# Patient Record
Sex: FEMALE | Race: BLACK OR AFRICAN AMERICAN | NOT HISPANIC OR LATINO | Employment: FULL TIME | URBAN - METROPOLITAN AREA
[De-identification: names, ages, dates, MRNs, and addresses within clinical notes are randomized per-mention and may not be internally consistent; named-entity substitution may affect disease eponyms.]

---

## 2017-01-03 DIAGNOSIS — M17.0 OSTEOARTHRITIS OF BOTH KNEES, UNSPECIFIED OSTEOARTHRITIS TYPE: ICD-10-CM

## 2017-01-03 RX ORDER — MELOXICAM 15 MG/1
TABLET ORAL
Qty: 30 TABLET | Refills: 5 | Status: SHIPPED | OUTPATIENT
Start: 2017-01-03 | End: 2019-05-21

## 2017-01-05 ENCOUNTER — PROCEDURE VISIT (OUTPATIENT)
Dept: OPHTHALMOLOGY | Facility: CLINIC | Age: 52
End: 2017-01-05
Payer: COMMERCIAL

## 2017-01-05 DIAGNOSIS — H34.8120 CENTRAL RETINAL VEIN OCCLUSION WITH MACULAR EDEMA OF LEFT EYE: Primary | ICD-10-CM

## 2017-01-05 PROCEDURE — 67028 INJECTION EYE DRUG: CPT | Mod: LT,S$GLB,, | Performed by: OPHTHALMOLOGY

## 2017-01-05 PROCEDURE — 92134 CPTRZ OPH DX IMG PST SGM RTA: CPT | Mod: S$GLB,,, | Performed by: OPHTHALMOLOGY

## 2017-01-05 PROCEDURE — 99499 UNLISTED E&M SERVICE: CPT | Mod: S$GLB,,, | Performed by: OPHTHALMOLOGY

## 2017-01-05 NOTE — PROGRESS NOTES
===============================  Chastity Hung,   51 y.o. female   Last visit [facility]: :12/5/2016   Last visit eye dept. 12/5/2016  [facility]:  Corrected distance visual acuity was 20/25 in the right eye and 20/40 in the left eye.   Not recorded         Not recorded         Not recorded        Chief Complaint   Patient presents with    CRVO     today eylea vs focal os, pt states no change in vision since last visit     Ophthalmic Medications     Ophthalmic - Anti-inflammatory, Glucocorticoids Start End    prednisoLONE acetate (PRED FORTE) 1 % DrpS 7/6/2016     Sig: Place 1 drop into both eyes 4 (four) times daily.    Route: Both Eyes         HPI     CRVO    Additional comments: today eylea vs focal os, pt states no change in   vision since last visit           Comments   --DM since 1999  NO H/O BDR  --CRVO OS with associated ME diagnosed 2/25/14  Previous Avastin, now Eylea  LAST Eylea 12/5/16  --JEANE JACOBS (dx by Dr. JEANE Schneider)  C:D 0.6/0.55  /519  RNFL OD nl OS low IT 2/14/14 with Dr. Schneider  Tmax 20/18   Last VF 2/25/14       Last edited by KRYSTA Muhammad on 1/5/2017  8:10 AM. (History)      Read Studies: y  Vitalsy    ________________  1/5/2017  1. Central retinal vein occlusion with macular edema of left eye      .  crvo  Now treated to minmixed   rec focal  1/5/2017  Diagnosis :  Os  focal}   Plan : rtc 1 mo - eyela  Or obsevre     OS} today  Next visit : rtc 1 mo          Laser Procedure Note    Laser: Focal OS  ARGON  Power: 330  Duration: 30  Interval: 450  Number: 38  Lens centralis  Complications: None  Procedure ordered: y  Consent: y  Opnote: y  Charge capture:y  Sided procedure note: y             ===========================

## 2017-01-05 NOTE — MR AVS SNAPSHOT
"    [facility]  [address] [place] [address]  Phone: [phone]  Fax: [phone]                  Chastity Hung   2017 8:00 AM   Procedure visit    Description:  Female : 1965   Provider:  SHIMA Lopez MD   Department:  [facility]           Reason for Visit     CRVO           Diagnoses this Visit        Comments    Central retinal vein occlusion with macular edema of left eye    -  Primary            To Do List           Goals (5 Years of Data)     None      Follow-Up and Disposition     Return in about 1 month (around 2017).      [facility]     [facility] On Call Nurse Care Line -  Assistance  Registered nurses in the [facility] On Call Center provide clinical advisement, health education, appointment booking, and other advisory services.  Call for this free service at [phone].             Medications           Message regarding Medications     Verify the changes and/or additions to your medication regime listed below are the same as discussed with your clinician today.  If any of these changes or additions are incorrect, please notify your healthcare provider.             Verify that the below list of medications is an accurate representation of the medications you are currently taking.  If none reported, the list may be blank. If incorrect, please contact your healthcare provider. Carry this list with you in case of emergency.           Current Medications     ACCU-CHEK FREDI PLUS TEST STRP Strp TEST BLOOD SUGAR THREE TIMES DAILY    diphenhydramine-acetaminophen (TYLENOL PM)  mg Tab Take 1 tablet by mouth nightly as needed.    fluticasone (FLONASE) 50 mcg/actuation nasal spray 2 sprays by Each Nare route once daily.    glimepiride (AMARYL) 1 MG tablet TAKE 1 TABLET BY MOUTH EVERY MORNING    insulin needles, disposable, (BD INSULIN PEN NEEDLE UF MINI) 31 x 3/16 " Ndle USE AS DIRECTED WITH VICTOZA    losartan-hydrochlorothiazide " 50-12.5 mg (HYZAAR) 50-12.5 mg per tablet Take 1 tablet by mouth every morning.    meloxicam (MOBIC) 15 MG tablet TAKE 1 TABLET BY MOUTH EVERY DAY    metformin (GLUCOPHAGE) 1000 MG tablet TAKE 1 TABLET BY MOUTH TWICE DAILY WITH FOOD    multivitamin with minerals tablet Take 1 tablet by mouth once daily.    prednisoLONE acetate (PRED FORTE) 1 % DrpS Place 1 drop into both eyes 4 (four) times daily.    senna-docusate 8.6-50 mg (PERICOLACE) 8.6-50 mg per tablet Take 1 tablet by mouth once daily.    sertraline (ZOLOFT) 50 MG tablet TAKE 1 TABLET BY MOUTH EVERY DAY    tolterodine (DETROL) 2 MG Tab TAKE 1 TABLET BY MOUTH TWICE DAILY           Clinical Reference Information           Allergies as of 1/5/2017     No Known Allergies      Immunizations Administered on Date of Encounter - 1/5/2017     None      Orders Placed During Today's Visit      Normal Orders This Visit    Posterior Segment OCT Retina-Both eyes

## 2017-01-06 DIAGNOSIS — E11.9 TYPE 2 DIABETES MELLITUS WITHOUT COMPLICATION: ICD-10-CM

## 2017-02-10 ENCOUNTER — NURSE TRIAGE (OUTPATIENT)
Dept: ADMINISTRATIVE | Facility: CLINIC | Age: 52
End: 2017-02-10

## 2017-02-10 NOTE — TELEPHONE ENCOUNTER
Reason for Disposition   [1] Probable influenza (fever) with no complications AND [2] NOT HIGH RISK (all triage questions negative)   Influenza prevention, questions about    Protocols used: ST INFLUENZA - SEASONAL-A-, ST INFLUENZA EXPOSURE-A-    Patient has flu like symptoms and is wondering how to care for it. She states she started having symptoms today including body aches, chills, stuffy nose, and sore throat. She isn't sure if she was exposed to anyone with the flu that she is aware of. She is not sure if she has fever. She denies shortness of breath. She states she is normally fairly healthy. Advised on home care including drinking plenty of fluids, ibuprofen for body aches/fever, coricidin a and steam showers for nasal congestion. Advised if any worsening symptoms to call back. Please contact caller with any further care advice.

## 2017-03-01 ENCOUNTER — PROCEDURE VISIT (OUTPATIENT)
Dept: OPHTHALMOLOGY | Facility: CLINIC | Age: 52
End: 2017-03-01
Payer: COMMERCIAL

## 2017-03-01 DIAGNOSIS — H34.8120 CENTRAL RETINAL VEIN OCCLUSION WITH MACULAR EDEMA OF LEFT EYE: Primary | ICD-10-CM

## 2017-03-01 DIAGNOSIS — E11.9 TYPE 2 DIABETES MELLITUS WITHOUT COMPLICATION, WITH LONG-TERM CURRENT USE OF INSULIN: ICD-10-CM

## 2017-03-01 DIAGNOSIS — Z79.4 TYPE 2 DIABETES MELLITUS WITHOUT COMPLICATION, WITH LONG-TERM CURRENT USE OF INSULIN: ICD-10-CM

## 2017-03-01 PROCEDURE — 92014 COMPRE OPH EXAM EST PT 1/>: CPT | Mod: 25,S$GLB,, | Performed by: OPHTHALMOLOGY

## 2017-03-01 PROCEDURE — 92134 CPTRZ OPH DX IMG PST SGM RTA: CPT | Mod: S$GLB,,, | Performed by: OPHTHALMOLOGY

## 2017-03-01 PROCEDURE — 67028 INJECTION EYE DRUG: CPT | Mod: LT,S$GLB,, | Performed by: OPHTHALMOLOGY

## 2017-03-01 NOTE — PROGRESS NOTES
===============================  Chastity Hung,   51 y.o. female   Last visit [facility]: :1/5/2017   Last visit eye dept. 1/5/2017  [facility]:  Corrected distance visual acuity was 20/25 in the right eye and 20/80 -2 in the left eye.  Tonometry     Tonometry (Applanation, 9:31 AM)      Right Left   Pressure 12 12                 Wearing Rx     Wearing Rx      Sphere Cylinder Axis Add   Right -1.25 +0.50 130 +2.25   Left -1.00 +0.50 035 +2.25       Type:  PAL              Manifest Refraction     Manifest Refraction      Sphere Cylinder Axis Dist Add   Right -1.25 +0.50 130  +2.25   Left -1.50 +0.50 035 20/60 +2.25                 Chief Complaint   Patient presents with    CRVO     EYLEA VRS OBSERVE     Ophthalmic Medications     Ophthalmic - Anti-inflammatory, Glucocorticoids Start End    prednisoLONE acetate (PRED FORTE) 1 % DrpS 7/6/2016     Sig: Place 1 drop into both eyes 4 (four) times daily.    Route: Both Eyes         HPI     CRVO    Additional comments: EYLEA VRS OBSERVE           Comments   --DM since 1999  NO H/O BDR  --CRVO OS with associated ME diagnosed 2/25/14  Previous Avastin, now Eylea  LAST Eylea 12/5/16  FOCAL OS 1/5/17  --JEANE JACOBS (dx by Dr. JEANE Schneider)  C:D 0.6/0.55  /519  RNFL OD nl OS low IT 2/14/14 with Dr. Schneider  Tmax 20/18   Last VF 2/25/14       Last edited by Nela Martinez on 3/1/2017  7:54 AM. (History)      Read Studies: y  Vitalsy    ________________  3/1/2017  1. Central retinal vein occlusion with macular edema of left eye    2. Type 2 diabetes mellitus without complication, with long-term current use of insulin      DM, no BDR  CRVO diagnosed 2/2014  Last eylea 12/5/17.  Os focal last visit - worse!, but 3 months since last Eylea  20/40> 20 100  MR today 20/60+1    eyela    3/1/2017  Diagnosis :  Os crvo  Today:   Eylea (afibercept) 2 mg/0.05 ml Intravitreal Injection , OS   Follow up: rtc 1 mo eyela #2        Call 24/7 for any worsening of vision. Check  OU QD. Gave my home  phone number.      Procedure  Note:   OS}  Eylea (afibercept) 2 mg/0.05 ml Intravitreal Injection    I have explained the Risks, Benefits and Alternatives of the procedure in detail.  The patient voices understanding and all questions have been answered.  The patient agrees to proceed as discussed.  LIDOCAINE 2%  subconj bleb  was used for anesthesia.  Topical betadine was used for antisepsis.  0.05 cc was  injected 3.7 mm from corneal limbus in the inferotemporal quadrant.  Following injection the IOP was less than thirty (<30) by tonopen.  The eye was then thoroughly irrigated with BSS.  Patient tolerated procedure well.  No complications were observed.  The Patient was educated that mild irritation tonight was normal secondary to topical antispsis use.  Pt was advised to call at any time day or night for pain, redness, or any decline in vision. I gave the patient my home number as well as the clinic on call number. Daily visual checks and Amsler grid testing were reviewed.  ciloxan Antibiotic Drops to be used 4 times daily for 4 days  SHIMA Lopez MD  Procedure ordered: y  Consent: y  Pre auth: y  MAR:y  Opnote: y  Charge capture:y  Sided procedure note: y       ===========================

## 2017-03-01 NOTE — MR AVS SNAPSHOT
[facility]  [address] [place] [address]  Phone: [phone]  Fax: [phone]                  Chastity Hung   3/1/2017 8:45 AM   Procedure visit    Description:  Female : 1965   Provider:  SHIMA Lopez MD   Department:  [facility]           Reason for Visit     CRVO           Diagnoses this Visit        Comments    Central retinal vein occlusion with macular edema of left eye    -  Primary            To Do List           Future Appointments        Provider Department Dept Phone    3/1/2017 8:45 AM SHIMA Lopez MD [facility] [phone]      Goals (5 Years of Data)     None      Follow-Up and Disposition     Return in about 1 month (around 2017).      [facility]     [facility] On Call Nurse Care Line -  Assistance  Registered nurses in the [facility] On Call Center provide clinical advisement, health education, appointment booking, and other advisory services.  Call for this free service at [phone].             Medications           Message regarding Medications     Verify the changes and/or additions to your medication regime listed below are the same as discussed with your clinician today.  If any of these changes or additions are incorrect, please notify your healthcare provider.        These medications were administered today        Dose Freq    aflibercept Soln 2 mg 2 mg Clinic/HOD 1 time    Si.05 mLs (2 mg total) by Intravitreal route one time.    Class: Normal    Route: Intravitreal           Verify that the below list of medications is an accurate representation of the medications you are currently taking.  If none reported, the list may be blank. If incorrect, please contact your healthcare provider. Carry this list with you in case of emergency.           Current Medications     ACCU-CHEK FREDI PLUS TEST STRP Strp TEST BLOOD SUGAR THREE TIMES DAILY    diphenhydramine-acetaminophen (TYLENOL PM)  mg Tab Take  "1 tablet by mouth nightly as needed.    fluticasone (FLONASE) 50 mcg/actuation nasal spray 2 sprays by Each Nare route once daily.    glimepiride (AMARYL) 1 MG tablet TAKE 1 TABLET BY MOUTH EVERY MORNING    insulin needles, disposable, (BD INSULIN PEN NEEDLE UF MINI) 31 x 3/16 " Ndle USE AS DIRECTED WITH VICTOZA    losartan-hydrochlorothiazide 50-12.5 mg (HYZAAR) 50-12.5 mg per tablet Take 1 tablet by mouth every morning.    meloxicam (MOBIC) 15 MG tablet TAKE 1 TABLET BY MOUTH EVERY DAY    metformin (GLUCOPHAGE) 1000 MG tablet TAKE 1 TABLET BY MOUTH TWICE DAILY WITH FOOD    multivitamin with minerals tablet Take 1 tablet by mouth once daily.    prednisoLONE acetate (PRED FORTE) 1 % DrpS Place 1 drop into both eyes 4 (four) times daily.    senna-docusate 8.6-50 mg (PERICOLACE) 8.6-50 mg per tablet Take 1 tablet by mouth once daily.    sertraline (ZOLOFT) 50 MG tablet TAKE 1 TABLET BY MOUTH EVERY DAY    tolterodine (DETROL) 2 MG Tab TAKE 1 TABLET BY MOUTH TWICE DAILY           Clinical Reference Information           Allergies as of 3/1/2017     No Known Allergies      Immunizations Administered on Date of Encounter - 3/1/2017     None      Orders Placed During Today's Visit      Normal Orders This Visit    Posterior Segment OCT Retina-Both eyes     Prior Authorization Order       Language Assistance Services     ATTENTION: Language assistance services are available, free of charge. Please call [phone].      ATENCIÓN: Si habla kailee, tiene a zayas disposición servicios gratuitos de asistencia lingüística. Llkrishna al [phone].     [facility] Ý: N?u b?n nói Ti?ng Vi?t, có các d?ch v? h? tr? ngôn ng? mi?n phí dành cho b?n. G?i s? [phone].         [facility] complies with applicable Federal civil rights laws and does not discriminate on the basis of race, color, national origin, age, disability, or sex.        "

## 2017-03-10 ENCOUNTER — PATIENT MESSAGE (OUTPATIENT)
Dept: FAMILY MEDICINE | Facility: CLINIC | Age: 52
End: 2017-03-10

## 2017-03-29 ENCOUNTER — PROCEDURE VISIT (OUTPATIENT)
Dept: OPHTHALMOLOGY | Facility: CLINIC | Age: 52
End: 2017-03-29
Payer: COMMERCIAL

## 2017-03-29 DIAGNOSIS — H34.8120 CENTRAL RETINAL VEIN OCCLUSION WITH MACULAR EDEMA OF LEFT EYE: Primary | ICD-10-CM

## 2017-03-29 PROCEDURE — 92134 CPTRZ OPH DX IMG PST SGM RTA: CPT | Mod: S$GLB,,, | Performed by: OPHTHALMOLOGY

## 2017-03-29 PROCEDURE — 99499 UNLISTED E&M SERVICE: CPT | Mod: S$GLB,,, | Performed by: OPHTHALMOLOGY

## 2017-03-29 PROCEDURE — 67028 INJECTION EYE DRUG: CPT | Mod: LT,S$GLB,, | Performed by: OPHTHALMOLOGY

## 2017-03-29 NOTE — PROGRESS NOTES
"    ===============================  Chastity Hung,   51 y.o. female   Last visit [facility]: :3/1/2017   Last visit eye dept. 3/1/2017  [facility]:  Corrected distance visual acuity was 20/25 in the right eye and 20/50 in the left eye.   Not recorded         Not recorded         Not recorded        Chief Complaint   Patient presents with    CRVO     eylea os, pt states vision is "no better, no worse"     Ophthalmic Medications     Ophthalmic - Anti-inflammatory, Glucocorticoids Start End    prednisoLONE acetate (PRED FORTE) 1 % DrpS 7/6/2016     Sig: Place 1 drop into both eyes 4 (four) times daily.    Route: Both Eyes         HPI     CRVO    Additional comments: eylea os, pt states vision is "no better, no worse"           Comments   Last Diabetic Eye Exam 3/1/17  Last S. COAG check 2/2014    --DM since 1999  NO H/O BDR  --CRVO OS with associated ME diagnosed 2/25/14  LAST Eylea 3/1/17  FOCAL OS 1/5/17  --S. COAG (dx by Dr. JEANE Schneider)  C:D 0.6/0.55  /519  RNFL OD nl OS low IT 2/14/14 with Dr. Schneider  Tmax 20/18   Last VF 2/25/14       Last edited by KRYSTA Muhammad on 3/29/2017  8:09 AM. (History)      Read Studies: y  Vitalsy    ________________  3/29/2017  Problem List Items Addressed This Visit        Eye/Vision problems    Central retinal vein occlusion with macular edema of left eye - Primary    Overview     Diagnosed 2/25/14  Treating with Eylea  History of Focal OS         Relevant Medications    aflibercept Soln 2 mg (Completed)    Other Relevant Orders    Posterior Segment OCT Retina-Both eyes (Completed)    Prior Authorization Order        .  Much better by oct - no me   va 20/80 tp 20 50    3/29/2017  Diagnosis :  Os crvo  Today:   Eylea (afibercept) 2 mg/0.05 ml Intravitreal Injection , OS   Follow up: rtc 1 mo  avgf then focal            Call 24/7 for any worsening of vision. Check  OU QD. Gave my home phone number.      Procedure  Note:   OS}  Eylea (afibercept) 2 mg/0.05 ml Intravitreal " Injection    I have explained the Risks, Benefits and Alternatives of the procedure in detail.  The patient voices understanding and all questions have been answered.  The patient agrees to proceed as discussed.  LIDOCAINE 2%  subconj bleb  was used for anesthesia.  Topical betadine was used for antisepsis.  0.05 cc was  injected 3.7 mm from corneal limbus in the inferotemporal quadrant.  Following injection the IOP was less than thirty (<30) by tonopen.  The eye was then thoroughly irrigated with BSS.  Patient tolerated procedure well.  No complications were observed.  The Patient was educated that mild irritation tonight was normal secondary to topical antispsis use.  Pt was advised to call at any time day or night for pain, redness, or any decline in vision. I gave the patient my home number as well as the clinic on call number. Daily visual checks and Amsler grid testing were reviewed.  ciloxan Antibiotic Drops to be used 4 times daily for 4 days  SHIMA Lopez MD  Procedure ordered: y  Consent: y  Pre auth: y  MAR:y  Opnote: y  Charge capture:y  Sided procedure note: y       ===========================

## 2017-03-29 NOTE — MR AVS SNAPSHOT
[facility]  [address] [place] [address]  Phone: [phone]  Fax: [phone]                  Chastity Hung   3/29/2017 8:00 AM   Procedure visit    Description:  Female : 1965   Provider:  SHIMA Lopez MD   Department:  [facility]           Reason for Visit     CRVO           Diagnoses this Visit        Comments    Central retinal vein occlusion with macular edema of left eye    -  Primary            To Do List           Future Appointments        Provider Department Dept Phone    3/30/2017 8:00 AM Tiarra Stuart RD, CDE [facility] [phone]      Goals (5 Years of Data)     None      Follow-Up and Disposition     Return in about 1 month (around 2017).      [facility]     [facility] Nurse Care [facility] - 24/7 Assistance  Registered nurses in the [facility] Center provide clinical advisement, health education, appointment booking, and other advisory services.  Call for this free service at [phone].             Medications           Message regarding Medications     Verify the changes and/or additions to your medication regime listed below are the same as discussed with your clinician today.  If any of these changes or additions are incorrect, please notify your healthcare provider.        These medications were administered today        Dose Freq    aflibercept Soln 2 mg 2 mg Clinic/HOD 1 time    Si.05 mLs (2 mg total) by Intravitreal route one time.    Class: Normal    Route: Intravitreal           Verify that the below list of medications is an accurate representation of the medications you are currently taking.  If none reported, the list may be blank. If incorrect, please contact your healthcare provider. Carry this list with you in case of emergency.           Current Medications     blood sugar diagnostic (ACCU-CHEK FREDI PLUS TEST STRP) Strp TEST BLOOD SUGAR THREE TIMES DAILY     "diphenhydramine-acetaminophen (TYLENOL PM)  mg Tab Take 1 tablet by mouth nightly as needed.    fluticasone (FLONASE) 50 mcg/actuation nasal spray 2 sprays by Each Nare route once daily.    glimepiride (AMARYL) 1 MG tablet TAKE 1 TABLET BY MOUTH EVERY MORNING    insulin needles, disposable, (BD INSULIN PEN NEEDLE UF MINI) 31 x 3/16 " Ndle USE AS DIRECTED WITH VICTOZA    losartan-hydrochlorothiazide 50-12.5 mg (HYZAAR) 50-12.5 mg per tablet Take 1 tablet by mouth every morning.    meloxicam (MOBIC) 15 MG tablet TAKE 1 TABLET BY MOUTH EVERY DAY    metformin (GLUCOPHAGE) 1000 MG tablet TAKE 1 TABLET BY MOUTH TWICE DAILY WITH FOOD    multivitamin with minerals tablet Take 1 tablet by mouth once daily.    prednisoLONE acetate (PRED FORTE) 1 % DrpS Place 1 drop into both eyes 4 (four) times daily.    senna-docusate 8.6-50 mg (PERICOLACE) 8.6-50 mg per tablet Take 1 tablet by mouth once daily.    sertraline (ZOLOFT) 50 MG tablet TAKE 1 TABLET BY MOUTH EVERY DAY    tolterodine (DETROL) 2 MG Tab TAKE 1 TABLET BY MOUTH TWICE DAILY           Clinical Reference Information           Allergies as of 3/29/2017     No Known Allergies      Immunizations Administered on Date of Encounter - 3/29/2017     None      Orders Placed During Today's Visit      Normal Orders This Visit    Posterior Segment OCT Retina-Both eyes     Prior Authorization Order       Administrations This Visit     aflibercept Soln 2 mg     Admin Date Action Dose Route Administered By             03/29/2017 Given 2 mg Intravitreal SHIMA Lopez MD                      Language Assistance Services     ATTENTION: Language assistance services are available, free of charge. Please call [phone].      ATENCIÓN: Si habla español, tiene a zayas disposición servicios gratuitos de asistencia lingüística. Llame al [phone].     CHÚ Ý: N?u b?n nói Ti?ng Vi?t, có các d?ch v? h? tr? ngôn ng? mi?n phí dành cho b?n. G?i s? [phone].         [facility] complies with applicable Federal civil rights laws and does not discriminate on the basis of race, color, national origin, age, disability, or sex.

## 2017-04-26 ENCOUNTER — PROCEDURE VISIT (OUTPATIENT)
Dept: OPHTHALMOLOGY | Facility: CLINIC | Age: 52
End: 2017-04-26
Payer: COMMERCIAL

## 2017-04-26 DIAGNOSIS — H34.8120 CENTRAL RETINAL VEIN OCCLUSION WITH MACULAR EDEMA OF LEFT EYE: Primary | ICD-10-CM

## 2017-04-26 PROCEDURE — 67028 INJECTION EYE DRUG: CPT | Mod: LT,S$GLB,, | Performed by: OPHTHALMOLOGY

## 2017-04-26 PROCEDURE — 92134 CPTRZ OPH DX IMG PST SGM RTA: CPT | Mod: S$GLB,,, | Performed by: OPHTHALMOLOGY

## 2017-04-26 PROCEDURE — 99499 UNLISTED E&M SERVICE: CPT | Mod: S$GLB,,, | Performed by: OPHTHALMOLOGY

## 2017-04-26 NOTE — MR AVS SNAPSHOT
[facility]  [address] [facility] [place] [address]  Phone: [phone]  Fax: [phone]                  Chastity Hung   2017 7:45 AM   Procedure visit    Description:  Female : 1965   Provider:  SHIMA Lopez MD   Department:  [facility]           Reason for Visit     CRVO           Diagnoses this Visit        Comments    Central retinal vein occlusion with macular edema of left eye    -  Primary            To Do List           Goals (5 Years of Data)     None      Follow-Up and Disposition     Return in about 1 month (around 2017).      [facility] Call     [facility] On Call Nurse Care Line -  Assistance  Unless otherwise directed by your provider, please contact [facility], our nurse care line that is available for  assistance.     Registered nurses in the [facility] Center provide: appointment scheduling, clinical advisement, health education, and other advisory services.  Call: [phone] (toll free)               Medications           Message regarding Medications     Verify the changes and/or additions to your medication regime listed below are the same as discussed with your clinician today.  If any of these changes or additions are incorrect, please notify your healthcare provider.        These medications were administered today        Dose Freq    aflibercept Soln 2 mg 2 mg Clinic/HOD 1 time    Si.05 mLs (2 mg total) by Intravitreal route one time.    Class: Normal    Route: Intravitreal           Verify that the below list of medications is an accurate representation of the medications you are currently taking.  If none reported, the list may be blank. If incorrect, please contact your healthcare provider. Carry this list with you in case of emergency.           Current Medications     blood sugar diagnostic (ACCU-CHEK FREDI PLUS TEST STRP) Strp TEST BLOOD SUGAR THREE TIMES DAILY    diphenhydramine-acetaminophen (TYLENOL  "PM)  mg Tab Take 1 tablet by mouth nightly as needed.    fluticasone (FLONASE) 50 mcg/actuation nasal spray 2 sprays by Each Nare route once daily.    glimepiride (AMARYL) 1 MG tablet TAKE 1 TABLET BY MOUTH EVERY MORNING    insulin needles, disposable, (BD INSULIN PEN NEEDLE UF MINI) 31 x 3/16 " Ndle USE AS DIRECTED WITH VICTOZA    losartan-hydrochlorothiazide 50-12.5 mg (HYZAAR) 50-12.5 mg per tablet Take 1 tablet by mouth every morning.    meloxicam (MOBIC) 15 MG tablet TAKE 1 TABLET BY MOUTH EVERY DAY    metformin (GLUCOPHAGE) 1000 MG tablet TAKE 1 TABLET BY MOUTH TWICE DAILY WITH FOOD    multivitamin with minerals tablet Take 1 tablet by mouth once daily.    prednisoLONE acetate (PRED FORTE) 1 % DrpS Place 1 drop into both eyes 4 (four) times daily.    senna-docusate 8.6-50 mg (PERICOLACE) 8.6-50 mg per tablet Take 1 tablet by mouth once daily.    sertraline (ZOLOFT) 50 MG tablet TAKE 1 TABLET BY MOUTH EVERY DAY    tolterodine (DETROL) 2 MG Tab TAKE 1 TABLET BY MOUTH TWICE DAILY           Clinical Reference Information           Allergies as of 4/26/2017     No Known Allergies      Immunizations Administered on Date of Encounter - 4/26/2017     None      Orders Placed During Today's Visit      Normal Orders This Visit    Posterior Segment OCT Retina-Both eyes     Prior Authorization Order       Administrations This Visit     aflibercept Soln 2 mg     Admin Date Action Dose Route Administered By             04/26/2017 Given 2 mg Intravitreal SHIMA Lopez MD                      Language Assistance Services     ATTENTION: Language assistance services are available, free of charge. Please call [phone].      ATENCIÓN: Si charlotte dugan, tiene a zayas disposición servicios gratuitos de asistencia lingüística. Llame al [phone].     CHÚ Ý: N?u b?n nói Ti?ng Vi?t, có các d?ch v? h? tr? ngôn ng? mi?n phí dành cho b?n. G?i s? [phone].         [facility] complies with applicable " Federal civil rights laws and does not discriminate on the basis of race, color, national origin, age, disability, or sex.

## 2017-04-26 NOTE — PROGRESS NOTES
===============================  04/26/2017   Chastity Hung,   51 y.o. female   Last visit [facility]: :3/29/2017   [facility]  [facility]  Cor[place] visual acuity was 20/20 in the right eye and 20/60 in the left eye.   Not recorded         Not recorded         Not recorded        Chief Complaint   Patient presents with    CRVO     EYLEA OS     Ophthalmic Medications     Ophthalmic - Anti-inflammatory, Glucocorticoids Start End    prednisoLONE acetate (PRED FORTE) 1 % DrpS 7/6/2016     Sig: Place 1 drop into both eyes 4 (four) times daily.    Route: Both Eyes         HPI     CRVO    Additional comments: EYLEA OS           Comments   Last Diabetic Eye Exam 3/1/17  Last S. COAG check 2/2014    --DM since 1999  NO H/O BDR  --CRVO OS with associated ME diagnosed 2/25/14  Previous Avastin, now Eylea  LAST Eylea 3/29/17  FOCAL OS 1/5/17  --S. COAG (dx by Dr. JEANE Schneider)  C:D 0.6/0.55  /519  RNFL OD nl OS low IT 2/14/14 with Dr. Schneider  Tmax 20/18   Last VF 2/25/14       Last edited by Nela Martinez on 4/26/2017  7:39 AM. (History)      Read Studies: y  Vitalsy  ________________  4/26/2017  Problem List Items Addressed This Visit        Eye/Vision problems    Central retinal vein occlusion with macular edema of left eye - Primary    Overview     Diagnosed 2/25/14  Treating with Eylea  History of Focal OS         Relevant Medications    aflibercept Soln 2 mg (Completed)    Other Relevant Orders    Prior Authorization Order    Posterior Segment OCT Retina-Both eyes        .  .  Looks great on oct   Focal laseer in janujary - 2 moths therafter worse worse   plan  eyle today repeat foal 2 weeks - eyela at week 4  To contiune then back off yela   20/60 NO ME - wou;ld be best expectation     4/26/2017  Diagnosis :  Os rvo  Today:   Eylea (afibercept) 2 mg/0.05 ml Intravitreal Injection , OS   Follow up: rtc 2 weeks focal - 4 weeks eyela         Call 24/7 for any worsening of vision. Check  OU  QD. Gave my home phone number.      Procedure  Note:   OS}  Eylea (afibercept) 2 mg/0.05 ml Intravitreal Injection    I have explained the Risks, Benefits and Alternatives of the procedure in detail.  The patient voices understanding and all questions have been answered.  The patient agrees to proceed as discussed.  LIDOCAINE 2% subconj bleb    was used for anesthesia.  Topical betadine was used for antisepsis.  0.05 cc was  injected 3.7 mm from corneal limbus in the inferotemporal quadrant.  Following injection the IOP was less than thirty (<30) by tonopen.  The eye was then thoroughly irrigated with BSS.  Patient tolerated procedure well.  No complications were observed.  The Patient was educated that mild irritation tonight was normal secondary to topical antispsis use.  Pt was advised to call at any time day or night for pain, redness, or any decline in vision. I gave the patient my home number as well as the clinic on call number. Daily visual checks and Amsler grid testing were reviewed.  ciloxan Antibiotic Drops to be used 4 times daily for 4 days  SHIMA Lopez MD  Procedure ordered: y  Consent: y  Pre auth: y  MAR:y  Opnote: y  Charge capture:y  Sided procedure note: y       ===========================

## 2017-05-13 RX ORDER — METFORMIN HYDROCHLORIDE 1000 MG/1
TABLET ORAL
Qty: 60 TABLET | Refills: 0 | Status: SHIPPED | OUTPATIENT
Start: 2017-05-13 | End: 2017-06-09 | Stop reason: SDUPTHER

## 2017-05-19 ENCOUNTER — OFFICE VISIT (OUTPATIENT)
Dept: FAMILY MEDICINE | Facility: CLINIC | Age: 52
End: 2017-05-19
Payer: COMMERCIAL

## 2017-05-19 VITALS
RESPIRATION RATE: 18 BRPM | DIASTOLIC BLOOD PRESSURE: 80 MMHG | HEIGHT: 64 IN | OXYGEN SATURATION: 98 % | TEMPERATURE: 98 F | HEART RATE: 104 BPM | WEIGHT: 293 LBS | BODY MASS INDEX: 50.02 KG/M2 | SYSTOLIC BLOOD PRESSURE: 124 MMHG

## 2017-05-19 DIAGNOSIS — S81.832A: Primary | ICD-10-CM

## 2017-05-19 DIAGNOSIS — Z23 NEED FOR TETANUS BOOSTER: ICD-10-CM

## 2017-05-19 PROCEDURE — 3074F SYST BP LT 130 MM HG: CPT | Mod: S$GLB,,, | Performed by: FAMILY MEDICINE

## 2017-05-19 PROCEDURE — 3079F DIAST BP 80-89 MM HG: CPT | Mod: S$GLB,,, | Performed by: FAMILY MEDICINE

## 2017-05-19 PROCEDURE — 99999 PR PBB SHADOW E&M-EST. PATIENT-LVL III: CPT | Mod: PBBFAC,,, | Performed by: FAMILY MEDICINE

## 2017-05-19 PROCEDURE — 99213 OFFICE O/P EST LOW 20 MIN: CPT | Mod: 25,S$GLB,, | Performed by: FAMILY MEDICINE

## 2017-05-19 PROCEDURE — 1160F RVW MEDS BY RX/DR IN RCRD: CPT | Mod: S$GLB,,, | Performed by: FAMILY MEDICINE

## 2017-05-19 PROCEDURE — 90714 TD VACC NO PRESV 7 YRS+ IM: CPT | Mod: S$GLB,,, | Performed by: FAMILY MEDICINE

## 2017-05-19 PROCEDURE — 90471 IMMUNIZATION ADMIN: CPT | Mod: S$GLB,,, | Performed by: FAMILY MEDICINE

## 2017-05-19 NOTE — MR AVS SNAPSHOT
[facility]  [address] [facility] [address]  Phone: [phone]                  Chastity Hung   2017 11:00 AM   Office Visit    Description:  Female : 1965   Provider:  Alexandra Dawkins MD   Department:  [facility]           Reason for Visit     Leg Injury           Diagnoses this Visit        Comments    Puncture wound of leg excluding thigh, left, initial encounter    -  Primary     Need for tetanus booster                To Do List           Future Appointments        Provider Department Dept Phone    2017 7:45 AM SHIMA Lopez MD [facility] [phone]    2017 1:30 PM Alexandra Dawkins MD [facility] [phone]      Goals (5 Years of Data)     None      [facility]     [facility] On [facility] Nurse Care Line -  Assistance  Unless otherwise directed by your provider, please contact [facility], our nurse care line that is available for  assistance.     Registered nurses in the [facility] Center provide: appointment scheduling, clinical advisement, health education, and other advisory services.  Call: [phone] (toll free)               Medications           Message regarding Medications     Verify the changes and/or additions to your medication regime listed below are the same as discussed with your clinician today.  If any of these changes or additions are incorrect, please notify your healthcare provider.             Verify that the below list of medications is an accurate representation of the medications you are currently taking.  If none reported, the list may be blank. If incorrect, please contact your healthcare provider. Carry this list with you in case of emergency.           Current Medications     diphenhydramine-acetaminophen (TYLENOL PM)  mg Tab Take 1 tablet by mouth nightly as needed.    fluticasone (FLONASE) 50 mcg/actuation nasal spray 2  "sprays by Each Nare route once daily.    glimepiride (AMARYL) 1 MG tablet TAKE 1 TABLET BY MOUTH EVERY MORNING    insulin needles, disposable, (BD INSULIN PEN NEEDLE UF MINI) 31 x 3/16 " Ndle USE AS DIRECTED WITH VICTOZA    losartan-hydrochlorothiazide 50-12.5 mg (HYZAAR) 50-12.5 mg per tablet Take 1 tablet by mouth every morning.    meloxicam (MOBIC) 15 MG tablet TAKE 1 TABLET BY MOUTH EVERY DAY    metformin (GLUCOPHAGE) 1000 MG tablet TAKE 1 TABLET BY MOUTH TWICE DAILY WITH FOOD    multivitamin with minerals tablet Take 1 tablet by mouth once daily.    prednisoLONE acetate (PRED FORTE) 1 % DrpS Place 1 drop into both eyes 4 (four) times daily.    senna-docusate 8.6-50 mg (PERICOLACE) 8.6-50 mg per tablet Take 1 tablet by mouth once daily.    sertraline (ZOLOFT) 50 MG tablet TAKE 1 TABLET BY MOUTH EVERY DAY    tolterodine (DETROL) 2 MG Tab TAKE 1 TABLET BY MOUTH TWICE DAILY    blood sugar diagnostic (ACCU-CHEK FREDI PLUS TEST STRP) Strp TEST BLOOD SUGAR THREE TIMES DAILY           Clinical Reference Information           Your Vitals Were     BP Pulse Temp Resp Height Weight    124/80 104 98.1 °F (36.7 °C) (Tympanic) 18 5' 3.5" (1.613 m) 225.5 kg (497 lb 2.2 oz)    SpO2 BMI             98% 86.68 kg/m2         Blood Pressure          Most Recent Value    BP  124/80      Allergies as of 5/19/2017     No Known Allergies      Immunizations Administered on Date of Encounter - 5/19/2017     Name Date Dose VIS Date Route    TD  Incomplete 0.5 mL 2/24/2015 Intramuscular      Orders Placed During Today's Visit      Normal Orders This Visit    Td Vaccine (Adult) - Preservative Free       Language Assistance Services     ATTENTION: Language assistance services are available, free of charge. Please call [phone].      ATENCIÓN: Si charlotte kailee, tiene a zayas disposición servicios gratuitos de asistencia lingüística. Llame al [phone].     CHÚ Ý: N?u b?n nói Ti?ng Vi?t, có các d?ch v? h? tr? ngôn ng? mi?n phí dành cho " b?n. G?i s? [phone].         [facility] complies with applicable Federal civil rights laws and does not discriminate on the basis of race, color, national origin, age, disability, or sex.

## 2017-05-19 NOTE — PROGRESS NOTES
CHIEF COMPLAINT: This 51-year-old female complaining of puncture wound to left lower extremity.    SUBJECTIVE: Patient reports that this morning, a spring in her mattress punctured her left lower leg.  Her son had to remove the spring from her leg by pulling it out from inside the mattress.  The patient's last tetanus shot was in 2009.  She denies swelling, fever or chills.  Patient reports that blood sugars have been 113-123.  She denies polyuria, polydipsia, polyphagia.  Last A1c was 5.6%, 12 months ago.    ROS:  GENERAL: Patient denies fever, chills, night sweats. Patient denies weight loss. Patient denies anorexia, fatigue, weakness or swollen glands.  SKIN: Patient denies rash or hair loss.  LUNGS: Patient denies cough, wheeze or hemoptysis.  CARDIOVASCULAR: Patient denies chest pain, shortness of breath, palpitations, syncope or lower extremity edema.  MUSCULOSKELETAL: Patient denies joint pain, swelling, redness or warmth.  NEUROLOGIC: Patient denies headache, vertigo,, numbness, tingling, weakness of limb or abnormality of gait.     OBJECTIVE:   GENERAL: Well-developed well-nourished, morbidly obese, black female alert and oriented x3, in no acute distress. Memory, judgment and cognition without deficit.   SKIN: Puncture wound, left anterior lower leg just below knee.  Minimal ecchymosis surrounding wound.  No swelling, redness or warmth.  HEENT: Eyes: Clear conjunctivae. No scleral icterus.   NECK: Supple, normal range of motion.   LUNGS: Normal respiratory effort.  EXTREMITIES: Without cyanosis, clubbing. Difficult to assess edema due to morbid obesity. Distal pulses 2+ and equal. Normal range of motion in left hip, knee, ankle and foot except as limited by obesity. No joint effusion, erythema or warmth.  NEUROLOGIC: Motor strength equal bilaterally. Sensation normal to touch.Gait waddling. No tremor.     ASSESSMENT:  1. Puncture wound of leg excluding thigh, left, initial encounter    2. Need for tetanus  booster      PLAN:   1.  Local care instructions.  2.  TD booster.  3.  Return to clinic for preventive health exam and fasting lab.

## 2017-05-24 ENCOUNTER — PROCEDURE VISIT (OUTPATIENT)
Dept: OPHTHALMOLOGY | Facility: CLINIC | Age: 52
End: 2017-05-24
Payer: COMMERCIAL

## 2017-05-24 DIAGNOSIS — H34.8120 CENTRAL RETINAL VEIN OCCLUSION WITH MACULAR EDEMA OF LEFT EYE: Primary | ICD-10-CM

## 2017-05-24 PROCEDURE — 92134 CPTRZ OPH DX IMG PST SGM RTA: CPT | Mod: S$GLB,,, | Performed by: OPHTHALMOLOGY

## 2017-05-24 PROCEDURE — 99499 UNLISTED E&M SERVICE: CPT | Mod: S$GLB,,, | Performed by: OPHTHALMOLOGY

## 2017-05-24 PROCEDURE — 67028 INJECTION EYE DRUG: CPT | Mod: LT,S$GLB,, | Performed by: OPHTHALMOLOGY

## 2017-05-24 NOTE — PROGRESS NOTES
"    ===============================  05/24/2017   Chastity Hung,   51 y.o. female   Last visit [facility]: :4/26/2017   [facility] [place] [facility]  [facility]  Cor[place] visual acuity was 20/20 in the right eye and 20/30 in the left eye.   Not recorded         Not recorded         Not recorded        Chief Complaint   Patient presents with    Eye Problem     crvo with me os, here for focal vs eylea     Ophthalmic Medications     Ophthalmic - Anti-inflammatory, Glucocorticoids Start End    prednisoLONE acetate (PRED FORTE) 1 % DrpS 7/6/2016     Sig: Place 1 drop into both eyes 4 (four) times daily.    Route: Both Eyes         HPI     Eye Problem    Additional comments: crvo with me os, here for focal vs eylea           Comments   Last Diabetic Eye Exam 3/1/17  Last S. COAG check 2/2014    --DM since 1999  NO H/O BDR  --CRVO OS with associated ME diagnosed 2/25/14  Previous Avastin, now Eylea  LAST Eylea 4/26/17  FOCAL OS 1/5/17  --S. COAG (dx by Dr. JEANE Schneider)  C:D 0.6/0.55  /519  RNFL OD nl OS low IT 2/14/14 with Dr. Schneider  Tmax 20/18   Last VF 2/25/14       Last edited by KRYSTA Muhammad on 5/24/2017  7:45 AM. (History)      Read Studies: y  Vitalsy  ________________  5/24/2017  Problem List        Eye/Vision problems    Central retinal vein occlusion with macular edema of left eye - Primary    Overview     Diagnosed 2/25/14  Treating with Eylea  History of Focal OS             .  .  Old inferioir "full hrvo"   presnetd 2/14  today    3/17 worse worse after focal      Prev foacal 1/17   oncging avgf   stable today    Today rec eylea os  Then focal 2 weesk then eyla agon bc wosre after laser       5/24/2017  Diagnosis :  Os crvo   Today:   Eylea (afibercept) 2 mg/0.05 ml Intravitreal Injection , OS   Follow up: rtc 2 weeks focal        Call 24/7 for any worsening of vision. Check  OU QD. Gave my home phone number.      Procedure  Note:   OS}  Eylea (afibercept) 2 mg/0.05 ml Intravitreal " Injection    I have explained the Risks, Benefits and Alternatives of the procedure in detail.  The patient voices understanding and all questions have been answered.  The patient agrees to proceed as discussed.  LIDOCAINE 2%  subconj bleb  was used for anesthesia.  Topical betadine was used for antisepsis.  0.05 cc was  injected 3.7 mm from corneal limbus in the inferotemporal quadrant.  Following injection the IOP was less than thirty (<30) by tonopen.  The eye was then thoroughly irrigated with BSS.  Patient tolerated procedure well.  No complications were observed.  The Patient was educated that mild irritation tonight was normal secondary to topical antispsis use.  Pt was advised to call at any time day or night for pain, redness, or any decline in vision. I gave the patient my home number as well as the clinic on call number. Daily visual checks and Amsler grid testing were reviewed.  ciloxan Antibiotic Drops to be used 4 times daily for 4 days  SHIMA Lopez MD  Procedure ordered: y  Consent: y  Pre auth: y  MAR:y  Opnote: y  Charge capture:y  Sided procedure note: y         ===========================

## 2017-05-26 ENCOUNTER — PATIENT OUTREACH (OUTPATIENT)
Dept: ADMINISTRATIVE | Facility: HOSPITAL | Age: 52
End: 2017-05-26

## 2017-06-07 ENCOUNTER — PROCEDURE VISIT (OUTPATIENT)
Dept: OPHTHALMOLOGY | Facility: CLINIC | Age: 52
End: 2017-06-07
Payer: COMMERCIAL

## 2017-06-07 DIAGNOSIS — H34.8120 CENTRAL RETINAL VEIN OCCLUSION WITH MACULAR EDEMA OF LEFT EYE: Primary | ICD-10-CM

## 2017-06-07 PROCEDURE — 67210 TREATMENT OF RETINAL LESION: CPT | Mod: LT,S$GLB,, | Performed by: OPHTHALMOLOGY

## 2017-06-07 PROCEDURE — 99499 UNLISTED E&M SERVICE: CPT | Mod: S$GLB,,, | Performed by: OPHTHALMOLOGY

## 2017-06-07 NOTE — PROGRESS NOTES
===============================  06/07/2017   Chastity Hung,   51 y.o. female   Last visit [facility]: :5/24/2017   Last [facility] dept[facility]24/2017  [facility]  Corrected distance visual acuity was 20/20 in the right eye and 20/30 in the left eye.   Not recorded         Not recorded         Not recorded        Chief Complaint   Patient presents with    crvo     focal os     Ophthalmic Medications     Ophthalmic - Anti-inflammatory, Glucocorticoids Start End    prednisoLONE acetate (PRED FORTE) 1 % DrpS 7/6/2016     Sig: Place 1 drop into both eyes 4 (four) times daily.    Route: Both Eyes         HPI     crvo    Additional comments: focal os           Comments   --DM since 1999  NO H/O BDR  --CRVO OS with associated ME diagnosed 2/25/14  Previous Avastin, now Eylea  LAST Eylea 5/24/17  FOCAL OS 1/5/17  --JEANE JACOBS (dx by Dr. JEANE Schneider)  C:D 0.6/0.55  /519  RNFL OD nl OS low IT 2/14/14 with Dr. Schneider  Tmax 20/18   Last VF 2/25/14       Last edited by KRYSTA Muhammad on 6/7/2017  1:13 PM. (History)      Read Studies:   Vitals  ________________  6/7/2017  Problem List Items Addressed This Visit        Eye/Vision problems    Central retinal vein occlusion with macular edema of left eye - Primary    Overview     Diagnosed 2/25/14  Treating with Eylea  History of Focal OS         Relevant Orders    Focal Coagulation - OS - Left Eye      Other Visit Diagnoses    None.       6/7/2017          Laser Procedure Note    Laser: Focal OS  ARGON  Power: 270  Duration: 30  Interval: 250  Number: [id number]  Lens centralis  Complications: None  Procedure ordered: y  Consent: y  Opnote: y  Charge capture:y  Sided procedure note: y      .  .       ===========================

## 2017-06-09 ENCOUNTER — OFFICE VISIT (OUTPATIENT)
Dept: FAMILY MEDICINE | Facility: CLINIC | Age: 52
End: 2017-06-09
Payer: COMMERCIAL

## 2017-06-09 VITALS
RESPIRATION RATE: 20 BRPM | SYSTOLIC BLOOD PRESSURE: 176 MMHG | WEIGHT: 293 LBS | HEIGHT: 64 IN | DIASTOLIC BLOOD PRESSURE: 100 MMHG | TEMPERATURE: 98 F | BODY MASS INDEX: 50.02 KG/M2 | HEART RATE: 110 BPM

## 2017-06-09 DIAGNOSIS — E66.01 MORBID OBESITY WITH BMI OF 70 AND OVER, ADULT: ICD-10-CM

## 2017-06-09 DIAGNOSIS — I10 ESSENTIAL HYPERTENSION: ICD-10-CM

## 2017-06-09 DIAGNOSIS — E78.5 HYPERLIPIDEMIA, UNSPECIFIED HYPERLIPIDEMIA TYPE: ICD-10-CM

## 2017-06-09 DIAGNOSIS — Z12.31 ENCOUNTER FOR SCREENING MAMMOGRAM FOR MALIGNANT NEOPLASM OF BREAST: ICD-10-CM

## 2017-06-09 DIAGNOSIS — E11.9 CONTROLLED TYPE 2 DIABETES MELLITUS WITHOUT COMPLICATION, WITHOUT LONG-TERM CURRENT USE OF INSULIN: ICD-10-CM

## 2017-06-09 DIAGNOSIS — Z00.00 PREVENTATIVE HEALTH CARE: Primary | ICD-10-CM

## 2017-06-09 PROCEDURE — 99999 PR PBB SHADOW E&M-EST. PATIENT-LVL III: CPT | Mod: PBBFAC,,, | Performed by: FAMILY MEDICINE

## 2017-06-09 PROCEDURE — 99396 PREV VISIT EST AGE 40-64: CPT | Mod: S$GLB,,, | Performed by: FAMILY MEDICINE

## 2017-06-09 RX ORDER — METFORMIN HYDROCHLORIDE 1000 MG/1
TABLET ORAL
Qty: 60 TABLET | Refills: 11 | Status: SHIPPED | OUTPATIENT
Start: 2017-06-09 | End: 2018-07-08 | Stop reason: SDUPTHER

## 2017-06-09 NOTE — PROGRESS NOTES
CHIEF COMPLAINT: This is a 51-year-old female here for preventive health exam.    SUBJECTIVE:  Patient is doing well without complaints.  She is morbidly obese with a BMI of 89.03.  She's gained 38 pounds in the last year.  She reports that her blood sugars are controlled with readings from 100-122..  Last A1c was 5.6% one year ago.  She denies polyuria, polydipsia, polyphagia.  She takes metformin 1000 mg twice daily and glimepiride 1 mg daily.  Blood pressure is not controlled.  Blood pressure reading today is 176/100.  She had procedure on her left eye this week.  She has central retinal vein occlusion with macular edema which has been attributed to hypertension.  Patient takes sertraline for depression.  Patient does not exercise.     Eye exam June 2017.  Mammogram October 2015.  Colonoscopy December 2013, due again December 2023.  Prevnar October 2014.  Tdap May 2009.     ROS:  GENERAL: Patient denies fever, chills, night sweats.  Patient denies weight loss.  Patient denies anorexia, fatigue, weakness or swollen glands.  SKIN: Patient denies rash or hair loss.  HEENT: Patient denies sore throat, ear pain, hearing loss, nasal congestion, or runny nose. Patient denies visual disturbance, eye irritation or discharge.  LUNGS: Patient denies cough, wheeze or hemoptysis.  CARDIOVASCULAR: Patient denies chest pain, shortness of breath, palpitations, syncope or lower extremity edema.  GI: Patient denies abdominal pain, nausea, vomiting, diarrhea, constipation, blood in stool or melena.  GENITOURINARY: Patient denies pelvic pain, vaginal discharge, itch or odor. Patient denies irregular vaginal bleeding.  Patient denies dysuria, frequency, hematuria, nocturia, urgency or incontinence.  BREASTS: Patient denies breast pain, mass or nipple discharge.  MUSCULOSKELETAL: Patient denies joint pain, swelling, redness or warmth.  NEUROLOGIC: Patient denies headache, vertigo, paresthesias, weakness in limb, dysarthria, dysphagia  or abnormality of gait.  PSYCHIATRIC: Patient denies anxiety, depression, or memory loss.     OBJECTIVE:   GENERAL: Well-developed well-nourished, morbidly obese, black female alert and oriented x3, in no acute distress. Memory, judgment and cognition without deficit.   SKIN: Clear without rash. Normal color and tone.  HEENT: Eyes: Clear conjunctivae. No scleral icterus. Pupils equal reactive to light and accommodation. Ears: Clear TMs. Clear canals. Nose: Without congestion. Pharynx: Without injection or exudates.  NECK: Supple, normal range of motion. No masses, lymphadenopathy or enlarged thyroid. No JVD. Carotids 2+ and equal. No bruits.  LUNGS: Clear to auscultation. Normal respiratory effort.  CARDIOVASCULAR: Regular rhythm, normal S1, S2, with grade 2/6 systolic murmur heard best at the right upper sternal border. No gallops or rubs.   BACK: No CVA or spinal tenderness.  BREASTS: No masses, tenderness or nipple discharge.  ABDOMEN: Obese. Active bowel sounds. Soft, nontender . Difficult exam, but no palpable mass or organomegaly. No rebound or guarding.  EXTREMITIES: Without cyanosis, clubbing. Difficult to assess edema due to morbid obesity. Distal pulses 2+ and equal. Normal range of motion in all extremities except as limited by obesity. No joint effusion, erythema or warmth.  NEUROLOGIC: Cranial nerves II through XII without deficit. Motor strength equal bilaterally. Sensation normal to touch. Deep tendon reflexes 2+ and equal. Gait waddling. No tremor. Negative cerebellar signs.  Negative microfilament.  PELVIC: Deferred due to difficulty getting patient on exam table and in position.     ASSESSMENT:  1. Preventative health care    2. Controlled type 2 diabetes mellitus without complication, without long-term current use of insulin    3. Essential hypertension    4. Hyperlipidemia, unspecified hyperlipidemia type    5. Morbid obesity with BMI of 70 and over, adult    6. Encounter for screening mammogram  for malignant neoplasm of breast      PLAN:   1.  Weight reduction.  Exercise regularly.  2.  Age-appropriate counseling.  3.  Discussed bariatric surgery with health insurance.  4.  Fasting lab.  5.  Mammogram.  6.  Increase losartan -25 mg daily.  Consider adding second antihypertensive.  7.  Monitor blood pressure.  Report readings in 2-3 weeks.

## 2017-06-12 ENCOUNTER — PATIENT MESSAGE (OUTPATIENT)
Dept: FAMILY MEDICINE | Facility: CLINIC | Age: 52
End: 2017-06-12

## 2017-06-14 RX ORDER — LANCETS
1 EACH MISCELLANEOUS 3 TIMES DAILY
Qty: 100 EACH | Refills: 11 | Status: SHIPPED | OUTPATIENT
Start: 2017-06-14 | End: 2017-07-28 | Stop reason: SDUPTHER

## 2017-06-21 ENCOUNTER — HOSPITAL ENCOUNTER (OUTPATIENT)
Dept: RADIOLOGY | Facility: HOSPITAL | Age: 52
Discharge: HOME | End: 2017-06-21
Attending: FAMILY MEDICINE
Payer: COMMERCIAL

## 2017-06-21 ENCOUNTER — PROCEDURE VISIT (OUTPATIENT)
Dept: OPHTHALMOLOGY | Facility: CLINIC | Age: 52
End: 2017-06-21
Payer: COMMERCIAL

## 2017-06-21 VITALS — WEIGHT: 293 LBS | BODY MASS INDEX: 50.02 KG/M2 | HEIGHT: 64 IN

## 2017-06-21 DIAGNOSIS — Z12.31 ENCOUNTER FOR SCREENING MAMMOGRAM FOR MALIGNANT NEOPLASM OF BREAST: ICD-10-CM

## 2017-06-21 DIAGNOSIS — H34.8120 CENTRAL RETINAL VEIN OCCLUSION WITH MACULAR EDEMA OF LEFT EYE: Primary | ICD-10-CM

## 2017-06-21 PROCEDURE — 77067 SCR MAMMO BI INCL CAD: CPT | Mod: TC

## 2017-06-21 PROCEDURE — 77067 SCR MAMMO BI INCL CAD: CPT | Mod: 26,,, | Performed by: RADIOLOGY

## 2017-06-21 PROCEDURE — 92134 CPTRZ OPH DX IMG PST SGM RTA: CPT | Mod: S$GLB,,, | Performed by: OPHTHALMOLOGY

## 2017-06-21 PROCEDURE — 99499 UNLISTED E&M SERVICE: CPT | Mod: S$GLB,,, | Performed by: OPHTHALMOLOGY

## 2017-06-21 PROCEDURE — 67028 INJECTION EYE DRUG: CPT | Mod: 58,LT,S$GLB, | Performed by: OPHTHALMOLOGY

## 2017-06-21 NOTE — PROGRESS NOTES
===============================  06/21/2017   Chastity Hung,   51 y.o. female   Last visit [facility]: :6/7/2017   Last visit eye dept. 6/7/2017  [facility]:  Corrected distance visual acuity was 20/20 in the right eye and 20/20 in the left eye.   Not recorded         Not recorded         Not recorded        Chief Complaint   Patient presents with    CRVO     EYLEA OS     Ophthalmic Medications     Ophthalmic - Anti-inflammatory, Glucocorticoids Start End    prednisoLONE acetate (PRED FORTE) 1 % DrpS 7/6/2016     Sig: Place 1 drop into both eyes 4 (four) times daily.    Route: Both Eyes         HPI     CRVO    Additional comments: EYLEA OS           Comments   Last Diabetic Eye Exam 3/1/17  Last S. COAG check 2/2014    --DM since 1999  NO H/O BDR  --CRVO OS with associated ME diagnosed 2/25/14  Previous Avastin, now Eylea  LAST Eylea 5/24/17  FOCAL OS 1/5/17 6/7/17  --S. COAG (dx by Dr. JEANE Schneider)  C:D 0.6/0.55  /519  RNFL OD nl OS low IT 2/14/14 with Dr. Schneider  Tmax 20/18   Last VF 2/25/14       Last edited by Nela Martinez on 6/21/2017  7:49 AM. (History)      Read Studies: y  Vitalsy  ________________  6/21/2017  Problem List Items Addressed This Visit        Eye/Vision problems    Central retinal vein occlusion with macular edema of left eye - Primary    Overview     Diagnosed 2/25/14  Treating with Eylea  History of Focal OS         Relevant Medications    aflibercept Soln 2 mg (Completed)    Other Relevant Orders    Prior Authorization Order    Posterior Segment OCT Retina-Both eyes      Other Visit Diagnoses    None.       .  .tretaing os recurrent crvo   Laser last visit 3 weeks ago   To contuiune AVGF    And eventual stop to eval  va better   Oct better      6/21/2017  Diagnosis :  Os crvo  Today:   Eylea (afibercept) 2 mg/0.05 ml Intravitreal Injection , OS   Follow up: rtc 1 mo eya;e again or folow?        Call 24/7 for any worsening of vision. Check  OU QD. Gave my home phone  number.      Procedure  Note:   OS}  Eylea (afibercept) 2 mg/0.05 ml Intravitreal Injection    I have explained the Risks, Benefits and Alternatives of the procedure in detail.  The patient voices understanding and all questions have been answered.  The patient agrees to proceed as discussed.  LIDOCAINE 2%  subconj bleb was used for anesthesia.  Topical betadine was used for antisepsis.  0.05 cc was  injected 3.7 mm from corneal limbus in the inferotemporal quadrant.  Following injection the IOP was less than thirty (<30) by tonopen.  The eye was then thoroughly irrigated with BSS.  Patient tolerated procedure well.  No complications were observed.  The Patient was educated that mild irritation tonight was normal secondary to topical antispsis use.  Pt was advised to call at any time day or night for pain, redness, or any decline in vision. I gave the patient my home number as well as the clinic on call number. Daily visual checks and Amsler grid testing were reviewed.  ciloxan Antibiotic Drops to be used 4 times daily for 4 days  SHIMA Lopez MD  Procedure ordered: y  Consent: y  Pre auth: y  MAR:y  Opnote: y  Charge capture:y  Sided procedure note: y       ===========================

## 2017-06-22 NOTE — TELEPHONE ENCOUNTER
Notified pt rx was at the pharmacy and not covered under her insurance  Pt will contact her insurance company to see what meter was filled

## 2017-06-22 NOTE — TELEPHONE ENCOUNTER
----- Message from Umu Escobar sent at 6/22/2017  3:23 PM CDT -----  Contact: self [phone]  1. What is the name of the medication you are requesting? Diabetic supplies (strips and lancets)  2. What is the dose? na  3. How do you take the medication? Orally, topically, etc? na  4. How often do you take this medication? 2x daily  5. Do you need a 30 day or 90 day supply? 90 day  6. How many refills are you requesting? 4  7. What is your preferred pharmacy and location of the pharmacy?     [facility] [address] [place] [address] [address] [facility] AT SEC of [address] [facility]  [address] [facility] [address]  Phone: [phone] Fax: [phone]    8. Who can we contact with further questions? Pt [phone]

## 2017-07-14 DIAGNOSIS — E11.9 TYPE 2 DIABETES MELLITUS WITHOUT COMPLICATION: ICD-10-CM

## 2017-07-22 RX ORDER — SERTRALINE HYDROCHLORIDE 50 MG/1
TABLET, FILM COATED ORAL
Qty: 90 TABLET | Refills: 11 | Status: SHIPPED | OUTPATIENT
Start: 2017-07-22 | End: 2019-05-21 | Stop reason: SDUPTHER

## 2017-07-28 RX ORDER — LANCETS
1 EACH MISCELLANEOUS 3 TIMES DAILY
Qty: 100 EACH | Refills: 11 | Status: SHIPPED | OUTPATIENT
Start: 2017-07-28 | End: 2017-10-04 | Stop reason: SDUPTHER

## 2017-08-02 ENCOUNTER — LAB VISIT (OUTPATIENT)
Dept: LAB | Facility: HOSPITAL | Age: 52
End: 2017-08-02
Attending: REGISTERED NURSE
Payer: COMMERCIAL

## 2017-08-02 ENCOUNTER — OFFICE VISIT (OUTPATIENT)
Dept: FAMILY MEDICINE | Facility: CLINIC | Age: 52
End: 2017-08-02
Payer: COMMERCIAL

## 2017-08-02 VITALS
DIASTOLIC BLOOD PRESSURE: 73 MMHG | WEIGHT: 293 LBS | OXYGEN SATURATION: 96 % | RESPIRATION RATE: 20 BRPM | HEIGHT: 63 IN | BODY MASS INDEX: 51.91 KG/M2 | SYSTOLIC BLOOD PRESSURE: 132 MMHG | HEART RATE: 99 BPM | TEMPERATURE: 95 F

## 2017-08-02 DIAGNOSIS — M79.671 BILATERAL FOOT PAIN: Primary | ICD-10-CM

## 2017-08-02 DIAGNOSIS — E11.9 CONTROLLED TYPE 2 DIABETES MELLITUS WITHOUT COMPLICATION, WITHOUT LONG-TERM CURRENT USE OF INSULIN: ICD-10-CM

## 2017-08-02 DIAGNOSIS — M79.672 BILATERAL FOOT PAIN: Primary | ICD-10-CM

## 2017-08-02 DIAGNOSIS — M79.671 BILATERAL FOOT PAIN: ICD-10-CM

## 2017-08-02 DIAGNOSIS — M79.672 BILATERAL FOOT PAIN: ICD-10-CM

## 2017-08-02 LAB — URATE SERPL-MCNC: 9.6 MG/DL

## 2017-08-02 PROCEDURE — 84550 ASSAY OF BLOOD/URIC ACID: CPT

## 2017-08-02 PROCEDURE — 3044F HG A1C LEVEL LT 7.0%: CPT | Mod: S$GLB,,, | Performed by: REGISTERED NURSE

## 2017-08-02 PROCEDURE — 99999 PR PBB SHADOW E&M-EST. PATIENT-LVL V: CPT | Mod: PBBFAC,,, | Performed by: REGISTERED NURSE

## 2017-08-02 PROCEDURE — 99214 OFFICE O/P EST MOD 30 MIN: CPT | Mod: S$GLB,,, | Performed by: REGISTERED NURSE

## 2017-08-02 PROCEDURE — 36415 COLL VENOUS BLD VENIPUNCTURE: CPT | Mod: PO

## 2017-08-02 RX ORDER — LANCETS 28 GAUGE
EACH MISCELLANEOUS
Refills: 11 | COMMUNITY
Start: 2017-07-30 | End: 2020-05-15

## 2017-08-02 RX ORDER — INDOMETHACIN 50 MG/1
50 CAPSULE ORAL
Qty: 90 CAPSULE | Refills: 0 | Status: SHIPPED | OUTPATIENT
Start: 2017-08-02 | End: 2017-08-29 | Stop reason: SDUPTHER

## 2017-08-02 NOTE — LETTER
August 2, 2017      [facility]  [address] [address]  Phone: [phone]       Patient: Chastity Hung   YOB: 1965  Date of Visit: 08/02/2017    To Whom It May Concern:    Chastity was at [facility] on 08/02/2017. She may return to work on 08/07/2017 with no restrictions. If you have any questions or concerns, or if I can be of further assistance, please do not hesitate to contact me.    Sincerely,    Minor Montesinos NP

## 2017-08-02 NOTE — PROGRESS NOTES
"Subjective:       Patient ID: Chastity Hung is a 51 y.o. female.    Chief Complaint: Foot Pain      HPI    Mrs. Hung is here today with c/o chronic foot pain, bilateral.  Denies injury or trauma.  Pain located to top, ball and bottom of feet.  Describes her pain as constant, sharp stabbing at times like "stiletto heels into my feet".  Denies swelling or changes to color of skin.  Denies h/o gout.  She has tried OTC pain meds, Icy Hot, ice and heat.  Does have OA to knees.      On DM meds as ordered, home FBS low 100s.    Lab Results   Component Value Date    HGBA1C 6.2 (H) 06/21/2017         Review of Systems   Constitutional: Positive for activity change (with pain).   Respiratory: Negative.    Cardiovascular: Negative.    Musculoskeletal: Positive for arthralgias and gait problem. Negative for joint swelling.         Patient Active Problem List   Diagnosis    Hyperlipidemia    Depression    Essential hypertension    Diabetes mellitus type 2, controlled    Morbid obesity with BMI of 70 and over, adult    Central retinal vein occlusion with macular edema of left eye         Objective:     Vitals:    08/02/17 0805   BP: 132/73   Pulse: 99   Resp: 20   Temp: (!) 95.2 °F (35.1 °C)   TempSrc: Tympanic   SpO2: 96%   Weight: (!) 224.1 kg (494 lb 0.8 oz)   Height: 5' 3" (1.6 m)   PainSc:   8   PainLoc: Foot       Physical Exam   Constitutional: She is oriented to person, place, and time. She appears well-developed and well-nourished.   Cardiovascular:   Pulses:       Dorsalis pedis pulses are 1+ on the right side, and 1+ on the left side.        Posterior tibial pulses are 1+ on the right side, and 1+ on the left side.   Musculoskeletal: She exhibits no edema, tenderness (no TTP with palpation (pt reports pain to area more with WB and walking)) or deformity (decreased ROM).        Right foot: There is normal range of motion and no deformity.        Left foot: There is normal range of motion and no " "deformity.   Feet:   Right Foot:   Skin Integrity: Negative for ulcer, blister or skin breakdown.   Left Foot:   Skin Integrity: Negative for ulcer, blister or skin breakdown.   Neurological: She is alert and oriented to person, place, and time. She displays no atrophy and no tremor. No sensory deficit. She exhibits normal muscle tone. She displays no seizure activity. Gait (antalgic gait due to foot pain) abnormal. Coordination normal.   Skin: Skin is warm and dry.         Medication List with Changes/Refills   New Medications    INDOMETHACIN (INDOCIN) 50 MG CAPSULE    Take 1 capsule (50 mg total) by mouth 3 (three) times daily with meals.   Current Medications    BLOOD SUGAR DIAGNOSTIC STRP    1 strip by Misc.(Non-Drug; Combo Route) route 3 (three) times daily.    DIPHENHYDRAMINE-ACETAMINOPHEN (TYLENOL PM)  MG TAB    Take 1 tablet by mouth nightly as needed.    FLUTICASONE (FLONASE) 50 MCG/ACTUATION NASAL SPRAY    2 sprays by Each Nare route once daily.    FREESTYLE LANCETS 28 GAUGE LANCETS    USE TID    GLIMEPIRIDE (AMARYL) 1 MG TABLET    TAKE 1 TABLET BY MOUTH EVERY MORNING    INSULIN NEEDLES, DISPOSABLE, (BD INSULIN PEN NEEDLE UF MINI) 31 X 3/16 " NDLE    USE AS DIRECTED WITH VICTOZA    LANCETS (ACCU-CHEK SOFTCLIX LANCETS) MISC    1 Device by Misc.(Non-Drug; Combo Route) route 3 (three) times daily.    LOSARTAN-HYDROCHLOROTHIAZIDE 50-12.5 MG (HYZAAR) 50-12.5 MG PER TABLET    Take 1 tablet by mouth every morning.    MELOXICAM (MOBIC) 15 MG TABLET    TAKE 1 TABLET BY MOUTH EVERY DAY    METFORMIN (GLUCOPHAGE) 1000 MG TABLET    TAKE 1 TABLET BY MOUTH TWICE DAILY WITH FOOD    MULTIVITAMIN WITH MINERALS TABLET    Take 1 tablet by mouth once daily.    PREDNISOLONE ACETATE (PRED FORTE) 1 % DRPS    Place 1 drop into both eyes 4 (four) times daily.    SENNA-DOCUSATE 8.6-50 MG (PERICOLACE) 8.6-50 MG PER TABLET    Take 1 tablet by mouth once daily.    SERTRALINE (ZOLOFT) 50 MG TABLET    TAKE 1 TABLET BY MOUTH EVERY " DAY    TOLTERODINE (DETROL) 2 MG TAB    TAKE 1 TABLET BY MOUTH TWICE DAILY           Diagnosis       1. Bilateral foot pain    2. Controlled type 2 diabetes mellitus without complication, without long-term current use of insulin          Assessment/ Plan     Bilateral foot pain  -     Uric acid; Future; Expected date: 08/02/2017  -     Ambulatory referral to Podiatry  -     indomethacin (INDOCIN) 50 MG capsule; Take 1 capsule (50 mg total) by mouth 3 (three) times daily with meals.  Dispense: 90 capsule; Refill: 0    Controlled type 2 diabetes mellitus without complication, without long-term current use of insulin        -    Stable and controlled, continue medication as ordered.          Lab pending.  Medication discussed, take as directed.  To Podiatry for further evaluation.  Follow-up in clinic as needed.        Future Appointments  Date Time Provider Department Center   8/2/2017 10:00 AM SHEN [facility]   8/3/2017 8:40 AM Delisa Murray DPM [facility] POD [facility]   8/30/2017 8:45 AM SHIMA Lopez MD [facility] OPHTHAL [place]         Andrea Cothern, CFNP [facility]

## 2017-08-03 ENCOUNTER — TELEPHONE (OUTPATIENT)
Dept: FAMILY MEDICINE | Facility: CLINIC | Age: 52
End: 2017-08-03

## 2017-08-03 DIAGNOSIS — M79.671 FOOT PAIN, BILATERAL: ICD-10-CM

## 2017-08-03 DIAGNOSIS — E79.0 HYPERURICEMIA: Primary | ICD-10-CM

## 2017-08-03 DIAGNOSIS — M79.672 FOOT PAIN, BILATERAL: ICD-10-CM

## 2017-08-03 RX ORDER — ALLOPURINOL 100 MG/1
200 TABLET ORAL DAILY
Qty: 60 TABLET | Refills: 6 | Status: SHIPPED | OUTPATIENT
Start: 2017-08-03 | End: 2017-08-29 | Stop reason: SDUPTHER

## 2017-08-03 NOTE — TELEPHONE ENCOUNTER
Patient contacted with the results and recommendations and also stated that she would reschedule the Podiatry appointment.

## 2017-08-03 NOTE — TELEPHONE ENCOUNTER
Uric acid elevated at 9.6 --- anything above 6 or 7 can cause chronic joint pain, especially in the feet.  I am starting her on allopurinol to take daily to see if this may help get her uric acid levels down.  Please send her copy of low-purine diet.  Recheck uric acid level in 3 months.  I noticed she cancelled her Podiatry appt --- does she plan on rescheduling ???

## 2017-08-04 DIAGNOSIS — E11.9 TYPE 2 DIABETES MELLITUS WITHOUT COMPLICATION: ICD-10-CM

## 2017-08-23 ENCOUNTER — PATIENT MESSAGE (OUTPATIENT)
Dept: ADMINISTRATIVE | Facility: OTHER | Age: 52
End: 2017-08-23

## 2017-08-23 ENCOUNTER — TELEPHONE (OUTPATIENT)
Dept: FAMILY MEDICINE | Facility: CLINIC | Age: 52
End: 2017-08-23

## 2017-08-23 NOTE — TELEPHONE ENCOUNTER
----- Message from Saúl Reese sent at 8/23/2017  2:49 PM CDT -----  Contact: pt  She's calling in regards to receipt of a fax on Primary provider form, please advise, [phone] (cell)

## 2017-08-29 DIAGNOSIS — M79.672 BILATERAL FOOT PAIN: ICD-10-CM

## 2017-08-29 DIAGNOSIS — M79.671 FOOT PAIN, BILATERAL: ICD-10-CM

## 2017-08-29 DIAGNOSIS — M79.672 FOOT PAIN, BILATERAL: ICD-10-CM

## 2017-08-29 DIAGNOSIS — E79.0 HYPERURICEMIA: ICD-10-CM

## 2017-08-29 DIAGNOSIS — M79.671 BILATERAL FOOT PAIN: ICD-10-CM

## 2017-08-29 RX ORDER — INDOMETHACIN 50 MG/1
CAPSULE ORAL
Qty: 90 CAPSULE | Refills: 0 | Status: SHIPPED | OUTPATIENT
Start: 2017-08-29 | End: 2019-05-21

## 2017-08-29 RX ORDER — ALLOPURINOL 100 MG/1
200 TABLET ORAL 2 TIMES DAILY
Qty: 120 TABLET | Refills: 6 | Status: SHIPPED | OUTPATIENT
Start: 2017-08-29 | End: 2019-05-22 | Stop reason: SDUPTHER

## 2017-08-29 NOTE — TELEPHONE ENCOUNTER
I rec'd refill request for Indocin today --- I started this med when I saw her for foot pain; however, her uric acid level came back sig elevated indicating hyperuricemia/gout.  I then started her on allopurinol to help with the elevated uric acid levels and hopefully in turn help with joint/foot pain.  How is she doing?  I may need to increase the allopurinol as the dose may need to be increased depending on how she is responding.

## 2017-08-29 NOTE — TELEPHONE ENCOUNTER
Pt notified of medication sent to the pharmacy and changes in medication. Pt voiced understanding.

## 2017-08-29 NOTE — TELEPHONE ENCOUNTER
Pt states that pain hasn't gotten any better and that she thinks you might need to increase the dosage.

## 2017-08-29 NOTE — TELEPHONE ENCOUNTER
Indocin refilled.  Also increased the allopurinol to 200 mg twice daily.  Need to recheck uric acid level in about 4 to 6 weeks.   2017

## 2017-08-30 ENCOUNTER — PROCEDURE VISIT (OUTPATIENT)
Dept: OPHTHALMOLOGY | Facility: CLINIC | Age: 52
End: 2017-08-30
Payer: COMMERCIAL

## 2017-08-30 DIAGNOSIS — H40.003 GLAUCOMA SUSPECT OF BOTH EYES: ICD-10-CM

## 2017-08-30 DIAGNOSIS — I10 ESSENTIAL HYPERTENSION: ICD-10-CM

## 2017-08-30 DIAGNOSIS — E11.9 CONTROLLED TYPE 2 DIABETES MELLITUS WITHOUT COMPLICATION, WITHOUT LONG-TERM CURRENT USE OF INSULIN: Primary | ICD-10-CM

## 2017-08-30 DIAGNOSIS — H34.8120 CENTRAL RETINAL VEIN OCCLUSION WITH MACULAR EDEMA OF LEFT EYE: ICD-10-CM

## 2017-08-30 PROCEDURE — 92133 CPTRZD OPH DX IMG PST SGM ON: CPT | Mod: S$GLB,,, | Performed by: OPHTHALMOLOGY

## 2017-08-30 PROCEDURE — 67028 INJECTION EYE DRUG: CPT | Mod: 78,LT,S$GLB, | Performed by: OPHTHALMOLOGY

## 2017-08-30 PROCEDURE — 92014 COMPRE OPH EXAM EST PT 1/>: CPT | Mod: 24,25,S$GLB, | Performed by: OPHTHALMOLOGY

## 2017-08-30 NOTE — PATIENT INSTRUCTIONS
"POSTERIOR VITREOUS DETACHMENT PRECAUTIONS  The eye is filled with a gel (vitreous) that supports its shape. The retina is the light-sensitive tissue at the back of your eye. It records visual images and sends them to your brain so you can see. In front of the retina, the center of the eye is filled with a jelly-like substance called the vitreous.   With age, the vitreous liquefies and begins to contract,  from the retinal tissue. When the vitreous separates it causes floaters to appear gradually. (Floaters are small dots or strings that seem to be moving across your field of vision). These floaters themselves are typically harmless, however a dilated eye exam is necessary to make sure that the separation did not lead to a retinal tear.  Sometimes, when the vitreous pulls away from the retina it can cause a tear in the retina. If this happens, you will experience a sudden onset of many floaters, which may occur with flashes of light. A retinal tear is painless, but is a serious condition. If not treated, most retinal tears will progress to retinal detachment within days or weeks. This may appear as a "curtain" or "veil" covering part of the field of view, or may just cause blurred vision. Retinal detachment is also painless, but it causes vision loss which is permanent.   Eye surgery is necessary to treat a retinal tear and prevent it from progressing to a retinal detachment. The methods commonly used are laser surgery or freezing. They may be done as outpatient procedures. Healing takes about two weeks. No treatment is necessary for floaters. They typically go away or become less noticeable with time.    SEE YOUR EYE DOCTOR IMMEDIATELY if any of the following signs of a new retinal tear occur:   Any sudden changes in your vision  Light flashes or wavy vision  Sudden blur in your vision  Burst of new floaters appearing in your field of vision    - be on the lookout for signs of retinal tear or " detachment.  These include:  1. Flashes of light  2. New floaters - especially a swarm of new floaters   3. A curtain or veil coming across your vision.  If you see any of these signs, contact your eye doctor immediately.

## 2017-08-30 NOTE — ADDENDUM NOTE
Encounter addended by: SHIMA Lopez MD on: 8/30/2017  2:45 PM<BR>    Actions taken: Sign clinical note

## 2017-08-30 NOTE — PROGRESS NOTES
===============================  08/30/2017   Chastity Hung,   51 y.o. female   Last visit [facility]: :6/21/2017   Last visit eye dept. 6/21/2017  [facility]:  Corrected distance visual acuity was 20/20 in the right eye and 20/70 -2 in the left eye.  Tonometry     Tonometry (Applanation, 7:56 AM)       Right Left    Pressure 12 9              Wearing Rx     Wearing Rx       Sphere Cylinder Axis Add    Right -1.25 +0.50 130 +2.25    Left -1.00 +0.50 035 +2.25    Type:  PAL              Manifest Refraction     Manifest Refraction       Sphere Cylinder Axis Add    Right -1.25 +0.50 130 +2.25    Left -1.00 +0.50 035 +2.25              Chief Complaint   Patient presents with    Diabetic Eye Exam     Patient states no complaints.  BG has been running good.  99 yesterday, usually not high.    Glaucoma     Glaucoma suspect, GOCT review    crvo OS     States she does not feel like swelling is worse.     Ophthalmic Medications     Ophthalmic - Anti-inflammatory, Glucocorticoids Start End    prednisoLONE acetate (PRED FORTE) 1 % DrpS (Discontinued) 7/6/2016 8/30/2017    Sig: Place 1 drop into both eyes 4 (four) times daily.    Route: Both Eyes    Reason for Discontinue: Patient no longer taking         HPI     Diabetic Eye Exam    Additional comments: Patient states no complaints.  BG has been running   good.  99 yesterday, usually not high.           Glaucoma    Additional comments: Glaucoma suspect, GOCT review           crvo OS    Additional comments: States she does not feel like swelling is worse.           Comments   Last Diabetic Eye Exam 3/1/17  Last S. COAG check 2/2014    --DM since 1999  NO H/O BDR  --CRVO OS with associated ME diagnosed 2/25/14  Previous Avastin, now Eylea  LAST Eylea OS 6/21/17  FOCAL OS 1/5/17 6/7/17  --S. COAG (dx by Dr. JEANE Schneider)  C:D 0.6/0.55  /519  RNFL OD nl OS low IT 2/14/14 with Dr. Schneider  Tmax 20/18   Last VF 2/25/14         Last edited by SHIMA Lopez MD on 8/30/2017  7:46  AM. (History)      Read Studies: clif Guajardo  ________________  8/30/2017  Problem List Items Addressed This Visit        Eye/Vision problems    Diabetes mellitus type 2, controlled - Primary  --No BDR    Central retinal vein occlusion with macular edema of left eye  --Worse CME today, 600 micron cme inferior and central  Recommend Eylea again today  Then, once flat, consider focal again      Overview     Diagnosed 2/25/14  Treating with Eylea  History of Focal OS         Relevant Medications    aflibercept Soln 2 mg    Other Relevant Orders    Prior Authorization Order       Other    Essential hypertension  --no active htnr    Glaucoma suspect of both eyes  --IOP good  But increased c:d 0.65/0.8 with notch at 3:00  Worse rnfl od, but wnl - borderline inf.  Needs vf next visit    Relevant Orders    Posterior Segment OCT Optic Nerve- Both eyes (Completed)      Other Visit Diagnoses    None.       .  .  Os worse cme   last focal 10 weeks   rec eyela agin todat   Consider foacl again after flat     8/30/2017  Diagnosis :  brvo os   Today:   Eylea (afibercept) 2 mg/0.05 ml Intravitreal Injection , OS   Follow up: rtc  1mop eyel  and vf        Call 24/7 for any worsening of vision. Check  OU QD. Gave my home phone number.      Procedure  Note:   OS}  Eylea (afibercept) 2 mg/0.05 ml Intravitreal Injection    I have explained the Risks, Benefits and Alternatives of the procedure in detail.  The patient voices understanding and all questions have been answered.  The patient agrees to proceed as discussed.  LIDOCAINE 2%  subconj bleb  was used for anesthesia.  Topical betadine was used for antisepsis.  0.05 cc was  injected 3.7 mm from corneal limbus in the inferotemporal quadrant.  Following injection the IOP was less than thirty (<30) by tonopen.  The eye was then thoroughly irrigated with BSS.  Patient tolerated procedure well.  No complications were observed.  The Patient was educated that mild irritation tonight was  normal secondary to topical antispsis use.  Pt was advised to call at any time day or night for pain, redness, or any decline in vision. I gave the patient my home number as well as the clinic on call number. Daily visual checks and Amsler grid testing were reviewed.  ciloxan Antibiotic Drops to be used 4 times daily for 4 days  SHIMA Lopez MD  Procedure ordered: y  Consent: y  Pre auth: y  MAR:y  Opnote: y  Charge capture:y  Sided procedure note: y         ===========================

## 2017-10-04 ENCOUNTER — PROCEDURE VISIT (OUTPATIENT)
Dept: OPHTHALMOLOGY | Facility: CLINIC | Age: 52
End: 2017-10-04
Payer: COMMERCIAL

## 2017-10-04 ENCOUNTER — HOSPITAL ENCOUNTER (OUTPATIENT)
Dept: RADIOLOGY | Facility: HOSPITAL | Age: 52
Discharge: HOME | End: 2017-10-04
Attending: PODIATRIST
Payer: COMMERCIAL

## 2017-10-04 ENCOUNTER — OFFICE VISIT (OUTPATIENT)
Dept: PODIATRY | Facility: CLINIC | Age: 52
End: 2017-10-04
Payer: COMMERCIAL

## 2017-10-04 VITALS
HEART RATE: 80 BPM | SYSTOLIC BLOOD PRESSURE: 149 MMHG | BODY MASS INDEX: 51.91 KG/M2 | DIASTOLIC BLOOD PRESSURE: 92 MMHG | WEIGHT: 293 LBS | HEIGHT: 63 IN

## 2017-10-04 DIAGNOSIS — H34.8120 CENTRAL RETINAL VEIN OCCLUSION WITH MACULAR EDEMA OF LEFT EYE: Primary | ICD-10-CM

## 2017-10-04 DIAGNOSIS — H40.003 GLAUCOMA SUSPECT OF BOTH EYES: ICD-10-CM

## 2017-10-04 DIAGNOSIS — M79.672 BILATERAL FOOT PAIN: ICD-10-CM

## 2017-10-04 DIAGNOSIS — M79.671 BILATERAL FOOT PAIN: ICD-10-CM

## 2017-10-04 DIAGNOSIS — E66.01 MORBID OBESITY WITH BMI OF 70 AND OVER, ADULT: ICD-10-CM

## 2017-10-04 DIAGNOSIS — M72.2 PLANTAR FASCIITIS, BILATERAL: ICD-10-CM

## 2017-10-04 DIAGNOSIS — M79.671 ARCH PAIN OF RIGHT FOOT: Primary | ICD-10-CM

## 2017-10-04 DIAGNOSIS — E11.9 CONTROLLED TYPE 2 DIABETES MELLITUS WITHOUT COMPLICATION, WITHOUT LONG-TERM CURRENT USE OF INSULIN: ICD-10-CM

## 2017-10-04 DIAGNOSIS — M21.40 ACQUIRED PES PLANOVALGUS, UNSPECIFIED LATERALITY: ICD-10-CM

## 2017-10-04 PROCEDURE — 67028 INJECTION EYE DRUG: CPT | Mod: LT,S$GLB,, | Performed by: OPHTHALMOLOGY

## 2017-10-04 PROCEDURE — 92083 EXTENDED VISUAL FIELD XM: CPT | Mod: S$GLB,,, | Performed by: OPHTHALMOLOGY

## 2017-10-04 PROCEDURE — 73630 X-RAY EXAM OF FOOT: CPT | Mod: 50,TC,PO

## 2017-10-04 PROCEDURE — 99243 OFF/OP CNSLTJ NEW/EST LOW 30: CPT | Mod: S$GLB,,, | Performed by: PODIATRIST

## 2017-10-04 PROCEDURE — 99999 PR PBB SHADOW E&M-EST. PATIENT-LVL III: CPT | Mod: PBBFAC,,, | Performed by: PODIATRIST

## 2017-10-04 PROCEDURE — 99212 OFFICE O/P EST SF 10 MIN: CPT | Mod: 25,S$GLB,, | Performed by: OPHTHALMOLOGY

## 2017-10-04 PROCEDURE — 73630 X-RAY EXAM OF FOOT: CPT | Mod: 26,50,, | Performed by: RADIOLOGY

## 2017-10-04 PROCEDURE — 92134 CPTRZ OPH DX IMG PST SGM RTA: CPT | Mod: S$GLB,,, | Performed by: OPHTHALMOLOGY

## 2017-10-04 NOTE — PROGRESS NOTES
===============================  10/04/2017   Chastity Hung,   51 y.o. female   Last visit [facility]: :8/30/2017   Last visit eye dept. 8/30/2017  [facility]  Corrected distance visual acuity was 20/20 in the right eye and 20/50 in the left eye.  Tonometry     Tonometry (Applanation, 10:00 AM)       Right Left    Pressure 16 18               Not recorded         Not recorded        Chief Complaint   Patient presents with    DME     EYLEA VRS FOCAL OS        HPI     DME    Additional comments: EYLEA VRS FOCAL OS           Comments   Last Diabetic Eye Exam 3/1/17  Last S. COAG check 2/2014    --DM since 1999  NO H/O BDR  --CRVO OS with associated ME diagnosed 2/25/14  Previous Avastin, now Eylea  LAST Eylea OS 8/30/17  FOCAL OS 1/5/17 6/7/17  --S. COAG (dx by Dr. JEANE Schneider)  C:D 0.6/0.55  /519  RNFL OD nl OS low IT 2/14/14 with Dr. Schneider  Tmax 20/18   Last VF 2/25/14       Last edited by Nela Martinez on 10/4/2017  8:41 AM. (History)          ________________  10/4/2017  Problem List Items Addressed This Visit        Eye/Vision problems    Central retinal vein occlusion with macular edema of left eye - Primary    Overview     Diagnosed 2/25/14  Treating with Eylea  History of Focal OS         Relevant Medications    aflibercept Soln 2 mg (Start on 10/4/2017 10:15 AM)    Other Relevant Orders    Prior Authorization Order    Posterior Segment OCT Retina-Both eyes (Completed)       Other    Glaucoma suspect of both eyes    Relevant Orders    [place] Visual Field - OU - Extended - Both Eyes (Completed)      Other Visit Diagnoses    None.       VF today od nl os superior relative  defect  cw brvo  T 16 18    Looks great!  rec focal agin ..I asked that the patient see me for an immediate exam if there is another episode of this problem.  Then avgf x2 therafter     10/4/2017  Diagnosis :  Os crvo  Today:   Eylea (afibercept) 2 mg/0.05 ml Intravitreal Injection , OS   Follow up: about 3 wekks focal repaet - then 2  later eyela x2        Call 24/7 for any worsening of vision. Check  OU QD. Gave my home phone number.      Procedure  Note:   OS}  Eylea (afibercept) 2 mg/0.05 ml Intravitreal Injection    I have explained the Risks, Benefits and Alternatives of the procedure in detail.  The patient voices understanding and all questions have been answered.  The patient agrees to proceed as discussed.  LIDOCAINE 2%  subconj bleb  was used for anesthesia.  Topical betadine was used for antisepsis.  0.05 cc was  injected 3.7 mm from corneal limbus in the inferotemporal quadrant.  Following injection the IOP was less than thirty (<30) by tonopen.  The eye was then thoroughly irrigated with BSS.  Patient tolerated procedure well.  No complications were observed.  The Patient was educated that mild irritation tonight was normal secondary to topical antispsis use.  Pt was advised to call at any time day or night for pain, redness, or any decline in vision. I gave the patient my home number as well as the clinic on call number. Daily visual checks and Amsler grid testing were reviewed.  ciloxan Antibiotic Drops to be used 4 times daily for 4 days  SHIMA Lopez MD  Procedure ordered: y  Consent: y  Pre auth: y  MAR:y  Opnote: y  Charge capture:y  Sided procedure note: y         ===========================

## 2017-10-04 NOTE — PROGRESS NOTES
[facility]  PODIATRIC MEDICINE AND SURGERY  PROGRESS NOTE  10/4/2017    PODIATRY NOTE  PCP: Dr. Alexandra Dawkins MD    CHIEF COMPLAINT   Chief Complaint   Patient presents with    Foot Pain     bilateral ankle heel and arch constant pain worse in arch       HPI  Chastity Hung is a 51 y.o. female who has a past medical history of Arthritis; Central retinal vein occlusion of left eye (02/25/2014); Depression; Diabetes mellitus type 2, controlled; Diabetic retinopathy; Glaucoma (02/2014); Hyperlipidemia; Hypertension; Morbid obesity; and Reactive airway disease.   Chastity presents to clinic today complaining of  Bilateral foot pain. She was referred here for consultation by her PCP for bilateral ankle and foot pain.    Patient describes pain as:   Location: both feet and ankles, arch pain specifically worse  Quality: throbbing,tingling  Severity:7/10  Duration: several months  Modifying Factors (Aggravating): weight bearing, symptoms are worse with ambulation   Modifying Factors (Alleviating): non weight bearing    Patient denies other pedal complaints at this time.    Hemoglobin A1C   Date Value Ref Range Status   06/21/2017 6.2 (H) 4.0 - 5.6 % Final     Comment:     According to ADA guidelines, hemoglobin A1c <7.0% represents  optimal control in non-pregnant diabetic patients. Different  metrics may apply to specific patient populations.   Standards of Medical Care in Diabetes-2016.  For the purpose of screening for the presence of diabetes:  <5.7%     Consistent with the absence of diabetes  5.7-6.4%  Consistent with increasing risk for diabetes   (prediabetes)  >or=6.5%  Consistent with diabetes  Currently, no consensus exists for use of hemoglobin A1c  for diagnosis of diabetes for children.  This Hemoglobin A1c assay has significant interference with fetal   hemoglobin   (HbF). The results are invalid for patients with abnormal amounts of   HbF,   including those with known Hereditary  Persistence   of Fetal Hemoglobin. Heterozygous hemoglobin variants (HbAS, HbAC,   HbAD, HbAE, HbA2) do not significantly interfere with this assay;   however, presence of multiple variants in a sample may impact the %   interference.     05/24/2016 5.6 4.5 - 6.2 % Final   01/05/2016 5.1 4.5 - 6.2 % Final       PMH  Past Medical History:   Diagnosis Date    Arthritis     Central retinal vein occlusion of left eye 02/25/2014    Treated by Dr. Lopez.  Jacquelinelehubert     Depression     Diabetes mellitus type 2, controlled     Diabetic retinopathy     Glaucoma 02/2014    Glaucoma Suspect    Hyperlipidemia     Hypertension     Morbid obesity     Reactive airway disease        PROBLEM LIST  Patient Active Problem List    Diagnosis Date Noted    Glaucoma suspect of both eyes 08/30/2017    Hyperuricemia 08/03/2017    Central retinal vein occlusion with macular edema of left eye 12/05/2016    Morbid obesity with BMI of 70 and over, adult 01/11/2016    Diabetes mellitus type 2, controlled     Essential hypertension     Hyperlipidemia 07/11/2013    Depression 07/11/2013       MEDS  Current Outpatient Prescriptions on File Prior to Visit   Medication Sig Dispense Refill    allopurinol (ZYLOPRIM) 100 MG tablet Take 2 tablets (200 mg total) by mouth 2 (two) times daily. 120 tablet 6    blood sugar diagnostic Strp 1 strip by Misc.(Non-Drug; Combo Route) route 3 (three) times daily. 100 strip 11    diphenhydramine-acetaminophen (TYLENOL PM)  mg Tab Take 1 tablet by mouth nightly as needed.      fluticasone (FLONASE) 50 mcg/actuation nasal spray 2 sprays by Each Nare route once daily. 16 g 3    FREESTYLE LANCETS 28 gauge lancets USE TID  11    glimepiride (AMARYL) 1 MG tablet TAKE 1 TABLET BY MOUTH EVERY MORNING 90 tablet 3    indomethacin (INDOCIN) 50 MG capsule TAKE 1 CAPSULE(50 MG) BY MOUTH THREE TIMES DAILY WITH MEALS 90 capsule 0    losartan-hydrochlorothiazide 50-12.5 mg (HYZAAR) 50-12.5 mg per tablet  Take 1 tablet by mouth every morning. 90 tablet 3    meloxicam (MOBIC) 15 MG tablet TAKE 1 TABLET BY MOUTH EVERY DAY 30 tablet 5    metformin (GLUCOPHAGE) 1000 MG tablet TAKE 1 TABLET BY MOUTH TWICE DAILY WITH FOOD 60 tablet 11    multivitamin with minerals tablet Take 1 tablet by mouth once daily.      senna-docusate 8.6-50 mg (PERICOLACE) 8.6-50 mg per tablet Take 1 tablet by mouth once daily. 30 tablet 0    sertraline (ZOLOFT) 50 MG tablet TAKE 1 TABLET BY MOUTH EVERY DAY 90 tablet 11    tolterodine (DETROL) 2 MG Tab TAKE 1 TABLET BY MOUTH TWICE DAILY 60 tablet 9    [DISCONTINUED] lancets (ACCU-CHEK SOFTCLIX LANCETS) Misc 1 Device by Misc.(Non-Drug; Combo Route) route 3 (three) times daily. 100 each 11     Current Facility-Administered Medications on File Prior to Visit   Medication Dose Route Frequency Provider Last Rate Last Dose    [COMPLETED] aflibercept Soln 2 mg  2 mg Intravitreal 1 time in Clinic/HOD SHIMA Lopez MD   2 mg at 10/04/17 1003       Medication List with Changes/Refills   Current Medications    ALLOPURINOL (ZYLOPRIM) 100 MG TABLET    Take 2 tablets (200 mg total) by mouth 2 (two) times daily.    BLOOD SUGAR DIAGNOSTIC STRP    1 strip by Misc.(Non-Drug; Combo Route) route 3 (three) times daily.    DIPHENHYDRAMINE-ACETAMINOPHEN (TYLENOL PM)  MG TAB    Take 1 tablet by mouth nightly as needed.    FLUTICASONE (FLONASE) 50 MCG/ACTUATION NASAL SPRAY    2 sprays by Each Nare route once daily.    FREESTYLE LANCETS 28 GAUGE LANCETS    USE TID    GLIMEPIRIDE (AMARYL) 1 MG TABLET    TAKE 1 TABLET BY MOUTH EVERY MORNING    INDOMETHACIN (INDOCIN) 50 MG CAPSULE    TAKE 1 CAPSULE(50 MG) BY MOUTH THREE TIMES DAILY WITH MEALS    LOSARTAN-HYDROCHLOROTHIAZIDE 50-12.5 MG (HYZAAR) 50-12.5 MG PER TABLET    Take 1 tablet by mouth every morning.    MELOXICAM (MOBIC) 15 MG TABLET    TAKE 1 TABLET BY MOUTH EVERY DAY    METFORMIN (GLUCOPHAGE) 1000 MG TABLET    TAKE 1 TABLET BY MOUTH TWICE DAILY WITH  FOOD    MULTIVITAMIN WITH MINERALS TABLET    Take 1 tablet by mouth once daily.    SENNA-DOCUSATE 8.6-50 MG (PERICOLACE) 8.6-50 MG PER TABLET    Take 1 tablet by mouth once daily.    SERTRALINE (ZOLOFT) 50 MG TABLET    TAKE 1 TABLET BY MOUTH EVERY DAY    TOLTERODINE (DETROL) 2 MG TAB    TAKE 1 TABLET BY MOUTH TWICE DAILY   Discontinued Medications    LANCETS (ACCU-CHEK SOFTCLIX LANCETS) MISC    1 Device by Misc.(Non-Drug; Combo Route) route 3 (three) times daily.       PSH     Past Surgical History:   Procedure Laterality Date    APPENDECTOMY      BREAST SURGERY Left 2014    CHOLECYSTECTOMY  10/2014    HEMICOLECTOMY Right 10/2014    Including appendix, due to perforated appendix    TOTAL VAGINAL HYSTERECTOMY      Secondary to uterine prolapse        ALL  Review of patient's allergies indicates:  No Known Allergies    SOC     Social History   Substance Use Topics    Smoking status: Never Smoker    Smokeless tobacco: Never Used    Alcohol use No         FAMILY HX    Family History   Problem Relation Age of Onset    Hypertension Father     Cancer Father      Myelodysplasia    Heart disease Father     Schizophrenia Father      Paranoid type    Diabetes Mother     Hypertension Mother     Stroke Mother     Heart disease Mother     Kidney disease Mother     Diabetes Sister     Hypertension Sister     Diabetes Son     Hypertension Son     Stroke Sister     Heart disease Sister     Stroke Sister     Breast cancer Paternal Aunt             REVIEW OF SYSTEMS  Constitutional: Negative for chills and fever.   Respiratory: Negative for shortness of breath.    Cardiovascular: Negative for chest pain, palpitations, orthopnea, claudication and leg swelling.   Gastrointestinal: Negative for diarrhea, nausea and vomiting.   Musculoskeletal: Positive for joint pain   Skin: Negative for rash.   Neurological: Negative for dizziness, tingling, sensory change, focal weakness and weakness.   Psychiatric/Behavioral:  "Negative.    PHYSICAL EXAM  Vitals:    10/04/17 1127   BP: (!) 149/92   Pulse: 80   Weight: (!) 224.1 kg (494 lb 0.8 oz)   Height: 5' 3" (1.6 m)   PainSc:   7   PainLoc: Foot       General: This patient is well-developed, well-nourished and appears stated age, well-oriented to person, place and time, and cooperative and pleasant on today's visit      LOWER EXTREMITY  Vascular exam:   · Dorsalis pedis and posterior tibial pulses palpable 2/4 bilaterally.   · Capillary refill time immediate to the toes.   · Feet are warm to the touch. Skin temperature warm to warm from proximally to distally   · There are no varicosities, telangiectasias noted to bilateral foot and ankle regions.   · There are no ecchymoses noted to bilateral foot and ankle regions.   · There is gross lower extremity edema.    Dermatologic exam:   · Skin moist with healthy texture and turgor.  · There are no open ulcerations, lacerations, or fissures to bilateral foot and ankle regions. There are no signs of infection as there is no erythema, no proximal-extending lymphangiitis, no fluctuance, or crepitus noted on palpation to bilateral foot and ankle regions.   · There is no interdigital maceration.   · There are no hyperkeratotic lesions noted to feet. Nails are well-trimmed.    Neurologic exam:  · Epicritic sensation is intact as the patient is able to sense light touch to bilateral foot and ankle regions.   · Achilles and patellar deep tendon reflexes intact  · Babinski reflex absent    Musculoskeletal/Orthopedic exam:   · There is pain with palpation of medial arch of RIGHT foot  · There is negative pain with palpation of medial tubercle of calcaneus   · Muscle strength AT/EHL/EDL/PT: 5/5; Achilles/Gastroc/Soleus: 5/5; PB/PL: 5/5 Muscle tone is normal.  · Ankle joint ROM  B/L supple DF/PF, non-crepitus  · STJ ROM supple inv/ev, non crepitus   · MTPJ b/l supple DF/PF, non crepitus  · Maximally pronated foot type with decreased medial longitudinal " arch    IMAGING   Reviewed by me and I agree with radiologist findings, 3 views of foot/ankle, reveal:  xrays reviewed no acute fractures or dislocations noted    ASSESSMENT  Encounter Diagnoses   Name Primary?    Arch pain of right foot Yes    Plantar fasciitis, bilateral     Morbid obesity with BMI of 70 and over, adult     Controlled type 2 diabetes mellitus without complication, without long-term current use of insulin     Acquired pes planovalgus, unspecified laterality          PLAN  1. Patient was educated about clinical and imaging findings, and verbalizes understanding of above.     Diagnoses and all orders for this visit:  Arch pain of right foot    Plantar fasciitis, bilateral    Morbid obesity with BMI of 70 and over, adult    Controlled type 2 diabetes mellitus without complication, without long-term current use of insulin    Acquired pes planovalgus, unspecified laterality      2. Treatment plan: Discuss weight loss and contribution to heel pain    Recommend custom orthotics but will start with power step orthoses and motion control tennis shoes     -I Reviewed films with patient. Discussed different treatment options for heel pain.   -I gave written and verbal instructions on heel cord stretching and this was demonstrated for the patient. A theraband was also provided to the patient for stretching exercises. Recommendations were given for plantar fasciitis treatment including icing, stretching, arch supports, avoidance of barefoot walking, appropriate shoe wear, and strict compliance. Discussed importance of patient to perform stretching exercises.   -Prescription was written for OTC arch supports. I Discussed that plantar fasciitis typically resolves on its own in a variable amount of time. If no improvement, will proceed with cortisone injection. I also  discussed possible tx with SLC, PT, Dynasplint and custom orthotics.Surgical intervention is the last resort for plantar fasciitis.   Information was given regarding the patient's condition and prognosis. All the patient's questions were answered.       3. RTC  for follow up/evaluation as scheduled       No future appointments.    Report Electronically Signed By:  Delisa Murray DPM   Podiatric Medicine & Surgery  [facility]  10/4/2017

## 2017-10-04 NOTE — LETTER
October 6, 2017      Alexandra Dawkins MD  [address]  [place] [address]           [facility]  [address] [place] [address]  Phone: [phone]  Fax: [phone]          Patient: Chastity Hung   MR Number: [id number]   YOB: 1965   Date of Visit: 10/4/2017       Dear Dr. Alexandra Dawkins:    Thank you for referring Chastity Hung to me for evaluation. Attached you will find relevant portions of my assessment and plan of care.    If you have questions, please do not hesitate to call me. I look forward to following Chastity Hung along with you.    Sincerely,    Delisa Murray DPM    Enclosure  CC:  No Recipients    If you would like to receive this communication electronically, please contact [email or web address] or [phone] to request more information on [employer] access.    For providers and/or their staff who would like to refer a patient to [facility], please contact us through our one-stop-shop provider referral line, [facility], at [phone].    If you feel you have received this communication in error or would no longer like to receive these types of communications, please e-mail [email or web address]

## 2018-01-05 DIAGNOSIS — E11.9 TYPE 2 DIABETES MELLITUS WITHOUT COMPLICATION: ICD-10-CM

## 2018-03-27 DIAGNOSIS — R32 URINARY INCONTINENCE: ICD-10-CM

## 2018-03-27 RX ORDER — TOLTERODINE TARTRATE 2 MG/1
TABLET, EXTENDED RELEASE ORAL
Qty: 60 TABLET | Refills: 3 | Status: SHIPPED | OUTPATIENT
Start: 2018-03-27 | End: 2019-05-21

## 2018-04-26 ENCOUNTER — PATIENT OUTREACH (OUTPATIENT)
Dept: ADMINISTRATIVE | Facility: HOSPITAL | Age: 53
End: 2018-04-26

## 2018-04-26 NOTE — PROGRESS NOTES
Attempted without success to contact pt. to schedule appointment.  Patient not available, left voicemail.

## 2018-06-05 ENCOUNTER — PATIENT OUTREACH (OUTPATIENT)
Dept: ADMINISTRATIVE | Facility: HOSPITAL | Age: 53
End: 2018-06-05

## 2018-06-05 NOTE — PROGRESS NOTES
I have attempted without success to contact this patient to schedule an appointment.Contact on file is unreachable

## 2018-07-09 RX ORDER — METFORMIN HYDROCHLORIDE 1000 MG/1
TABLET ORAL
Qty: 60 TABLET | Refills: 0 | Status: SHIPPED | OUTPATIENT
Start: 2018-07-09 | End: 2019-05-21 | Stop reason: SDUPTHER

## 2018-09-20 ENCOUNTER — PATIENT OUTREACH (OUTPATIENT)
Dept: ADMINISTRATIVE | Facility: HOSPITAL | Age: 53
End: 2018-09-20

## 2018-12-06 ENCOUNTER — PATIENT OUTREACH (OUTPATIENT)
Dept: ADMINISTRATIVE | Facility: HOSPITAL | Age: 53
End: 2018-12-06

## 2019-02-18 RX ORDER — SERTRALINE HYDROCHLORIDE 50 MG/1
TABLET, FILM COATED ORAL
Qty: 90 TABLET | Refills: 0 | OUTPATIENT
Start: 2019-02-18

## 2019-03-28 DIAGNOSIS — R32 URINARY INCONTINENCE: ICD-10-CM

## 2019-03-28 RX ORDER — TOLTERODINE TARTRATE 2 MG/1
TABLET, EXTENDED RELEASE ORAL
Qty: 60 TABLET | Refills: 0 | OUTPATIENT
Start: 2019-03-28

## 2019-04-05 ENCOUNTER — PATIENT OUTREACH (OUTPATIENT)
Dept: ADMINISTRATIVE | Facility: HOSPITAL | Age: 54
End: 2019-04-05

## 2019-04-05 NOTE — PROGRESS NOTES
Attempted to contact pt to schedule a blood pressure f/u per [facility] report and due for a physical. No answer. Left message for pt to return my call.

## 2019-05-21 ENCOUNTER — OFFICE VISIT (OUTPATIENT)
Dept: FAMILY MEDICINE | Facility: CLINIC | Age: 54
End: 2019-05-21
Payer: COMMERCIAL

## 2019-05-21 ENCOUNTER — LAB VISIT (OUTPATIENT)
Dept: LAB | Facility: HOSPITAL | Age: 54
End: 2019-05-21
Attending: FAMILY MEDICINE
Payer: COMMERCIAL

## 2019-05-21 VITALS
WEIGHT: 293 LBS | HEIGHT: 63 IN | DIASTOLIC BLOOD PRESSURE: 86 MMHG | TEMPERATURE: 98 F | SYSTOLIC BLOOD PRESSURE: 139 MMHG | BODY MASS INDEX: 51.91 KG/M2 | HEART RATE: 99 BPM | RESPIRATION RATE: 18 BRPM | OXYGEN SATURATION: 97 %

## 2019-05-21 DIAGNOSIS — E66.01 MORBID OBESITY WITH BMI OF 70 AND OVER, ADULT: ICD-10-CM

## 2019-05-21 DIAGNOSIS — M17.0 PRIMARY OSTEOARTHRITIS OF BOTH KNEES: ICD-10-CM

## 2019-05-21 DIAGNOSIS — I10 ESSENTIAL HYPERTENSION: ICD-10-CM

## 2019-05-21 DIAGNOSIS — E11.9 CONTROLLED TYPE 2 DIABETES MELLITUS WITHOUT COMPLICATION, WITHOUT LONG-TERM CURRENT USE OF INSULIN: ICD-10-CM

## 2019-05-21 DIAGNOSIS — E79.0 HYPERURICEMIA: ICD-10-CM

## 2019-05-21 DIAGNOSIS — Z00.00 PREVENTATIVE HEALTH CARE: ICD-10-CM

## 2019-05-21 DIAGNOSIS — E78.5 HYPERLIPIDEMIA, UNSPECIFIED HYPERLIPIDEMIA TYPE: ICD-10-CM

## 2019-05-21 DIAGNOSIS — Z12.31 ENCOUNTER FOR SCREENING MAMMOGRAM FOR MALIGNANT NEOPLASM OF BREAST: ICD-10-CM

## 2019-05-21 DIAGNOSIS — F32.0 CURRENT MILD EPISODE OF MAJOR DEPRESSIVE DISORDER WITHOUT PRIOR EPISODE: ICD-10-CM

## 2019-05-21 DIAGNOSIS — Z00.00 PREVENTATIVE HEALTH CARE: Primary | ICD-10-CM

## 2019-05-21 DIAGNOSIS — Z23 NEED FOR PNEUMOCOCCAL VACCINATION: ICD-10-CM

## 2019-05-21 LAB
ALBUMIN SERPL BCP-MCNC: 3.4 G/DL (ref 3.5–5.2)
ALP SERPL-CCNC: 108 U/L (ref 55–135)
ALT SERPL W/O P-5'-P-CCNC: 9 U/L (ref 10–44)
ANION GAP SERPL CALC-SCNC: 16 MMOL/L (ref 8–16)
AST SERPL-CCNC: 13 U/L (ref 10–40)
BASOPHILS # BLD AUTO: 0.03 K/UL (ref 0–0.2)
BASOPHILS NFR BLD: 0.4 % (ref 0–1.9)
BILIRUB SERPL-MCNC: 0.5 MG/DL (ref 0.1–1)
BUN SERPL-MCNC: 10 MG/DL (ref 6–20)
CALCIUM SERPL-MCNC: 10.1 MG/DL (ref 8.7–10.5)
CHLORIDE SERPL-SCNC: 106 MMOL/L (ref 95–110)
CHOLEST SERPL-MCNC: 202 MG/DL (ref 120–199)
CHOLEST/HDLC SERPL: 2.9 {RATIO} (ref 2–5)
CO2 SERPL-SCNC: 21 MMOL/L (ref 23–29)
CREAT SERPL-MCNC: 0.9 MG/DL (ref 0.5–1.4)
DIFFERENTIAL METHOD: ABNORMAL
EOSINOPHIL # BLD AUTO: 0.2 K/UL (ref 0–0.5)
EOSINOPHIL NFR BLD: 2.1 % (ref 0–8)
ERYTHROCYTE [DISTWIDTH] IN BLOOD BY AUTOMATED COUNT: 14 % (ref 11.5–14.5)
EST. GFR  (AFRICAN AMERICAN): >60 ML/MIN/1.73 M^2
EST. GFR  (NON AFRICAN AMERICAN): >60 ML/MIN/1.73 M^2
ESTIMATED AVG GLUCOSE: 134 MG/DL (ref 68–131)
GLUCOSE SERPL-MCNC: 100 MG/DL (ref 70–110)
HBA1C MFR BLD HPLC: 6.3 % (ref 4–5.6)
HCT VFR BLD AUTO: 42.7 % (ref 37–48.5)
HDLC SERPL-MCNC: 70 MG/DL (ref 40–75)
HDLC SERPL: 34.7 % (ref 20–50)
HGB BLD-MCNC: 12.8 G/DL (ref 12–16)
IMM GRANULOCYTES # BLD AUTO: 0.02 K/UL (ref 0–0.04)
IMM GRANULOCYTES NFR BLD AUTO: 0.3 % (ref 0–0.5)
LDLC SERPL CALC-MCNC: 112.6 MG/DL (ref 63–159)
LYMPHOCYTES # BLD AUTO: 2.7 K/UL (ref 1–4.8)
LYMPHOCYTES NFR BLD: 33.9 % (ref 18–48)
MCH RBC QN AUTO: 26.8 PG (ref 27–31)
MCHC RBC AUTO-ENTMCNC: 30 G/DL (ref 32–36)
MCV RBC AUTO: 89 FL (ref 82–98)
MONOCYTES # BLD AUTO: 0.7 K/UL (ref 0.3–1)
MONOCYTES NFR BLD: 8.6 % (ref 4–15)
NEUTROPHILS # BLD AUTO: 4.3 K/UL (ref 1.8–7.7)
NEUTROPHILS NFR BLD: 54.7 % (ref 38–73)
NONHDLC SERPL-MCNC: 132 MG/DL
NRBC BLD-RTO: 0 /100 WBC
PLATELET # BLD AUTO: 270 K/UL (ref 150–350)
PMV BLD AUTO: 12.5 FL (ref 9.2–12.9)
POTASSIUM SERPL-SCNC: 4 MMOL/L (ref 3.5–5.1)
PROT SERPL-MCNC: 8.2 G/DL (ref 6–8.4)
RBC # BLD AUTO: 4.78 M/UL (ref 4–5.4)
SODIUM SERPL-SCNC: 143 MMOL/L (ref 136–145)
TRIGL SERPL-MCNC: 97 MG/DL (ref 30–150)
TSH SERPL DL<=0.005 MIU/L-ACNC: 1.87 UIU/ML (ref 0.4–4)
WBC # BLD AUTO: 7.91 K/UL (ref 3.9–12.7)

## 2019-05-21 PROCEDURE — 84443 ASSAY THYROID STIM HORMONE: CPT

## 2019-05-21 PROCEDURE — 3044F HG A1C LEVEL LT 7.0%: CPT | Mod: CPTII,S$GLB,, | Performed by: FAMILY MEDICINE

## 2019-05-21 PROCEDURE — 3079F PR MOST RECENT DIASTOLIC BLOOD PRESSURE 80-89 MM HG: ICD-10-PCS | Mod: CPTII,S$GLB,, | Performed by: FAMILY MEDICINE

## 2019-05-21 PROCEDURE — 99396 PREV VISIT EST AGE 40-64: CPT | Mod: 25,S$GLB,, | Performed by: FAMILY MEDICINE

## 2019-05-21 PROCEDURE — 99396 PR PREVENTIVE VISIT,EST,40-64: ICD-10-PCS | Mod: 25,S$GLB,, | Performed by: FAMILY MEDICINE

## 2019-05-21 PROCEDURE — 85025 COMPLETE CBC W/AUTO DIFF WBC: CPT

## 2019-05-21 PROCEDURE — 83036 HEMOGLOBIN GLYCOSYLATED A1C: CPT

## 2019-05-21 PROCEDURE — 3075F PR MOST RECENT SYSTOLIC BLOOD PRESS GE 130-139MM HG: ICD-10-PCS | Mod: CPTII,S$GLB,, | Performed by: FAMILY MEDICINE

## 2019-05-21 PROCEDURE — 3044F PR MOST RECENT HEMOGLOBIN A1C LEVEL <7.0%: ICD-10-PCS | Mod: CPTII,S$GLB,, | Performed by: FAMILY MEDICINE

## 2019-05-21 PROCEDURE — 3075F SYST BP GE 130 - 139MM HG: CPT | Mod: CPTII,S$GLB,, | Performed by: FAMILY MEDICINE

## 2019-05-21 PROCEDURE — 90732 PPSV23 VACC 2 YRS+ SUBQ/IM: CPT | Mod: S$GLB,,, | Performed by: FAMILY MEDICINE

## 2019-05-21 PROCEDURE — 99999 PR PBB SHADOW E&M-EST. PATIENT-LVL V: CPT | Mod: PBBFAC,,, | Performed by: FAMILY MEDICINE

## 2019-05-21 PROCEDURE — 90732 PNEUMOCOCCAL POLYSACCHARIDE VACCINE 23-VALENT =>2YO SQ IM: ICD-10-PCS | Mod: S$GLB,,, | Performed by: FAMILY MEDICINE

## 2019-05-21 PROCEDURE — 90471 IMMUNIZATION ADMIN: CPT | Mod: S$GLB,,, | Performed by: FAMILY MEDICINE

## 2019-05-21 PROCEDURE — 80061 LIPID PANEL: CPT

## 2019-05-21 PROCEDURE — 3079F DIAST BP 80-89 MM HG: CPT | Mod: CPTII,S$GLB,, | Performed by: FAMILY MEDICINE

## 2019-05-21 PROCEDURE — 90471 PNEUMOCOCCAL POLYSACCHARIDE VACCINE 23-VALENT =>2YO SQ IM: ICD-10-PCS | Mod: S$GLB,,, | Performed by: FAMILY MEDICINE

## 2019-05-21 PROCEDURE — 80053 COMPREHEN METABOLIC PANEL: CPT

## 2019-05-21 PROCEDURE — 36415 COLL VENOUS BLD VENIPUNCTURE: CPT | Mod: PO

## 2019-05-21 PROCEDURE — 99999 PR PBB SHADOW E&M-EST. PATIENT-LVL V: ICD-10-PCS | Mod: PBBFAC,,, | Performed by: FAMILY MEDICINE

## 2019-05-21 RX ORDER — SERTRALINE HYDROCHLORIDE 50 MG/1
50 TABLET, FILM COATED ORAL DAILY
Qty: 90 TABLET | Refills: 3 | Status: SHIPPED | OUTPATIENT
Start: 2019-05-21 | End: 2020-04-15

## 2019-05-21 RX ORDER — ATORVASTATIN CALCIUM 10 MG/1
10 TABLET, FILM COATED ORAL DAILY
Qty: 90 TABLET | Refills: 3 | Status: SHIPPED | OUTPATIENT
Start: 2019-05-21 | End: 2020-05-13

## 2019-05-21 RX ORDER — GABAPENTIN 300 MG/1
300 CAPSULE ORAL NIGHTLY
Qty: 90 CAPSULE | Refills: 3 | Status: SHIPPED | OUTPATIENT
Start: 2019-05-21 | End: 2020-04-28

## 2019-05-21 RX ORDER — LOSARTAN POTASSIUM AND HYDROCHLOROTHIAZIDE 12.5; 5 MG/1; MG/1
1 TABLET ORAL EVERY MORNING
Qty: 90 TABLET | Refills: 3 | Status: SHIPPED | OUTPATIENT
Start: 2019-05-21 | End: 2020-10-14 | Stop reason: SDUPTHER

## 2019-05-21 RX ORDER — GLIMEPIRIDE 1 MG/1
1 TABLET ORAL EVERY MORNING
Qty: 90 TABLET | Refills: 3 | Status: SHIPPED | OUTPATIENT
Start: 2019-05-21 | End: 2020-05-13

## 2019-05-21 RX ORDER — MELOXICAM 15 MG/1
15 TABLET ORAL DAILY
Qty: 30 TABLET | Refills: 5 | Status: SHIPPED | OUTPATIENT
Start: 2019-05-21 | End: 2019-11-14 | Stop reason: SDUPTHER

## 2019-05-21 RX ORDER — METFORMIN HYDROCHLORIDE 1000 MG/1
1000 TABLET ORAL 2 TIMES DAILY WITH MEALS
Qty: 180 TABLET | Refills: 3 | Status: SHIPPED | OUTPATIENT
Start: 2019-05-21 | End: 2020-05-15

## 2019-05-21 NOTE — PROGRESS NOTES
CHIEF COMPLAINT:  This is a 53-year-old female here for preventive health exam.    SUBJECTIVE:  The patient has not been seen in almost 2 years.  She admits to stopping all her medication months ago.  Her blood pressure is elevated today at 150/86.  She is supposed to be taking losartan HCT 50-12.5 mg daily.  She is a type 2 diabetic but has not been monitoring her blood sugars.  She is supposed to be taking glimepiride 1 mg with breakfast and metformin 1000 mg twice daily.  She denies polyuria, polydipsia or polyphagia.  The patient is morbidly obese with a BMI of 90.95.  Patient has not followed up with ophthalmologist after central retinal vein occlusion with macular edema which was attributed to hypertension.  She has been treated for depression in the past with sertraline and requests refill.    Patient complains of burning pain and tingling in the bottom of her feet which makes it difficult to bear weight on her feet.  She complains of pain in her knees and fingers.  She has a history of osteoarthritis of the knees.    Eye exam June 2017.  Mammogram October 2015.  Colonoscopy December 2013, due again December 2023.  Prevnar October 2014.  Tdap May 2009.     ROS:  GENERAL: Patient denies fever, chills, night sweats.  Patient denies weight loss.  Patient denies anorexia, fatigue, weakness or swollen glands.  SKIN: Patient denies rash or hair loss.  HEENT: Patient denies sore throat, ear pain, hearing loss, nasal congestion, or runny nose. Patient denies visual disturbance, eye irritation or discharge.  LUNGS: Patient denies cough, wheeze or hemoptysis.  CARDIOVASCULAR: Patient denies chest pain, shortness of breath, palpitations, syncope or lower extremity edema.  GI: Patient denies abdominal pain, nausea, vomiting, diarrhea, constipation, blood in stool or melena.  GENITOURINARY: Patient denies pelvic pain, vaginal discharge, itch or odor. Patient denies irregular vaginal bleeding.  Patient denies dysuria,  frequency, hematuria, nocturia, urgency or incontinence.  BREASTS: Patient denies breast pain, mass or nipple discharge.  MUSCULOSKELETAL: Patient denies joint pain, swelling, redness or warmth.  NEUROLOGIC: Patient denies headache, vertigo, paresthesias, weakness in limb, dysarthria, dysphagia or abnormality of gait.  PSYCHIATRIC: Patient denies anxiety, depression, or memory loss.     OBJECTIVE:   GENERAL: Well-developed well-nourished, morbidly obese, black female alert and oriented x3, in no acute distress. Memory, judgment and cognition without deficit.   SKIN: Clear without rash. Normal color and tone.  HEENT: Eyes: Clear conjunctivae. No scleral icterus. Pupils equal reactive to light and accommodation. Ears: Clear TMs. Clear canals. Nose: Without congestion. Pharynx: Without injection or exudates.  NECK: Supple, normal range of motion. No masses, lymphadenopathy or enlarged thyroid. No JVD. Carotids 2+ and equal. No bruits.  LUNGS: Clear to auscultation. Normal respiratory effort.  CARDIOVASCULAR: Regular rhythm, normal S1, S2, with grade 2/6 systolic murmur heard best at the right upper sternal border. No gallops or rubs.   BACK: No CVA or spinal tenderness.  BREASTS: No masses, tenderness or nipple discharge.  ABDOMEN: Obese. Active bowel sounds. Soft, nontender . Difficult exam, but no palpable mass or organomegaly. No rebound or guarding.  EXTREMITIES: Without cyanosis, clubbing. Difficult to assess edema due to morbid obesity. Distal pulses 2+ and equal. Normal range of motion in all extremities except as limited by obesity. No joint effusion, erythema or warmth.  NEUROLOGIC: Cranial nerves II through XII without deficit. Motor strength equal bilaterally. Sensation normal to touch. Deep tendon reflexes 2+ and equal. Gait waddling. No tremor. Negative cerebellar signs.  Negative microfilament.  PELVIC: Deferred due to difficulty getting patient on exam table and in position.     ASSESSMENT:  1.  Preventative health care    2. Controlled type 2 diabetes mellitus without complication, without long-term current use of insulin    3. Essential hypertension    4. Hyperlipidemia, unspecified hyperlipidemia type    5. Hyperuricemia    6. Morbid obesity with BMI of 70 and over, adult    7. Primary osteoarthritis of both knees    8. Current mild episode of major depressive disorder without prior episode    9. Encounter for screening mammogram for malignant neoplasm of breast    10. Need for pneumococcal vaccination      PLAN:   1.  Weight reduction.  Exercise regularly.  2.  Age-appropriate counseling.  3.  Fasting lab.  4.  Mammogram.  5.  Refill medications.  6.  Pneumovax.  7.  Add atorvastatin 10 mg daily.  8.  Eye exam.  9.  Follow-up in 6-12 months.    This note is generated with speech recognition software and is subject to transcription error and sound alike phrases that may be missed by proofreading.

## 2019-05-22 DIAGNOSIS — M79.671 FOOT PAIN, BILATERAL: ICD-10-CM

## 2019-05-22 DIAGNOSIS — M79.672 FOOT PAIN, BILATERAL: ICD-10-CM

## 2019-05-22 DIAGNOSIS — E79.0 HYPERURICEMIA: ICD-10-CM

## 2019-05-22 RX ORDER — ALLOPURINOL 100 MG/1
TABLET ORAL
Qty: 120 TABLET | Refills: 0 | Status: SHIPPED | OUTPATIENT
Start: 2019-05-22 | End: 2020-10-14 | Stop reason: SDUPTHER

## 2019-11-14 DIAGNOSIS — M17.0 PRIMARY OSTEOARTHRITIS OF BOTH KNEES: ICD-10-CM

## 2019-11-14 RX ORDER — MELOXICAM 15 MG/1
TABLET ORAL
Qty: 30 TABLET | Refills: 3 | Status: SHIPPED | OUTPATIENT
Start: 2019-11-14 | End: 2020-03-28

## 2019-12-06 DIAGNOSIS — E11.9 TYPE 2 DIABETES MELLITUS WITHOUT COMPLICATION: ICD-10-CM

## 2020-01-23 NOTE — TELEPHONE ENCOUNTER
Patient last visit 05/21/19  Last annual appt /04/24/15    Called patient and schedule her for  Annual  appt

## 2020-03-28 DIAGNOSIS — M17.0 PRIMARY OSTEOARTHRITIS OF BOTH KNEES: ICD-10-CM

## 2020-03-28 RX ORDER — MELOXICAM 15 MG/1
TABLET ORAL
Qty: 30 TABLET | Refills: 2 | Status: SHIPPED | OUTPATIENT
Start: 2020-03-28 | End: 2020-06-29

## 2020-04-15 RX ORDER — SERTRALINE HYDROCHLORIDE 50 MG/1
TABLET, FILM COATED ORAL
Qty: 90 TABLET | Refills: 0 | Status: SHIPPED | OUTPATIENT
Start: 2020-04-15 | End: 2020-07-20

## 2020-04-28 RX ORDER — GABAPENTIN 300 MG/1
CAPSULE ORAL
Qty: 90 CAPSULE | Refills: 0 | Status: SHIPPED | OUTPATIENT
Start: 2020-04-28 | End: 2020-10-14 | Stop reason: SDUPTHER

## 2020-05-13 RX ORDER — GLIMEPIRIDE 1 MG/1
TABLET ORAL
Qty: 90 TABLET | Refills: 0 | Status: SHIPPED | OUTPATIENT
Start: 2020-05-13 | End: 2020-08-09

## 2020-05-13 RX ORDER — ATORVASTATIN CALCIUM 10 MG/1
TABLET, FILM COATED ORAL
Qty: 90 TABLET | Refills: 0 | Status: SHIPPED | OUTPATIENT
Start: 2020-05-13 | End: 2020-08-09

## 2020-05-15 RX ORDER — LANCETS 28 GAUGE
EACH MISCELLANEOUS
Qty: 100 EACH | Refills: 0 | Status: SHIPPED | OUTPATIENT
Start: 2020-05-15 | End: 2020-06-12

## 2020-05-15 RX ORDER — METFORMIN HYDROCHLORIDE 1000 MG/1
TABLET ORAL
Qty: 180 TABLET | Refills: 0 | Status: SHIPPED | OUTPATIENT
Start: 2020-05-15 | End: 2020-08-14

## 2020-06-12 RX ORDER — LANCETS 28 GAUGE
EACH MISCELLANEOUS
Qty: 100 EACH | Refills: 0 | Status: SHIPPED | OUTPATIENT
Start: 2020-06-12 | End: 2020-12-02 | Stop reason: SDUPTHER

## 2020-07-13 RX ORDER — LANCETS 28 GAUGE
EACH MISCELLANEOUS
OUTPATIENT
Start: 2020-07-13

## 2020-07-30 RX ORDER — GABAPENTIN 300 MG/1
CAPSULE ORAL
Qty: 90 CAPSULE | Refills: 0 | OUTPATIENT
Start: 2020-07-30

## 2020-08-03 DIAGNOSIS — M17.0 PRIMARY OSTEOARTHRITIS OF BOTH KNEES: ICD-10-CM

## 2020-08-03 RX ORDER — MELOXICAM 15 MG/1
TABLET ORAL
Qty: 30 TABLET | Refills: 0 | OUTPATIENT
Start: 2020-08-03

## 2020-08-07 ENCOUNTER — PATIENT OUTREACH (OUTPATIENT)
Dept: ADMINISTRATIVE | Facility: HOSPITAL | Age: 55
End: 2020-08-07

## 2020-08-07 DIAGNOSIS — Z00.00 VISIT FOR ANNUAL HEALTH EXAMINATION: Primary | ICD-10-CM

## 2020-08-07 NOTE — PROGRESS NOTES
Pt PCP visit and labs scheduled, Not Eligible for Dig med, or OPCM , Labs Ordered Pt Refused Mammogram at this time      Autumn HA LPN Care Coordinator  Care Coordination Department  [facility]  [phone]

## 2020-08-09 RX ORDER — ATORVASTATIN CALCIUM 10 MG/1
TABLET, FILM COATED ORAL
Qty: 90 TABLET | Refills: 0 | Status: SHIPPED | OUTPATIENT
Start: 2020-08-09 | End: 2020-10-14 | Stop reason: SDUPTHER

## 2020-08-09 RX ORDER — GLIMEPIRIDE 1 MG/1
TABLET ORAL
Qty: 90 TABLET | Refills: 0 | Status: SHIPPED | OUTPATIENT
Start: 2020-08-09 | End: 2020-10-14 | Stop reason: SDUPTHER

## 2020-08-14 RX ORDER — METFORMIN HYDROCHLORIDE 1000 MG/1
TABLET ORAL
Qty: 180 TABLET | Refills: 0 | Status: SHIPPED | OUTPATIENT
Start: 2020-08-14 | End: 2020-10-14 | Stop reason: SDUPTHER

## 2020-10-01 ENCOUNTER — PATIENT MESSAGE (OUTPATIENT)
Dept: FAMILY MEDICINE | Facility: CLINIC | Age: 55
End: 2020-10-01

## 2020-10-13 ENCOUNTER — OFFICE VISIT (OUTPATIENT)
Dept: FAMILY MEDICINE | Facility: CLINIC | Age: 55
End: 2020-10-13
Payer: COMMERCIAL

## 2020-10-13 ENCOUNTER — LAB VISIT (OUTPATIENT)
Dept: LAB | Facility: HOSPITAL | Age: 55
End: 2020-10-13
Payer: COMMERCIAL

## 2020-10-13 VITALS
OXYGEN SATURATION: 95 % | BODY MASS INDEX: 50.02 KG/M2 | TEMPERATURE: 97 F | SYSTOLIC BLOOD PRESSURE: 181 MMHG | DIASTOLIC BLOOD PRESSURE: 98 MMHG | HEIGHT: 64 IN | WEIGHT: 293 LBS | HEART RATE: 118 BPM

## 2020-10-13 DIAGNOSIS — E11.9 TYPE 2 DIABETES MELLITUS WITHOUT COMPLICATION: ICD-10-CM

## 2020-10-13 DIAGNOSIS — Z00.00 VISIT FOR ANNUAL HEALTH EXAMINATION: ICD-10-CM

## 2020-10-13 DIAGNOSIS — I10 ESSENTIAL HYPERTENSION: ICD-10-CM

## 2020-10-13 DIAGNOSIS — E78.5 HYPERLIPIDEMIA, UNSPECIFIED HYPERLIPIDEMIA TYPE: ICD-10-CM

## 2020-10-13 DIAGNOSIS — Z00.00 PREVENTATIVE HEALTH CARE: Primary | ICD-10-CM

## 2020-10-13 DIAGNOSIS — Z23 NEED FOR VACCINATION: ICD-10-CM

## 2020-10-13 DIAGNOSIS — Z00.00 PREVENTATIVE HEALTH CARE: ICD-10-CM

## 2020-10-13 DIAGNOSIS — E66.01 MORBID OBESITY WITH BMI OF 70 AND OVER, ADULT: ICD-10-CM

## 2020-10-13 DIAGNOSIS — E11.9 CONTROLLED TYPE 2 DIABETES MELLITUS WITHOUT COMPLICATION, WITHOUT LONG-TERM CURRENT USE OF INSULIN: ICD-10-CM

## 2020-10-13 DIAGNOSIS — F32.0 CURRENT MILD EPISODE OF MAJOR DEPRESSIVE DISORDER WITHOUT PRIOR EPISODE: ICD-10-CM

## 2020-10-13 DIAGNOSIS — Z12.31 ENCOUNTER FOR SCREENING MAMMOGRAM FOR MALIGNANT NEOPLASM OF BREAST: ICD-10-CM

## 2020-10-13 LAB
ALBUMIN SERPL BCP-MCNC: 3.4 G/DL (ref 3.5–5.2)
ALP SERPL-CCNC: 91 U/L (ref 55–135)
ALT SERPL W/O P-5'-P-CCNC: 11 U/L (ref 10–44)
ANION GAP SERPL CALC-SCNC: 14 MMOL/L (ref 8–16)
AST SERPL-CCNC: 15 U/L (ref 10–40)
BASOPHILS # BLD AUTO: 0.03 K/UL (ref 0–0.2)
BASOPHILS NFR BLD: 0.3 % (ref 0–1.9)
BILIRUB SERPL-MCNC: 0.5 MG/DL (ref 0.1–1)
BUN SERPL-MCNC: 10 MG/DL (ref 6–20)
CALCIUM SERPL-MCNC: 9.3 MG/DL (ref 8.7–10.5)
CHLORIDE SERPL-SCNC: 106 MMOL/L (ref 95–110)
CHOLEST SERPL-MCNC: 123 MG/DL (ref 120–199)
CHOLEST/HDLC SERPL: 2.4 {RATIO} (ref 2–5)
CO2 SERPL-SCNC: 24 MMOL/L (ref 23–29)
CREAT SERPL-MCNC: 0.8 MG/DL (ref 0.5–1.4)
DIFFERENTIAL METHOD: ABNORMAL
EOSINOPHIL # BLD AUTO: 0.2 K/UL (ref 0–0.5)
EOSINOPHIL NFR BLD: 2.4 % (ref 0–8)
ERYTHROCYTE [DISTWIDTH] IN BLOOD BY AUTOMATED COUNT: 14.3 % (ref 11.5–14.5)
EST. GFR  (AFRICAN AMERICAN): >60 ML/MIN/1.73 M^2
EST. GFR  (NON AFRICAN AMERICAN): >60 ML/MIN/1.73 M^2
GLUCOSE SERPL-MCNC: 96 MG/DL (ref 70–110)
HCT VFR BLD AUTO: 42.3 % (ref 37–48.5)
HDLC SERPL-MCNC: 52 MG/DL (ref 40–75)
HDLC SERPL: 42.3 % (ref 20–50)
HGB BLD-MCNC: 12.2 G/DL (ref 12–16)
IMM GRANULOCYTES # BLD AUTO: 0.04 K/UL (ref 0–0.04)
IMM GRANULOCYTES NFR BLD AUTO: 0.4 % (ref 0–0.5)
LDLC SERPL CALC-MCNC: 53.4 MG/DL (ref 63–159)
LYMPHOCYTES # BLD AUTO: 3.2 K/UL (ref 1–4.8)
LYMPHOCYTES NFR BLD: 35.3 % (ref 18–48)
MCH RBC QN AUTO: 25.8 PG (ref 27–31)
MCHC RBC AUTO-ENTMCNC: 28.8 G/DL (ref 32–36)
MCV RBC AUTO: 89 FL (ref 82–98)
MONOCYTES # BLD AUTO: 0.6 K/UL (ref 0.3–1)
MONOCYTES NFR BLD: 6.6 % (ref 4–15)
NEUTROPHILS # BLD AUTO: 5 K/UL (ref 1.8–7.7)
NEUTROPHILS NFR BLD: 55 % (ref 38–73)
NONHDLC SERPL-MCNC: 71 MG/DL
NRBC BLD-RTO: 0 /100 WBC
PLATELET # BLD AUTO: 263 K/UL (ref 150–350)
PMV BLD AUTO: 13 FL (ref 9.2–12.9)
POTASSIUM SERPL-SCNC: 3.8 MMOL/L (ref 3.5–5.1)
PROT SERPL-MCNC: 8 G/DL (ref 6–8.4)
RBC # BLD AUTO: 4.73 M/UL (ref 4–5.4)
SODIUM SERPL-SCNC: 144 MMOL/L (ref 136–145)
TRIGL SERPL-MCNC: 88 MG/DL (ref 30–150)
TSH SERPL DL<=0.005 MIU/L-ACNC: 1.37 UIU/ML (ref 0.4–4)
WBC # BLD AUTO: 9.06 K/UL (ref 3.9–12.7)

## 2020-10-13 PROCEDURE — 3077F PR MOST RECENT SYSTOLIC BLOOD PRESSURE >= 140 MM HG: ICD-10-PCS | Mod: CPTII,S$GLB,, | Performed by: FAMILY MEDICINE

## 2020-10-13 PROCEDURE — 83036 HEMOGLOBIN GLYCOSYLATED A1C: CPT

## 2020-10-13 PROCEDURE — 90686 IIV4 VACC NO PRSV 0.5 ML IM: CPT | Mod: S$GLB,,, | Performed by: FAMILY MEDICINE

## 2020-10-13 PROCEDURE — 80061 LIPID PANEL: CPT

## 2020-10-13 PROCEDURE — 99396 PR PREVENTIVE VISIT,EST,40-64: ICD-10-PCS | Mod: 25,S$GLB,, | Performed by: FAMILY MEDICINE

## 2020-10-13 PROCEDURE — 3008F PR BODY MASS INDEX (BMI) DOCUMENTED: ICD-10-PCS | Mod: CPTII,S$GLB,, | Performed by: FAMILY MEDICINE

## 2020-10-13 PROCEDURE — 80053 COMPREHEN METABOLIC PANEL: CPT

## 2020-10-13 PROCEDURE — 3008F BODY MASS INDEX DOCD: CPT | Mod: CPTII,S$GLB,, | Performed by: FAMILY MEDICINE

## 2020-10-13 PROCEDURE — 85025 COMPLETE CBC W/AUTO DIFF WBC: CPT

## 2020-10-13 PROCEDURE — 86703 HIV-1/HIV-2 1 RESULT ANTBDY: CPT

## 2020-10-13 PROCEDURE — 99999 PR PBB SHADOW E&M-EST. PATIENT-LVL IV: ICD-10-PCS | Mod: PBBFAC,,, | Performed by: FAMILY MEDICINE

## 2020-10-13 PROCEDURE — 3077F SYST BP >= 140 MM HG: CPT | Mod: CPTII,S$GLB,, | Performed by: FAMILY MEDICINE

## 2020-10-13 PROCEDURE — 3080F PR MOST RECENT DIASTOLIC BLOOD PRESSURE >= 90 MM HG: ICD-10-PCS | Mod: CPTII,S$GLB,, | Performed by: FAMILY MEDICINE

## 2020-10-13 PROCEDURE — 90686 FLU VACCINE (QUAD) GREATER THAN OR EQUAL TO 3YO PRESERVATIVE FREE IM: ICD-10-PCS | Mod: S$GLB,,, | Performed by: FAMILY MEDICINE

## 2020-10-13 PROCEDURE — 36415 COLL VENOUS BLD VENIPUNCTURE: CPT | Mod: PO

## 2020-10-13 PROCEDURE — 99396 PREV VISIT EST AGE 40-64: CPT | Mod: 25,S$GLB,, | Performed by: FAMILY MEDICINE

## 2020-10-13 PROCEDURE — 90471 IMMUNIZATION ADMIN: CPT | Mod: S$GLB,,, | Performed by: FAMILY MEDICINE

## 2020-10-13 PROCEDURE — 3080F DIAST BP >= 90 MM HG: CPT | Mod: CPTII,S$GLB,, | Performed by: FAMILY MEDICINE

## 2020-10-13 PROCEDURE — 84443 ASSAY THYROID STIM HORMONE: CPT

## 2020-10-13 PROCEDURE — 99999 PR PBB SHADOW E&M-EST. PATIENT-LVL IV: CPT | Mod: PBBFAC,,, | Performed by: FAMILY MEDICINE

## 2020-10-13 PROCEDURE — 90471 FLU VACCINE (QUAD) GREATER THAN OR EQUAL TO 3YO PRESERVATIVE FREE IM: ICD-10-PCS | Mod: S$GLB,,, | Performed by: FAMILY MEDICINE

## 2020-10-13 NOTE — PROGRESS NOTES
CHIEF COMPLAINT: This is a 54-year-old female here for preventive health exam.     SUBJECTIVE:  Patient is doing well without complaints except for occasional headaches.  She has essential hypertension and her blood pressure is not controlled.  Blood pressure reading today is 181/98.  She admits to not taking her anti-hypertensive medication, losartan HCT 50-12.5 mg daily.  She is morbidly obese with a BMI of 87.87. She reports that her blood sugars are controlled with readings from 100-125.  Last A1c was 6.3% over one year ago.  She denies polyuria, polydipsia, polyphagia.  She takes metformin 1000 mg twice daily and glimepiride 1 mg daily.  She has a history of central retinal vein occlusion with macular edema which has been attributed to hypertension.  Patient admits to decreased vision.  She has been noncompliant with follow-up with retinal specialist.  Patient takes sertraline for depression.  Patient does not exercise.     Eye exam June 2017.  Mammogram June 2017  Colonoscopy December 2013, due again December 2023.  Prevnar October 2014.  Td booster May 2017. Pneumovax May 2019.     ROS:  GENERAL: Patient denies fever, chills, night sweats.  Patient denies weight loss.  Patient denies anorexia, fatigue, weakness or swollen glands.  SKIN: Patient denies rash or hair loss.  HEENT: Patient denies sore throat, ear pain, hearing loss, nasal congestion, or runny nose. Patient denies visual disturbance, eye irritation or discharge.  LUNGS: Patient denies cough, wheeze or hemoptysis.  CARDIOVASCULAR: Patient denies chest pain, shortness of breath, palpitations, syncope or lower extremity edema.  GI: Patient denies abdominal pain, nausea, vomiting, diarrhea, constipation, blood in stool or melena.  GENITOURINARY: Patient denies pelvic pain, vaginal discharge, itch or odor. Patient denies irregular vaginal bleeding.  Patient denies dysuria, frequency, hematuria, nocturia, urgency or incontinence.  BREASTS: Patient  denies breast pain, mass or nipple discharge.  MUSCULOSKELETAL: Patient denies joint pain, swelling, redness or warmth.  NEUROLOGIC: Patient denies vertigo, paresthesias, weakness in limb, dysarthria, dysphagia or abnormality of gait.  Positive for occasional headaches.  PSYCHIATRIC: Patient denies anxiety, depression, or memory loss.  Positive for dysphoric mood.     OBJECTIVE:   GENERAL: Well-developed well-nourished, morbidly obese, black female alert and oriented x3, in no acute distress. Memory, judgment and cognition without deficit.   SKIN: Clear without rash. Normal color and tone.  HEENT: Eyes: Clear conjunctivae. No scleral icterus. Pupils equal reactive to light and accommodation. Ears: Clear TMs. Clear canals. Nose: Without congestion. Pharynx: Without injection or exudates.  NECK: Supple, normal range of motion. No masses, lymphadenopathy or enlarged thyroid. No JVD. Carotids 2+ and equal. No bruits.  LUNGS: Clear to auscultation. Normal respiratory effort.  CARDIOVASCULAR: Regular rhythm, normal S1, S2, with grade 2/6 systolic murmur heard best at the right upper sternal border. No gallops or rubs.   BACK: No CVA or spinal tenderness.  BREASTS:  Deferred per patient request.  ABDOMEN: Obese. Active bowel sounds. Soft, nontender . Difficult exam, but no palpable mass or organomegaly. No rebound or guarding.  EXTREMITIES: Without cyanosis, clubbing. Difficult to assess edema due to morbid obesity. Distal pulses 2+ and equal. Normal range of motion in all extremities except as limited by obesity. No joint effusion, erythema or warmth.  NEUROLOGIC: Cranial nerves II through XII without deficit. Motor strength equal bilaterally. Sensation normal to touch. Deep tendon reflexes 2+ and equal. Gait waddling. No tremor. Negative cerebellar signs.    FOOT EVALUATION: 10 gram monofilament exam with protective sensation intact bilaterally. Nails appropriately trimmed. No ulcers. Distal pulses palpable.  PELVIC:  Deferred due to difficulty getting patient on exam table and in position.      ASSESSMENT:  1. Preventative health care    2. Controlled type 2 diabetes mellitus without complication, without long-term current use of insulin    3. Hyperlipidemia, unspecified hyperlipidemia type    4. Morbid obesity with BMI of 70 and over, adult    5. Essential hypertension    6. Current mild episode of major depressive disorder without prior episode    7. Need for vaccination    8. Encounter for screening mammogram for malignant neoplasm of breast        PLAN:  1.  Weight reduction. Exercise regularly.  2.  Age-appropriate counseling.  3.  Fasting lab.  4.  Mammogram.  5.  Influenza vaccine.  6.  Shingrix vaccine.  7.  Monitor blood pressure.  Report readings in 2 weeks.  8.  Eye exam.  9.  Follow-up in 6 months.    This note is generated with speech recognition software and is subject to transcription error and sound alike phrases that may be missed by proofreading.

## 2020-10-14 ENCOUNTER — PATIENT MESSAGE (OUTPATIENT)
Dept: FAMILY MEDICINE | Facility: CLINIC | Age: 55
End: 2020-10-14

## 2020-10-14 DIAGNOSIS — M17.0 PRIMARY OSTEOARTHRITIS OF BOTH KNEES: ICD-10-CM

## 2020-10-14 DIAGNOSIS — I10 ESSENTIAL HYPERTENSION: ICD-10-CM

## 2020-10-14 LAB
ESTIMATED AVG GLUCOSE: 114 MG/DL (ref 68–131)
HBA1C MFR BLD HPLC: 5.6 % (ref 4–5.6)
HIV 1+2 AB+HIV1 P24 AG SERPL QL IA: NEGATIVE

## 2020-10-14 RX ORDER — METFORMIN HYDROCHLORIDE 1000 MG/1
1000 TABLET ORAL 2 TIMES DAILY WITH MEALS
Qty: 180 TABLET | Refills: 3 | Status: SHIPPED | OUTPATIENT
Start: 2020-10-14 | End: 2020-11-23 | Stop reason: SDUPTHER

## 2020-10-14 RX ORDER — MELOXICAM 15 MG/1
15 TABLET ORAL DAILY
Qty: 30 TABLET | Refills: 3 | Status: SHIPPED | OUTPATIENT
Start: 2020-10-14 | End: 2021-02-11 | Stop reason: SDUPTHER

## 2020-10-14 RX ORDER — SERTRALINE HYDROCHLORIDE 50 MG/1
50 TABLET, FILM COATED ORAL DAILY
Qty: 90 TABLET | Refills: 3 | Status: SHIPPED | OUTPATIENT
Start: 2020-10-14 | End: 2021-04-01 | Stop reason: SDUPTHER

## 2020-10-14 RX ORDER — GLIMEPIRIDE 1 MG/1
1 TABLET ORAL
Qty: 90 TABLET | Refills: 3 | Status: SHIPPED | OUTPATIENT
Start: 2020-10-14 | End: 2021-10-06

## 2020-10-14 RX ORDER — GABAPENTIN 300 MG/1
300 CAPSULE ORAL NIGHTLY
Qty: 90 CAPSULE | Refills: 3 | Status: SHIPPED | OUTPATIENT
Start: 2020-10-14 | End: 2021-04-01 | Stop reason: SDUPTHER

## 2020-10-14 RX ORDER — ATORVASTATIN CALCIUM 10 MG/1
10 TABLET, FILM COATED ORAL DAILY
Qty: 90 TABLET | Refills: 3 | Status: SHIPPED | OUTPATIENT
Start: 2020-10-14 | End: 2021-10-06

## 2020-10-14 RX ORDER — LOSARTAN POTASSIUM AND HYDROCHLOROTHIAZIDE 12.5; 5 MG/1; MG/1
1 TABLET ORAL EVERY MORNING
Qty: 90 TABLET | Refills: 3 | Status: SHIPPED | OUTPATIENT
Start: 2020-10-14 | End: 2021-10-03

## 2020-11-16 ENCOUNTER — PATIENT OUTREACH (OUTPATIENT)
Dept: ADMINISTRATIVE | Facility: HOSPITAL | Age: 55
End: 2020-11-16

## 2020-11-16 ENCOUNTER — TELEPHONE (OUTPATIENT)
Dept: FAMILY MEDICINE | Facility: CLINIC | Age: 55
End: 2020-11-16

## 2020-11-16 NOTE — PROGRESS NOTES
Working HTN report.  Attempted to call pt to obtain remote bp reading or schedule follow up in clinic.  Last time in clinic pts bp was uncontrolled, no f/u scheduled.  Talked to pt on phone, will call back with bp reading. Also entered bp order.

## 2020-11-16 NOTE — Clinical Note
Please see my note for more details.  Talked to pt on phone-bp 244/139.  Pt does not want to come in for visit or go to the ER at this time.                Leon HA LPN  Care Coordination Department  [place] [facility]  [phone]

## 2020-11-16 NOTE — PROGRESS NOTES
"Pt returned phone call but I was on the other line with another pt. Called pt back who stated she already checked bp two times and it was elevated. Has wrist monitor only. Checked again with me on the phone--244/139. Sent chart to pcp. Pt denied any symptoms stated "I feel fine." pt declined setting up a follow up appt. Stated she does not follow a cardiologist. Pt declined going to the ER. Stated she would check her bp again in about an hr and if it went any higher she would make arrangements to go to the ER. Pt stated she does have someone that can take her to the hospital. Did tell pt even though she feels fine she may have something going on she does not know about, that bp is stroke numbers. Pt verbalized understanding. Pt stated she will not do any strenuous activity in the meantime. Pt also stated Dr. Dawkins knows she is stubborn.    "

## 2020-12-02 ENCOUNTER — PATIENT MESSAGE (OUTPATIENT)
Dept: FAMILY MEDICINE | Facility: CLINIC | Age: 55
End: 2020-12-02

## 2020-12-02 RX ORDER — LANCETS 28 GAUGE
EACH MISCELLANEOUS
Qty: 100 EACH | Refills: 11 | Status: SHIPPED | OUTPATIENT
Start: 2020-12-02

## 2021-02-09 ENCOUNTER — PATIENT OUTREACH (OUTPATIENT)
Dept: ADMINISTRATIVE | Facility: HOSPITAL | Age: 56
End: 2021-02-09

## 2021-02-11 DIAGNOSIS — M17.0 PRIMARY OSTEOARTHRITIS OF BOTH KNEES: ICD-10-CM

## 2021-02-11 RX ORDER — MELOXICAM 15 MG/1
15 TABLET ORAL DAILY
Qty: 30 TABLET | Refills: 5 | Status: SHIPPED | OUTPATIENT
Start: 2021-02-11 | End: 2021-08-16

## 2021-03-19 ENCOUNTER — PATIENT OUTREACH (OUTPATIENT)
Dept: ADMINISTRATIVE | Facility: HOSPITAL | Age: 56
End: 2021-03-19

## 2021-04-01 ENCOUNTER — PATIENT MESSAGE (OUTPATIENT)
Dept: FAMILY MEDICINE | Facility: CLINIC | Age: 56
End: 2021-04-01

## 2021-04-01 RX ORDER — GABAPENTIN 300 MG/1
300 CAPSULE ORAL NIGHTLY
Qty: 90 CAPSULE | Refills: 1 | Status: SHIPPED | OUTPATIENT
Start: 2021-04-01 | End: 2021-09-19

## 2021-04-01 RX ORDER — SERTRALINE HYDROCHLORIDE 50 MG/1
50 TABLET, FILM COATED ORAL DAILY
Qty: 90 TABLET | Refills: 1 | Status: SHIPPED | OUTPATIENT
Start: 2021-04-01 | End: 2021-09-19

## 2021-04-21 DIAGNOSIS — E11.9 TYPE 2 DIABETES MELLITUS WITHOUT COMPLICATION: ICD-10-CM

## 2021-04-28 ENCOUNTER — PATIENT MESSAGE (OUTPATIENT)
Dept: RESEARCH | Facility: HOSPITAL | Age: 56
End: 2021-04-28

## 2021-05-18 ENCOUNTER — TELEPHONE (OUTPATIENT)
Dept: OPHTHALMOLOGY | Facility: CLINIC | Age: 56
End: 2021-05-18

## 2021-05-25 ENCOUNTER — LAB VISIT (OUTPATIENT)
Dept: LAB | Facility: HOSPITAL | Age: 56
End: 2021-05-25
Attending: FAMILY MEDICINE
Payer: COMMERCIAL

## 2021-05-25 ENCOUNTER — OFFICE VISIT (OUTPATIENT)
Dept: OPHTHALMOLOGY | Facility: CLINIC | Age: 56
End: 2021-05-25
Payer: COMMERCIAL

## 2021-05-25 DIAGNOSIS — E11.9 TYPE 2 DIABETES MELLITUS WITHOUT COMPLICATION: ICD-10-CM

## 2021-05-25 DIAGNOSIS — H34.8120 CENTRAL RETINAL VEIN OCCLUSION WITH MACULAR EDEMA OF LEFT EYE: Primary | ICD-10-CM

## 2021-05-25 DIAGNOSIS — E11.36 DIABETIC CATARACT: ICD-10-CM

## 2021-05-25 DIAGNOSIS — E11.9 CONTROLLED TYPE 2 DIABETES MELLITUS WITHOUT COMPLICATION, WITHOUT LONG-TERM CURRENT USE OF INSULIN: ICD-10-CM

## 2021-05-25 DIAGNOSIS — H40.013 OPEN ANGLE WITH BORDERLINE FINDINGS AND LOW GLAUCOMA RISK IN BOTH EYES: ICD-10-CM

## 2021-05-25 LAB
ESTIMATED AVG GLUCOSE: 100 MG/DL (ref 68–131)
HBA1C MFR BLD: 5.1 % (ref 4–5.6)
LEFT EYE DM RETINOPATHY: NEGATIVE
RIGHT EYE DM RETINOPATHY: NEGATIVE

## 2021-05-25 PROCEDURE — 1126F PR PAIN SEVERITY QUANTIFIED, NO PAIN PRESENT: ICD-10-PCS | Mod: S$GLB,,, | Performed by: OPHTHALMOLOGY

## 2021-05-25 PROCEDURE — 83036 HEMOGLOBIN GLYCOSYLATED A1C: CPT | Performed by: FAMILY MEDICINE

## 2021-05-25 PROCEDURE — 99999 PR PBB SHADOW E&M-EST. PATIENT-LVL III: ICD-10-PCS | Mod: PBBFAC,,, | Performed by: OPHTHALMOLOGY

## 2021-05-25 PROCEDURE — 92134 POSTERIOR SEGMENT OCT RETINA (OCULAR COHERENCE TOMOGRAPHY)-BOTH EYES: ICD-10-PCS | Mod: S$GLB,,, | Performed by: OPHTHALMOLOGY

## 2021-05-25 PROCEDURE — 92134 CPTRZ OPH DX IMG PST SGM RTA: CPT | Mod: S$GLB,,, | Performed by: OPHTHALMOLOGY

## 2021-05-25 PROCEDURE — 99203 PR OFFICE/OUTPT VISIT, NEW, LEVL III, 30-44 MIN: ICD-10-PCS | Mod: S$GLB,,, | Performed by: OPHTHALMOLOGY

## 2021-05-25 PROCEDURE — 1126F AMNT PAIN NOTED NONE PRSNT: CPT | Mod: S$GLB,,, | Performed by: OPHTHALMOLOGY

## 2021-05-25 PROCEDURE — 99203 OFFICE O/P NEW LOW 30 MIN: CPT | Mod: S$GLB,,, | Performed by: OPHTHALMOLOGY

## 2021-05-25 PROCEDURE — 99999 PR PBB SHADOW E&M-EST. PATIENT-LVL III: CPT | Mod: PBBFAC,,, | Performed by: OPHTHALMOLOGY

## 2021-05-25 PROCEDURE — 36415 COLL VENOUS BLD VENIPUNCTURE: CPT | Performed by: FAMILY MEDICINE

## 2021-06-03 ENCOUNTER — PROCEDURE VISIT (OUTPATIENT)
Dept: OPHTHALMOLOGY | Facility: CLINIC | Age: 56
End: 2021-06-03
Payer: COMMERCIAL

## 2021-06-03 ENCOUNTER — PATIENT MESSAGE (OUTPATIENT)
Dept: OPHTHALMOLOGY | Facility: CLINIC | Age: 56
End: 2021-06-03

## 2021-06-03 DIAGNOSIS — H34.8120 CENTRAL RETINAL VEIN OCCLUSION WITH MACULAR EDEMA OF LEFT EYE: Primary | ICD-10-CM

## 2021-06-03 PROCEDURE — 92134 POSTERIOR SEGMENT OCT RETINA (OCULAR COHERENCE TOMOGRAPHY)-BOTH EYES: ICD-10-PCS | Mod: S$GLB,,, | Performed by: OPHTHALMOLOGY

## 2021-06-03 PROCEDURE — 67028 INJECTION EYE DRUG: CPT | Mod: S$GLB,,, | Performed by: OPHTHALMOLOGY

## 2021-06-03 PROCEDURE — 99499 NO LOS: ICD-10-PCS | Mod: S$GLB,,, | Performed by: OPHTHALMOLOGY

## 2021-06-03 PROCEDURE — 92134 CPTRZ OPH DX IMG PST SGM RTA: CPT | Mod: S$GLB,,, | Performed by: OPHTHALMOLOGY

## 2021-06-03 PROCEDURE — 99499 UNLISTED E&M SERVICE: CPT | Mod: S$GLB,,, | Performed by: OPHTHALMOLOGY

## 2021-06-03 PROCEDURE — 67028 PR INJECT INTRAVITREAL PHARMCOLOGIC: ICD-10-PCS | Mod: S$GLB,,, | Performed by: OPHTHALMOLOGY

## 2021-06-03 RX ADMIN — Medication 1.25 MG: at 08:06

## 2021-08-23 DIAGNOSIS — M17.0 PRIMARY OSTEOARTHRITIS OF BOTH KNEES: ICD-10-CM

## 2021-08-23 RX ORDER — MELOXICAM 15 MG/1
TABLET ORAL
Qty: 30 TABLET | Refills: 1 | Status: SHIPPED | OUTPATIENT
Start: 2021-08-23 | End: 2022-03-07 | Stop reason: SDUPTHER

## 2021-09-11 DIAGNOSIS — E79.0 HYPERURICEMIA: ICD-10-CM

## 2021-09-11 DIAGNOSIS — M79.671 FOOT PAIN, BILATERAL: ICD-10-CM

## 2021-09-11 DIAGNOSIS — M79.672 FOOT PAIN, BILATERAL: ICD-10-CM

## 2021-09-12 RX ORDER — ALLOPURINOL 100 MG/1
TABLET ORAL
Qty: 120 TABLET | Refills: 0 | Status: SHIPPED | OUTPATIENT
Start: 2021-09-12 | End: 2022-03-07 | Stop reason: SDUPTHER

## 2021-09-19 RX ORDER — GABAPENTIN 300 MG/1
CAPSULE ORAL
Qty: 90 CAPSULE | Refills: 0 | Status: SHIPPED | OUTPATIENT
Start: 2021-09-19 | End: 2022-03-07 | Stop reason: SDUPTHER

## 2021-09-19 RX ORDER — SERTRALINE HYDROCHLORIDE 50 MG/1
TABLET, FILM COATED ORAL
Qty: 90 TABLET | Refills: 0 | Status: SHIPPED | OUTPATIENT
Start: 2021-09-19 | End: 2022-03-07

## 2021-10-03 DIAGNOSIS — I10 ESSENTIAL HYPERTENSION: ICD-10-CM

## 2021-10-03 RX ORDER — LOSARTAN POTASSIUM AND HYDROCHLOROTHIAZIDE 12.5; 5 MG/1; MG/1
1 TABLET ORAL EVERY MORNING
Qty: 90 TABLET | Refills: 0 | Status: SHIPPED | OUTPATIENT
Start: 2021-10-03 | End: 2022-01-01

## 2021-10-06 RX ORDER — ATORVASTATIN CALCIUM 10 MG/1
TABLET, FILM COATED ORAL
Qty: 90 TABLET | Refills: 0 | Status: SHIPPED | OUTPATIENT
Start: 2021-10-06 | End: 2022-03-07 | Stop reason: SDUPTHER

## 2021-10-06 RX ORDER — GLIMEPIRIDE 1 MG/1
TABLET ORAL
Qty: 90 TABLET | Refills: 0 | Status: SHIPPED | OUTPATIENT
Start: 2021-10-06 | End: 2022-03-07 | Stop reason: SDUPTHER

## 2021-12-13 ENCOUNTER — PATIENT OUTREACH (OUTPATIENT)
Dept: ADMINISTRATIVE | Facility: HOSPITAL | Age: 56
End: 2021-12-13
Payer: COMMERCIAL

## 2021-12-31 DIAGNOSIS — I10 ESSENTIAL HYPERTENSION: ICD-10-CM

## 2021-12-31 NOTE — TELEPHONE ENCOUNTER
Care Due:                  Date            Visit Type   Department     Provider  --------------------------------------------------------------------------------                                MYCHART                              ANNUAL                              CHECKUP/PHY  [facility]  Last Visit: 10-      [facility]       Alexandra Woodardfransisco  Next Visit: None Scheduled  None         None Found                                                            Last  Test          Frequency    Reason                     Performed    Due Date  --------------------------------------------------------------------------------    Office Visit  12 months..  allopurinoL,               10-   10-                             atorvastatin,                             glimepiride,                             losartan-hydrochlorothiaz                             ronald, metFORMIN,                             sertraline...............    CBC.........  12 months..  allopurinoL..............  10-   10-    CMP.........  12 months..  allopurinoL,               Not Found    Overdue                             atorvastatin,                             glimepiride,                             losartan-hydrochlorothiaz                             ronald, metFORMIN...........    HBA1C.......  6 months...  glimepiride, metFORMIN...  05- 11-    Lipid Panel.  12 months..  atorvastatin.............  Not Found    Overdue    Uric Acid...  12 months..  allopurinoL..............  Not Found    Overdue    Powered by [employer]. Reference number: [id number].   12/31/2021 4:14:42 AM CST

## 2022-01-01 RX ORDER — LOSARTAN POTASSIUM AND HYDROCHLOROTHIAZIDE 12.5; 5 MG/1; MG/1
1 TABLET ORAL EVERY MORNING
Qty: 90 TABLET | Refills: 0 | Status: SHIPPED | OUTPATIENT
Start: 2022-01-01 | End: 2022-03-07 | Stop reason: SDUPTHER

## 2022-01-19 DIAGNOSIS — M79.672 FOOT PAIN, BILATERAL: ICD-10-CM

## 2022-01-19 DIAGNOSIS — M79.671 FOOT PAIN, BILATERAL: ICD-10-CM

## 2022-01-19 DIAGNOSIS — E79.0 HYPERURICEMIA: ICD-10-CM

## 2022-01-19 RX ORDER — ALLOPURINOL 100 MG/1
TABLET ORAL
Qty: 120 TABLET | Refills: 0 | OUTPATIENT
Start: 2022-01-19

## 2022-01-19 NOTE — TELEPHONE ENCOUNTER
No new care gaps identified.  Powered by [employer]. Reference number: [id number].   1/19/2022 4:20:13 AM CST

## 2022-01-19 NOTE — TELEPHONE ENCOUNTER
This Rx Request does not qualify for assessment with the OR.    Please review protocol details and the Care Due Message for extra clinical information    Reasons Rx Request may be deferred:  1. Labs/Vitals overdue  2. Labs/Vitals abnormal  3. Patient has been seen in the ED/Hospital since the last PCP visit  4. Medication is non-delegated  5. Provider is a non-participating provider    Yes

## 2022-02-02 DIAGNOSIS — E11.9 DIABETES MELLITUS TYPE 2, CONTROLLED: ICD-10-CM

## 2022-02-16 RX ORDER — SERTRALINE HYDROCHLORIDE 50 MG/1
TABLET, FILM COATED ORAL
Qty: 90 TABLET | Refills: 0 | OUTPATIENT
Start: 2022-02-16

## 2022-02-16 NOTE — TELEPHONE ENCOUNTER
Care Due:                  Date            Visit Type   Department     Provider  --------------------------------------------------------------------------------                                MYCHART                              ANNUAL                              CHECKUP/PHY  [facility]  Last Visit: 10-      [facility]       Alexandra Dawkins  Next Visit: None Scheduled  None         None Found                                                            Last  Test          Frequency    Reason                     Performed    Due Date  --------------------------------------------------------------------------------    CMP.........  12 months..  allopurinoL,               10-   10-                             atorvastatin,                             glimepiride,                             losartan-hydrochlorothiaz                             ronald, metFORMIN...........    Lipid Panel.  12 months..  atorvastatin.............  10-   10-    Powered by [email or web address]. Reference number: [id number].   2/16/2022 4:26:57 AM CST

## 2022-02-23 PROBLEM — F32.0 CURRENT MILD EPISODE OF MAJOR DEPRESSIVE DISORDER WITHOUT PRIOR EPISODE: Status: RESOLVED | Noted: 2019-05-21 | Resolved: 2022-02-23

## 2022-02-27 ENCOUNTER — PATIENT MESSAGE (OUTPATIENT)
Dept: FAMILY MEDICINE | Facility: CLINIC | Age: 57
End: 2022-02-27
Payer: COMMERCIAL

## 2022-03-01 NOTE — PROGRESS NOTES
Subjective:       Patient ID: Chastity Hung is a 56 y.o. female.    Chief Complaint   Patient presents with    Annual Exam       HPI    Chastity Hung is here today for an annual wellness exam.  This patient is accompanied in the office by her son.  I have reviewed the patient's medical history in detail and updated the computerized patient record.      Health Maintenance Due   Topic Date Due    Shingles Vaccine (1 of 2) Never done --- refuses    Mammogram  06/21/2018 --- ordered    Eye Exam  10/04/2018 --- referral done, overdue for exam    Diabetes Urine Screening  05/21/2020 --- ordered    COVID-19 Vaccine (2 - Booster for Luis Carlos series) 06/02/2021 -- discussed    Influenza Vaccine (1) 09/01/2021 -- defer    Foot Exam  10/13/2021 --- referred to pod    Lipid Panel  10/13/2021 --- ordered    Hemoglobin A1c  11/25/2021 --- ordered         Review of Systems   Constitutional: Positive for activity change and fatigue. Negative for appetite change, chills, diaphoresis, fever and unexpected weight change.   HENT: Negative for nasal congestion, mouth sores and tinnitus.    Eyes: Negative for discharge and visual disturbance.   Respiratory: Negative for cough, shortness of breath and wheezing.    Cardiovascular: Negative for chest pain, palpitations and leg swelling.   Gastrointestinal: Negative for abdominal pain, bloating, blood in stool, constipation, diarrhea, nausea, vomiting and reflux.   Endocrine: Positive for diabetes (no home checks).   Genitourinary: Positive for frequency.   Musculoskeletal: Positive for arthralgias, back pain, joint swelling, leg pain and myalgias.        Neck pain   Integumentary:  Negative for rash. Negative.   Neurological: Negative for vertigo, seizures, syncope, numbness and headaches.        Neuropathy issues, takes te HS only   Hematological: Negative.    Psychiatric/Behavioral: Negative for depression and sleep disturbance. The patient is nervous/anxious.          Dysphoric mood, extreme stress.  Attributes to living arrangements (lives w/ her sister).  Sister has medical issues.   Breast: negative.          Review of patient's allergies indicates:  No Known Allergies      Patient Active Problem List   Diagnosis    Hyperlipidemia    Depression    Essential hypertension    Diabetes mellitus type 2, controlled    Morbid obesity with BMI of 70 and over, adult    Central retinal vein occlusion with macular edema of left eye    Hyperuricemia    Glaucoma suspect of both eyes           Current Outpatient Medications:     allopurinoL (ZYLOPRIM) 100 MG tablet, TAKE 2 TABLETS(200 MG) BY MOUTH TWICE DAILY, Disp: 120 tablet, Rfl: 0    atorvastatin (LIPITOR) 10 MG tablet, TAKE 1 TABLET(10 MG) BY MOUTH EVERY DAY, Disp: 90 tablet, Rfl: 0    blood sugar diagnostic Strp, 1 strip by Misc.(Non-Drug; Combo Route) route 3 (three) times daily., Disp: 100 strip, Rfl: 11    gabapentin (NEURONTIN) 300 MG capsule, TAKE 1 CAPSULE(300 MG) BY MOUTH EVERY EVENING, Disp: 90 capsule, Rfl: 0    glimepiride (AMARYL) 1 MG tablet, TAKE 1 TABLET(1 MG) BY MOUTH DAILY WITH BREAKFAST, Disp: 90 tablet, Rfl: 0    lancets (FREESTYLE LANCETS) 28 gauge Misc, USE THREE TIMES DAILY, Disp: 100 each, Rfl: 11    losartan-hydrochlorothiazide 50-12.5 mg (HYZAAR) 50-12.5 mg per tablet, TAKE 1 TABLET BY MOUTH EVERY MORNING, Disp: 90 tablet, Rfl: 0    meloxicam (MOBIC) 15 MG tablet, TAKE 1 TABLET(15 MG) BY MOUTH EVERY DAY, Disp: 30 tablet, Rfl: 1    metFORMIN (GLUCOPHAGE) 1000 MG tablet, Take 1 tablet (1,000 mg total) by mouth 2 (two) times daily with meals., Disp: 180 tablet, Rfl: 3    multivitamin with minerals tablet, Take 1 tablet by mouth once daily., Disp: , Rfl:     sertraline (ZOLOFT) 50 MG tablet, TAKE 1 TABLET(50 MG) BY MOUTH EVERY DAY. NEED TO SCHEDULE ANNUAL PHYSICAL EXAM WITH DOCTOR NO MORE REILLFS TIL THEN, Disp: 90 tablet, Rfl: 0        Past Medical History:   Diagnosis Date    Central retinal  "vein occlusion of left eye 02/25/2014    Treated by Dr. Lopez.  Adele     Depression     Diabetes mellitus type 2, controlled     Glaucoma 02/2014    Glaucoma Suspect    Hyperlipidemia     Hypertension     Hyperuricemia 8/3/2017    Morbid obesity     Reactive airway disease     Urolithiasis          Past Surgical History:   Procedure Laterality Date    APPENDECTOMY      BREAST SURGERY Left 2014    CHOLECYSTECTOMY  10/2014    HEMICOLECTOMY Right 10/2014    Including appendix, due to perforated appendix    KIDNEY STONE SURGERY  2018    TOTAL VAGINAL HYSTERECTOMY      Secondary to uterine prolapse         Family History   Problem Relation Age of Onset    Hypertension Father     Cancer Father         Myelodysplasia    Heart disease Father     Schizophrenia Father         Paranoid type    Diabetes Mother     Hypertension Mother     Stroke Mother     Heart disease Mother     Kidney disease Mother     Diabetes Sister     Hypertension Sister     Stroke Sister     Kidney failure Sister     Diabetes Son     Hypertension Son     Peripheral vascular disease Sister         S/P AKA, BKA    Hypertension Sister     Heart disease Sister     Breast cancer Paternal Aunt          Social History     Tobacco Use    Smoking status: Never Smoker    Smokeless tobacco: Never Used   Substance Use Topics    Alcohol use: No    Drug use: No           Objective:     Vitals:    03/07/22 1056   BP: (!) 151/72   BP Location: Right arm   Patient Position: Sitting   BP Method: Large (Manual)   Pulse: 105   Temp: 96.9 °F (36.1 °C)   SpO2: 99%   Weight: (!) 218.8 kg (482 lb 4.1 oz)   Height: 5' 4" (1.626 m)       Body mass index is 82.78 kg/m².      Physical Exam  Constitutional:       General: She is not in acute distress.     Appearance: She is well-developed. She is not diaphoretic.   HENT:      Head: Normocephalic and atraumatic.      Right Ear: Tympanic membrane, ear canal and external ear normal. There is no " impacted cerumen.      Left Ear: Tympanic membrane, ear canal and external ear normal. There is no impacted cerumen.      Nose: Nose normal. No nasal deformity, mucosal edema, congestion, rhinorrhea or epistaxis.      Mouth/Throat:      Mouth: Mucous membranes are moist. Mucous membranes are not dry and not cyanotic. No oral lesions.      Dentition: Normal dentition.      Pharynx: Oropharynx is clear. Uvula midline. No oropharyngeal exudate, posterior oropharyngeal erythema or uvula swelling.      Tonsils: No tonsillar abscesses.   Eyes:      General: Lids are normal. Lids are everted, no foreign bodies appreciated. No scleral icterus.        Right eye: No discharge.         Left eye: No discharge.      Conjunctiva/sclera: Conjunctivae normal.      Right eye: Right conjunctiva is not injected. No exudate.     Left eye: Left conjunctiva is not injected. No exudate.     Pupils: Pupils are equal, round, and reactive to light.   Neck:      Thyroid: No thyroid mass or thyromegaly.      Vascular: No carotid bruit or JVD.      Trachea: No tracheal deviation.   Cardiovascular:      Rate and Rhythm: Normal rate and regular rhythm.      Pulses: Normal pulses.      Heart sounds: Normal heart sounds. No murmur heard.    No friction rub. No gallop.   Pulmonary:      Effort: Pulmonary effort is normal. No respiratory distress.      Breath sounds: Normal breath sounds. No stridor. No wheezing.   Chest:      Chest wall: No tenderness.   Abdominal:      General: Bowel sounds are normal. There is no distension.      Palpations: Abdomen is soft. There is no mass.      Tenderness: There is no abdominal tenderness. There is no guarding or rebound.   Musculoskeletal:         General: No swelling, tenderness or deformity. Normal range of motion.      Cervical back: Normal range of motion and neck supple. No edema or erythema. Normal range of motion.   Lymphadenopathy:      Cervical: No cervical adenopathy.   Skin:     General: Skin is  warm and dry.      Capillary Refill: Capillary refill takes less than 2 seconds.      Coloration: Skin is not pale.      Findings: No bruising, erythema or rash.   Neurological:      Mental Status: She is alert and oriented to person, place, and time.      Sensory: No sensory deficit.      Motor: No weakness, tremor, atrophy or abnormal muscle tone.      Coordination: Coordination normal.      Gait: Gait normal.      Deep Tendon Reflexes: Reflexes are normal and symmetric.   Psychiatric:         Mood and Affect: Mood normal.         Speech: Speech normal.         Behavior: Behavior normal.         Thought Content: Thought content normal.         Cognition and Memory: Memory is not impaired.         Judgment: Judgment normal.         LABS REVIEWED:    Lab Results   Component Value Date    WBC 9.06 10/13/2020    HGB 12.2 10/13/2020    HCT 42.3 10/13/2020    MCV 89 10/13/2020     10/13/2020       Sodium   Date Value Ref Range Status   10/13/2020 144 136 - 145 mmol/L Final     Potassium   Date Value Ref Range Status   10/13/2020 3.8 3.5 - 5.1 mmol/L Final     Chloride   Date Value Ref Range Status   10/13/2020 106 95 - 110 mmol/L Final     CO2   Date Value Ref Range Status   10/13/2020 24 23 - 29 mmol/L Final     Glucose   Date Value Ref Range Status   10/13/2020 96 70 - 110 mg/dL Final     BUN   Date Value Ref Range Status   10/13/2020 10 6 - 20 mg/dL Final     Creatinine   Date Value Ref Range Status   10/13/2020 0.8 0.5 - 1.4 mg/dL Final     Calcium   Date Value Ref Range Status   10/13/2020 9.3 8.7 - 10.5 mg/dL Final     Total Protein   Date Value Ref Range Status   10/13/2020 8.0 6.0 - 8.4 g/dL Final     Albumin   Date Value Ref Range Status   10/13/2020 3.4 (L) 3.5 - 5.2 g/dL Final     Total Bilirubin   Date Value Ref Range Status   10/13/2020 0.5 0.1 - 1.0 mg/dL Final     Comment:     For infants and newborns, interpretation of results should be based  on gestational age, weight and in agreement with  clinical  observations.  Premature Infant recommended reference ranges:  Up to 24 hours.............<8.0 mg/dL  Up to 48 hours............<12.0 mg/dL  3-5 days..................<15.0 mg/dL  6-29 days.................<15.0 mg/dL       Alkaline Phosphatase   Date Value Ref Range Status   10/13/2020 91 55 - 135 U/L Final     AST   Date Value Ref Range Status   10/13/2020 15 10 - 40 U/L Final     ALT   Date Value Ref Range Status   10/13/2020 11 10 - 44 U/L Final     Anion Gap   Date Value Ref Range Status   10/13/2020 14 8 - 16 mmol/L Final     eGFR if    Date Value Ref Range Status   10/13/2020 >60.0 >60 mL/min/1.73 m^2 Final     eGFR if non    Date Value Ref Range Status   10/13/2020 >60.0 >60 mL/min/1.73 m^2 Final     Comment:     Calculation used to obtain the estimated glomerular filtration  rate (eGFR) is the CKD-EPI equation.           Lab Results   Component Value Date    TSH 1.370 10/13/2020        Lab Results   Component Value Date    CHOL 123 10/13/2020     Lab Results   Component Value Date    HDL 52 10/13/2020     Lab Results   Component Value Date    LDLCALC 53.4 (L) 10/13/2020     Lab Results   Component Value Date    TRIG 88 10/13/2020     Lab Results   Component Value Date    CHOLHDL 42.3 10/13/2020        Lab Results   Component Value Date    HGBA1C 5.1 05/25/2021           Diagnosis     1. Preventative health care    2. Essential hypertension    3. Hyperlipidemia, unspecified hyperlipidemia type    4. Controlled type 2 diabetes mellitus with complication, without long-term current use of insulin    5. Neuropathy    6. Hyperuricemia    7. Arthritis    8. Mixed anxiety and depressive disorder    9. Morbid obesity with BMI of 70 and over, adult    10. Encounter for screening for malignant neoplasm of breast, unspecified screening modality        Assessment/ Plan     1. Preventative health care  - Uric Acid; Future  - CBC Auto Differential; Future  - Comprehensive  Metabolic Panel; Future  - TSH; Future  - Lipid Panel; Future  - Hemoglobin A1C; Future  - Microalbumin/Creatinine Ratio, Urine  - Ambulatory referral/consult to Ophthalmology; Future  - Ambulatory referral/consult to Podiatry; Future  - Mammo Digital Screening Bilat w/ Nikhil; Future    2. Essential hypertension  - CBC Auto Differential; Future  - Comprehensive Metabolic Panel; Future  - TSH; Future  - Lipid Panel; Future  - Hemoglobin A1C; Future  - Microalbumin/Creatinine Ratio, Urine  - Ambulatory referral/consult to Ophthalmology; Future  - losartan-hydrochlorothiazide 50-12.5 mg (HYZAAR) 50-12.5 mg per tablet; Take 1 tablet by mouth every morning.  Dispense: 90 tablet; Refill: 1    3. Hyperlipidemia, unspecified hyperlipidemia type  - CBC Auto Differential; Future  - Comprehensive Metabolic Panel; Future  - TSH; Future  - Lipid Panel; Future  - Hemoglobin A1C; Future  - Microalbumin/Creatinine Ratio, Urine  - atorvastatin (LIPITOR) 10 MG tablet; Take 1 tablet (10 mg total) by mouth once daily.  Dispense: 90 tablet; Refill: 1    4. Controlled type 2 diabetes mellitus with complication, without long-term current use of insulin  - CBC Auto Differential; Future  - Comprehensive Metabolic Panel; Future  - TSH; Future  - Lipid Panel; Future  - Hemoglobin A1C; Future  - Microalbumin/Creatinine Ratio, Urine  - Ambulatory referral/consult to Ophthalmology; Future  - Ambulatory referral/consult to Podiatry; Future  - glimepiride (AMARYL) 1 MG tablet; TAKE 1 TABLET(1 MG) BY MOUTH DAILY WITH BREAKFAST  Dispense: 90 tablet; Refill: 1  - metFORMIN (GLUCOPHAGE) 1000 MG tablet; Take 1 tablet (1,000 mg total) by mouth 2 (two) times daily with meals.  Dispense: 180 tablet; Refill: 1    5. Neuropathy  - gabapentin (NEURONTIN) 300 MG capsule; Take 1 capsule (300 mg total) by mouth 3 (three) times daily.  Dispense: 270 capsule; Refill: 1  Increased to TID dosing to see if will be more effective, watch for sedating effects.    6.  Hyperuricemia  - Uric Acid; Future    7. Arthritis  - meloxicam (MOBIC) 15 MG tablet; Take 1 tablet (15 mg total) by mouth once daily.  Dispense: 90 tablet; Refill: 0    8. Mixed anxiety and depressive disorder  - sertraline (ZOLOFT) 100 MG tablet; Take 1 tablet (100 mg total) by mouth once daily.  Dispense: 90 tablet; Refill: 1  Uncontrolled, increased Zoloft from 50 to 100 mg.  CBT if needed.    9. Morbid obesity with BMI of 70 and over, adult  - CBC Auto Differential; Future  - Comprehensive Metabolic Panel; Future  - TSH; Future  - Lipid Panel; Future  - Hemoglobin A1C; Future    10. Encounter for screening for malignant neoplasm of breast, unspecified screening modality  - Mammo Digital Screening Bilat w/ Nikhil; Future       FOLLOW-UP:    Patient Care Team:  Alexandra Dawkins MD as PCP - General (Family Medicine)  Yesi Barboza LPN as Care Coordinator (Internal Medicine)      JOY Cr  [facility]

## 2022-03-07 ENCOUNTER — TELEPHONE (OUTPATIENT)
Dept: FAMILY MEDICINE | Facility: CLINIC | Age: 57
End: 2022-03-07

## 2022-03-07 ENCOUNTER — OFFICE VISIT (OUTPATIENT)
Dept: FAMILY MEDICINE | Facility: CLINIC | Age: 57
End: 2022-03-07
Payer: COMMERCIAL

## 2022-03-07 ENCOUNTER — LAB VISIT (OUTPATIENT)
Dept: LAB | Facility: HOSPITAL | Age: 57
End: 2022-03-07
Attending: REGISTERED NURSE
Payer: COMMERCIAL

## 2022-03-07 VITALS
OXYGEN SATURATION: 99 % | TEMPERATURE: 97 F | SYSTOLIC BLOOD PRESSURE: 151 MMHG | HEIGHT: 64 IN | DIASTOLIC BLOOD PRESSURE: 72 MMHG | BODY MASS INDEX: 50.02 KG/M2 | WEIGHT: 293 LBS | HEART RATE: 105 BPM

## 2022-03-07 DIAGNOSIS — E79.0 HYPERURICEMIA: ICD-10-CM

## 2022-03-07 DIAGNOSIS — E78.5 HYPERLIPIDEMIA, UNSPECIFIED HYPERLIPIDEMIA TYPE: ICD-10-CM

## 2022-03-07 DIAGNOSIS — Z00.00 PREVENTATIVE HEALTH CARE: Primary | ICD-10-CM

## 2022-03-07 DIAGNOSIS — E66.01 MORBID OBESITY WITH BMI OF 70 AND OVER, ADULT: ICD-10-CM

## 2022-03-07 DIAGNOSIS — G62.9 NEUROPATHY: ICD-10-CM

## 2022-03-07 DIAGNOSIS — E11.9 CONTROLLED TYPE 2 DIABETES MELLITUS WITHOUT COMPLICATION, WITHOUT LONG-TERM CURRENT USE OF INSULIN: ICD-10-CM

## 2022-03-07 DIAGNOSIS — I10 ESSENTIAL HYPERTENSION: ICD-10-CM

## 2022-03-07 DIAGNOSIS — M19.90 ARTHRITIS: ICD-10-CM

## 2022-03-07 DIAGNOSIS — F41.8 MIXED ANXIETY AND DEPRESSIVE DISORDER: ICD-10-CM

## 2022-03-07 DIAGNOSIS — Z12.39 ENCOUNTER FOR SCREENING FOR MALIGNANT NEOPLASM OF BREAST, UNSPECIFIED SCREENING MODALITY: ICD-10-CM

## 2022-03-07 DIAGNOSIS — E11.8 CONTROLLED TYPE 2 DIABETES MELLITUS WITH COMPLICATION, WITHOUT LONG-TERM CURRENT USE OF INSULIN: ICD-10-CM

## 2022-03-07 DIAGNOSIS — Z00.00 PREVENTATIVE HEALTH CARE: ICD-10-CM

## 2022-03-07 LAB
ALBUMIN SERPL BCP-MCNC: 3.1 G/DL (ref 3.5–5.2)
ALP SERPL-CCNC: 102 U/L (ref 55–135)
ALT SERPL W/O P-5'-P-CCNC: 16 U/L (ref 10–44)
ANION GAP SERPL CALC-SCNC: 14 MMOL/L (ref 8–16)
ANISOCYTOSIS BLD QL SMEAR: SLIGHT
AST SERPL-CCNC: 18 U/L (ref 10–40)
BASOPHILS # BLD AUTO: 0.04 K/UL (ref 0–0.2)
BASOPHILS NFR BLD: 0.6 % (ref 0–1.9)
BILIRUB SERPL-MCNC: 0.4 MG/DL (ref 0.1–1)
BUN SERPL-MCNC: 19 MG/DL (ref 6–20)
CALCIUM SERPL-MCNC: 9.6 MG/DL (ref 8.7–10.5)
CHLORIDE SERPL-SCNC: 105 MMOL/L (ref 95–110)
CHOLEST SERPL-MCNC: 145 MG/DL (ref 120–199)
CHOLEST/HDLC SERPL: 2.1 {RATIO} (ref 2–5)
CO2 SERPL-SCNC: 21 MMOL/L (ref 23–29)
CREAT SERPL-MCNC: 1.1 MG/DL (ref 0.5–1.4)
DIFFERENTIAL METHOD: ABNORMAL
EOSINOPHIL # BLD AUTO: 0.2 K/UL (ref 0–0.5)
EOSINOPHIL NFR BLD: 2.6 % (ref 0–8)
ERYTHROCYTE [DISTWIDTH] IN BLOOD BY AUTOMATED COUNT: 13.6 % (ref 11.5–14.5)
EST. GFR  (AFRICAN AMERICAN): >60 ML/MIN/1.73 M^2
EST. GFR  (NON AFRICAN AMERICAN): 55.5 ML/MIN/1.73 M^2
ESTIMATED AVG GLUCOSE: 114 MG/DL (ref 68–131)
GLUCOSE SERPL-MCNC: 112 MG/DL (ref 70–110)
HBA1C MFR BLD: 5.6 % (ref 4–5.6)
HCT VFR BLD AUTO: 38 % (ref 37–48.5)
HDLC SERPL-MCNC: 69 MG/DL (ref 40–75)
HDLC SERPL: 47.6 % (ref 20–50)
HGB BLD-MCNC: 11.3 G/DL (ref 12–16)
IMM GRANULOCYTES # BLD AUTO: 0.02 K/UL (ref 0–0.04)
IMM GRANULOCYTES NFR BLD AUTO: 0.3 % (ref 0–0.5)
LDLC SERPL CALC-MCNC: 54.4 MG/DL (ref 63–159)
LYMPHOCYTES # BLD AUTO: 2.3 K/UL (ref 1–4.8)
LYMPHOCYTES NFR BLD: 37.4 % (ref 18–48)
MCH RBC QN AUTO: 28.2 PG (ref 27–31)
MCHC RBC AUTO-ENTMCNC: 29.7 G/DL (ref 32–36)
MCV RBC AUTO: 95 FL (ref 82–98)
MONOCYTES # BLD AUTO: 0.4 K/UL (ref 0.3–1)
MONOCYTES NFR BLD: 7 % (ref 4–15)
NEUTROPHILS # BLD AUTO: 3.2 K/UL (ref 1.8–7.7)
NEUTROPHILS NFR BLD: 52.1 % (ref 38–73)
NONHDLC SERPL-MCNC: 76 MG/DL
NRBC BLD-RTO: 0 /100 WBC
OVALOCYTES BLD QL SMEAR: ABNORMAL
PLATELET # BLD AUTO: 307 K/UL (ref 150–450)
PMV BLD AUTO: 12.7 FL (ref 9.2–12.9)
POIKILOCYTOSIS BLD QL SMEAR: SLIGHT
POTASSIUM SERPL-SCNC: 4.5 MMOL/L (ref 3.5–5.1)
PROT SERPL-MCNC: 7.9 G/DL (ref 6–8.4)
RBC # BLD AUTO: 4.01 M/UL (ref 4–5.4)
SODIUM SERPL-SCNC: 140 MMOL/L (ref 136–145)
TRIGL SERPL-MCNC: 108 MG/DL (ref 30–150)
TSH SERPL DL<=0.005 MIU/L-ACNC: 2.32 UIU/ML (ref 0.4–4)
URATE SERPL-MCNC: 9.7 MG/DL (ref 2.4–5.7)
WBC # BLD AUTO: 6.18 K/UL (ref 3.9–12.7)

## 2022-03-07 PROCEDURE — 3077F PR MOST RECENT SYSTOLIC BLOOD PRESSURE >= 140 MM HG: ICD-10-PCS | Mod: CPTII,S$GLB,, | Performed by: REGISTERED NURSE

## 2022-03-07 PROCEDURE — 80053 COMPREHEN METABOLIC PANEL: CPT | Performed by: REGISTERED NURSE

## 2022-03-07 PROCEDURE — 3078F PR MOST RECENT DIASTOLIC BLOOD PRESSURE < 80 MM HG: ICD-10-PCS | Mod: CPTII,S$GLB,, | Performed by: REGISTERED NURSE

## 2022-03-07 PROCEDURE — 1159F MED LIST DOCD IN RCRD: CPT | Mod: CPTII,S$GLB,, | Performed by: REGISTERED NURSE

## 2022-03-07 PROCEDURE — 90750 ZOSTER RECOMBINANT VACCINE: ICD-10-PCS | Mod: S$GLB,,, | Performed by: REGISTERED NURSE

## 2022-03-07 PROCEDURE — 99999 PR PBB SHADOW E&M-EST. PATIENT-LVL IV: ICD-10-PCS | Mod: PBBFAC,,, | Performed by: REGISTERED NURSE

## 2022-03-07 PROCEDURE — 83036 HEMOGLOBIN GLYCOSYLATED A1C: CPT | Performed by: REGISTERED NURSE

## 2022-03-07 PROCEDURE — 99999 PR PBB SHADOW E&M-EST. PATIENT-LVL IV: CPT | Mod: PBBFAC,,, | Performed by: REGISTERED NURSE

## 2022-03-07 PROCEDURE — 84443 ASSAY THYROID STIM HORMONE: CPT | Performed by: REGISTERED NURSE

## 2022-03-07 PROCEDURE — 3008F BODY MASS INDEX DOCD: CPT | Mod: CPTII,S$GLB,, | Performed by: REGISTERED NURSE

## 2022-03-07 PROCEDURE — 3072F PR LOW RISK FOR RETINOPATHY: ICD-10-PCS | Mod: CPTII,S$GLB,, | Performed by: REGISTERED NURSE

## 2022-03-07 PROCEDURE — 84550 ASSAY OF BLOOD/URIC ACID: CPT | Performed by: REGISTERED NURSE

## 2022-03-07 PROCEDURE — 1159F PR MEDICATION LIST DOCUMENTED IN MEDICAL RECORD: ICD-10-PCS | Mod: CPTII,S$GLB,, | Performed by: REGISTERED NURSE

## 2022-03-07 PROCEDURE — 99396 PR PREVENTIVE VISIT,EST,40-64: ICD-10-PCS | Mod: 25,S$GLB,, | Performed by: REGISTERED NURSE

## 2022-03-07 PROCEDURE — 3008F PR BODY MASS INDEX (BMI) DOCUMENTED: ICD-10-PCS | Mod: CPTII,S$GLB,, | Performed by: REGISTERED NURSE

## 2022-03-07 PROCEDURE — 90750 HZV VACC RECOMBINANT IM: CPT | Mod: S$GLB,,, | Performed by: REGISTERED NURSE

## 2022-03-07 PROCEDURE — 90471 IMMUNIZATION ADMIN: CPT | Mod: S$GLB,,, | Performed by: REGISTERED NURSE

## 2022-03-07 PROCEDURE — 3044F PR MOST RECENT HEMOGLOBIN A1C LEVEL <7.0%: ICD-10-PCS | Mod: CPTII,S$GLB,, | Performed by: REGISTERED NURSE

## 2022-03-07 PROCEDURE — 3044F HG A1C LEVEL LT 7.0%: CPT | Mod: CPTII,S$GLB,, | Performed by: REGISTERED NURSE

## 2022-03-07 PROCEDURE — 36415 COLL VENOUS BLD VENIPUNCTURE: CPT | Mod: PO | Performed by: REGISTERED NURSE

## 2022-03-07 PROCEDURE — 85025 COMPLETE CBC W/AUTO DIFF WBC: CPT | Performed by: REGISTERED NURSE

## 2022-03-07 PROCEDURE — 3077F SYST BP >= 140 MM HG: CPT | Mod: CPTII,S$GLB,, | Performed by: REGISTERED NURSE

## 2022-03-07 PROCEDURE — 3072F LOW RISK FOR RETINOPATHY: CPT | Mod: CPTII,S$GLB,, | Performed by: REGISTERED NURSE

## 2022-03-07 PROCEDURE — 80061 LIPID PANEL: CPT | Performed by: REGISTERED NURSE

## 2022-03-07 PROCEDURE — 3078F DIAST BP <80 MM HG: CPT | Mod: CPTII,S$GLB,, | Performed by: REGISTERED NURSE

## 2022-03-07 PROCEDURE — 99396 PREV VISIT EST AGE 40-64: CPT | Mod: 25,S$GLB,, | Performed by: REGISTERED NURSE

## 2022-03-07 PROCEDURE — 90471 ZOSTER RECOMBINANT VACCINE: ICD-10-PCS | Mod: S$GLB,,, | Performed by: REGISTERED NURSE

## 2022-03-07 RX ORDER — METFORMIN HYDROCHLORIDE 1000 MG/1
1000 TABLET ORAL 2 TIMES DAILY WITH MEALS
Qty: 180 TABLET | Refills: 1 | Status: SHIPPED | OUTPATIENT
Start: 2022-03-07 | End: 2022-08-31

## 2022-03-07 RX ORDER — ATORVASTATIN CALCIUM 10 MG/1
10 TABLET, FILM COATED ORAL DAILY
Qty: 90 TABLET | Refills: 1 | Status: SHIPPED | OUTPATIENT
Start: 2022-03-07 | End: 2022-10-02

## 2022-03-07 RX ORDER — GABAPENTIN 300 MG/1
300 CAPSULE ORAL 3 TIMES DAILY
Qty: 270 CAPSULE | Refills: 1 | Status: SHIPPED | OUTPATIENT
Start: 2022-03-07 | End: 2022-06-06

## 2022-03-07 RX ORDER — SERTRALINE HYDROCHLORIDE 100 MG/1
100 TABLET, FILM COATED ORAL DAILY
Qty: 90 TABLET | Refills: 1 | Status: SHIPPED | OUTPATIENT
Start: 2022-03-07 | End: 2022-06-06

## 2022-03-07 RX ORDER — ALLOPURINOL 100 MG/1
TABLET ORAL
Qty: 360 TABLET | Refills: 1 | Status: SHIPPED | OUTPATIENT
Start: 2022-03-07 | End: 2022-03-11

## 2022-03-07 RX ORDER — GLIMEPIRIDE 1 MG/1
TABLET ORAL
Qty: 90 TABLET | Refills: 1 | Status: ON HOLD | OUTPATIENT
Start: 2022-03-07 | End: 2023-01-31 | Stop reason: HOSPADM

## 2022-03-07 RX ORDER — MELOXICAM 15 MG/1
15 TABLET ORAL DAILY
Qty: 90 TABLET | Refills: 0 | Status: ON HOLD | OUTPATIENT
Start: 2022-03-07 | End: 2023-01-31 | Stop reason: HOSPADM

## 2022-03-07 RX ORDER — LOSARTAN POTASSIUM AND HYDROCHLOROTHIAZIDE 12.5; 5 MG/1; MG/1
1 TABLET ORAL EVERY MORNING
Qty: 90 TABLET | Refills: 1 | Status: SHIPPED | OUTPATIENT
Start: 2022-03-07 | End: 2022-04-07

## 2022-03-07 NOTE — TELEPHONE ENCOUNTER
Pt states she does not want to come in for b/p check   Will do nurse visit with podiatry appt on the 29th

## 2022-03-07 NOTE — TELEPHONE ENCOUNTER
----- Message from Diony Frances sent at 3/7/2022  1:32 PM CST -----  Contact: self  Pt would like to consult with nurse regarding upcoming nurse visit.  Please contact Chastity Hung @ [phone].  Thanks/As

## 2022-03-11 ENCOUNTER — PATIENT MESSAGE (OUTPATIENT)
Dept: FAMILY MEDICINE | Facility: CLINIC | Age: 57
End: 2022-03-11
Payer: COMMERCIAL

## 2022-03-11 DIAGNOSIS — E79.0 HYPERURICEMIA: Primary | ICD-10-CM

## 2022-03-11 RX ORDER — FEBUXOSTAT 40 MG/1
40 TABLET, FILM COATED ORAL DAILY
Qty: 90 TABLET | Refills: 1 | Status: SHIPPED | OUTPATIENT
Start: 2022-03-11 | End: 2022-09-08

## 2022-03-29 ENCOUNTER — PATIENT MESSAGE (OUTPATIENT)
Dept: FAMILY MEDICINE | Facility: CLINIC | Age: 57
End: 2022-03-29
Payer: COMMERCIAL

## 2022-04-18 RX ORDER — CALCIUM CITRATE/VITAMIN D3 200MG-6.25
TABLET ORAL
Qty: 300 STRIP | Refills: 3 | OUTPATIENT
Start: 2022-04-18

## 2022-04-19 NOTE — TELEPHONE ENCOUNTER
No new care gaps identified.  Powered by [employer]. Reference number: [id number].   4/17/2022 4:32:58 AM CDT  
Provider Staff:     Action required for this patient.    Please note Refusal of medication.            Requested Prescriptions     Refused Prescriptions Disp Refills    TRUE METRIX GLUCOSE TEST STRIP Strp [Pharmacy Med Name: TRUE METRIX BLOOD GLUCOSE TEST STRP] 300 strip 3     Sig: USE THREE TIMES DAILY     Refused By: OCTAVIO SOLORIO     Reason for Refusal: Patient needs an appointment      Thanks!  [facility]   Note composed: 04/19/2022 12:36 PM         
Refill Routing Note   Medication(s) are not appropriate for processing by [facility] for the following reason(s):      - Patient has not been seen in over 15 months by PCP    [facility] action(s):  Defer          Medication reconciliation completed: No     Appointments  past 12m or future 3m with PCP    Date Provider   Last Visit   10/13/2020 Alexandra Dawkins MD   Next Visit   Visit date not found Alexandra Dawkins MD   ED visits in past 90 days: 0        Note composed:3:38 PM 04/18/2022          
yes

## 2022-04-26 ENCOUNTER — PATIENT MESSAGE (OUTPATIENT)
Dept: ADMINISTRATIVE | Facility: HOSPITAL | Age: 57
End: 2022-04-26
Payer: COMMERCIAL

## 2022-05-03 ENCOUNTER — PATIENT MESSAGE (OUTPATIENT)
Dept: FAMILY MEDICINE | Facility: CLINIC | Age: 57
End: 2022-05-03
Payer: COMMERCIAL

## 2022-05-06 NOTE — PROGRESS NOTES
Subjective:       Patient ID: Chastity Hung is a 56 y.o. female.      Chief Complaint   Patient presents with    Leg Pain       HPI    Leg Pain   There was no injury mechanism. The pain is present in the left leg and right leg (chronic issue, grad worse over time). The pain is at a severity of 9/10. The pain has been constant since onset. Associated symptoms include a loss of sensation (numbness), muscle weakness, numbness (fingers, feet, toes) and tingling (fingers, feet, toes). Pertinent negatives include no inability to bear weight or loss of motion. Nothing aggravates the symptoms. She has tried nothing for the symptoms.            Review of Systems   Constitutional: Positive for activity change and fatigue. Negative for chills, diaphoresis and unexpected weight change.   HENT: Negative.  Negative for hearing loss, rhinorrhea and trouble swallowing.    Eyes: Negative for photophobia, pain, discharge and visual disturbance.   Respiratory: Positive for apnea (possible due to snoring, gasping for air, weight issues). Negative for cough, choking, chest tightness, shortness of breath and wheezing.    Cardiovascular: Negative for chest pain and palpitations.   Gastrointestinal: Negative for blood in stool, constipation, diarrhea and vomiting.   Endocrine: Negative for polydipsia and polyuria.   Genitourinary: Negative for difficulty urinating, dysuria, hematuria and menstrual problem.   Musculoskeletal: Positive for arthralgias, gait problem and joint swelling. Negative for neck pain.   Skin: Positive for color change (lower legs).   Neurological: Positive for tingling (fingers, feet, toes), speech difficulty, numbness (fingers, feet, toes) and headaches. Negative for dizziness, tremors, seizures, syncope, facial asymmetry, weakness and light-headedness.   Psychiatric/Behavioral: Positive for sleep disturbance. Negative for confusion and dysphoric mood.         Review of patient's allergies indicates:  No Known  "Allergies      Patient Active Problem List   Diagnosis    Hyperlipidemia    Depression    Essential hypertension    Diabetes mellitus type 2, controlled    Morbid obesity with BMI of 70 and over, adult    Central retinal vein occlusion with macular edema of left eye    Hyperuricemia    Glaucoma suspect of both eyes           Current Outpatient Medications:     atorvastatin (LIPITOR) 10 MG tablet, Take 1 tablet (10 mg total) by mouth once daily., Disp: 90 tablet, Rfl: 1    blood sugar diagnostic Strp, 1 strip by Misc.(Non-Drug; Combo Route) route 3 (three) times daily., Disp: 100 strip, Rfl: 11    febuxostat (ULORIC) 40 mg Tab, Take 1 tablet (40 mg total) by mouth once daily., Disp: 90 tablet, Rfl: 1    gabapentin (NEURONTIN) 300 MG capsule, Take 1 capsule (300 mg total) by mouth 3 (three) times daily., Disp: 270 capsule, Rfl: 1    glimepiride (AMARYL) 1 MG tablet, TAKE 1 TABLET(1 MG) BY MOUTH DAILY WITH BREAKFAST, Disp: 90 tablet, Rfl: 1    lancets (FREESTYLE LANCETS) 28 gauge Misc, USE THREE TIMES DAILY, Disp: 100 each, Rfl: 11    losartan-hydrochlorothiazide 50-12.5 mg (HYZAAR) 50-12.5 mg per tablet, TAKE 1 TABLET BY MOUTH EVERY MORNING, Disp: 90 tablet, Rfl: 3    meloxicam (MOBIC) 15 MG tablet, Take 1 tablet (15 mg total) by mouth once daily., Disp: 90 tablet, Rfl: 0    metFORMIN (GLUCOPHAGE) 1000 MG tablet, Take 1 tablet (1,000 mg total) by mouth 2 (two) times daily with meals., Disp: 180 tablet, Rfl: 1    multivitamin with minerals tablet, Take 1 tablet by mouth once daily., Disp: , Rfl:     sertraline (ZOLOFT) 100 MG tablet, Take 1 tablet (100 mg total) by mouth once daily., Disp: 90 tablet, Rfl: 1      Past medical, surgical, family and social histories have been reviewed today.      Objective:     Vitals:    05/10/22 1350   BP: 130/72   Pulse: 105   Temp: 97.1 °F (36.2 °C)   SpO2: 95%   Weight: (!) 223.9 kg (493 lb 9.8 oz)   Height: 5' 4" (1.626 m)   PainSc:   9   PainLoc: Leg "         Physical Exam  Vitals reviewed.   Constitutional:       General: She is not in acute distress.     Appearance: She is obese. She is not ill-appearing or diaphoretic.   HENT:      Head: Normocephalic and atraumatic.   Eyes:      Pupils: Pupils are equal, round, and reactive to light.   Neck:      Vascular: No carotid bruit.   Cardiovascular:      Rate and Rhythm: Regular rhythm. Tachycardia present.      Pulses: Normal pulses.      Heart sounds: Normal heart sounds.      Comments: Mild tachy at 105  Pulmonary:      Effort: Pulmonary effort is normal.      Breath sounds: Normal breath sounds.   Musculoskeletal:      Cervical back: Normal range of motion and neck supple. No rigidity.      Right lower leg: Edema present.      Left lower leg: Edema present.   Skin:     Capillary Refill: Capillary refill takes less than 2 seconds.      Findings: No erythema or rash.      Comments: Skin to both lower legs smooth, shiny and hyperpigmented w/ no hair growth.   Neurological:      Mental Status: She is alert and oriented to person, place, and time.      Sensory: No sensory deficit.      Motor: No weakness.      Coordination: Coordination normal.      Gait: Gait normal.   Psychiatric:         Mood and Affect: Mood normal.         Behavior: Behavior normal.         Thought Content: Thought content normal.         Judgment: Judgment normal.           Assessment     1. Essential hypertension --- stop the HCTZ to see if this is causing or adding to the leg issue as she does have a h/o hyperuricemia.  Trial of spironolactone with ARB by itself.  DASH diet, monitor.  - spironolactone (ALDACTONE) 25 MG tablet; Take 1 tablet (25 mg total) by mouth once daily.  Dispense: 90 tablet; Refill: 1  - losartan (COZAAR) 25 MG tablet; Take 1 tablet (25 mg total) by mouth once daily.  Dispense: 90 tablet; Refill: 3    2. Edema, unspecified type --- see # 1.  Exact cause unclear at this time ---- weight, salt, lack of water (only drinks ~  32 oz daily), or other ???  - spironolactone (ALDACTONE) 25 MG tablet; Take 1 tablet (25 mg total) by mouth once daily.  Dispense: 90 tablet; Refill: 1    3. Pain and swelling of lower leg, unspecified laterality -- pending.  - US Lower Extremity Veins Bilateral; Future  - US Lower Extremity Arteries Bilateral; Future    4. Snoring  - Polysomnogram (CPAP will be added if patient meets diagnostic criteria.); Future    5. Gasping for breath  - Polysomnogram (CPAP will be added if patient meets diagnostic criteria.); Future    6. Morbid obesity with BMI of 70 and over, adult  - Polysomnogram (CPAP will be added if patient meets diagnostic criteria.); Future    7. Need for vaccination --- Shingrix # 2 today to complete the series  - (In Office Administered) Zoster Recombinant Vaccine       Follow-up     US pending.  Recheck 2 weeks or sooner if needed.  RTC prn.        JOY Cr  [facility]       40 minutes of total time spent on the encounter, which includes face to face time and non-face to face time preparing to see the patient (eg, review of tests), obtaining and/or reviewing separately obtained history, and documenting clinical information in the electronic or other health record.  Also includes independent interpretation of results (not separately reported) and communicating results to the patient/family/caregiver, with care coordination (not separately reported).

## 2022-05-10 ENCOUNTER — OFFICE VISIT (OUTPATIENT)
Dept: FAMILY MEDICINE | Facility: CLINIC | Age: 57
End: 2022-05-10
Payer: COMMERCIAL

## 2022-05-10 VITALS
SYSTOLIC BLOOD PRESSURE: 130 MMHG | HEIGHT: 64 IN | BODY MASS INDEX: 50.02 KG/M2 | DIASTOLIC BLOOD PRESSURE: 72 MMHG | TEMPERATURE: 97 F | OXYGEN SATURATION: 95 % | WEIGHT: 293 LBS | HEART RATE: 105 BPM

## 2022-05-10 DIAGNOSIS — R60.9 EDEMA, UNSPECIFIED TYPE: ICD-10-CM

## 2022-05-10 DIAGNOSIS — R06.89 GASPING FOR BREATH: ICD-10-CM

## 2022-05-10 DIAGNOSIS — M79.669 PAIN AND SWELLING OF LOWER LEG, UNSPECIFIED LATERALITY: ICD-10-CM

## 2022-05-10 DIAGNOSIS — M79.89 PAIN AND SWELLING OF LOWER LEG, UNSPECIFIED LATERALITY: ICD-10-CM

## 2022-05-10 DIAGNOSIS — R06.83 SNORING: ICD-10-CM

## 2022-05-10 DIAGNOSIS — Z23 NEED FOR VACCINATION: ICD-10-CM

## 2022-05-10 DIAGNOSIS — I10 ESSENTIAL HYPERTENSION: Primary | ICD-10-CM

## 2022-05-10 DIAGNOSIS — E66.01 MORBID OBESITY WITH BMI OF 70 AND OVER, ADULT: ICD-10-CM

## 2022-05-10 PROCEDURE — 90471 ZOSTER RECOMBINANT VACCINE: ICD-10-PCS | Mod: S$GLB,,, | Performed by: REGISTERED NURSE

## 2022-05-10 PROCEDURE — 99999 PR PBB SHADOW E&M-EST. PATIENT-LVL IV: CPT | Mod: PBBFAC,,, | Performed by: REGISTERED NURSE

## 2022-05-10 PROCEDURE — 4010F ACE/ARB THERAPY RXD/TAKEN: CPT | Mod: CPTII,S$GLB,, | Performed by: REGISTERED NURSE

## 2022-05-10 PROCEDURE — 3044F PR MOST RECENT HEMOGLOBIN A1C LEVEL <7.0%: ICD-10-PCS | Mod: CPTII,S$GLB,, | Performed by: REGISTERED NURSE

## 2022-05-10 PROCEDURE — 3078F DIAST BP <80 MM HG: CPT | Mod: CPTII,S$GLB,, | Performed by: REGISTERED NURSE

## 2022-05-10 PROCEDURE — 1159F PR MEDICATION LIST DOCUMENTED IN MEDICAL RECORD: ICD-10-PCS | Mod: CPTII,S$GLB,, | Performed by: REGISTERED NURSE

## 2022-05-10 PROCEDURE — 4010F PR ACE/ARB THEARPY RXD/TAKEN: ICD-10-PCS | Mod: CPTII,S$GLB,, | Performed by: REGISTERED NURSE

## 2022-05-10 PROCEDURE — 3075F SYST BP GE 130 - 139MM HG: CPT | Mod: CPTII,S$GLB,, | Performed by: REGISTERED NURSE

## 2022-05-10 PROCEDURE — 1159F MED LIST DOCD IN RCRD: CPT | Mod: CPTII,S$GLB,, | Performed by: REGISTERED NURSE

## 2022-05-10 PROCEDURE — 3075F PR MOST RECENT SYSTOLIC BLOOD PRESS GE 130-139MM HG: ICD-10-PCS | Mod: CPTII,S$GLB,, | Performed by: REGISTERED NURSE

## 2022-05-10 PROCEDURE — 90750 ZOSTER RECOMBINANT VACCINE: ICD-10-PCS | Mod: S$GLB,,, | Performed by: REGISTERED NURSE

## 2022-05-10 PROCEDURE — 99215 OFFICE O/P EST HI 40 MIN: CPT | Mod: 25,S$GLB,, | Performed by: REGISTERED NURSE

## 2022-05-10 PROCEDURE — 3072F LOW RISK FOR RETINOPATHY: CPT | Mod: CPTII,S$GLB,, | Performed by: REGISTERED NURSE

## 2022-05-10 PROCEDURE — 99999 PR PBB SHADOW E&M-EST. PATIENT-LVL IV: ICD-10-PCS | Mod: PBBFAC,,, | Performed by: REGISTERED NURSE

## 2022-05-10 PROCEDURE — 3008F PR BODY MASS INDEX (BMI) DOCUMENTED: ICD-10-PCS | Mod: CPTII,S$GLB,, | Performed by: REGISTERED NURSE

## 2022-05-10 PROCEDURE — 3078F PR MOST RECENT DIASTOLIC BLOOD PRESSURE < 80 MM HG: ICD-10-PCS | Mod: CPTII,S$GLB,, | Performed by: REGISTERED NURSE

## 2022-05-10 PROCEDURE — 3072F PR LOW RISK FOR RETINOPATHY: ICD-10-PCS | Mod: CPTII,S$GLB,, | Performed by: REGISTERED NURSE

## 2022-05-10 PROCEDURE — 90471 IMMUNIZATION ADMIN: CPT | Mod: S$GLB,,, | Performed by: REGISTERED NURSE

## 2022-05-10 PROCEDURE — 3008F BODY MASS INDEX DOCD: CPT | Mod: CPTII,S$GLB,, | Performed by: REGISTERED NURSE

## 2022-05-10 PROCEDURE — 3044F HG A1C LEVEL LT 7.0%: CPT | Mod: CPTII,S$GLB,, | Performed by: REGISTERED NURSE

## 2022-05-10 PROCEDURE — 99215 PR OFFICE/OUTPT VISIT, EST, LEVL V, 40-54 MIN: ICD-10-PCS | Mod: 25,S$GLB,, | Performed by: REGISTERED NURSE

## 2022-05-10 PROCEDURE — 90750 HZV VACC RECOMBINANT IM: CPT | Mod: S$GLB,,, | Performed by: REGISTERED NURSE

## 2022-05-10 RX ORDER — SPIRONOLACTONE 25 MG/1
25 TABLET ORAL DAILY
Qty: 90 TABLET | Refills: 1 | Status: SHIPPED | OUTPATIENT
Start: 2022-05-10 | End: 2022-11-04

## 2022-05-10 RX ORDER — LOSARTAN POTASSIUM 25 MG/1
25 TABLET ORAL DAILY
Qty: 90 TABLET | Refills: 3 | Status: ON HOLD | OUTPATIENT
Start: 2022-05-10 | End: 2023-03-24 | Stop reason: HOSPADM

## 2022-07-20 ENCOUNTER — PATIENT MESSAGE (OUTPATIENT)
Dept: FAMILY MEDICINE | Facility: CLINIC | Age: 57
End: 2022-07-20
Payer: COMMERCIAL

## 2022-08-09 ENCOUNTER — PATIENT MESSAGE (OUTPATIENT)
Dept: ADMINISTRATIVE | Facility: HOSPITAL | Age: 57
End: 2022-08-09
Payer: COMMERCIAL

## 2022-09-01 ENCOUNTER — PATIENT OUTREACH (OUTPATIENT)
Dept: ADMINISTRATIVE | Facility: HOSPITAL | Age: 57
End: 2022-09-01
Payer: COMMERCIAL

## 2022-09-01 ENCOUNTER — PATIENT MESSAGE (OUTPATIENT)
Dept: ADMINISTRATIVE | Facility: HOSPITAL | Age: 57
End: 2022-09-01
Payer: COMMERCIAL

## 2022-09-01 NOTE — PROGRESS NOTES
Working eye exam report; Chart reviewed for dm eye exam - Manually hyper-linked DM EYE EXAM 5/25/2021  Called and spoke patient to schedule appointment, portal message sent to patient for scheduling at a later date. She states that her sister just passed away and she will need to call back once the services are complete.

## 2022-09-15 DIAGNOSIS — E11.9 TYPE 2 DIABETES MELLITUS WITHOUT COMPLICATION: ICD-10-CM

## 2022-10-04 ENCOUNTER — PATIENT MESSAGE (OUTPATIENT)
Dept: ADMINISTRATIVE | Facility: HOSPITAL | Age: 57
End: 2022-10-04
Payer: COMMERCIAL

## 2022-10-18 ENCOUNTER — PATIENT MESSAGE (OUTPATIENT)
Dept: ADMINISTRATIVE | Facility: HOSPITAL | Age: 57
End: 2022-10-18
Payer: COMMERCIAL

## 2022-12-05 ENCOUNTER — PATIENT MESSAGE (OUTPATIENT)
Dept: FAMILY MEDICINE | Facility: CLINIC | Age: 57
End: 2022-12-05
Payer: COMMERCIAL

## 2022-12-05 NOTE — TELEPHONE ENCOUNTER
How long ?   - for the last two days constantly  Pt tried insta -flex as well as other joint creams pt states please advise     SANDRA Kovacs

## 2022-12-06 DIAGNOSIS — M25.561 PAIN IN BOTH KNEES, UNSPECIFIED CHRONICITY: Primary | ICD-10-CM

## 2022-12-06 DIAGNOSIS — M25.562 PAIN IN BOTH KNEES, UNSPECIFIED CHRONICITY: Primary | ICD-10-CM

## 2022-12-06 NOTE — TELEPHONE ENCOUNTER
I have not seen the patient in over 2 years.  Minor saw her recently.  And apparently she is dealing with the issue.  Why are you sending it to me?

## 2023-01-25 ENCOUNTER — ANESTHESIA EVENT (OUTPATIENT)
Dept: SURGERY | Facility: HOSPITAL | Age: 58
DRG: 326 | End: 2023-01-25
Payer: COMMERCIAL

## 2023-01-25 ENCOUNTER — ANESTHESIA (OUTPATIENT)
Dept: SURGERY | Facility: HOSPITAL | Age: 58
DRG: 326 | End: 2023-01-25
Payer: COMMERCIAL

## 2023-01-25 ENCOUNTER — HOSPITAL ENCOUNTER (INPATIENT)
Facility: HOSPITAL | Age: 58
LOS: 7 days | Discharge: NURSING, REHAB OR LONG-TERM CARE | DRG: 326 | End: 2023-02-01
Attending: EMERGENCY MEDICINE | Admitting: INTERNAL MEDICINE
Payer: COMMERCIAL

## 2023-01-25 DIAGNOSIS — K46.0 INCARCERATED HERNIA: ICD-10-CM

## 2023-01-25 DIAGNOSIS — B37.2 CUTANEOUS CANDIDIASIS: ICD-10-CM

## 2023-01-25 DIAGNOSIS — K43.6 INCARCERATED VENTRAL HERNIA: Primary | ICD-10-CM

## 2023-01-25 DIAGNOSIS — R07.9 CHEST PAIN: ICD-10-CM

## 2023-01-25 DIAGNOSIS — E66.01 MORBID OBESITY: ICD-10-CM

## 2023-01-25 DIAGNOSIS — K25.5 GASTRIC ULCER WITH PERFORATION, UNSPECIFIED CHRONICITY: ICD-10-CM

## 2023-01-25 DIAGNOSIS — K25.1 ACUTE GASTRIC ULCER WITH PERFORATION: ICD-10-CM

## 2023-01-25 DIAGNOSIS — K42.0 INCARCERATED UMBILICAL HERNIA: ICD-10-CM

## 2023-01-25 DIAGNOSIS — R23.8 SKIN BREAKDOWN: ICD-10-CM

## 2023-01-25 DIAGNOSIS — Z01.810 PREOP CARDIOVASCULAR EXAM: ICD-10-CM

## 2023-01-25 DIAGNOSIS — E11.8 CONTROLLED TYPE 2 DIABETES MELLITUS WITH COMPLICATION, WITHOUT LONG-TERM CURRENT USE OF INSULIN: ICD-10-CM

## 2023-01-25 DIAGNOSIS — R60.9 EDEMA: ICD-10-CM

## 2023-01-25 DIAGNOSIS — I10 ESSENTIAL HYPERTENSION: ICD-10-CM

## 2023-01-25 DIAGNOSIS — R11.10 VOMITING: ICD-10-CM

## 2023-01-25 PROBLEM — I87.2 VENOUS STASIS DERMATITIS OF LEFT LOWER EXTREMITY: Status: ACTIVE | Noted: 2023-01-25

## 2023-01-25 PROBLEM — K43.0 INCARCERATED INCISIONAL HERNIA: Status: ACTIVE | Noted: 2023-01-25

## 2023-01-25 PROBLEM — E11.9 TYPE 2 DIABETES MELLITUS, WITHOUT LONG-TERM CURRENT USE OF INSULIN: Status: ACTIVE | Noted: 2023-01-25

## 2023-01-25 PROBLEM — N17.9 AKI (ACUTE KIDNEY INJURY): Status: ACTIVE | Noted: 2023-01-25

## 2023-01-25 PROBLEM — K43.0 INCARCERATED INCISIONAL HERNIA: Status: RESOLVED | Noted: 2023-01-25 | Resolved: 2023-01-25

## 2023-01-25 LAB
ACANTHOCYTES BLD QL SMEAR: PRESENT
ALBUMIN SERPL BCP-MCNC: 2 G/DL (ref 3.5–5.2)
ALP SERPL-CCNC: 91 U/L (ref 55–135)
ALT SERPL W/O P-5'-P-CCNC: 50 U/L (ref 10–44)
ANION GAP SERPL CALC-SCNC: 15 MMOL/L (ref 8–16)
ANISOCYTOSIS BLD QL SMEAR: SLIGHT
AORTIC ROOT ANNULUS: 2.78 CM
ASCENDING AORTA: 3.07 CM
AST SERPL-CCNC: 67 U/L (ref 10–40)
AV INDEX (PROSTH): 0.47
AV MEAN GRADIENT: 23 MMHG
AV PEAK GRADIENT: 27 MMHG
AV VALVE AREA: 1.39 CM2
AV VELOCITY RATIO: 0.44
BACTERIA #/AREA URNS HPF: ABNORMAL /HPF
BASOPHILS # BLD AUTO: 0.09 K/UL (ref 0–0.2)
BASOPHILS NFR BLD: 0.7 % (ref 0–1.9)
BILIRUB SERPL-MCNC: 1.4 MG/DL (ref 0.1–1)
BILIRUB UR QL STRIP: ABNORMAL
BNP SERPL-MCNC: 123 PG/ML (ref 0–99)
BSA FOR ECHO PROCEDURE: 3.26 M2
BUN SERPL-MCNC: 51 MG/DL (ref 6–20)
CALCIUM SERPL-MCNC: 9.1 MG/DL (ref 8.7–10.5)
CHLORIDE SERPL-SCNC: 105 MMOL/L (ref 95–110)
CLARITY UR: ABNORMAL
CO2 SERPL-SCNC: 17 MMOL/L (ref 23–29)
COLOR UR: YELLOW
CREAT SERPL-MCNC: 1.6 MG/DL (ref 0.5–1.4)
CV ECHO LV RWT: 0.93 CM
DACRYOCYTES BLD QL SMEAR: ABNORMAL
DIFFERENTIAL METHOD: ABNORMAL
DOP CALC AO PEAK VEL: 2.59 M/S
DOP CALC AO VTI: 57.75 CM
DOP CALC LVOT AREA: 3 CM2
DOP CALC LVOT DIAMETER: 1.95 CM
DOP CALC LVOT PEAK VEL: 1.15 M/S
DOP CALC LVOT STROKE VOLUME: 80.53 CM3
DOP CALC RVOT PEAK VEL: 1.04 M/S
DOP CALC RVOT VTI: 16.52 CM
DOP CALCLVOT PEAK VEL VTI: 26.98 CM
E WAVE DECELERATION TIME: 196.75 MSEC
E/A RATIO: 0.9
E/E' RATIO: 13.07 M/S
ECHO LV POSTERIOR WALL: 1.38 CM (ref 0.6–1.1)
EJECTION FRACTION: 65 %
EOSINOPHIL # BLD AUTO: 0 K/UL (ref 0–0.5)
EOSINOPHIL NFR BLD: 0.3 % (ref 0–8)
ERYTHROCYTE [DISTWIDTH] IN BLOOD BY AUTOMATED COUNT: 13.9 % (ref 11.5–14.5)
EST. GFR  (NO RACE VARIABLE): 37 ML/MIN/1.73 M^2
FRACTIONAL SHORTENING: 31 % (ref 28–44)
GLUCOSE SERPL-MCNC: 137 MG/DL (ref 70–110)
GLUCOSE UR QL STRIP: NEGATIVE
HCT VFR BLD AUTO: 39.3 % (ref 37–48.5)
HGB BLD-MCNC: 12.2 G/DL (ref 12–16)
HGB UR QL STRIP: ABNORMAL
HYALINE CASTS #/AREA URNS LPF: 23 /LPF
IMM GRANULOCYTES # BLD AUTO: 0.27 K/UL (ref 0–0.04)
IMM GRANULOCYTES NFR BLD AUTO: 2 % (ref 0–0.5)
INTERVENTRICULAR SEPTUM: 1.43 CM (ref 0.6–1.1)
IVRT: 79.92 MSEC
KETONES UR QL STRIP: ABNORMAL
LA MAJOR: 3.1 CM
LA MINOR: 2.7 CM
LA WIDTH: 3.42 CM
LACTATE SERPL-SCNC: 1.8 MMOL/L (ref 0.5–2.2)
LEFT ATRIUM SIZE: 2.67 CM
LEFT ATRIUM VOLUME INDEX: 7.6 ML/M2
LEFT ATRIUM VOLUME: 22.4 CM3
LEFT INTERNAL DIMENSION IN SYSTOLE: 2.05 CM (ref 2.1–4)
LEFT VENTRICLE DIASTOLIC VOLUME INDEX: 11.66 ML/M2
LEFT VENTRICLE DIASTOLIC VOLUME: 34.27 ML
LEFT VENTRICLE MASS INDEX: 47 G/M2
LEFT VENTRICLE SYSTOLIC VOLUME INDEX: 4.6 ML/M2
LEFT VENTRICLE SYSTOLIC VOLUME: 13.59 ML
LEFT VENTRICULAR INTERNAL DIMENSION IN DIASTOLE: 2.97 CM (ref 3.5–6)
LEFT VENTRICULAR MASS: 139.46 G
LEUKOCYTE ESTERASE UR QL STRIP: ABNORMAL
LIPASE SERPL-CCNC: 13 U/L (ref 4–60)
LV LATERAL E/E' RATIO: 16.33 M/S
LV SEPTAL E/E' RATIO: 10.89 M/S
LYMPHOCYTES # BLD AUTO: 1.3 K/UL (ref 1–4.8)
LYMPHOCYTES NFR BLD: 9.5 % (ref 18–48)
MAGNESIUM SERPL-MCNC: 1.3 MG/DL (ref 1.6–2.6)
MCH RBC QN AUTO: 26.3 PG (ref 27–31)
MCHC RBC AUTO-ENTMCNC: 31 G/DL (ref 32–36)
MCV RBC AUTO: 85 FL (ref 82–98)
MICROSCOPIC COMMENT: ABNORMAL
MONOCYTES # BLD AUTO: 1 K/UL (ref 0.3–1)
MONOCYTES NFR BLD: 7.5 % (ref 4–15)
MV PEAK A VEL: 1.09 M/S
MV PEAK E VEL: 0.98 M/S
MV STENOSIS PRESSURE HALF TIME: 57.06 MS
MV VALVE AREA P 1/2 METHOD: 3.86 CM2
NEUTROPHILS # BLD AUTO: 11.1 K/UL (ref 1.8–7.7)
NEUTROPHILS NFR BLD: 80 % (ref 38–73)
NITRITE UR QL STRIP: NEGATIVE
NRBC BLD-RTO: 0 /100 WBC
PH UR STRIP: 6 [PH] (ref 5–8)
PHOSPHATE SERPL-MCNC: 5.1 MG/DL (ref 2.7–4.5)
PISA TR MAX VEL: 3.37 M/S
PLATELET # BLD AUTO: 271 K/UL (ref 150–450)
PLATELET BLD QL SMEAR: ABNORMAL
PMV BLD AUTO: 12.1 FL (ref 9.2–12.9)
POCT GLUCOSE: 143 MG/DL (ref 70–110)
POCT GLUCOSE: 146 MG/DL (ref 70–110)
POTASSIUM SERPL-SCNC: 4.5 MMOL/L (ref 3.5–5.1)
PROT SERPL-MCNC: 6.8 G/DL (ref 6–8.4)
PROT UR QL STRIP: ABNORMAL
PV MEAN GRADIENT: 2.25 MMHG
RA MAJOR: 3.15 CM
RA PRESSURE: 3 MMHG
RA WIDTH: 2.66 CM
RBC # BLD AUTO: 4.63 M/UL (ref 4–5.4)
RBC #/AREA URNS HPF: 16 /HPF (ref 0–4)
SINUS: 2.81 CM
SODIUM SERPL-SCNC: 137 MMOL/L (ref 136–145)
SP GR UR STRIP: 1.02 (ref 1–1.03)
SQUAMOUS #/AREA URNS HPF: 3 /HPF
STJ: 3.06 CM
TARGETS BLD QL SMEAR: ABNORMAL
TDI LATERAL: 0.06 M/S
TDI SEPTAL: 0.09 M/S
TDI: 0.08 M/S
TR MAX PG: 45 MMHG
TRICUSPID ANNULAR PLANE SYSTOLIC EXCURSION: 1.74 CM
TSH SERPL DL<=0.005 MIU/L-ACNC: 1.98 UIU/ML (ref 0.4–4)
TV REST PULMONARY ARTERY PRESSURE: 48 MMHG
UNIDENT CRYS URNS QL MICRO: ABNORMAL
URN SPEC COLLECT METH UR: ABNORMAL
UROBILINOGEN UR STRIP-ACNC: >=8 EU/DL
WBC # BLD AUTO: 13.83 K/UL (ref 3.9–12.7)
WBC #/AREA URNS HPF: >100 /HPF (ref 0–5)
WBC CLUMPS URNS QL MICRO: ABNORMAL
YEAST URNS QL MICRO: ABNORMAL

## 2023-01-25 PROCEDURE — 88307 TISSUE EXAM BY PATHOLOGIST: CPT | Mod: 26,,, | Performed by: STUDENT IN AN ORGANIZED HEALTH CARE EDUCATION/TRAINING PROGRAM

## 2023-01-25 PROCEDURE — 36000708 HC OR TIME LEV III 1ST 15 MIN: Performed by: SURGERY

## 2023-01-25 PROCEDURE — 63600175 PHARM REV CODE 636 W HCPCS: Performed by: STUDENT IN AN ORGANIZED HEALTH CARE EDUCATION/TRAINING PROGRAM

## 2023-01-25 PROCEDURE — 37000008 HC ANESTHESIA 1ST 15 MINUTES: Performed by: SURGERY

## 2023-01-25 PROCEDURE — 99223 PR INITIAL HOSPITAL CARE,LEVL III: ICD-10-PCS | Mod: ,,, | Performed by: NURSE PRACTITIONER

## 2023-01-25 PROCEDURE — 36415 COLL VENOUS BLD VENIPUNCTURE: CPT | Performed by: INTERNAL MEDICINE

## 2023-01-25 PROCEDURE — 44120 PR RESECT SMALL INTEST,SINGL RESEC/ANAS: ICD-10-PCS | Mod: 51,,, | Performed by: SURGERY

## 2023-01-25 PROCEDURE — 20000000 HC ICU ROOM

## 2023-01-25 PROCEDURE — 88341 PR IHC OR ICC EACH ADD'L SINGLE ANTIBODY  STAINPR: ICD-10-PCS | Mod: 26,,, | Performed by: STUDENT IN AN ORGANIZED HEALTH CARE EDUCATION/TRAINING PROGRAM

## 2023-01-25 PROCEDURE — 43610 PR EXCIS STOMACH ULCER,LESN;LOCAL: ICD-10-PCS | Mod: 80,51,, | Performed by: SURGERY

## 2023-01-25 PROCEDURE — 84100 ASSAY OF PHOSPHORUS: CPT | Performed by: INTERNAL MEDICINE

## 2023-01-25 PROCEDURE — 99285 EMERGENCY DEPT VISIT HI MDM: CPT | Mod: 25

## 2023-01-25 PROCEDURE — 87088 URINE BACTERIA CULTURE: CPT | Performed by: SURGERY

## 2023-01-25 PROCEDURE — 88302 TISSUE EXAM BY PATHOLOGIST: CPT | Mod: 26,,, | Performed by: STUDENT IN AN ORGANIZED HEALTH CARE EDUCATION/TRAINING PROGRAM

## 2023-01-25 PROCEDURE — 88341 IMHCHEM/IMCYTCHM EA ADD ANTB: CPT | Mod: 26,,, | Performed by: STUDENT IN AN ORGANIZED HEALTH CARE EDUCATION/TRAINING PROGRAM

## 2023-01-25 PROCEDURE — 36000709 HC OR TIME LEV III EA ADD 15 MIN: Performed by: SURGERY

## 2023-01-25 PROCEDURE — 99900035 HC TECH TIME PER 15 MIN (STAT)

## 2023-01-25 PROCEDURE — 99223 PR INITIAL HOSPITAL CARE,LEVL III: ICD-10-PCS | Mod: 57,,, | Performed by: SURGERY

## 2023-01-25 PROCEDURE — 83880 ASSAY OF NATRIURETIC PEPTIDE: CPT | Performed by: INTERNAL MEDICINE

## 2023-01-25 PROCEDURE — 88312 PR  SPECIAL STAINS,GROUP I: ICD-10-PCS | Mod: 26,,, | Performed by: STUDENT IN AN ORGANIZED HEALTH CARE EDUCATION/TRAINING PROGRAM

## 2023-01-25 PROCEDURE — 44120 REMOVAL OF SMALL INTESTINE: CPT | Mod: 51,,, | Performed by: SURGERY

## 2023-01-25 PROCEDURE — 25000003 PHARM REV CODE 250: Performed by: SURGERY

## 2023-01-25 PROCEDURE — 99223 1ST HOSP IP/OBS HIGH 75: CPT | Mod: ,,, | Performed by: NURSE PRACTITIONER

## 2023-01-25 PROCEDURE — 82962 GLUCOSE BLOOD TEST: CPT | Performed by: SURGERY

## 2023-01-25 PROCEDURE — 44120 PR RESECT SMALL INTEST,SINGL RESEC/ANAS: ICD-10-PCS | Mod: 80,,, | Performed by: SURGERY

## 2023-01-25 PROCEDURE — 63600175 PHARM REV CODE 636 W HCPCS: Performed by: SURGERY

## 2023-01-25 PROCEDURE — 87186 SC STD MICRODIL/AGAR DIL: CPT | Performed by: SURGERY

## 2023-01-25 PROCEDURE — 43610 EXCISION OF STOMACH LESION: CPT | Mod: ,,, | Performed by: SURGERY

## 2023-01-25 PROCEDURE — 84443 ASSAY THYROID STIM HORMONE: CPT | Performed by: INTERNAL MEDICINE

## 2023-01-25 PROCEDURE — 88302 PR  SURG PATH,LEVEL II: ICD-10-PCS | Mod: 26,,, | Performed by: STUDENT IN AN ORGANIZED HEALTH CARE EDUCATION/TRAINING PROGRAM

## 2023-01-25 PROCEDURE — C1729 CATH, DRAINAGE: HCPCS | Performed by: SURGERY

## 2023-01-25 PROCEDURE — 80053 COMPREHEN METABOLIC PANEL: CPT | Performed by: EMERGENCY MEDICINE

## 2023-01-25 PROCEDURE — 88312 SPECIAL STAINS GROUP 1: CPT | Mod: 26,,, | Performed by: STUDENT IN AN ORGANIZED HEALTH CARE EDUCATION/TRAINING PROGRAM

## 2023-01-25 PROCEDURE — C9290 INJ, BUPIVACAINE LIPOSOME: HCPCS | Performed by: SURGERY

## 2023-01-25 PROCEDURE — 88302 TISSUE EXAM BY PATHOLOGIST: CPT | Performed by: STUDENT IN AN ORGANIZED HEALTH CARE EDUCATION/TRAINING PROGRAM

## 2023-01-25 PROCEDURE — 25000003 PHARM REV CODE 250: Performed by: NURSE PRACTITIONER

## 2023-01-25 PROCEDURE — 44120 REMOVAL OF SMALL INTESTINE: CPT | Mod: 80,,, | Performed by: SURGERY

## 2023-01-25 PROCEDURE — 99223 1ST HOSP IP/OBS HIGH 75: CPT | Mod: 57,,, | Performed by: SURGERY

## 2023-01-25 PROCEDURE — 88342 IMHCHEM/IMCYTCHM 1ST ANTB: CPT | Mod: 26,,, | Performed by: STUDENT IN AN ORGANIZED HEALTH CARE EDUCATION/TRAINING PROGRAM

## 2023-01-25 PROCEDURE — 63600175 PHARM REV CODE 636 W HCPCS: Performed by: EMERGENCY MEDICINE

## 2023-01-25 PROCEDURE — 27201423 OPTIME MED/SURG SUP & DEVICES STERILE SUPPLY: Performed by: SURGERY

## 2023-01-25 PROCEDURE — 83605 ASSAY OF LACTIC ACID: CPT | Performed by: INTERNAL MEDICINE

## 2023-01-25 PROCEDURE — 81000 URINALYSIS NONAUTO W/SCOPE: CPT | Performed by: SURGERY

## 2023-01-25 PROCEDURE — 87077 CULTURE AEROBIC IDENTIFY: CPT | Performed by: SURGERY

## 2023-01-25 PROCEDURE — 88307 PR  SURG PATH,LEVEL V: ICD-10-PCS | Mod: 26,,, | Performed by: STUDENT IN AN ORGANIZED HEALTH CARE EDUCATION/TRAINING PROGRAM

## 2023-01-25 PROCEDURE — 25000003 PHARM REV CODE 250: Performed by: STUDENT IN AN ORGANIZED HEALTH CARE EDUCATION/TRAINING PROGRAM

## 2023-01-25 PROCEDURE — 43610 EXCISION OF STOMACH LESION: CPT | Mod: 80,51,, | Performed by: SURGERY

## 2023-01-25 PROCEDURE — 63600175 PHARM REV CODE 636 W HCPCS: Performed by: NURSE ANESTHETIST, CERTIFIED REGISTERED

## 2023-01-25 PROCEDURE — 37000009 HC ANESTHESIA EA ADD 15 MINS: Performed by: SURGERY

## 2023-01-25 PROCEDURE — 85025 COMPLETE CBC W/AUTO DIFF WBC: CPT | Performed by: EMERGENCY MEDICINE

## 2023-01-25 PROCEDURE — 83690 ASSAY OF LIPASE: CPT | Performed by: EMERGENCY MEDICINE

## 2023-01-25 PROCEDURE — 88307 TISSUE EXAM BY PATHOLOGIST: CPT | Performed by: STUDENT IN AN ORGANIZED HEALTH CARE EDUCATION/TRAINING PROGRAM

## 2023-01-25 PROCEDURE — 88342 CHG IMMUNOCYTOCHEMISTRY: ICD-10-PCS | Mod: 26,,, | Performed by: STUDENT IN AN ORGANIZED HEALTH CARE EDUCATION/TRAINING PROGRAM

## 2023-01-25 PROCEDURE — 83036 HEMOGLOBIN GLYCOSYLATED A1C: CPT | Performed by: INTERNAL MEDICINE

## 2023-01-25 PROCEDURE — 96375 TX/PRO/DX INJ NEW DRUG ADDON: CPT

## 2023-01-25 PROCEDURE — 71000033 HC RECOVERY, INTIAL HOUR: Performed by: SURGERY

## 2023-01-25 PROCEDURE — 83735 ASSAY OF MAGNESIUM: CPT | Performed by: INTERNAL MEDICINE

## 2023-01-25 PROCEDURE — 87086 URINE CULTURE/COLONY COUNT: CPT | Performed by: SURGERY

## 2023-01-25 PROCEDURE — S0030 INJECTION, METRONIDAZOLE: HCPCS | Performed by: STUDENT IN AN ORGANIZED HEALTH CARE EDUCATION/TRAINING PROGRAM

## 2023-01-25 PROCEDURE — 96374 THER/PROPH/DIAG INJ IV PUSH: CPT

## 2023-01-25 PROCEDURE — 63600175 PHARM REV CODE 636 W HCPCS: Mod: TB,JG | Performed by: INTERNAL MEDICINE

## 2023-01-25 PROCEDURE — 43610 PR EXCIS STOMACH ULCER,LESN;LOCAL: ICD-10-PCS | Mod: ,,, | Performed by: SURGERY

## 2023-01-25 RX ORDER — SODIUM CHLORIDE 0.9 % (FLUSH) 0.9 %
10 SYRINGE (ML) INJECTION EVERY 8 HOURS PRN
Status: DISCONTINUED | OUTPATIENT
Start: 2023-01-25 | End: 2023-02-01 | Stop reason: HOSPADM

## 2023-01-25 RX ORDER — INSULIN ASPART 100 [IU]/ML
0-5 INJECTION, SOLUTION INTRAVENOUS; SUBCUTANEOUS
Status: DISCONTINUED | OUTPATIENT
Start: 2023-01-25 | End: 2023-02-01 | Stop reason: HOSPADM

## 2023-01-25 RX ORDER — IBUPROFEN 200 MG
16 TABLET ORAL
Status: DISCONTINUED | OUTPATIENT
Start: 2023-01-25 | End: 2023-02-01 | Stop reason: HOSPADM

## 2023-01-25 RX ORDER — ONDANSETRON 2 MG/ML
INJECTION INTRAMUSCULAR; INTRAVENOUS
Status: DISCONTINUED | OUTPATIENT
Start: 2023-01-25 | End: 2023-01-25

## 2023-01-25 RX ORDER — LANOLIN ALCOHOL/MO/W.PET/CERES
800 CREAM (GRAM) TOPICAL
Status: DISCONTINUED | OUTPATIENT
Start: 2023-01-25 | End: 2023-01-25

## 2023-01-25 RX ORDER — NALOXONE HCL 0.4 MG/ML
0.02 VIAL (ML) INJECTION
Status: DISCONTINUED | OUTPATIENT
Start: 2023-01-25 | End: 2023-02-01 | Stop reason: HOSPADM

## 2023-01-25 RX ORDER — FLUCONAZOLE 2 MG/ML
400 INJECTION, SOLUTION INTRAVENOUS
Status: DISCONTINUED | OUTPATIENT
Start: 2023-01-25 | End: 2023-01-25

## 2023-01-25 RX ORDER — PROPOFOL 10 MG/ML
VIAL (ML) INTRAVENOUS
Status: DISCONTINUED | OUTPATIENT
Start: 2023-01-25 | End: 2023-01-25

## 2023-01-25 RX ORDER — MAG HYDROX/ALUMINUM HYD/SIMETH 200-200-20
30 SUSPENSION, ORAL (FINAL DOSE FORM) ORAL 4 TIMES DAILY PRN
Status: DISCONTINUED | OUTPATIENT
Start: 2023-01-25 | End: 2023-01-25

## 2023-01-25 RX ORDER — BUPIVACAINE HYDROCHLORIDE 5 MG/ML
INJECTION, SOLUTION EPIDURAL; INTRACAUDAL
Status: DISCONTINUED | OUTPATIENT
Start: 2023-01-25 | End: 2023-01-25 | Stop reason: HOSPADM

## 2023-01-25 RX ORDER — ZINC SULFATE 50(220)MG
220 CAPSULE ORAL 3 TIMES DAILY
COMMUNITY

## 2023-01-25 RX ORDER — FERROUS GLUCONATE 324(38)MG
324 TABLET ORAL
COMMUNITY

## 2023-01-25 RX ORDER — PROCHLORPERAZINE EDISYLATE 5 MG/ML
5 INJECTION INTRAMUSCULAR; INTRAVENOUS EVERY 6 HOURS PRN
Status: DISCONTINUED | OUTPATIENT
Start: 2023-01-25 | End: 2023-02-01 | Stop reason: HOSPADM

## 2023-01-25 RX ORDER — ROCURONIUM BROMIDE 10 MG/ML
INJECTION, SOLUTION INTRAVENOUS
Status: DISCONTINUED | OUTPATIENT
Start: 2023-01-25 | End: 2023-01-25

## 2023-01-25 RX ORDER — ONDANSETRON 2 MG/ML
4 INJECTION INTRAMUSCULAR; INTRAVENOUS EVERY 6 HOURS PRN
Status: DISCONTINUED | OUTPATIENT
Start: 2023-01-25 | End: 2023-01-25 | Stop reason: SDUPTHER

## 2023-01-25 RX ORDER — MUPIROCIN 20 MG/G
OINTMENT TOPICAL 2 TIMES DAILY
Status: CANCELLED | OUTPATIENT
Start: 2023-01-25 | End: 2023-01-30

## 2023-01-25 RX ORDER — TALC
6 POWDER (GRAM) TOPICAL NIGHTLY PRN
Status: DISCONTINUED | OUTPATIENT
Start: 2023-01-25 | End: 2023-01-25

## 2023-01-25 RX ORDER — ONDANSETRON 2 MG/ML
4 INJECTION INTRAMUSCULAR; INTRAVENOUS DAILY PRN
Status: DISCONTINUED | OUTPATIENT
Start: 2023-01-25 | End: 2023-01-25 | Stop reason: HOSPADM

## 2023-01-25 RX ORDER — SODIUM,POTASSIUM PHOSPHATES 280-250MG
2 POWDER IN PACKET (EA) ORAL
Status: DISCONTINUED | OUTPATIENT
Start: 2023-01-25 | End: 2023-01-25

## 2023-01-25 RX ORDER — PNV NO.95/FERROUS FUM/FOLIC AC 28MG-0.8MG
100 TABLET ORAL DAILY
COMMUNITY

## 2023-01-25 RX ORDER — MIDAZOLAM HYDROCHLORIDE 1 MG/ML
INJECTION, SOLUTION INTRAMUSCULAR; INTRAVENOUS
Status: DISCONTINUED | OUTPATIENT
Start: 2023-01-25 | End: 2023-01-25

## 2023-01-25 RX ORDER — CHOLECALCIFEROL (VITAMIN D3) 25 MCG
1000 TABLET ORAL DAILY
COMMUNITY

## 2023-01-25 RX ORDER — MUPIROCIN 20 MG/G
OINTMENT TOPICAL 2 TIMES DAILY
Status: COMPLETED | OUTPATIENT
Start: 2023-01-25 | End: 2023-01-30

## 2023-01-25 RX ORDER — CEFEPIME HYDROCHLORIDE 1 G/50ML
1 INJECTION, SOLUTION INTRAVENOUS
Status: DISCONTINUED | OUTPATIENT
Start: 2023-01-25 | End: 2023-01-26

## 2023-01-25 RX ORDER — LIDOCAINE HYDROCHLORIDE 10 MG/ML
INJECTION, SOLUTION EPIDURAL; INFILTRATION; INTRACAUDAL; PERINEURAL
Status: DISCONTINUED | OUTPATIENT
Start: 2023-01-25 | End: 2023-01-25

## 2023-01-25 RX ORDER — MORPHINE SULFATE 4 MG/ML
4 INJECTION, SOLUTION INTRAMUSCULAR; INTRAVENOUS
Status: COMPLETED | OUTPATIENT
Start: 2023-01-25 | End: 2023-01-25

## 2023-01-25 RX ORDER — CEFEPIME HYDROCHLORIDE 1 G/50ML
1 INJECTION, SOLUTION INTRAVENOUS
Status: DISCONTINUED | OUTPATIENT
Start: 2023-01-25 | End: 2023-01-25

## 2023-01-25 RX ORDER — GLUCAGON 1 MG
1 KIT INJECTION
Status: DISCONTINUED | OUTPATIENT
Start: 2023-01-25 | End: 2023-02-01 | Stop reason: HOSPADM

## 2023-01-25 RX ORDER — FAMOTIDINE 10 MG/ML
20 INJECTION INTRAVENOUS 2 TIMES DAILY
Status: DISCONTINUED | OUTPATIENT
Start: 2023-01-26 | End: 2023-01-26

## 2023-01-25 RX ORDER — IBUPROFEN 200 MG
24 TABLET ORAL
Status: DISCONTINUED | OUTPATIENT
Start: 2023-01-25 | End: 2023-02-01 | Stop reason: HOSPADM

## 2023-01-25 RX ORDER — DEXAMETHASONE SODIUM PHOSPHATE 4 MG/ML
INJECTION, SOLUTION INTRA-ARTICULAR; INTRALESIONAL; INTRAMUSCULAR; INTRAVENOUS; SOFT TISSUE
Status: DISCONTINUED | OUTPATIENT
Start: 2023-01-25 | End: 2023-01-25

## 2023-01-25 RX ORDER — FLUCONAZOLE 2 MG/ML
200 INJECTION, SOLUTION INTRAVENOUS
Status: DISCONTINUED | OUTPATIENT
Start: 2023-01-25 | End: 2023-01-26

## 2023-01-25 RX ORDER — LINEZOLID 2 MG/ML
600 INJECTION, SOLUTION INTRAVENOUS
Status: DISCONTINUED | OUTPATIENT
Start: 2023-01-25 | End: 2023-01-25

## 2023-01-25 RX ORDER — METRONIDAZOLE 500 MG/100ML
500 INJECTION, SOLUTION INTRAVENOUS ONCE
Status: DISCONTINUED | OUTPATIENT
Start: 2023-01-25 | End: 2023-01-25 | Stop reason: HOSPADM

## 2023-01-25 RX ORDER — MIDAZOLAM HYDROCHLORIDE 1 MG/ML
INJECTION INTRAMUSCULAR; INTRAVENOUS
Status: DISCONTINUED | OUTPATIENT
Start: 2023-01-25 | End: 2023-01-25

## 2023-01-25 RX ORDER — MICONAZOLE NITRATE 2 %
POWDER (GRAM) TOPICAL 2 TIMES DAILY
Status: DISCONTINUED | OUTPATIENT
Start: 2023-01-25 | End: 2023-01-26

## 2023-01-25 RX ORDER — IPRATROPIUM BROMIDE AND ALBUTEROL SULFATE 2.5; .5 MG/3ML; MG/3ML
3 SOLUTION RESPIRATORY (INHALATION) EVERY 6 HOURS PRN
Status: DISCONTINUED | OUTPATIENT
Start: 2023-01-25 | End: 2023-02-01 | Stop reason: HOSPADM

## 2023-01-25 RX ORDER — ONDANSETRON 2 MG/ML
4 INJECTION INTRAMUSCULAR; INTRAVENOUS EVERY 6 HOURS PRN
Status: DISCONTINUED | OUTPATIENT
Start: 2023-01-25 | End: 2023-02-01 | Stop reason: HOSPADM

## 2023-01-25 RX ORDER — ONDANSETRON 2 MG/ML
4 INJECTION INTRAMUSCULAR; INTRAVENOUS
Status: COMPLETED | OUTPATIENT
Start: 2023-01-25 | End: 2023-01-25

## 2023-01-25 RX ORDER — HYDRALAZINE HYDROCHLORIDE 20 MG/ML
10 INJECTION INTRAMUSCULAR; INTRAVENOUS EVERY 4 HOURS PRN
Status: DISCONTINUED | OUTPATIENT
Start: 2023-01-25 | End: 2023-02-01 | Stop reason: HOSPADM

## 2023-01-25 RX ORDER — ENOXAPARIN SODIUM 100 MG/ML
40 INJECTION SUBCUTANEOUS EVERY 24 HOURS
Status: DISCONTINUED | OUTPATIENT
Start: 2023-01-26 | End: 2023-01-25

## 2023-01-25 RX ORDER — POLYETHYLENE GLYCOL 3350 17 G/17G
17 POWDER, FOR SOLUTION ORAL 2 TIMES DAILY PRN
Status: DISCONTINUED | OUTPATIENT
Start: 2023-01-25 | End: 2023-01-25

## 2023-01-25 RX ORDER — SIMETHICONE 80 MG
1 TABLET,CHEWABLE ORAL 4 TIMES DAILY PRN
Status: DISCONTINUED | OUTPATIENT
Start: 2023-01-25 | End: 2023-01-25

## 2023-01-25 RX ORDER — METRONIDAZOLE 500 MG/100ML
INJECTION, SOLUTION INTRAVENOUS
Status: DISCONTINUED | OUTPATIENT
Start: 2023-01-25 | End: 2023-01-25

## 2023-01-25 RX ORDER — ACETAMINOPHEN 325 MG/1
650 TABLET ORAL EVERY 4 HOURS PRN
Status: DISCONTINUED | OUTPATIENT
Start: 2023-01-25 | End: 2023-02-01 | Stop reason: HOSPADM

## 2023-01-25 RX ORDER — MORPHINE SULFATE 4 MG/ML
4 INJECTION, SOLUTION INTRAMUSCULAR; INTRAVENOUS EVERY 4 HOURS PRN
Status: DISCONTINUED | OUTPATIENT
Start: 2023-01-25 | End: 2023-01-26 | Stop reason: SDUPTHER

## 2023-01-25 RX ORDER — ACETAMINOPHEN 10 MG/ML
1000 INJECTION, SOLUTION INTRAVENOUS ONCE
Status: DISCONTINUED | OUTPATIENT
Start: 2023-01-25 | End: 2023-01-25 | Stop reason: HOSPADM

## 2023-01-25 RX ORDER — HYDROMORPHONE HYDROCHLORIDE 2 MG/ML
0.2 INJECTION, SOLUTION INTRAMUSCULAR; INTRAVENOUS; SUBCUTANEOUS EVERY 5 MIN PRN
Status: DISCONTINUED | OUTPATIENT
Start: 2023-01-25 | End: 2023-01-25 | Stop reason: HOSPADM

## 2023-01-25 RX ORDER — KETAMINE HCL IN 0.9 % NACL 50 MG/5 ML
SYRINGE (ML) INTRAVENOUS
Status: DISCONTINUED | OUTPATIENT
Start: 2023-01-25 | End: 2023-01-25

## 2023-01-25 RX ORDER — SODIUM CHLORIDE 9 MG/ML
INJECTION, SOLUTION INTRAVENOUS CONTINUOUS
Status: DISCONTINUED | OUTPATIENT
Start: 2023-01-25 | End: 2023-01-26

## 2023-01-25 RX ADMIN — METRONIDAZOLE 500 MG: 500 SOLUTION INTRAVENOUS at 04:01

## 2023-01-25 RX ADMIN — ROCURONIUM BROMIDE 20 MG: 10 INJECTION, SOLUTION INTRAVENOUS at 04:01

## 2023-01-25 RX ADMIN — MUPIROCIN: 20 OINTMENT TOPICAL at 08:01

## 2023-01-25 RX ADMIN — CEFEPIME HYDROCHLORIDE 1 G: 1 INJECTION, SOLUTION INTRAVENOUS at 09:01

## 2023-01-25 RX ADMIN — ONDANSETRON 4 MG: 2 INJECTION, SOLUTION INTRAMUSCULAR; INTRAVENOUS at 05:01

## 2023-01-25 RX ADMIN — ONDANSETRON 4 MG: 2 INJECTION INTRAMUSCULAR; INTRAVENOUS at 11:01

## 2023-01-25 RX ADMIN — PHENYLEPHRINE HYDROCHLORIDE 20 MCG/MIN: 10 INJECTION INTRAVENOUS at 03:01

## 2023-01-25 RX ADMIN — ROCURONIUM BROMIDE 25 MG: 10 INJECTION, SOLUTION INTRAVENOUS at 04:01

## 2023-01-25 RX ADMIN — ROCURONIUM BROMIDE 5 MG: 10 INJECTION, SOLUTION INTRAVENOUS at 04:01

## 2023-01-25 RX ADMIN — Medication 25 MG: at 04:01

## 2023-01-25 RX ADMIN — SODIUM CHLORIDE, SODIUM LACTATE, POTASSIUM CHLORIDE, AND CALCIUM CHLORIDE: .6; .31; .03; .02 INJECTION, SOLUTION INTRAVENOUS at 03:01

## 2023-01-25 RX ADMIN — MORPHINE SULFATE 4 MG: 4 INJECTION INTRAVENOUS at 11:01

## 2023-01-25 RX ADMIN — ROCURONIUM BROMIDE 20 MG: 10 INJECTION, SOLUTION INTRAVENOUS at 05:01

## 2023-01-25 RX ADMIN — SUGAMMADEX 400 MG: 100 INJECTION, SOLUTION INTRAVENOUS at 06:01

## 2023-01-25 RX ADMIN — SODIUM CHLORIDE: 9 INJECTION, SOLUTION INTRAVENOUS at 08:01

## 2023-01-25 RX ADMIN — DEXAMETHASONE SODIUM PHOSPHATE 4 MG: 4 INJECTION, SOLUTION INTRAMUSCULAR; INTRAVENOUS at 04:01

## 2023-01-25 RX ADMIN — MIDAZOLAM HYDROCHLORIDE 2 MG: 1 INJECTION, SOLUTION INTRAMUSCULAR; INTRAVENOUS at 03:01

## 2023-01-25 RX ADMIN — LIDOCAINE HYDROCHLORIDE 100 MG: 10 INJECTION, SOLUTION EPIDURAL; INFILTRATION; INTRACAUDAL; PERINEURAL at 04:01

## 2023-01-25 RX ADMIN — CEFTRIAXONE SODIUM 2 G: 1 INJECTION, POWDER, FOR SOLUTION INTRAMUSCULAR; INTRAVENOUS at 04:01

## 2023-01-25 RX ADMIN — PROPOFOL 100 MG: 10 INJECTION, EMULSION INTRAVENOUS at 04:01

## 2023-01-25 NOTE — ANESTHESIA PREPROCEDURE EVALUATION
01/25/2023  Chastity Hung is a 57 y.o., Morbidly obese (BMI 90) female who presents to the emergency room with a 3 day history of epigastric pain associated with nausea and vomiting loss of appetite.  She states that she has not been able to keep anything down. She says the bulge above her bellybutton is more prominent.    The patient is unable to go to the CT scanner due to her body mass index of 89.6.    The patient also has significant skin breakdown along the upper thighs and along the edges in undersurface of her very large abdominal wall pannus.  She has significant swelling and blistering of her lower extremities.    She does not report any fever or chills.  She is not reporting any chest pain.    The patient is essentially nonambulatory    Patient Active Problem List   Diagnosis    Hyperlipidemia    Depression    Essential hypertension    Diabetes mellitus type 2, controlled    Morbid obesity with BMI of 70 and over, adult    Central retinal vein occlusion with macular edema of left eye    Hyperuricemia    Glaucoma suspect of both eyes    Type 2 diabetes mellitus, without long-term current use of insulin     how some people would answer that do not you    Venous stasis dermatitis of left lower extremity    Cutaneous candidiasis     Past Medical History:   Diagnosis Date    Central retinal vein occlusion of left eye 02/25/2014    Treated by Dr. Lopez.  [place]     Depression     Diabetes mellitus type 2, controlled     Glaucoma 02/2014    Glaucoma Suspect    Hyperlipidemia     Hypertension     Hyperuricemia 8/3/2017    Morbid obesity     Reactive airway disease     Urolithiasis      Past Surgical History:   Procedure Laterality Date    APPENDECTOMY      BREAST SURGERY Left 2014    CHOLECYSTECTOMY  10/2014    HEMICOLECTOMY Right 10/2014    Including appendix, due to  perforated appendix    KIDNEY STONE SURGERY  2018    TOTAL VAGINAL HYSTERECTOMY      Secondary to uterine prolapse     Pre-op Assessment    I have reviewed the Patient Summary Reports.    I have reviewed the NPO Status.   I have reviewed the Medications.     Review of Systems  Anesthesia Hx:  No problems with previous Anesthesia  History of prior surgery of interest to airway management or planning: Previous anesthesia: General, MAC  Denies Personal Hx of Anesthesia complications.   Social:  Non-Smoker    Hematology/Oncology:  Hematology Normal   Oncology Normal    -- Denies Anemia:    Cardiovascular:   Hypertension Denies MI.  Denies CAD.      ECHO (bedside): (1/25/23)  Good EF, Mild AS   Pulmonary:   Asthma    Renal/:   Chronic Renal Disease MIGUEL (BUN 51/ Cre 1.6   Hepatic/GI:   Incarcerated ventral hernia    Musculoskeletal:  Musculoskeletal Normal    Neurological:  Neurology Normal    Endocrine:   Diabetes, type 2 A1c: 5.6    Super morbid obesity  Morbid Obesity / BMI > 40  Psych:   Psychiatric History depression          Chemistry        Component Value Date/Time     01/25/2023 1254    K 4.5 01/25/2023 1254     01/25/2023 1254    CO2 17 (L) 01/25/2023 1254    BUN 51 (H) 01/25/2023 1254    CREATININE 1.6 (H) 01/25/2023 1254     (H) 01/25/2023 1254        Component Value Date/Time    CALCIUM 9.1 01/25/2023 1254    ALKPHOS 91 01/25/2023 1254    AST 67 (H) 01/25/2023 1254    ALT 50 (H) 01/25/2023 1254    BILITOT 1.4 (H) 01/25/2023 1254    ESTGFRAFRICA >60.0 03/07/2022 1229    EGFRNONAA 55.5 (A) 03/07/2022 1229        Lab Results   Component Value Date    WBC 13.83 (H) 01/25/2023    HGB 12.2 01/25/2023    HCT 39.3 01/25/2023    MCV 85 01/25/2023     01/25/2023           Physical Exam  General: Cooperative, Alert, Oriented and Lethargic  Super morbid obesity - bmi 90  Airway:  Mallampati: IV   Mouth Opening: Normal  TM Distance: < 4 cm  Tongue: Normal, Large  Neck ROM: Normal ROM  Neck:  Girth Increased    Dental:  Intact  Patient denies any currently loose teeth; Patient denies any removable dental appliances      Anesthesia Plan  Type of Anesthesia, risks & benefits discussed:    Anesthesia Type: Gen ETT  Intra-op Monitoring Plan: Standard ASA Monitors and Art Line  Post Op Pain Control Plan: multimodal analgesia and IV/PO Opioids PRN  Induction:  IV  Airway Plan: Direct and Video  Informed Consent: Informed consent signed with the Patient and all parties understand the risks and agree with anesthesia plan.  All questions answered.   ASA Score: 4 Emergent  Day of Surgery Review of History & Physical: H&P Update referred to the surgeon/provider.  Anesthesia Plan Notes: ETT 06/19/18; 0905 (created via procedure documentation); Direct laryngoscopy; Standard; 7 mm; Yes; Christine; 3; Oral; Grade IIa; 1; Capnometry     *Current with 24ga PIV in left hand ; discussed need for more secure IV access including possible midline PIV and CVC -patient and son (at bedside) acknowledged understanding and agreed; also discussed possibility of arterial line if BP cuff pressures not reliable - patient and son (at bedside) acknowledged understanding and agreed     Ready For Surgery From Anesthesia Perspective.     .

## 2023-01-25 NOTE — H&P
[facility] - Emergency Dept.  [facility] Medicine  History & Physical    Patient Name: Chastity Hung  MRN: [id number]  Patient Class: IP- Inpatient  Admission Date: 1/25/2023  Attending Physician: Dr. Ryan   Primary Care Provider: Alexandra Dawkins MD         Patient information was obtained from patient and ER records.     Subjective:     Principal Problem:Incarcerated umbilical hernia    Chief Complaint:   Chief Complaint   Patient presents with    Wound Check     Pt called EMS for pain under her pannus, per EMS there is a skin tear. Pt is non ambulatory        HPI: The patient is a 56 yo female with Morbid Obesity - BMI 89, DM, HTN, Kidney stones who presented to ED with N/V and abdominal pain x 3 days. The patient reports abdominal pain at umbilical area is a sharp, 9/10 pain associated with N/V, Poor appetite, Fatigue, and Malaise. Pt also reports pain and wounds to lower abdomen and BLE legs making it difficult to ambulate. She has not ambulated for over 2 weeks. Pt reports noncompliance with home meds and self care.     In the ED, pt was found to have a non-reducible umbilical hernia with a draining wound at the umbilicus. Pt unable to have CT imaging due to body habitus. Afebrile, WBC 13, Serum Cr 1.6, mildly elevated LFTs/T bili  Abd xray showed nothing acute.     Dr. Collins with General surgery evaluated pt in ED and recommended urgent surgery for incarcerated umbilical hernia and ICU admission.     Pt is a full code. The pt's son, Ra Hung, is her SMD     Past Medical History:   Diagnosis Date    Central retinal vein occlusion of left eye 02/25/2014    Treated by Dr. Lopez.  [place]     Depression     Diabetes mellitus type 2, controlled     Glaucoma 02/2014    Glaucoma Suspect    Hyperlipidemia     Hypertension     Hyperuricemia 8/3/2017    Morbid obesity     Reactive airway disease     Urolithiasis        Past Surgical History:   Procedure Laterality Date    APPENDECTOMY       BREAST SURGERY Left 2014    CHOLECYSTECTOMY  10/2014    HEMICOLECTOMY Right 10/2014    Including appendix, due to perforated appendix    KIDNEY STONE SURGERY  2018    TOTAL VAGINAL HYSTERECTOMY      Secondary to uterine prolapse       Review of patient's allergies indicates:  No Known Allergies    No current facility-administered medications on file prior to encounter.     Current Outpatient Medications on File Prior to Encounter   Medication Sig    atorvastatin (LIPITOR) 10 MG tablet TAKE 1 TABLET(10 MG) BY MOUTH EVERY DAY    blood sugar diagnostic Strp 1 strip by Misc.(Non-Drug; Combo Route) route 3 (three) times daily.    febuxostat (ULORIC) 40 mg Tab TAKE 1 TABLET(40 MG) BY MOUTH EVERY DAY    gabapentin (NEURONTIN) 300 MG capsule TAKE 1 CAPSULE(300 MG) BY MOUTH THREE TIMES DAILY    glimepiride (AMARYL) 1 MG tablet TAKE 1 TABLET(1 MG) BY MOUTH DAILY WITH BREAKFAST    lancets (FREESTYLE LANCETS) 28 gauge Misc USE THREE TIMES DAILY    losartan (COZAAR) 25 MG tablet Take 1 tablet (25 mg total) by mouth once daily.    meloxicam (MOBIC) 15 MG tablet Take 1 tablet (15 mg total) by mouth once daily.    metFORMIN (GLUCOPHAGE) 1000 MG tablet Take 1 tablet (1,000 mg total) by mouth 2 (two) times daily with meals.    multivitamin with minerals tablet Take 1 tablet by mouth once daily.    sertraline (ZOLOFT) 100 MG tablet TAKE 1 TABLET(100 MG) BY MOUTH EVERY DAY    spironolactone (ALDACTONE) 25 MG tablet TAKE 1 TABLET(25 MG) BY MOUTH EVERY DAY     Family History       Problem Relation (Age of Onset)    Breast cancer Paternal Aunt    Cancer Father    Diabetes Mother, Sister, Son    Heart disease Father, Mother, Sister    Hypertension Father, Mother, Sister, Son, Sister    Kidney disease Mother    Kidney failure Sister    Peripheral vascular disease Sister    Schizophrenia Father    Stroke Mother, Sister          Tobacco Use    Smoking status: Never    Smokeless tobacco: Never   Substance and Sexual Activity     Alcohol use: No    Drug use: No    Sexual activity: Never     Review of Systems   Constitutional:  Positive for activity change, appetite change and fatigue. Negative for chills, diaphoresis, fever and unexpected weight change.   HENT:  Negative for congestion, nosebleeds, sinus pressure and sore throat.    Eyes:  Negative for pain, discharge and visual disturbance.   Respiratory:  Negative for cough, chest tightness, shortness of breath, wheezing and stridor.    Cardiovascular:  Negative for chest pain, palpitations and leg swelling.   Gastrointestinal:  Positive for abdominal pain, nausea and vomiting. Negative for abdominal distention, blood in stool, constipation and diarrhea.   Endocrine: Negative for cold intolerance and heat intolerance.   Genitourinary:  Negative for difficulty urinating, dysuria, flank pain, frequency and urgency.   Musculoskeletal:  Positive for arthralgias and gait problem. Negative for back pain, joint swelling, myalgias, neck pain and neck stiffness.   Skin:  Positive for color change and wound (umbilical, lower abdomen, and legs). Negative for rash.   Allergic/Immunologic: Negative for food allergies and immunocompromised state.   Neurological:  Positive for weakness (generalized). Negative for dizziness, seizures, syncope, facial asymmetry, speech difficulty, light-headedness, numbness and headaches.   Hematological:  Negative for adenopathy.   Psychiatric/Behavioral:  Negative for agitation, confusion and hallucinations.    Objective:     Vital Signs (Most Recent):  Temp: 97.7 °F (36.5 °C) (01/25/23 1359)  Pulse: 104 (01/25/23 1359)  Resp: (!) 24 (01/25/23 1359)  BP: (!) 142/41 (01/25/23 1359)  SpO2: 95 % (01/25/23 1359)   Vital Signs (24h Range):  Temp:  [97.7 °F (36.5 °C)-97.8 °F (36.6 °C)] 97.7 °F (36.5 °C)  Pulse:  [] 104  Resp:  [18-24] 24  SpO2:  [95 %-99 %] 95 %  BP: (129-142)/(41-77) 142/41     Weight: (!) 235.9 kg (520 lb)  Body mass index is 89.26  kg/m².    Physical Exam  Vitals and nursing note reviewed.   Constitutional:       General: She is not in acute distress.     Appearance: She is morbidly obese. She is not diaphoretic.   HENT:      Head: Normocephalic and atraumatic.      Nose: Nose normal.   Eyes:      General: No scleral icterus.     Conjunctiva/sclera: Conjunctivae normal.   Neck:      Trachea: No tracheal deviation.   Cardiovascular:      Rate and Rhythm: Normal rate and regular rhythm.      Heart sounds: Normal heart sounds. No murmur heard.    No friction rub. No gallop.   Pulmonary:      Effort: Pulmonary effort is normal. No respiratory distress.      Breath sounds: Normal breath sounds. No stridor. No wheezing or rales.   Chest:      Chest wall: No tenderness.   Abdominal:      General: Bowel sounds are normal. There is no distension.      Palpations: Abdomen is soft. There is no mass.      Tenderness: There is abdominal tenderness in the periumbilical area. There is no guarding or rebound.      Hernia: A hernia is present. Hernia is present in the umbilical area (not reducible).      Comments: Wound at umbilicus draining yellow pus   Musculoskeletal:         General: No tenderness or deformity. Normal range of motion.      Cervical back: Normal range of motion and neck supple.   Skin:     General: Skin is warm.      Coloration: Skin is not pale.      Findings: Erythema and rash present.      Comments: Large areas of excoriation to lower pannus, bilateral inguinal, and bilateral upper thigh area  Chronic venous stasis changes to BLE with anasarca edema   Large blister to Left dorsal foot  See pictures    Neurological:      Mental Status: She is alert and oriented to person, place, and time.      Cranial Nerves: No cranial nerve deficit.      Motor: No abnormal muscle tone.      Coordination: Coordination normal.   Psychiatric:         Behavior: Behavior normal.         Thought Content: Thought content normal.                          Significant Labs: All pertinent labs within the past 24 hours have been reviewed.    Significant Imaging: I have reviewed all pertinent imaging results/findings within the past 24 hours.    Assessment/Plan:     * Incarcerated umbilical hernia  General surgery consulted   Plan for surgery today   IV pain control, antiemetics   IV Zyvox and Cefepime   NPO      MIGUEL (acute kidney injury)  Patient with acute kidney injury likely due to IVVD/dehydration MIGUEL is currently worsening. Labs reviewed- Renal function/electrolytes with Estimated Creatinine Clearance: 76 mL/min (A) (based on SCr of 1.6 mg/dL (H)). according to latest data. Monitor urine output and serial BMP and adjust therapy as needed. Avoid nephrotoxins and renally dose meds for GFR listed above.     Gentle IV hydration   Avoid hypotension and nephrotoxic agents       Cutaneous candidiasis  Large areas of excoriated skin to pannus, bilateral inguinal, and bilateral upper thigh area.   IV Fluconazole  Miconazole powder BID  Wound care consult       Venous stasis dermatitis of both lower extremities  Consult wound care  Check Echo/BNP      Morbid obesity with BMI of 70 and over, adult  Body mass index is 89.26 kg/m². Morbid obesity complicates all aspects of disease management from diagnostic modalities to treatment. Weight loss encouraged and health benefits explained to patient.         Diabetes mellitus type 2, controlled  Patient's FSGs are uncontrolled due to hyperglycemia on current medication regimen.  Last A1c reviewed-   Lab Results   Component Value Date    HGBA1C 5.6 03/07/2022     Most recent fingerstick glucose reviewed- No results for input(s): POCTGLUCOSE in the last 24 hours.  Current correctional scale  Low  Maintain anti-hyperglycemic dose as follows-   Antihyperglycemics (From admission, onward)    Start     Stop Route Frequency Ordered    01/25/23 1515  insulin aspart U-100 pen 0-5 Units         -- SubQ Before meals & nightly PRN 01/25/23  1417        Hold Oral hypoglycemics while patient is in the hospital.    Essential hypertension  Hold Spironolactone and Losartan for now   Restart after surgery      Depression  Patient has persistent depression which is mild and is currently controlled. Will Continue anti-depressant medications. We will not consult psychiatry at this time. Patient does not display psychosis at this time. Continue to monitor closely and adjust plan of care as needed.    Restart Zoloft after surgery        Hyperlipidemia  Cont Statin after surgery        VTE Risk Mitigation (From admission, onward)         Ordered     IP VTE HIGH RISK PATIENT  Once         01/25/23 1417     Place sequential compression device  Until discontinued         01/25/23 1417                   Saida Alejandra NP  Department of Hospital Medicine   [facility] - Emergency Dept.

## 2023-01-25 NOTE — ASSESSMENT & PLAN NOTE
Large areas of excoriated skin to pannus, bilateral inguinal, and bilateral upper thigh area.   IV Fluconazole  Miconazole powder BID  Wound care consult

## 2023-01-25 NOTE — ASSESSMENT & PLAN NOTE
Patient has persistent depression which is mild and is currently controlled. Will Continue anti-depressant medications. We will not consult psychiatry at this time. Patient does not display psychosis at this time. Continue to monitor closely and adjust plan of care as needed.    Restart Zoloft after surgery

## 2023-01-25 NOTE — ASSESSMENT & PLAN NOTE
1/25/23 Post op surgical release and reduction of hernia with small bowel resection and excision of gastric ulcer    NPO  IV hydration  IV cefepime  IV GI prophylaxis

## 2023-01-25 NOTE — ASSESSMENT & PLAN NOTE
As best can be determined by physical exam the patient has incarcerated in ventral/incisional hernia.  She is too large to undergo a CT scan.      I would recommend a laparotomy with reduction and repair of the incisional hernia.  If strangulated bowel was found she would require a bowel resection.  The hernia will likely be repaired with mesh.      The patient is an increased risk of complications due to her morbid obesity.      Need for surgery risks benefits and complications were discussed.  The urgency of the procedure was discussed.      Risks include infection, bleeding, wound infection, mesh infection, injury to the bowel, anastomotic leak or stricture if a bowel resection is required    , and recurrence of the hernia.      The patient will need to be admitted to the intensive care unit secondary to her morbid obesity

## 2023-01-25 NOTE — HOSPITAL COURSE
The patient was evaluated by the emergency room service.  In the general surgery service was contacted due to concerns for a incarcerated hernia as well as purulent drainage from the umbilicus.      The patient is being evaluated by the medical service with planned admission.      The patient was seen and examined and I explained the limitations of imaging studies but discuss the fact that she would likely has a incarcerated ventral/incisional hernia that is causing obstruction    1/26/23:  pain controlled, c/o ngt, leukocytosis, acute renal failure, marginal urine output, hemodynamically stable, was taking aleve 2 to 3 times a day for the last 3 weeks. Has not walked in 3 weeks    01/27/2023.  Complains of some mild incisional pain but mostly NG tube discomfort.  Afebrile.  Marginal blood improved urine output.  Seen by wound care for extensive candidiasis    01/28/2023.  Mostly NG pain, NG tube removed.  No abdominal pain.  No flatus or bowel movement.  Incision is clean.  Discuss disposition with that she needs to think about where she would like to go from here given her daily needs and limitations    NG tube removed yesterday.  No significant abdominal pain.  Start clear liquids.  Oral and IV narcotics.  DC Singh catheter.  Jovitack    01/29/2023.  No incisional pain.  Tolerated clear liquids.  White count is normalized.  Continue antibiotics until H pylori titer or pathology is available.  ADA diet.  Disposition is going to be challenging due to her super morbid

## 2023-01-25 NOTE — ANESTHESIA PROCEDURE NOTES
Central Line    Diagnosis: incarcerated hernia  Patient location during procedure: done in OR  Timeout: 1/25/2023 4:30 PM  Procedure end time: 1/25/2023 4:37 PM    Staffing  Authorizing Provider: Robby Burton MD  Performing Provider: Robby Burton MD    Staffing  Performed: anesthesiologist   Anesthesiologist: Robby Burton MD  Anesthesiologist was present at the time of the procedure.  Preanesthetic Checklist  Completed: patient identified, IV checked, site marked, risks and benefits discussed, surgical consent, monitors and equipment checked, pre-op evaluation, timeout performed and anesthesia consent given  Indication   Indication: vascular access     Anesthesia   general anesthesia    Central Line   Skin Prep: skin prepped with ChloraPrep, skin prep agent completely dried prior to procedure  Sterile Barriers Followed: Yes    All five maximal barriers used- gloves, gown, cap, mask, and large sterile sheet    hand hygiene performed prior to central venous catheter insertion  Location: right internal jugular.   Catheter type: triple lumen  Catheter Size: 7 Fr  Inserted Catheter Length: 13 cm  Ultrasound: vascular probe with ultrasound   Vessel Caliber: large, patent  Vascular Doppler:  not done, compressibility normal  Needle advanced into vessel with real time Ultrasound guidance.  Guidewire confirmed in vessel.  sterile gel and probe cover used in ultrasound-guided central venous catheter insertion  Manometry: none  Insertion Attempts: 1   Securement:line sutured, chlorhexidine patch, sterile dressing applied and blood return through all ports    Post-Procedure        Guidewire Guidewire removed intact.

## 2023-01-25 NOTE — HPI
57-year-old female who presents to the emergency room with a 3 day history of epigastric pain associated with nausea and vomiting loss of appetite.  She states that she has not been able to keep anything down.  She says the bulge above her bellybutton is more prominent.      The patient is unable to go to the CT scanner due to her body mass index of 89.6.      The patient also has significant skin breakdown along the upper thighs and along the edges in undersurface of her very large abdominal wall pannus.  She has significant swelling and blistering of her lower extremities.      She does not report any fever or chills.  She is not report any chest pain.      The patient is essentially nonambulatory       her home medications, and is non-ambulatory at baseline. No further complaints or concerns at this time.      Arrival mode: EMS

## 2023-01-25 NOTE — NURSING
Pt expected to ICU from OR.  Immobile at baseline, pt will have abdominal surgery.  Order placed for [facility] Platform[facility] Bed c [employer]. Confirmation # [id number]

## 2023-01-25 NOTE — HPI
57W pmh obesity (BMI 89), DM2, HTN, DLD, central L retinal vein occlusion, glaucoma, depression.      She presented to ED with N/V and abdominal pain x 3 days. also reported pain and wounds to lower abdomen and BLE legs making it difficult to ambulate. She has not ambulated for over 2 weeks. Reports noncompliance with home meds and self care.      In the ED, pt was found to have a non-reducible umbilical hernia with a draining wound at the umbilicus.   Pt unable to have CT imaging due to body habitus.      She was taken to OR by Dr. Collins finding area of ileum incarcerated within a supraumbilical hernia with partial obstruction.  Perforated gastric ulcer approximately 2-1/2 cm. Underwent small bowel excision, gastric ulcer excision, lysis of adhesions    She now returns to the ICU from OR, awake, alert, oriented on supplemental oxygen support  Pt is a full code. The pt's son, Ra Hung, is her SMD

## 2023-01-25 NOTE — ASSESSMENT & PLAN NOTE
Hold home meds glimepiride, metformin while inpt.  SSI prn with monitoring for glucose control and prevention of insulin toxicity

## 2023-01-25 NOTE — CONSULTS
[facility] - Emergency Dept.  General Surgery  Consult Note    Patient Name: Chastity Hung  MRN: [id number]  Code Status: Prior  Admission Date: 1/25/2023  Hospital Length of Stay: 0 days  Attending Physician: Hero Lin MD  Primary Care Provider: Alexandra Dawkins MD    Patient information was obtained from patient, past medical records and ER records.     Consults  Subjective:     Principal Problem:  Incarcerated ventral hernia, morbid obesity, hypertension, diabetes, and cutaneous candidiasis    History of Present Illness:   57-year-old female who presents to the emergency room with a 3 day history of epigastric pain associated with nausea and vomiting loss of appetite.  She states that she has not been able to keep anything down.  She says the bulge above her bellybutton is more prominent.      The patient is unable to go to the CT scanner due to her body mass index of 89.6.      The patient also has significant skin breakdown along the upper thighs and along the edges in undersurface of her very large abdominal wall pannus.  She has significant swelling and blistering of her lower extremities.      She does not report any fever or chills.  She is not report any chest pain.      The patient is essentially nonambulatory       her home medications, and is non-ambulatory at baseline. No further complaints or concerns at this time.      Arrival mode: EMS      No current facility-administered medications on file prior to encounter.     Current Outpatient Medications on File Prior to Encounter   Medication Sig    atorvastatin (LIPITOR) 10 MG tablet TAKE 1 TABLET(10 MG) BY MOUTH EVERY DAY    blood sugar diagnostic Strp 1 strip by Misc.(Non-Drug; Combo Route) route 3 (three) times daily.    febuxostat (ULORIC) 40 mg Tab TAKE 1 TABLET(40 MG) BY MOUTH EVERY DAY    gabapentin (NEURONTIN) 300 MG capsule TAKE 1 CAPSULE(300 MG) BY MOUTH THREE TIMES DAILY    glimepiride (AMARYL) 1 MG tablet TAKE 1 TABLET(1 MG) BY MOUTH  DAILY WITH BREAKFAST    lancets (FREESTYLE LANCETS) 28 gauge Misc USE THREE TIMES DAILY    losartan (COZAAR) 25 MG tablet Take 1 tablet (25 mg total) by mouth once daily.    meloxicam (MOBIC) 15 MG tablet Take 1 tablet (15 mg total) by mouth once daily.    metFORMIN (GLUCOPHAGE) 1000 MG tablet Take 1 tablet (1,000 mg total) by mouth 2 (two) times daily with meals.    multivitamin with minerals tablet Take 1 tablet by mouth once daily.    sertraline (ZOLOFT) 100 MG tablet TAKE 1 TABLET(100 MG) BY MOUTH EVERY DAY    spironolactone (ALDACTONE) 25 MG tablet TAKE 1 TABLET(25 MG) BY MOUTH EVERY DAY       Review of patient's allergies indicates:  No Known Allergies    Past Medical History:   Diagnosis Date    Central retinal vein occlusion of left eye 02/25/2014    Treated by Dr. Lopez.  [facility]de[facility]     Depression     Diabetes mellitus type 2, controlled     Glaucoma 02/2014    Glaucoma Suspect    Hyperlipidemia     Hypertension     Hyperuricemia 8/3/2017    Morbid obesity     Reactive airway disease     Urolithiasis      Past Surgical History:   Procedure Laterality Date    APPENDECTOMY      BREAST SURGERY Left 2014    CHOLECYSTECTOMY  10/2014    HEMICOLECTOMY Right 10/2014    Including appendix, due to perforated appendix    KIDNEY STONE SURGERY  2018    TOTAL VAGINAL HYSTERECTOMY      Secondary to uterine prolapse     Family History       Problem Relation (Age of Onset)    Breast cancer Paternal Aunt    Cancer Father    Diabetes Mother, Sister, Son    Heart disease Father, Mother, Sister    Hypertension Father, Mother, Sister, Son, Sister    Kidney disease Mother    Kidney failure Sister    Peripheral vascular disease Sister    Schizophrenia Father    Stroke Mother, Sister          Tobacco Use    Smoking status: Never    Smokeless tobacco: Never   Substance and Sexual Activity    Alcohol use: No    Drug use: No    Sexual activity: Never     Review of Systems   Constitutional:  Negative for appetite change, chills, fatigue,  fever and unexpected weight change.   HENT:  Negative for hearing loss and rhinorrhea.    Eyes:  Negative for visual disturbance.   Respiratory:  Positive for shortness of breath. Negative for apnea, cough and wheezing.    Cardiovascular:  Negative for chest pain and palpitations.   Gastrointestinal:  Positive for abdominal pain, nausea and vomiting. Negative for abdominal distention, blood in stool, constipation and diarrhea.   Genitourinary:  Negative for dysuria, frequency and urgency.   Musculoskeletal:  Positive for arthralgias. Negative for neck pain.   Skin:  Negative for rash.        There is breakdown of the skin/epidermis of the lower aspect of the pannus and on the upper thighs bilaterally.  She has significant edema and skin   Neurological:  Negative for seizures, weakness, numbness and headaches.   Hematological:  Negative for adenopathy. Does not bruise/bleed easily.   Psychiatric/Behavioral:  Negative for hallucinations. The patient is not nervous/anxious.    Objective:     Vital Signs (Most Recent):  Temp: 97.7 °F (36.5 °C) (01/25/23 1359)  Pulse: 104 (01/25/23 1359)  Resp: (!) 24 (01/25/23 1359)  BP: (!) 142/41 (01/25/23 1359)  SpO2: 95 % (01/25/23 1359)   Vital Signs (24h Range):  Temp:  [97.7 °F (36.5 °C)-97.8 °F (36.6 °C)] 97.7 °F (36.5 °C)  Pulse:  [] 104  Resp:  [18-24] 24  SpO2:  [95 %-99 %] 95 %  BP: (129-142)/(41-77) 142/41     Weight: (!) 235.9 kg (520 lb)  Body mass index is 89.26 kg/m².    Physical Exam  Vitals and nursing note reviewed.   Constitutional:       Appearance: She is well-developed.   HENT:      Head: Normocephalic.   Eyes:      Pupils: Pupils are equal, round, and reactive to light.   Neck:      Thyroid: No thyromegaly.      Vascular: No JVD.      Trachea: No tracheal deviation.   Cardiovascular:      Rate and Rhythm: Normal rate and regular rhythm.      Heart sounds: Normal heart sounds.   Pulmonary:      Breath sounds: Normal breath sounds. No wheezing.   Abdominal:       General: Bowel sounds are normal. There is no distension.      Palpations: Abdomen is soft. Abdomen is not rigid. There is no mass.      Tenderness: There is no abdominal tenderness. There is no guarding or rebound.      Comments:   Massively obese    There is an area of fullness and tenderness above the umbilicus.  There is purulent from the umbilicus where there is epithelial breakdown   Musculoskeletal:         General: Normal range of motion.      Right lower leg: Edema (with associated skin changes) present.      Left lower leg: Edema (with associated skin changes) present.   Lymphadenopathy:      Cervical: No cervical adenopathy.   Skin:     General: Skin is warm and dry.      Findings: No erythema or rash.      Comments: There are extensive changes of erythema and epithelial loss on the lower and posterior surfaces of her abdominal wall pannus as well as along her thighs.      There is significant edema and skin changes   Neurological:      Mental Status: She is oriented to person, place, and time.   Psychiatric:         Mood and Affect: Mood normal.         Behavior: Behavior normal.         Thought Content: Thought content normal.         Judgment: Judgment normal.     Significant Labs:  I have reviewed all pertinent lab results within the past 24 hours.  CBC:   Recent Labs   Lab 01/25/23  1055   WBC 13.83*   RBC 4.63   HGB 12.2   HCT 39.3      MCV 85   MCH 26.3*   MCHC 31.0*     BMP:   Recent Labs   Lab 01/25/23  1254   *      K 4.5      CO2 17*   BUN 51*   CREATININE 1.6*   CALCIUM 9.1     CMP:   Recent Labs   Lab 01/25/23  1254   *   CALCIUM 9.1   ALBUMIN 2.0*   PROT 6.8      K 4.5   CO2 17*      BUN 51*   CREATININE 1.6*   ALKPHOS 91   ALT 50*   AST 67*   BILITOT 1.4*     Coagulation: No results for input(s): LABPROT, INR, APTT in the last 168 hours.  Cardiac markers: No results for input(s): CKMB, CPKMB, TROPONINT, TROPONINI, MYOGLOBIN in the last 168  hours.    Significant Diagnostics:  I have reviewed all pertinent imaging results/findings within the past 24 hours.  Abdominal films were limited but showed no significant abnormalities      Assessment/Plan:     Cutaneous candidiasis  Would recommend treatment of the cutaneous candidiasis both with oral Diflucan and topical ketoconazole ointment.  Barrier gauze or abdominal pads or other material should be placed to prevent    Venous stasis dermatitis of left lower extremity  Management per hospital Medicine     how some people would answer that do not you  As best can be determined by physical exam the patient has incarcerated in ventral/incisional hernia.  She is too large to undergo a CT scan.      I would recommend a laparotomy with reduction and repair of the incisional hernia.  If strangulated bowel was found she would require a bowel resection.  The hernia will likely be repaired with mesh.      The patient is an increased risk of complications due to her morbid obesity.      Need for surgery risks benefits and complications were discussed.  The urgency of the procedure was discussed.      Risks include infection, bleeding, wound infection, mesh infection, injury to the bowel, anastomotic leak or stricture if a bowel resection is required    , and recurrence of the hernia.      The patient will need to be admitted to the intensive care unit secondary to her morbid obesity    Type 2 diabetes mellitus, without long-term current use of insulin  Insulin sliding scale per hospital Medicine    Essential hypertension  Further management per hospital Medicine    Depression  Further management per hospital Medicine    Hyperlipidemia  Further management by hospital Medicine      BMI of 89.2 put her at risk for comorbidities related to her surgery and in the postoperative period.    VTE Risk Mitigation (From admission, onward)      None            Thank you for your consult. I will follow-up with patient. Please  contact us if you have any additional questions.    Luis Angel Collins MD  General Surgery  [facility] - Emergency Dept.

## 2023-01-25 NOTE — SUBJECTIVE & OBJECTIVE
Past Medical History:   Diagnosis Date    Central retinal vein occlusion of left eye 02/25/2014    Treated by Dr. Lopez.  Adele     Depression     Diabetes mellitus type 2, controlled     Glaucoma 02/2014    Glaucoma Suspect    Hyperlipidemia     Hypertension     Hyperuricemia 8/3/2017    Morbid obesity     Reactive airway disease     Urolithiasis        Past Surgical History:   Procedure Laterality Date    APPENDECTOMY      BREAST SURGERY Left 2014    CHOLECYSTECTOMY  10/2014    HEMICOLECTOMY Right 10/2014    Including appendix, due to perforated appendix    KIDNEY STONE SURGERY  2018    TOTAL VAGINAL HYSTERECTOMY      Secondary to uterine prolapse       Review of patient's allergies indicates:  No Known Allergies    No current facility-administered medications on file prior to encounter.     Current Outpatient Medications on File Prior to Encounter   Medication Sig    atorvastatin (LIPITOR) 10 MG tablet TAKE 1 TABLET(10 MG) BY MOUTH EVERY DAY    blood sugar diagnostic Strp 1 strip by Misc.(Non-Drug; Combo Route) route 3 (three) times daily.    febuxostat (ULORIC) 40 mg Tab TAKE 1 TABLET(40 MG) BY MOUTH EVERY DAY    gabapentin (NEURONTIN) 300 MG capsule TAKE 1 CAPSULE(300 MG) BY MOUTH THREE TIMES DAILY    glimepiride (AMARYL) 1 MG tablet TAKE 1 TABLET(1 MG) BY MOUTH DAILY WITH BREAKFAST    lancets (FREESTYLE LANCETS) 28 gauge Misc USE THREE TIMES DAILY    losartan (COZAAR) 25 MG tablet Take 1 tablet (25 mg total) by mouth once daily.    meloxicam (MOBIC) 15 MG tablet Take 1 tablet (15 mg total) by mouth once daily.    metFORMIN (GLUCOPHAGE) 1000 MG tablet Take 1 tablet (1,000 mg total) by mouth 2 (two) times daily with meals.    multivitamin with minerals tablet Take 1 tablet by mouth once daily.    sertraline (ZOLOFT) 100 MG tablet TAKE 1 TABLET(100 MG) BY MOUTH EVERY DAY    spironolactone (ALDACTONE) 25 MG tablet TAKE 1 TABLET(25 MG) BY MOUTH EVERY DAY     Family History       Problem Relation (Age of Onset)     Breast cancer Paternal Aunt    Cancer Father    Diabetes Mother, Sister, Son    Heart disease Father, Mother, Sister    Hypertension Father, Mother, Sister, Son, Sister    Kidney disease Mother    Kidney failure Sister    Peripheral vascular disease Sister    Schizophrenia Father    Stroke Mother, Sister          Tobacco Use    Smoking status: Never    Smokeless tobacco: Never   Substance and Sexual Activity    Alcohol use: No    Drug use: No    Sexual activity: Never     Review of Systems   Constitutional:  Positive for activity change, appetite change and fatigue. Negative for chills, diaphoresis, fever and unexpected weight change.   HENT:  Negative for congestion, nosebleeds, sinus pressure and sore throat.    Eyes:  Negative for pain, discharge and visual disturbance.   Respiratory:  Negative for cough, chest tightness, shortness of breath, wheezing and stridor.    Cardiovascular:  Negative for chest pain, palpitations and leg swelling.   Gastrointestinal:  Positive for abdominal pain, nausea and vomiting. Negative for abdominal distention, blood in stool, constipation and diarrhea.   Endocrine: Negative for cold intolerance and heat intolerance.   Genitourinary:  Negative for difficulty urinating, dysuria, flank pain, frequency and urgency.   Musculoskeletal:  Positive for arthralgias and gait problem. Negative for back pain, joint swelling, myalgias, neck pain and neck stiffness.   Skin:  Positive for color change and wound (umbilical, lower abdomen, and legs). Negative for rash.   Allergic/Immunologic: Negative for food allergies and immunocompromised state.   Neurological:  Positive for weakness (generalized). Negative for dizziness, seizures, syncope, facial asymmetry, speech difficulty, light-headedness, numbness and headaches.   Hematological:  Negative for adenopathy.   Psychiatric/Behavioral:  Negative for agitation, confusion and hallucinations.    Objective:     Vital Signs (Most Recent):  Temp: 97.7  °F (36.5 °C) (01/25/23 1359)  Pulse: 104 (01/25/23 1359)  Resp: (!) 24 (01/25/23 1359)  BP: (!) 142/41 (01/25/23 1359)  SpO2: 95 % (01/25/23 1359)   Vital Signs (24h Range):  Temp:  [97.7 °F (36.5 °C)-97.8 °F (36.6 °C)] 97.7 °F (36.5 °C)  Pulse:  [] 104  Resp:  [18-24] 24  SpO2:  [95 %-99 %] 95 %  BP: (129-142)/(41-77) 142/41     Weight: (!) 235.9 kg (520 lb)  Body mass index is 89.26 kg/m².    Physical Exam  Vitals and nursing note reviewed.   Constitutional:       General: She is not in acute distress.     Appearance: She is morbidly obese. She is not diaphoretic.   HENT:      Head: Normocephalic and atraumatic.      Nose: Nose normal.   Eyes:      General: No scleral icterus.     Conjunctiva/sclera: Conjunctivae normal.   Neck:      Trachea: No tracheal deviation.   Cardiovascular:      Rate and Rhythm: Normal rate and regular rhythm.      Heart sounds: Normal heart sounds. No murmur heard.    No friction rub. No gallop.   Pulmonary:      Effort: Pulmonary effort is normal. No respiratory distress.      Breath sounds: Normal breath sounds. No stridor. No wheezing or rales.   Chest:      Chest wall: No tenderness.   Abdominal:      General: Bowel sounds are normal. There is no distension.      Palpations: Abdomen is soft. There is no mass.      Tenderness: There is abdominal tenderness in the periumbilical area. There is no guarding or rebound.      Hernia: A hernia is present. Hernia is present in the umbilical area (not reducible).      Comments: Wound at umbilicus draining yellow pus   Musculoskeletal:         General: No tenderness or deformity. Normal range of motion.      Cervical back: Normal range of motion and neck supple.   Skin:     General: Skin is warm.      Coloration: Skin is not pale.      Findings: Erythema and rash present.      Comments: Large areas of excoriation to lower pannus, bilateral inguinal, and bilateral upper thigh area  Chronic venous stasis changes to BLE with anasarca edema    Large blister to Left dorsal foot  See pictures    Neurological:      Mental Status: She is alert and oriented to person, place, and time.      Cranial Nerves: No cranial nerve deficit.      Motor: No abnormal muscle tone.      Coordination: Coordination normal.   Psychiatric:         Behavior: Behavior normal.         Thought Content: Thought content normal.                         Significant Labs: All pertinent labs within the past 24 hours have been reviewed.    Significant Imaging: I have reviewed all pertinent imaging results/findings within the past 24 hours.

## 2023-01-25 NOTE — HPI
The patient is a 58 yo female with Morbid Obesity - BMI 89, DM, HTN, Kidney stones who presented to ED with N/V and abdominal pain x 3 days. The patient reports abdominal pain at umbilical area is a sharp, 9/10 pain associated with N/V, Poor appetite, Fatigue, and Malaise. Pt also reports pain and wounds to lower abdomen and BLE legs making it difficult to ambulate. She has not ambulated for over 2 weeks. Pt reports noncompliance with home meds and self care.     In the ED, pt was found to have a non-reducible umbilical hernia with a draining wound at the umbilicus. Pt unable to have CT imaging due to body habitus. Afebrile, WBC 13, Serum Cr 1.6, mildly elevated LFTs/T bili  Abd xray showed nothing acute.     Dr. Collins with General surgery evaluated pt in ED and recommended urgent surgery for incarcerated umbilical hernia and ICU admission.

## 2023-01-25 NOTE — ANESTHESIA PROCEDURE NOTES
Intubation    Date/Time: 1/25/2023 4:18 PM  Performed by: Kyle Carrasquillo CRNA  Authorized by: Robby Burton MD     Intubation:     Induction:  Intravenous    Intubated:  Postinduction    Mask Ventilation:  Easy mask    Attempts:  1    Attempted By:  CRNA    Method of Intubation:  Video laryngoscopy    Blade:  Trent 4    Laryngeal View Grade: Grade I - full view of cords      Difficult Airway Encountered?: No      Complications:  None    Airway Device:  Oral endotracheal tube    Airway Device Size:  7.5    Style/Cuff Inflation:  Cuffed (inflated to minimal occlusive pressure)    Tube secured:  23    Secured at:  The lips    Placement Verified By:  Capnometry    Complicating Factors:  Short neck and obesity    Findings Post-Intubation:  BS equal bilateral and atraumatic/condition of teeth unchanged

## 2023-01-25 NOTE — ASSESSMENT & PLAN NOTE
Patient with acute kidney injury likely due to IVVD/dehydration MIGUEL is currently worsening. Labs reviewed- Renal function/electrolytes with Estimated Creatinine Clearance: 76 mL/min (A) (based on SCr of 1.6 mg/dL (H)). according to latest data. Monitor urine output and serial BMP and adjust therapy as needed. Avoid nephrotoxins and renally dose meds for GFR listed above.     Gentle IV hydration   Avoid hypotension and nephrotoxic agents

## 2023-01-25 NOTE — ASSESSMENT & PLAN NOTE
Patient's FSGs are uncontrolled due to hyperglycemia on current medication regimen.  Last A1c reviewed-   Lab Results   Component Value Date    HGBA1C 5.6 03/07/2022     Most recent fingerstick glucose reviewed- No results for input(s): POCTGLUCOSE in the last 24 hours.  Current correctional scale  Low  Maintain anti-hyperglycemic dose as follows-   Antihyperglycemics (From admission, onward)    Start     Stop Route Frequency Ordered    01/25/23 1515  insulin aspart U-100 pen 0-5 Units         -- SubQ Before meals & nightly PRN 01/25/23 1417        Hold Oral hypoglycemics while patient is in the hospital.

## 2023-01-25 NOTE — ASSESSMENT & PLAN NOTE
Body mass index is 89.26 kg/m². Morbid obesity complicates all aspects of disease management from diagnostic modalities to treatment. Weight loss encouraged and health benefits explained to patient.

## 2023-01-25 NOTE — SUBJECTIVE & OBJECTIVE
No current facility-administered medications on file prior to encounter.     Current Outpatient Medications on File Prior to Encounter   Medication Sig    atorvastatin (LIPITOR) 10 MG tablet TAKE 1 TABLET(10 MG) BY MOUTH EVERY DAY    blood sugar diagnostic Strp 1 strip by Misc.(Non-Drug; Combo Route) route 3 (three) times daily.    febuxostat (ULORIC) 40 mg Tab TAKE 1 TABLET(40 MG) BY MOUTH EVERY DAY    gabapentin (NEURONTIN) 300 MG capsule TAKE 1 CAPSULE(300 MG) BY MOUTH THREE TIMES DAILY    glimepiride (AMARYL) 1 MG tablet TAKE 1 TABLET(1 MG) BY MOUTH DAILY WITH BREAKFAST    lancets (FREESTYLE LANCETS) 28 gauge Misc USE THREE TIMES DAILY    losartan (COZAAR) 25 MG tablet Take 1 tablet (25 mg total) by mouth once daily.    meloxicam (MOBIC) 15 MG tablet Take 1 tablet (15 mg total) by mouth once daily.    metFORMIN (GLUCOPHAGE) 1000 MG tablet Take 1 tablet (1,000 mg total) by mouth 2 (two) times daily with meals.    multivitamin with minerals tablet Take 1 tablet by mouth once daily.    sertraline (ZOLOFT) 100 MG tablet TAKE 1 TABLET(100 MG) BY MOUTH EVERY DAY    spironolactone (ALDACTONE) 25 MG tablet TAKE 1 TABLET(25 MG) BY MOUTH EVERY DAY       Review of patient's allergies indicates:  No Known Allergies    Past Medical History:   Diagnosis Date    Central retinal vein occlusion of left eye 02/25/2014    Treated by Dr. Lopez.  [facility]     Depression     Diabetes mellitus type 2, controlled     Glaucoma 02/2014    Glaucoma Suspect    Hyperlipidemia     Hypertension     Hyperuricemia 8/3/2017    Morbid obesity     Reactive airway disease     Urolithiasis      Past Surgical History:   Procedure Laterality Date    APPENDECTOMY      BREAST SURGERY Left 2014    CHOLECYSTECTOMY  10/2014    HEMICOLECTOMY Right 10/2014    Including appendix, due to perforated appendix    KIDNEY STONE SURGERY  2018    TOTAL VAGINAL HYSTERECTOMY      Secondary to uterine prolapse     Family History       Problem Relation (Age of Onset)     Breast cancer Paternal Aunt    Cancer Father    Diabetes Mother, Sister, Son    Heart disease Father, Mother, Sister    Hypertension Father, Mother, Sister, Son, Sister    Kidney disease Mother    Kidney failure Sister    Peripheral vascular disease Sister    Schizophrenia Father    Stroke Mother, Sister          Tobacco Use    Smoking status: Never    Smokeless tobacco: Never   Substance and Sexual Activity    Alcohol use: No    Drug use: No    Sexual activity: Never     Review of Systems   Constitutional:  Negative for appetite change, chills, fatigue, fever and unexpected weight change.   HENT:  Negative for hearing loss and rhinorrhea.    Eyes:  Negative for visual disturbance.   Respiratory:  Positive for shortness of breath. Negative for apnea, cough and wheezing.    Cardiovascular:  Negative for chest pain and palpitations.   Gastrointestinal:  Positive for abdominal pain, nausea and vomiting. Negative for abdominal distention, blood in stool, constipation and diarrhea.   Genitourinary:  Negative for dysuria, frequency and urgency.   Musculoskeletal:  Positive for arthralgias. Negative for neck pain.   Skin:  Negative for rash.        There is breakdown of the skin/epidermis of the lower aspect of the pannus and on the upper thighs bilaterally.  She has significant edema and skin   Neurological:  Negative for seizures, weakness, numbness and headaches.   Hematological:  Negative for adenopathy. Does not bruise/bleed easily.   Psychiatric/Behavioral:  Negative for hallucinations. The patient is not nervous/anxious.    Objective:     Vital Signs (Most Recent):  Temp: 97.7 °F (36.5 °C) (01/25/23 1359)  Pulse: 104 (01/25/23 1359)  Resp: (!) 24 (01/25/23 1359)  BP: (!) 142/41 (01/25/23 1359)  SpO2: 95 % (01/25/23 1359)   Vital Signs (24h Range):  Temp:  [97.7 °F (36.5 °C)-97.8 °F (36.6 °C)] 97.7 °F (36.5 °C)  Pulse:  [] 104  Resp:  [18-24] 24  SpO2:  [95 %-99 %] 95 %  BP: (129-142)/(41-77) 142/41     Weight:  (!) 235.9 kg (520 lb)  Body mass index is 89.26 kg/m².    Physical Exam  Vitals and nursing note reviewed.   Constitutional:       Appearance: She is well-developed.   HENT:      Head: Normocephalic.   Eyes:      Pupils: Pupils are equal, round, and reactive to light.   Neck:      Thyroid: No thyromegaly.      Vascular: No JVD.      Trachea: No tracheal deviation.   Cardiovascular:      Rate and Rhythm: Normal rate and regular rhythm.      Heart sounds: Normal heart sounds.   Pulmonary:      Breath sounds: Normal breath sounds. No wheezing.   Abdominal:      General: Bowel sounds are normal. There is no distension.      Palpations: Abdomen is soft. Abdomen is not rigid. There is no mass.      Tenderness: There is no abdominal tenderness. There is no guarding or rebound.      Comments:   Massively obese    There is an area of fullness and tenderness above the umbilicus.  There is purulent from the umbilicus where there is epithelial breakdown   Musculoskeletal:         General: Normal range of motion.      Right lower leg: Edema (with associated skin changes) present.      Left lower leg: Edema (with associated skin changes) present.   Lymphadenopathy:      Cervical: No cervical adenopathy.   Skin:     General: Skin is warm and dry.      Findings: No erythema or rash.      Comments: There are extensive changes of erythema and epithelial loss on the lower and posterior surfaces of her abdominal wall pannus as well as along her thighs.      There is significant edema and skin changes   Neurological:      Mental Status: She is oriented to person, place, and time.   Psychiatric:         Mood and Affect: Mood normal.         Behavior: Behavior normal.         Thought Content: Thought content normal.         Judgment: Judgment normal.     Significant Labs:  I have reviewed all pertinent lab results within the past 24 hours.  CBC:   Recent Labs   Lab 01/25/23  1055   WBC 13.83*   RBC 4.63   HGB 12.2   HCT 39.3       MCV 85   MCH 26.3*   MCHC 31.0*     BMP:   Recent Labs   Lab 01/25/23  1254   *      K 4.5      CO2 17*   BUN 51*   CREATININE 1.6*   CALCIUM 9.1     CMP:   Recent Labs   Lab 01/25/23  1254   *   CALCIUM 9.1   ALBUMIN 2.0*   PROT 6.8      K 4.5   CO2 17*      BUN 51*   CREATININE 1.6*   ALKPHOS 91   ALT 50*   AST 67*   BILITOT 1.4*     Coagulation: No results for input(s): LABPROT, INR, APTT in the last 168 hours.  Cardiac markers: No results for input(s): CKMB, CPKMB, TROPONINT, TROPONINI, MYOGLOBIN in the last 168 hours.    Significant Diagnostics:  I have reviewed all pertinent imaging results/findings within the past 24 hours.  Abdominal films were limited but showed no significant abnormalities

## 2023-01-25 NOTE — ASSESSMENT & PLAN NOTE
BMI 89 on admit  Complicates all aspects of care and increases mortality risk  Recommend weight loss  Consider referral for formal weight loss consultation when improved from acute issues

## 2023-01-25 NOTE — ASSESSMENT & PLAN NOTE
Would recommend treatment of the cutaneous candidiasis both with oral Diflucan and topical ketoconazole ointment.  Barrier gauze or abdominal pads or other material should be placed to prevent

## 2023-01-25 NOTE — SUBJECTIVE & OBJECTIVE
Past Medical History:   Diagnosis Date    Central retinal vein occlusion of left eye 02/25/2014    Treated by Dr. Lopez.  [facility]     Depression     Diabetes mellitus type 2, controlled     Glaucoma 02/2014    Glaucoma Suspect    Hyperlipidemia     Hypertension     Hyperuricemia 8/3/2017    Morbid obesity     Reactive airway disease     Urolithiasis        Past Surgical History:   Procedure Laterality Date    APPENDECTOMY      BREAST SURGERY Left 2014    CHOLECYSTECTOMY  10/2014    HEMICOLECTOMY Right 10/2014    Including appendix, due to perforated appendix    KIDNEY STONE SURGERY  2018    TOTAL VAGINAL HYSTERECTOMY      Secondary to uterine prolapse       Review of patient's allergies indicates:  No Known Allergies    Family History       Problem Relation (Age of Onset)    Breast cancer Paternal Aunt    Cancer Father    Diabetes Mother, Sister, Son    Heart disease Father, Mother, Sister    Hypertension Father, Mother, Sister, Son, Sister    Kidney disease Mother    Kidney failure Sister    Peripheral vascular disease Sister    Schizophrenia Father    Stroke Mother, Sister          Tobacco Use    Smoking status: Never    Smokeless tobacco: Never   Substance and Sexual Activity    Alcohol use: No    Drug use: No    Sexual activity: Never         Review of Systems   Constitutional:  Negative for chills and fever.   Respiratory:  Negative for shortness of breath and wheezing.    Gastrointestinal:  Positive for abdominal pain. Negative for nausea and vomiting.   Genitourinary:  Negative for flank pain.   Skin:  Positive for wound.   Neurological:  Negative for headaches.   Psychiatric/Behavioral:  The patient is not nervous/anxious.    Objective:     Vital Signs (Most Recent):  Temp: 97.7 °F (36.5 °C) (01/25/23 1359)  Pulse: 104 (01/25/23 1359)  Resp: (!) 24 (01/25/23 1359)  BP: (!) 142/41 (01/25/23 1359)  SpO2: 95 % (01/25/23 1359)   Vital Signs (24h Range):  Temp:  [97.7 °F (36.5 °C)-97.8 °F (36.6 °C)] 97.7 °F (36.5  °C)  Pulse:  [] 104  Resp:  [18-24] 24  SpO2:  [95 %-99 %] 95 %  BP: (129-142)/(41-77) 142/41     Weight: (!) 235.9 kg (520 lb)  Body mass index is 89.26 kg/m².    No intake or output data in the 24 hours ending 01/25/23 1617     sodium chloride 0.9%         Physical Exam  Vitals and nursing note reviewed.   Constitutional:       General: She is awake. She is not in acute distress.     Appearance: She is morbidly obese. She is ill-appearing.      Comments: Arrived on NRB, transitioned to HFNC   HENT:      Nose:      Comments: Lt nare NG  Eyes:      Conjunctiva/sclera: Conjunctivae normal.      Pupils: Pupils are equal, round, and reactive to light.   Neck:      Vascular: No JVD.   Cardiovascular:      Rate and Rhythm: Regular rhythm. Tachycardia present.      Pulses:           Radial pulses are 1+ on the right side and 1+ on the left side.        Dorsalis pedis pulses are 1+ on the right side and 1+ on the left side.   Pulmonary:      Effort: Pulmonary effort is normal.      Breath sounds: Examination of the right-lower field reveals decreased breath sounds. Examination of the left-lower field reveals decreased breath sounds. Decreased breath sounds present.   Abdominal:      General: Bowel sounds are absent. There is no distension.      Palpations: Abdomen is soft.      Tenderness: There is generalized abdominal tenderness. There is no guarding or rebound.      Comments: Extreme body habitus  Midline abd dressing cdi  RT and LT LISET drains with serosanguinous output  Pendulous panus with severe excoriation throughout   Musculoskeletal:      Right lower leg: Edema present.      Left lower leg: Edema present.   Skin:     Comments: Surgical and panus wounds as described  Apparent pressure wound to lt lower buttock   Neurological:      Mental Status: She is alert and oriented to person, place, and time.   Psychiatric:         Mood and Affect: Mood normal.         Speech: Speech normal.         Behavior: Behavior  normal.       Vents:       Lines/Drains/Airways       Airway  Duration                  Airway - Non-Surgical 01/25/23 1558 Endotracheal Tube <1 day              Peripheral Intravenous Line  Duration                  Peripheral IV - Single Lumen 01/25/23 1106 24 G Left;Posterior Hand <1 day                    Significant Labs:    CBC/Anemia Profile:  Recent Labs   Lab 01/25/23  1055   WBC 13.83*   HGB 12.2   HCT 39.3      MCV 85   RDW 13.9        Chemistries:  Recent Labs   Lab 01/25/23  1254      K 4.5      CO2 17*   BUN 51*   CREATININE 1.6*   CALCIUM 9.1   ALBUMIN 2.0*   PROT 6.8   BILITOT 1.4*   ALKPHOS 91   ALT 50*   AST 67*       POCT Glucose:   Recent Labs   Lab 01/25/23  1537   POCTGLUCOSE 143*         Significant Imaging:   I have reviewed all pertinent imaging results/findings within the past 24 hours.

## 2023-01-25 NOTE — ED PROVIDER NOTES
SCRIBE #1 NOTE: I, Adal Hadley, am scribing for, and in the presence of, Hero Lin MD. I have scribed the entire note.      History      Chief Complaint   Patient presents with    Wound Check     Pt called EMS for pain under her pannus, per EMS there is a skin tear. Pt is non ambulatory       Review of patient's allergies indicates:  No Known Allergies     HPI   HPI    1/25/2023, 9:52 AM   History obtained from the patient      History of Present Illness: Chastity Hung is a 57 y.o. female patient who presents to the Emergency Department for periumbilical abdominal pain, onset 1 week PTA. Symptoms are constant and moderate in severity. No mitigating or exacerbating factors reported. Associated sxs includes n/v and loss of appetite. Pt also reports wound to her pannus. Patient denies any fever, chills, SOB, CP, weakness, numbness, dizziness, headache, and all other sxs at this time. Pt notes that she is not compliant with her home medications, and is non-ambulatory at baseline. No further complaints or concerns at this time.     Arrival mode: EMS    PCP: Alexandra Dawkins MD       Past Medical History:  Past Medical History:   Diagnosis Date    Central retinal vein occlusion of left eye 02/25/2014    Treated by Dr. Lopez.  [place]     Depression     Diabetes mellitus type 2, controlled     Glaucoma 02/2014    Glaucoma Suspect    Hyperlipidemia     Hypertension     Hyperuricemia 8/3/2017    Morbid obesity     Reactive airway disease     Urolithiasis        Past Surgical History:  Past Surgical History:   Procedure Laterality Date    APPENDECTOMY      BREAST SURGERY Left 2014    CHOLECYSTECTOMY  10/2014    HEMICOLECTOMY Right 10/2014    Including appendix, due to perforated appendix    KIDNEY STONE SURGERY  2018    TOTAL VAGINAL HYSTERECTOMY      Secondary to uterine prolapse         Family History:  Family History   Problem Relation Age of Onset    Hypertension Father     Cancer Father          Myelodysplasia    Heart disease Father     Schizophrenia Father         Paranoid type    Diabetes Mother     Hypertension Mother     Stroke Mother     Heart disease Mother     Kidney disease Mother     Diabetes Sister     Hypertension Sister     Stroke Sister     Kidney failure Sister     Diabetes Son     Hypertension Son     Peripheral vascular disease Sister         S/P AKA, BKA    Hypertension Sister     Heart disease Sister     Breast cancer Paternal Aunt        Social History:  Social History     Tobacco Use    Smoking status: Never    Smokeless tobacco: Never   Substance and Sexual Activity    Alcohol use: No    Drug use: No    Sexual activity: Never       ROS   Review of Systems   Constitutional:  Positive for appetite change (loss of appetite). Negative for chills and fever.   HENT:  Negative for sore throat.    Respiratory:  Negative for shortness of breath.    Cardiovascular:  Negative for chest pain.   Gastrointestinal:  Positive for abdominal pain (periumbilical), nausea and vomiting. Negative for diarrhea.   Genitourinary:  Negative for dysuria.   Musculoskeletal:  Negative for back pain.   Skin:  Positive for wound (pannus). Negative for rash.   Neurological:  Negative for dizziness, weakness, light-headedness, numbness and headaches.   Hematological:  Does not bruise/bleed easily.   All other systems reviewed and are negative.    Physical Exam      Initial Vitals [01/25/23 0927]   BP Pulse Resp Temp SpO2   129/77 98 18 97.8 °F (36.6 °C) 99 %      MAP       --          Physical Exam  Nursing Notes and Vital Signs Reviewed.  Constitutional: Patient is in no acute distress. Well-developed and well-nourished.  Head: Atraumatic. Normocephalic.  Eyes: PERRL. EOM intact. Conjunctivae are not pale. No scleral icterus.  ENT: Mucous membranes are moist. Oropharynx is clear and symmetric.    Neck: Supple. Full ROM.  Cardiovascular: Regular rate. Regular rhythm. No murmurs, rubs, or gallops. Distal pulses are 2+  and symmetric.  Pulmonary/Chest: No respiratory distress. Clear to auscultation bilaterally. No wheezing or rales.  Abdominal: Soft and non-distended. Umbilical hernia, unable to reduce at bedside. Purulent drainage from the umbilicus. No rebound, guarding.  Musculoskeletal: Moves all extremities. No obvious deformities. No edema.  Skin: Severe skin breakdown noted to bilateral lower pannus.  Neurological:  Alert, awake, and appropriate.  Normal speech.  No acute focal neurological deficits are appreciated.  Psychiatric: Normal affect. Good eye contact. Appropriate in content.    ED Course    Procedures  ED Vital Signs:  Vitals:    01/25/23 0927 01/25/23 1109 01/25/23 1136 01/25/23 1250   BP: 129/77      Pulse: 98   91   Resp: 18  (!) 23 (!) 22   Temp: 97.8 °F (36.6 °C)      TempSrc: Oral      SpO2: 99%   97%   Weight:  (!) 235.9 kg (520 lb)      01/25/23 1359   BP: (!) 142/41   Pulse: 104   Resp: (!) 24   Temp: 97.7 °F (36.5 °C)   TempSrc:    SpO2: 95%   Weight:        Abnormal Lab Results:  Labs Reviewed   CBC W/ AUTO DIFFERENTIAL - Abnormal; Notable for the following components:       Result Value    WBC 13.83 (*)     MCH 26.3 (*)     MCHC 31.0 (*)     Immature Granulocytes 2.0 (*)     Gran # (ANC) 11.1 (*)     Immature Grans (Abs) 0.27 (*)     Gran % 80.0 (*)     Lymph % 9.5 (*)     All other components within normal limits   COMPREHENSIVE METABOLIC PANEL - Abnormal; Notable for the following components:    CO2 17 (*)     Glucose 137 (*)     BUN 51 (*)     Creatinine 1.6 (*)     Albumin 2.0 (*)     Total Bilirubin 1.4 (*)     AST 67 (*)     ALT 50 (*)     eGFR 37 (*)     All other components within normal limits   CULTURE, BLOOD   CULTURE, BLOOD   LIPASE   URINALYSIS, REFLEX TO URINE CULTURE   LACTIC ACID, PLASMA   PROTIME-INR   TSH   HEMOGLOBIN A1C        All Lab Results:  Results for orders placed or performed during the hospital encounter of 01/25/23   CBC Auto Differential   Result Value Ref Range    WBC  13.83 (H) 3.90 - 12.70 K/uL    RBC 4.63 4.00 - 5.40 M/uL    Hemoglobin 12.2 12.0 - 16.0 g/dL    Hematocrit 39.3 37.0 - 48.5 %    MCV 85 82 - 98 fL    MCH 26.3 (L) 27.0 - 31.0 pg    MCHC 31.0 (L) 32.0 - 36.0 g/dL    RDW 13.9 11.5 - 14.5 %    Platelets 271 150 - 450 K/uL    MPV 12.1 9.2 - 12.9 fL    Immature Granulocytes 2.0 (H) 0.0 - 0.5 %    Gran # (ANC) 11.1 (H) 1.8 - 7.7 K/uL    Immature Grans (Abs) 0.27 (H) 0.00 - 0.04 K/uL    Lymph # 1.3 1.0 - 4.8 K/uL    Mono # 1.0 0.3 - 1.0 K/uL    Eos # 0.0 0.0 - 0.5 K/uL    Baso # 0.09 0.00 - 0.20 K/uL    nRBC 0 0 /100 WBC    Gran % 80.0 (H) 38.0 - 73.0 %    Lymph % 9.5 (L) 18.0 - 48.0 %    Mono % 7.5 4.0 - 15.0 %    Eosinophil % 0.3 0.0 - 8.0 %    Basophil % 0.7 0.0 - 1.9 %    Platelet Estimate Appears normal     Aniso Slight     Target Cells Occasional     Tear Drop Cells Occasional     Acanthocytes Present     Differential Method Automated    Comprehensive metabolic panel   Result Value Ref Range    Sodium 137 136 - 145 mmol/L    Potassium 4.5 3.5 - 5.1 mmol/L    Chloride 105 95 - 110 mmol/L    CO2 17 (L) 23 - 29 mmol/L    Glucose 137 (H) 70 - 110 mg/dL    BUN 51 (H) 6 - 20 mg/dL    Creatinine 1.6 (H) 0.5 - 1.4 mg/dL    Calcium 9.1 8.7 - 10.5 mg/dL    Total Protein 6.8 6.0 - 8.4 g/dL    Albumin 2.0 (L) 3.5 - 5.2 g/dL    Total Bilirubin 1.4 (H) 0.1 - 1.0 mg/dL    Alkaline Phosphatase 91 55 - 135 U/L    AST 67 (H) 10 - 40 U/L    ALT 50 (H) 10 - 44 U/L    Anion Gap 15 8 - 16 mmol/L    eGFR 37 (A) >60 mL/min/1.73 m^2   Lipase   Result Value Ref Range    Lipase 13 4 - 60 U/L     Imaging Results:  Imaging Results              X-Ray Abdomen AP 1 View (Final result)  Result time 01/25/23 10:54:50   Procedure changed from X-Ray Abdomen Flat And Erect     Final result by Kofi Avina MD (01/25/23 10:54:50)                   Impression:      1.  Negative for acute process, considering the technical limitations described above.      Electronically signed by: Kofi Avina  MD  Date:    01/25/2023  Time:    10:54               Narrative:    EXAMINATION:  XR ABDOMEN AP 1 VIEW    CLINICAL HISTORY:  vomiting ; Vomiting, unspecified    TECHNIQUE:  AP View(s) of the abdomen was performed.    COMPARISON:  None    FINDINGS:  Three images were provided for review.  There are limited by the patient's body habitus.  Bowel-gas pattern is grossly unremarkable.  Negative for definite free air.  There are mild degenerative changes of the spine and pelvis.  Elevation of the right hemidiaphragm with adjacent scarring or atelectasis.                                              The Emergency Provider reviewed the vital signs and test results, which are outlined above.    ED Discussion     12:00 PM: Re-evaluated pt. Pt is resting comfortably and is in no acute distress. Attempted to reduce the pt's umbilical hernia twice without success. D/w pt all pertinent results. D/w pt any concerns expressed at this time. Answered all questions. Pt expresses understanding at this time.    1:40 PM: Discussed pt's case with Dr. Collins (General Surgery), who will come evaluate pt at bedside.    1:52 PM: Discussed case with Dr. Ryan ([facility]). Dr. Ryan agrees with current care and management of pt and accepts admission.   Admitting Service: Hospital Medicine  Admitting Physician: Dr. Ryan  Admit to: Inpatient    1:53 PM: Re-evaluated pt. I have discussed test results, shared treatment plan, and the need for admission with patient and family at bedside. Pt and family express understanding at this time and agree with all information. All questions answered. Pt and family have no further questions or concerns at this time. Pt is ready for admit.    1:54 PM: Dr. Collins (General Surgery) has evaluated pt at bedside and will plan for surgery.         ED Medication(s):  Medications   cefepime in dextrose 5 % 1 gram/50 mL IVPB 1 g (has no administration in time range)   morphine injection 4 mg (has no  administration in time range)   ondansetron injection 4 mg (has no administration in time range)   sodium chloride 0.9% flush 10 mL (has no administration in time range)   albuterol-ipratropium 2.5 mg-0.5 mg/3 mL nebulizer solution 3 mL (has no administration in time range)   melatonin tablet 6 mg (has no administration in time range)   prochlorperazine injection Soln 5 mg (has no administration in time range)   simethicone chewable tablet 80 mg (has no administration in time range)   acetaminophen tablet 650 mg (has no administration in time range)   naloxone 0.4 mg/mL injection 0.02 mg (has no administration in time range)   potassium bicarbonate disintegrating tablet 50 mEq (has no administration in time range)   potassium bicarbonate disintegrating tablet 35 mEq (has no administration in time range)   potassium bicarbonate disintegrating tablet 60 mEq (has no administration in time range)   magnesium oxide tablet 800 mg (has no administration in time range)   magnesium oxide tablet 800 mg (has no administration in time range)   potassium, sodium phosphates 280-160-250 mg packet 2 packet (has no administration in time range)   potassium, sodium phosphates 280-160-250 mg packet 2 packet (has no administration in time range)   potassium, sodium phosphates 280-160-250 mg packet 2 packet (has no administration in time range)   glucose chewable tablet 16 g (has no administration in time range)   glucose chewable tablet 24 g (has no administration in time range)   dextrose 10% bolus 125 mL 125 mL (has no administration in time range)   dextrose 10% bolus 250 mL 250 mL (has no administration in time range)   glucagon (human recombinant) injection 1 mg (has no administration in time range)   insulin aspart U-100 pen 0-5 Units (has no administration in time range)   fluconazole (DIFLUCAN) IVPB 200 mg (has no administration in time range)   miconazole NITRATE 2 % top powder (has no administration in time range)    famotidine (PF) injection 20 mg (has no administration in time range)   hydrALAZINE injection 10 mg (has no administration in time range)   ondansetron injection 4 mg (4 mg Intravenous Given 1/25/23 1136)   morphine injection 4 mg (4 mg Intravenous Given 1/25/23 1136)     New Prescriptions    No medications on file           Medical Decision Making    Medical Decision Making:   Initial Assessment:   Comes in with abdominal pain and vomiting for the last several days. Ventral/umbilical hernia on exam with severe skin breakdown secondary to panus and body habitus  Differential Diagnosis:   Strangulated hernia, incarcerated hernia, morbid obesity, SBO,  Clinical Tests:   Lab Tests: Ordered and Reviewed  Radiological Study: Ordered and Reviewed  ED Management:  Tried twice to reduce the hernia and was unable to.  Unable to get CT because of body habitus.  Will consult general surgery and   Other:   I have discussed this case with another health care provider.       <> Summary of the Discussion: Discussed with Dr. Reyna () will evaluate in the ED.  Discussed case with Dr. Ryan () they will admit to their service, and Dr. Collins will take to the OR.         Scribe Attestation:   Scribe #1: I performed the above scribed service and the documentation accurately describes the services I performed. I attest to the accuracy of the note.    Attending:   Physician Attestation Statement for Scribe #1: I, Hero Lin MD, personally performed the services described in this documentation, as scribed by Adal Hadley, in my presence, and it is both accurate and complete.          Clinical Impression       ICD-10-CM ICD-9-CM   1. Incarcerated ventral hernia  K43.6 552.20   2. Vomiting  R11.10 787.03   3. Incarcerated hernia  K46.0 552.9   4. Skin breakdown  R23.8 782.9   5. Morbid obesity  E66.01 278.01   6. Preop cardiovascular exam  Z01.810 V72.81   7. Incarcerated umbilical hernia  K42.0 552.1   8. Chest pain   R07.9 786.50       Disposition:   Disposition: Admitted  Condition: Manuela Lin MD  01/25/23 1428

## 2023-01-25 NOTE — ASSESSMENT & PLAN NOTE
General surgery consulted   Plan for surgery today   IV pain control, antiemetics   IV Zyvox and Cefepime   NPO

## 2023-01-26 LAB
ALBUMIN SERPL BCP-MCNC: 1.6 G/DL (ref 3.5–5.2)
ALP SERPL-CCNC: 76 U/L (ref 55–135)
ALT SERPL W/O P-5'-P-CCNC: 45 U/L (ref 10–44)
ANION GAP SERPL CALC-SCNC: 12 MMOL/L (ref 8–16)
ANISOCYTOSIS BLD QL SMEAR: SLIGHT
AST SERPL-CCNC: 51 U/L (ref 10–40)
BASOPHILS # BLD AUTO: 0.12 K/UL (ref 0–0.2)
BASOPHILS NFR BLD: 0.7 % (ref 0–1.9)
BILIRUB SERPL-MCNC: 1 MG/DL (ref 0.1–1)
BUN SERPL-MCNC: 53 MG/DL (ref 6–20)
BURR CELLS BLD QL SMEAR: ABNORMAL
CALCIUM SERPL-MCNC: 8.6 MG/DL (ref 8.7–10.5)
CHLORIDE SERPL-SCNC: 106 MMOL/L (ref 95–110)
CO2 SERPL-SCNC: 19 MMOL/L (ref 23–29)
CREAT SERPL-MCNC: 1.6 MG/DL (ref 0.5–1.4)
DIFFERENTIAL METHOD: ABNORMAL
EOSINOPHIL # BLD AUTO: 0 K/UL (ref 0–0.5)
EOSINOPHIL NFR BLD: 0.1 % (ref 0–8)
ERYTHROCYTE [DISTWIDTH] IN BLOOD BY AUTOMATED COUNT: 13.7 % (ref 11.5–14.5)
EST. GFR  (NO RACE VARIABLE): 37 ML/MIN/1.73 M^2
ESTIMATED AVG GLUCOSE: 137 MG/DL (ref 68–131)
GLUCOSE SERPL-MCNC: 127 MG/DL (ref 70–110)
HBA1C MFR BLD: 6.4 % (ref 4–5.6)
HCT VFR BLD AUTO: 33 % (ref 37–48.5)
HGB BLD-MCNC: 10.3 G/DL (ref 12–16)
IMM GRANULOCYTES # BLD AUTO: 0.32 K/UL (ref 0–0.04)
IMM GRANULOCYTES NFR BLD AUTO: 1.8 % (ref 0–0.5)
LACTATE SERPL-SCNC: 1.7 MMOL/L (ref 0.5–2.2)
LYMPHOCYTES # BLD AUTO: 1.9 K/UL (ref 1–4.8)
LYMPHOCYTES NFR BLD: 10.4 % (ref 18–48)
MAGNESIUM SERPL-MCNC: 2.1 MG/DL (ref 1.6–2.6)
MCH RBC QN AUTO: 26.3 PG (ref 27–31)
MCHC RBC AUTO-ENTMCNC: 31.2 G/DL (ref 32–36)
MCV RBC AUTO: 84 FL (ref 82–98)
MONOCYTES # BLD AUTO: 1.1 K/UL (ref 0.3–1)
MONOCYTES NFR BLD: 6.2 % (ref 4–15)
NEUTROPHILS # BLD AUTO: 14.7 K/UL (ref 1.8–7.7)
NEUTROPHILS NFR BLD: 80.8 % (ref 38–73)
NRBC BLD-RTO: 0 /100 WBC
OVALOCYTES BLD QL SMEAR: ABNORMAL
PHOSPHATE SERPL-MCNC: 5.2 MG/DL (ref 2.7–4.5)
PLATELET # BLD AUTO: 310 K/UL (ref 150–450)
PLATELET BLD QL SMEAR: ABNORMAL
PMV BLD AUTO: 11.8 FL (ref 9.2–12.9)
POCT GLUCOSE: 111 MG/DL (ref 70–110)
POCT GLUCOSE: 128 MG/DL (ref 70–110)
POCT GLUCOSE: 134 MG/DL (ref 70–110)
POCT GLUCOSE: 69 MG/DL (ref 70–110)
POCT GLUCOSE: 82 MG/DL (ref 70–110)
POTASSIUM SERPL-SCNC: 5 MMOL/L (ref 3.5–5.1)
PROT SERPL-MCNC: 5.8 G/DL (ref 6–8.4)
RBC # BLD AUTO: 3.92 M/UL (ref 4–5.4)
SODIUM SERPL-SCNC: 137 MMOL/L (ref 136–145)
WBC # BLD AUTO: 18.17 K/UL (ref 3.9–12.7)

## 2023-01-26 PROCEDURE — 63600175 PHARM REV CODE 636 W HCPCS: Performed by: NURSE PRACTITIONER

## 2023-01-26 PROCEDURE — 63600175 PHARM REV CODE 636 W HCPCS: Mod: TB,JG | Performed by: INTERNAL MEDICINE

## 2023-01-26 PROCEDURE — 97163 PT EVAL HIGH COMPLEX 45 MIN: CPT

## 2023-01-26 PROCEDURE — 63700000 PHARM REV CODE 250 ALT 637 W/O HCPCS: Performed by: FAMILY MEDICINE

## 2023-01-26 PROCEDURE — 25000003 PHARM REV CODE 250: Performed by: FAMILY MEDICINE

## 2023-01-26 PROCEDURE — 27000221 HC OXYGEN, UP TO 24 HOURS

## 2023-01-26 PROCEDURE — 83605 ASSAY OF LACTIC ACID: CPT | Performed by: NURSE PRACTITIONER

## 2023-01-26 PROCEDURE — 97530 THERAPEUTIC ACTIVITIES: CPT

## 2023-01-26 PROCEDURE — 63600175 PHARM REV CODE 636 W HCPCS: Mod: TB,JG | Performed by: SURGERY

## 2023-01-26 PROCEDURE — 20000000 HC ICU ROOM

## 2023-01-26 PROCEDURE — S0030 INJECTION, METRONIDAZOLE: HCPCS | Performed by: FAMILY MEDICINE

## 2023-01-26 PROCEDURE — 80053 COMPREHEN METABOLIC PANEL: CPT | Performed by: NURSE PRACTITIONER

## 2023-01-26 PROCEDURE — 83735 ASSAY OF MAGNESIUM: CPT | Performed by: NURSE PRACTITIONER

## 2023-01-26 PROCEDURE — 85025 COMPLETE CBC W/AUTO DIFF WBC: CPT | Performed by: NURSE PRACTITIONER

## 2023-01-26 PROCEDURE — C9113 INJ PANTOPRAZOLE SODIUM, VIA: HCPCS | Performed by: SURGERY

## 2023-01-26 PROCEDURE — 63600175 PHARM REV CODE 636 W HCPCS: Performed by: FAMILY MEDICINE

## 2023-01-26 PROCEDURE — 25000003 PHARM REV CODE 250: Performed by: NURSE PRACTITIONER

## 2023-01-26 PROCEDURE — 84100 ASSAY OF PHOSPHORUS: CPT | Performed by: NURSE PRACTITIONER

## 2023-01-26 PROCEDURE — 99900035 HC TECH TIME PER 15 MIN (STAT)

## 2023-01-26 PROCEDURE — 25000003 PHARM REV CODE 250: Performed by: SURGERY

## 2023-01-26 PROCEDURE — 94761 N-INVAS EAR/PLS OXIMETRY MLT: CPT

## 2023-01-26 PROCEDURE — 94799 UNLISTED PULMONARY SVC/PX: CPT

## 2023-01-26 PROCEDURE — 97166 OT EVAL MOD COMPLEX 45 MIN: CPT

## 2023-01-26 RX ORDER — CHLORHEXIDINE GLUCONATE ORAL RINSE 1.2 MG/ML
10 SOLUTION DENTAL 2 TIMES DAILY
Status: DISPENSED | OUTPATIENT
Start: 2023-01-26 | End: 2023-01-31

## 2023-01-26 RX ORDER — ENOXAPARIN SODIUM 100 MG/ML
40 INJECTION SUBCUTANEOUS 2 TIMES DAILY
Status: DISCONTINUED | OUTPATIENT
Start: 2023-01-26 | End: 2023-01-27

## 2023-01-26 RX ORDER — SODIUM CHLORIDE, SODIUM LACTATE, POTASSIUM CHLORIDE, CALCIUM CHLORIDE 600; 310; 30; 20 MG/100ML; MG/100ML; MG/100ML; MG/100ML
INJECTION, SOLUTION INTRAVENOUS CONTINUOUS
Status: DISCONTINUED | OUTPATIENT
Start: 2023-01-26 | End: 2023-01-28

## 2023-01-26 RX ORDER — MORPHINE SULFATE 2 MG/ML
2 INJECTION, SOLUTION INTRAMUSCULAR; INTRAVENOUS
Status: DISCONTINUED | OUTPATIENT
Start: 2023-01-26 | End: 2023-01-29

## 2023-01-26 RX ORDER — DIPHENHYDRAMINE HYDROCHLORIDE 50 MG/ML
25 INJECTION INTRAMUSCULAR; INTRAVENOUS EVERY 4 HOURS PRN
Status: DISCONTINUED | OUTPATIENT
Start: 2023-01-26 | End: 2023-02-01 | Stop reason: HOSPADM

## 2023-01-26 RX ORDER — METRONIDAZOLE 500 MG/1
500 TABLET ORAL EVERY 8 HOURS
Status: DISCONTINUED | OUTPATIENT
Start: 2023-01-26 | End: 2023-01-31

## 2023-01-26 RX ORDER — METRONIDAZOLE 500 MG/100ML
500 INJECTION, SOLUTION INTRAVENOUS
Status: DISCONTINUED | OUTPATIENT
Start: 2023-01-26 | End: 2023-01-26

## 2023-01-26 RX ORDER — SUCRALFATE 1 G/1
1 TABLET ORAL
Status: DISCONTINUED | OUTPATIENT
Start: 2023-01-26 | End: 2023-02-01 | Stop reason: HOSPADM

## 2023-01-26 RX ORDER — MAGNESIUM SULFATE HEPTAHYDRATE 40 MG/ML
4 INJECTION, SOLUTION INTRAVENOUS ONCE
Status: COMPLETED | OUTPATIENT
Start: 2023-01-26 | End: 2023-01-26

## 2023-01-26 RX ORDER — MICONAZOLE NITRATE 2 %
POWDER (GRAM) TOPICAL 2 TIMES DAILY
Status: DISCONTINUED | OUTPATIENT
Start: 2023-01-26 | End: 2023-02-01 | Stop reason: HOSPADM

## 2023-01-26 RX ORDER — ACETAMINOPHEN 10 MG/ML
1000 INJECTION, SOLUTION INTRAVENOUS EVERY 8 HOURS
Status: COMPLETED | OUTPATIENT
Start: 2023-01-26 | End: 2023-01-26

## 2023-01-26 RX ORDER — PANTOPRAZOLE SODIUM 40 MG/10ML
40 INJECTION, POWDER, LYOPHILIZED, FOR SOLUTION INTRAVENOUS 2 TIMES DAILY
Status: DISCONTINUED | OUTPATIENT
Start: 2023-01-26 | End: 2023-01-29

## 2023-01-26 RX ORDER — METOCLOPRAMIDE HYDROCHLORIDE 5 MG/ML
5 INJECTION INTRAMUSCULAR; INTRAVENOUS EVERY 6 HOURS PRN
Status: DISCONTINUED | OUTPATIENT
Start: 2023-01-26 | End: 2023-02-01 | Stop reason: HOSPADM

## 2023-01-26 RX ADMIN — SODIUM CHLORIDE 500 ML: 9 INJECTION, SOLUTION INTRAVENOUS at 02:01

## 2023-01-26 RX ADMIN — CEFEPIME HYDROCHLORIDE 1 G: 1 INJECTION, SOLUTION INTRAVENOUS at 05:01

## 2023-01-26 RX ADMIN — MUPIROCIN: 20 OINTMENT TOPICAL at 08:01

## 2023-01-26 RX ADMIN — ENOXAPARIN SODIUM 40 MG: 40 INJECTION SUBCUTANEOUS at 09:01

## 2023-01-26 RX ADMIN — CHLORHEXIDINE GLUCONATE 0.12% ORAL RINSE 10 ML: 1.2 LIQUID ORAL at 08:01

## 2023-01-26 RX ADMIN — MICONAZOLE NITRATE 2 % TOPICAL POWDER: at 08:01

## 2023-01-26 RX ADMIN — SUCRALFATE 1 G: 1 TABLET ORAL at 05:01

## 2023-01-26 RX ADMIN — ENOXAPARIN SODIUM 40 MG: 40 INJECTION SUBCUTANEOUS at 10:01

## 2023-01-26 RX ADMIN — METRONIDAZOLE 500 MG: 5 INJECTION, SOLUTION INTRAVENOUS at 11:01

## 2023-01-26 RX ADMIN — SODIUM CHLORIDE, POTASSIUM CHLORIDE, SODIUM LACTATE AND CALCIUM CHLORIDE: 600; 310; 30; 20 INJECTION, SOLUTION INTRAVENOUS at 12:01

## 2023-01-26 RX ADMIN — CEFTRIAXONE 2 G: 2 INJECTION, POWDER, FOR SOLUTION INTRAMUSCULAR; INTRAVENOUS at 01:01

## 2023-01-26 RX ADMIN — SODIUM CHLORIDE 500 ML: 9 INJECTION, SOLUTION INTRAVENOUS at 06:01

## 2023-01-26 RX ADMIN — ACETAMINOPHEN 1000 MG: 10 INJECTION INTRAVENOUS at 01:01

## 2023-01-26 RX ADMIN — MORPHINE SULFATE 2 MG: 2 INJECTION, SOLUTION INTRAMUSCULAR; INTRAVENOUS at 08:01

## 2023-01-26 RX ADMIN — SODIUM CHLORIDE, POTASSIUM CHLORIDE, SODIUM LACTATE AND CALCIUM CHLORIDE: 600; 310; 30; 20 INJECTION, SOLUTION INTRAVENOUS at 02:01

## 2023-01-26 RX ADMIN — SUCRALFATE 1 G: 1 TABLET ORAL at 11:01

## 2023-01-26 RX ADMIN — SODIUM CHLORIDE, POTASSIUM CHLORIDE, SODIUM LACTATE AND CALCIUM CHLORIDE: 600; 310; 30; 20 INJECTION, SOLUTION INTRAVENOUS at 10:01

## 2023-01-26 RX ADMIN — MAGNESIUM SULFATE HEPTAHYDRATE 4 G: 40 INJECTION, SOLUTION INTRAVENOUS at 02:01

## 2023-01-26 RX ADMIN — CHLORHEXIDINE GLUCONATE 0.12% ORAL RINSE 10 ML: 1.2 LIQUID ORAL at 09:01

## 2023-01-26 RX ADMIN — MUPIROCIN: 20 OINTMENT TOPICAL at 09:01

## 2023-01-26 RX ADMIN — METRONIDAZOLE 500 MG: 500 TABLET ORAL at 09:01

## 2023-01-26 RX ADMIN — MICONAZOLE NITRATE 2 % TOPICAL POWDER: at 09:01

## 2023-01-26 RX ADMIN — MORPHINE SULFATE 2 MG: 2 INJECTION, SOLUTION INTRAMUSCULAR; INTRAVENOUS at 11:01

## 2023-01-26 RX ADMIN — PANTOPRAZOLE SODIUM 40 MG: 40 INJECTION, POWDER, FOR SOLUTION INTRAVENOUS at 08:01

## 2023-01-26 RX ADMIN — MICONAZOLE NITRATE: 20 POWDER TOPICAL at 12:01

## 2023-01-26 RX ADMIN — DEXTROSE MONOHYDRATE 125 ML: 100 INJECTION, SOLUTION INTRAVENOUS at 11:01

## 2023-01-26 RX ADMIN — PANTOPRAZOLE SODIUM 40 MG: 40 INJECTION, POWDER, FOR SOLUTION INTRAVENOUS at 09:01

## 2023-01-26 RX ADMIN — FLUCONAZOLE 400 MG: 100 TABLET ORAL at 01:01

## 2023-01-26 RX ADMIN — MICONAZOLE NITRATE: 20 OINTMENT TOPICAL at 09:01

## 2023-01-26 RX ADMIN — ACETAMINOPHEN 1000 MG: 10 INJECTION INTRAVENOUS at 05:01

## 2023-01-26 RX ADMIN — SUCRALFATE 1 G: 1 TABLET ORAL at 09:01

## 2023-01-26 RX ADMIN — ACETAMINOPHEN 1000 MG: 10 INJECTION INTRAVENOUS at 09:01

## 2023-01-26 NOTE — TRANSFER OF CARE
"Anesthesia Transfer of Care Note    Patient: Chastity Hung    Procedure(s) Performed: Procedure(s):  REPAIR, HERNIA, VENTRAL, INCARCERATED, WITHOUT HISTORY OF PRIOR REPAIR  LAPAROTOMY, EXPLORATORY  BLOCK, TRANSVERSUS ABDOMINIS PLANE  LYSIS, ADHESIONS  EXCISION, SMALL INTESTINE  EXCISION, ULCER    Patient location: PACU    Anesthesia Type: general    Transport from OR: Transported from OR on room air with adequate spontaneous ventilation    Post pain: adequate analgesia    Post assessment: no apparent anesthetic complications and tolerated procedure well    Post vital signs: stable    Level of consciousness: awake, responds to stimulation and alert    Nausea/Vomiting: no nausea/vomiting    Complications: none    Transfer of care protocol was followed      Last vitals:   Visit Vitals  BP (!) 142/41   Pulse 104   Temp 36.5 °C (97.7 °F)   Resp (!) 24   Ht 5' 4" (1.626 m)   Wt (!) 235.9 kg (520 lb)   SpO2 95%   Breastfeeding No   BMI 89.26 kg/m²     "

## 2023-01-26 NOTE — ASSESSMENT & PLAN NOTE
Excision and over sewn 1/25/23  ngt for 4 days  ivf  ppi iv bid  Check h- plori titer  Antibiotic till wbc normal  Monitor drains

## 2023-01-26 NOTE — CONSULTS
[facility] ([facility])  Critical Care Medicine  Consult Note    Patient Name: Chastity Hung  MRN: [id number]  Admission Date: 1/25/2023  Hospital Length of Stay: 0 days  Code Status: Full Code  Attending Physician: Beni Ryan MD   Primary Care Provider: Alexandra Dawkins MD   Principal Problem: Incarcerated umbilical hernia      Subjective:     HPI:  57W pmh obesity (BMI 89), DM2, HTN, DLD, central L retinal vein occlusion, glaucoma, depression.      She presented to ED with N/V and abdominal pain x 3 days. also reported pain and wounds to lower abdomen and BLE legs making it difficult to ambulate. She has not ambulated for over 2 weeks. Reports noncompliance with home meds and self care.      In the ED, pt was found to have a non-reducible umbilical hernia with a draining wound at the umbilicus.   Pt unable to have CT imaging due to body habitus.      She was taken to OR by Dr. Collins finding area of ileum incarcerated within a supraumbilical hernia with partial obstruction.  Perforated gastric ulcer approximately 2-1/2 cm. Underwent small bowel excision, gastric ulcer excision, lysis of adhesions    She now returns to the ICU from OR, awake, alert, oriented on supplemental oxygen support  Pt is a full code. The pt's son, Ra Hung, is her [facility]       Hospital/ICU Course:  1/25:  To ICU from OR for monitoring s/t major co morbidities.      Past Medical History:   Diagnosis Date    Central retinal vein occlusion of left eye 02/25/2014    Treated by Dr. Lopez.  Eylea     Depression     Diabetes mellitus type 2, controlled     Glaucoma 02/2014    Glaucoma Suspect    Hyperlipidemia     Hypertension     Hyperuricemia 8/3/2017    Morbid obesity     Reactive airway disease     Urolithiasis        Past Surgical History:   Procedure Laterality Date    APPENDECTOMY      BREAST SURGERY Left 2014    CHOLECYSTECTOMY  10/2014    HEMICOLECTOMY Right 10/2014    Including appendix, due  to perforated appendix    KIDNEY STONE SURGERY  2018    TOTAL VAGINAL HYSTERECTOMY      Secondary to uterine prolapse       Review of patient's allergies indicates:  No Known Allergies    Family History       Problem Relation (Age of Onset)    Breast cancer Paternal Aunt    Cancer Father    Diabetes Mother, Sister, Son    Heart disease Father, Mother, Sister    Hypertension Father, Mother, Sister, Son, Sister    Kidney disease Mother    Kidney failure Sister    Peripheral vascular disease Sister    Schizophrenia Father    Stroke Mother, Sister          Tobacco Use    Smoking status: Never    Smokeless tobacco: Never   Substance and Sexual Activity    Alcohol use: No    Drug use: No    Sexual activity: Never         Review of Systems   Constitutional:  Negative for chills and fever.   Respiratory:  Negative for shortness of breath and wheezing.    Gastrointestinal:  Positive for abdominal pain. Negative for nausea and vomiting.   Genitourinary:  Negative for flank pain.   Skin:  Positive for wound.   Neurological:  Negative for headaches.   Psychiatric/Behavioral:  The patient is not nervous/anxious.    Objective:     Vital Signs (Most Recent):  Temp: 97.7 °F (36.5 °C) (01/25/23 1359)  Pulse: 104 (01/25/23 1359)  Resp: (!) 24 (01/25/23 1359)  BP: (!) 142/41 (01/25/23 1359)  SpO2: 95 % (01/25/23 1359)   Vital Signs (24h Range):  Temp:  [97.7 °F (36.5 °C)-97.8 °F (36.6 °C)] 97.7 °F (36.5 °C)  Pulse:  [] 104  Resp:  [18-24] 24  SpO2:  [95 %-99 %] 95 %  BP: (129-142)/(41-77) 142/41     Weight: (!) 235.9 kg (520 lb)  Body mass index is 89.26 kg/m².    No intake or output data in the 24 hours ending 01/25/23 1617     sodium chloride 0.9%         Physical Exam  Vitals and nursing note reviewed.   Constitutional:       General: She is awake. She is not in acute distress.     Appearance: She is morbidly obese. She is ill-appearing.      Comments: Arrived on NRB, transitioned to HFNC   HENT:      Nose:       Comments: Lt nare NG  Eyes:      Conjunctiva/sclera: Conjunctivae normal.      Pupils: Pupils are equal, round, and reactive to light.   Neck:      Vascular: No JVD.   Cardiovascular:      Rate and Rhythm: Regular rhythm. Tachycardia present.      Pulses:           Radial pulses are 1+ on the right side and 1+ on the left side.        Dorsalis pedis pulses are 1+ on the right side and 1+ on the left side.   Pulmonary:      Effort: Pulmonary effort is normal.      Breath sounds: Examination of the right-lower field reveals decreased breath sounds. Examination of the left-lower field reveals decreased breath sounds. Decreased breath sounds present.   Abdominal:      General: Bowel sounds are absent. There is no distension.      Palpations: Abdomen is soft.      Tenderness: There is generalized abdominal tenderness. There is no guarding or rebound.      Comments: Extreme body habitus  Midline abd dressing cdi  RT and LT LISET drains with serosanguinous output  Pendulous panus with severe excoriation throughout   Musculoskeletal:      Right lower leg: Edema present.      Left lower leg: Edema present.   Skin:     Comments: Surgical and panus wounds as described  Apparent pressure wound to lt lower buttock   Neurological:      Mental Status: She is alert and oriented to person, place, and time.   Psychiatric:         Mood and Affect: Mood normal.         Speech: Speech normal.         Behavior: Behavior normal.       Vents:       Lines/Drains/Airways       Airway  Duration                  Airway - Non-Surgical 01/25/23 1558 Endotracheal Tube <1 day              Peripheral Intravenous Line  Duration                  Peripheral IV - Single Lumen 01/25/23 1106 24 G Left;Posterior Hand <1 day                    Significant Labs:    CBC/Anemia Profile:  Recent Labs   Lab 01/25/23  1055   WBC 13.83*   HGB 12.2   HCT 39.3      MCV 85   RDW 13.9        Chemistries:  Recent Labs   Lab 01/25/23  1254      K 4.5       CO2 17*   BUN 51*   CREATININE 1.6*   CALCIUM 9.1   ALBUMIN 2.0*   PROT 6.8   BILITOT 1.4*   ALKPHOS 91   ALT 50*   AST 67*       POCT Glucose:   Recent Labs   Lab 01/25/23  1537   POCTGLUCOSE 143*         Significant Imaging:   I have reviewed all pertinent imaging results/findings within the past 24 hours.      ABG  No results for input(s): PH, PO2, PCO2, HCO3, BE in the last 168 hours.  Assessment/Plan:     Psychiatric  Depression  Home med Zoloft; resume once clear for enteral intake    Cardiac/Vascular  Essential hypertension  Home med losartan; hold with MIGUEL and NPO post bowel resection  IV prn hydralazine    Hyperlipidemia  Home med atorvastatin; resume once clear for enteral intake    Renal/  MIGUEL (acute kidney injury)  IV hydration  Strict I/O  Avoid nephrotoxins  Monitor creatinine trend    ID  Cutaneous candidiasis  Large areas of excoriated skin to pannus, bilateral inguinal, and bilateral upper thigh area.   IV Fluconazole  Miconazole powder BID  Wound care consult     Endocrine  Morbid obesity with BMI of 70 and over, adult  BMI 89 on admit  Complicates all aspects of care and increases mortality risk  Recommend weight loss  Consider referral for formal weight loss consultation when improved from acute issues    Diabetes mellitus type 2, controlled  Hold home meds glimepiride, metformin while inpt.  SSI prn with monitoring for glucose control and prevention of insulin toxicity    GI  * Incarcerated umbilical hernia with partial ileum obstruction and perforated gastric ulcer  1/25/23 Post op surgical release and reduction of hernia with small bowel resection and excision of gastric ulcer    NPO  IV hydration  IV cefepime  IV GI prophylaxis        Critical Care Daily Checklist:    A: Awake: RASS Goal/Actual Goal: RASS Goal: 0-->alert and calm  Actual:     B: Spontaneous Breathing Trial Performed?     C: SAT & SBT Coordinated?  n/a                      D: Delirium: CAM-ICU     E: Early Mobility  Performed? Yes   F: Feeding Goal:    Status:     Current Diet Order   Procedures    Diet NPO Except for: Sips with Medication     Order Specific Question:   Except for     Answer:   Sips with Medication      AS: Analgesia/Sedation prn   T: Thromboembolic Prophylaxis Defer to surgery   H: HOB > 300 Yes   U: Stress Ulcer Prophylaxis (if needed) pepcid   G: Glucose Control SSI prn   B: Bowel Function     I: Indwelling Catheter (Lines & Singh) Necessity reviewed   D: De-escalation of Antimicrobials/Pharmacotherapies reviewed    Plan for the day/ETD As above    Code Status:  Family/Goals of Care: Full Code  Home on discharge pending hospital course     Thank you for your consult.      MASON Li-BC  Critical Care Medicine  [facility] - Intensive Care ([facility])

## 2023-01-26 NOTE — PROGRESS NOTES
Pharmacist Renal Dose Adjustment Note    Chastity Hung is a 57 y.o. female being treated with the medication enoxaparin for VTE prophylaxis.     Patient Data:    Vital Signs (Most Recent):  Temp: 98.1 °F (36.7 °C) (01/26/23 0700)  Pulse: 102 (01/26/23 0900)  Resp: (!) 22 (01/26/23 0900)  BP: (!) 126/58 (01/26/23 0900)  SpO2: 99 % (01/26/23 0900)   Vital Signs (72h Range):  Temp:  [97.2 °F (36.2 °C)-98.8 °F (37.1 °C)]   Pulse:  []   Resp:  [18-31]   BP: (103-180)/(41-77)   SpO2:  [84 %-100 %]      Recent Labs   Lab 01/25/23  1254 01/26/23  0417   CREATININE 1.6* 1.6*     Serum creatinine: 1.6 mg/dL (H) 01/26/23 0417  Estimated creatinine clearance: 76 mL/min (A)  BMI = 89.23    Per protocol for a CrCl > 30 ml/min & for BMI > 40, dose will be increased from 40 mg SQ daily to 40 mg SQ twice daily.     Pharmacist's Name: Katherine E Mcardle  Pharmacist's Extension: [phone]

## 2023-01-26 NOTE — PT/OT/SLP EVAL
"Occupational Therapy Evaluation and Treatment    Name: Chastity Hung  MRN: [id number]  Admitting Diagnosis: Incarcerated umbilical hernia  Recent Surgery: Procedure(s):  REPAIR, HERNIA, VENTRAL, INCARCERATED, WITHOUT HISTORY OF PRIOR REPAIR  LAPAROTOMY, EXPLORATORY  BLOCK, TRANSVERSUS ABDOMINIS PLANE  LYSIS, ADHESIONS  EXCISION, SMALL INTESTINE  EXCISION, ULCER 1 Day Post-Op    Recommendations:     Discharge Recommendations: nursing facility, skilled  Level of Assistance Recommended: 24 hours significant assistance  Discharge Equipment Recommendations:  (TBD at next level of care)  Barriers to discharge: Decreased caregiver support    Assessment:     Chastity Hung is a 57 y.o. female with a medical diagnosis of Incarcerated umbilical hernia. She presents with performance deficits affecting function including weakness, impaired endurance, impaired self care skills, impaired functional mobility, impaired balance, impaired cardiopulmonary response to activity, pain, decreased safety awareness, edema, impaired skin.    Rehab Prognosis:  Questionable ; patient would benefit from acute skilled OT services to address these deficits and reach maximum level of function.    Plan:     Patient to be seen 2 x/week to address the above listed problems via self-care/home management, therapeutic activities, therapeutic exercises  Plan of Care Expires: 02/09/23  Plan of Care Reviewed with: patient    Subjective     Chief Complaint: Reported "I don't think I can do this."  Patient Comments/Goals: none reported  Pain/Comfort:  Pain Rating 1: 10/10  Location - Orientation 1: generalized  Pain Addressed 1:  (severe pain with all movement. activity pacing as able, then cessation of activity)  Pain Rating Post-Intervention 1:  (no nonverbal indicators of pain at rest)    Social History:  Living Environment: Patient  granddaughter  in a single story home, number of outside stairs: 1 step with rail .  Prior Level of Function: Prior " "to admission, patient was independent with ADLs and completed household distance ambulation with A of "chair".  Roles and Routines: Patient was an active  and does not work.  Equipment Used at Home: none  DME owned (not currently used): none  Assistance Upon Discharge: unknown.    Objective:     Communicated with nurse, Ashleigh, prior to session. Patient found HOB elevated with NG tube, blood pressure cuff, pulse ox (continuous), telemetry, oxygen, peripheral IV, central line, LISET drain, waters catheter upon OT entry to room.    General Precautions: Standard, fall , CASSIE mattress  Orthopedic Precautions:N/A   Braces: N/A  Respiratory Status: Nasal cannula, flow 2 L/min    Occupational Performance    Bed Mobility:  Attempted with total A of 2 to initiate R side rolling and sup>sit. Achieved ~1/4 rolled position and patient unable to tolerate due to pain in B LE and abdomen. Patient with minimal to no engagement in completion despite max encouragement.  Increased time was provided with max cueing for technique in attempts to maximize patient participation.    Functional Mobility/Transfers:  Not appropriate for functional transfers at this time.    Activities of Daily Living:  Upper Body Dressing: total assistance .  Lower Body Dressing: total assistance .    Cognitive/Visual Perceptual:  Cognitive/Psychosocial Skills:     -       Oriented to: Person, Place, Time, and Situation   -       Follows Commands/attention:Follows one-step commands  -       Mood/Affect/Coping skills/emotional control: Lethargic and dismissive  Patient kept eyes closed throughout majority of assessment, opened only when prompted by therapist. Reported "good" night sleep last night.    Physical Exam:  Upper Extremity Range of Motion:     -       Right Upper Extremity: WFL  -       Left Upper Extremity: WFL  Upper Extremity Strength:    -       Right Upper Extremity: Deficits: grossly 4/5  -       Left Upper Extremity: Deficits: grossly " 4/5   Strength:    -       Right Upper Extremity: Deficits: fair  -       Left Upper Extremity: Deficits: fair    AMPAC 6 Click ADL:  AMPAC Total Score: 7    Treatment & Education:  Therapist provided facilitation and instruction of proper body mechanics, energy conservation, and fall prevention strategies during tasks listed above.  Patient educated on role of OT, POC and goals for therapy  Stressed importance of early mobility throughout hospitalization due to significant risk for continued debilitation.  Overall rehab potential dependent on patient engagement in session.  Encouraged completion of B UE AROM therex throughout the day to increase functional strength and activity tolerance needed for ADL completion, with visual demonstration of exercises to improve understanding.    Patient left HOB elevated with all lines intact, call button in reach, RN notified, and son present.    GOALS:   Multidisciplinary Problems       Occupational Therapy Goals          Problem: Occupational Therapy    Goal Priority Disciplines Outcome Interventions   Occupational Therapy Goal     OT, PT/OT     Description: Goals to be met by: 2/9/23     Patient will increase functional independence with ADLs by performing:    Grooming while HOB elevated on bed with Set-up Assistance.  Rolling to Bilateral with Minimal Assistance.   Supine to sit with Moderate Assistance.                         History:     Past Medical History:   Diagnosis Date    Central retinal vein occlusion of left eye 02/25/2014    Treated by Dr. Lopez.  [place]     Depression     Diabetes mellitus type 2, controlled     Glaucoma 02/2014    Glaucoma Suspect    Hyperlipidemia     Hypertension     Hyperuricemia 8/3/2017    Morbid obesity     Reactive airway disease     Urolithiasis          Past Surgical History:   Procedure Laterality Date    APPENDECTOMY      BREAST SURGERY Left 2014    CHOLECYSTECTOMY  10/2014    EXCISION, SMALL INTESTINE  1/25/2023    Procedure:  EXCISION, SMALL INTESTINE;  Surgeon: Luis Angel Collins MD;  Location: [facility] OR;  Service: General;;  x2    HEMICOLECTOMY Right 10/2014    Including appendix, due to perforated appendix    INJECTION OF ANESTHETIC AGENT INTO TISSUE PLANE DEFINED BY TRANSVERSUS ABDOMINIS MUSCLE  1/25/2023    Procedure: BLOCK, TRANSVERSUS ABDOMINIS PLANE;  Surgeon: Luis Angel Collins MD;  Location: [facility] OR;  Service: General;;    KIDNEY STONE SURGERY  2018    LAPAROTOMY, EXPLORATORY  1/25/2023    Procedure: LAPAROTOMY, EXPLORATORY;  Surgeon: Luis Angel Collins MD;  Location: [facility] OR;  Service: General;;    LYSIS OF ADHESIONS  1/25/2023    Procedure: LYSIS, ADHESIONS;  Surgeon: Luis Angel Collins MD;  Location: [facility] OR;  Service: General;;    REPAIR, HERNIA, VENTRAL  1/25/2023    Procedure: REPAIR, HERNIA, VENTRAL, INCARCERATED, WITHOUT HISTORY OF PRIOR REPAIR;  Surgeon: Luis Angel Collins MD;  Location: [facility] OR;  Service: General;;  Reduction of incisional ventral hernias x2    SURGICAL REMOVAL OF ULCER  1/25/2023    Procedure: EXCISION, ULCER;  Surgeon: Luis Angel Collins MD;  Location: [facility];  Service: General;;  Gastric Ulcer    TOTAL VAGINAL HYSTERECTOMY      Secondary to uterine prolapse       Time Tracking:     OT Date of Treatment: 01/26/23  OT Start Time: 0845  OT Stop Time: 0910  OT Total Time (min): 25 min    Billable Minutes: Evaluation 15 and Therapeutic Activity 10    1/26/2023

## 2023-01-26 NOTE — PLAN OF CARE
PT EVAL complete. Required Total A of 2 to initiate rolling. Unable to roll completely onto side. Recommending SNF upon D/C.

## 2023-01-26 NOTE — CONSULTS
[facility] - Intensive Care ([facility])  Adult Nutrition  Consult Note    SUMMARY     Recommendations  1. Recommend pt PO diet be advanced to clear liquid diet when medically appropriate.   2. Recommend pt receives Boost breeze TID on tray with meals when medically appropriate.  3. Recommend pt receives Abdulkadir BID on tray with meals once PO diet is advanced to full liquid diet or when medically appropriate.   4. Weekly weight.    Goals:   1. Pt will be initiated onto clear liquid diet within 72hrs.   2. Pt will consume > 50% of energy  Nutrition Goal Status: new  Communication of RD Recs: other (comment) (POC: Sticky Note)    Assessment and Plan    Nutrition Problem  Altered GI function    Related to (etiology):   Bowel obstruction    Signs and Symptoms (as evidenced by):   Incarcerated umbilical hernia with partial ileum obstruction and perforated gastric ulcer    Interventions(treatment strategy):  1. Recommend pt PO diet be advanced to clear liquid diet when medically appropriate.   2. Recommend pt receives Boost breeze TID on tray with meals when medically appropriate.  3. Recommend pt receives Abdulkadir BID on tray with meals once PO diet is advanced to full liquid diet or when medically appropriate.   4. Weekly weight.  5. Collaboration of care with medical providers.      Nutrition Diagnosis Status:   New       Malnutrition Assessment     Skin (Micronutrient): dry, edema, wounds unhealed       Fluid Accumulation (Malnutrition): mild           Edema (Fluid Accumulation): 2-->mild   Fluid Accumulation Evaluation: mild         Reason for Assessment    Reason For Assessment: consult  Diagnosis: gastrointestinal disease  Relevant Medical History: T2DM, HTN, HLD, Morbid obese, MIGUEL, Perforated gastric ulcer  Interdisciplinary Rounds: did not attend  General Information Comments:   1/26: 56 y/o female admitted with current principal problem of Incarcerated umbilical hernia with partial ileum obstruction and perforated  "gastric ulcer. Pt presented to ED with c/o  "epigastric pain associated with nausea and vomiting loss of appetite.  She states that she has not been able to keep anything down.  She says the bulge above her bellybutton is more prominent." Pt is currently on NPO diet. "Perforated gastric ulcer, surgically corrected by surgery." NFPE not performed per not appropriate d/t ICU status. Pt is charted to weigh 520lbs, BMI 89.26, morbidly obese. Per chart review the pt has significant skin breakdown along the upper thighs and along the edges in undersurface of her very large abdominal wall pannus.  She has significant swelling and blistering of her lower extremities. Pt LBM was 1/25. All pertinent labs and medications reviewed. Dietitian will continue to monitor.  Nutrition Discharge Planning: Diabetic, Cardiac    Nutrition Risk Screen    Nutrition Risk Screen: other (see comments) (V/N)    Nutrition/Diet History    Spiritual, Cultural Beliefs, Judaism Practices, Values that Affect Care: yes  Food Allergies: NKFA  Factors Affecting Nutritional Intake: abdominal pain, abdominal distension, altered gastrointestinal function, NPO, nausea/vomiting    Anthropometrics    Temp: 98.6 °F (37 °C)  Height: 5' 4" (162.6 cm)  Height (inches): 64 in  Weight Method: Bed Scale  Weight: (!) 235.9 kg (520 lb 1 oz)  Weight (lb): (!) 520.07 lb  Ideal Body Weight (IBW), Female: 120 lb  % Ideal Body Weight, Female (lb): 433.39 %  BMI (Calculated): 89.2  BMI Grade: greater than 40 - morbid obesity       Lab/Procedures/Meds  BMP  Lab Results   Component Value Date     01/26/2023    K 5.0 01/26/2023     01/26/2023    CO2 19 (L) 01/26/2023    BUN 53 (H) 01/26/2023    CREATININE 1.6 (H) 01/26/2023    CALCIUM 8.6 (L) 01/26/2023    ANIONGAP 12 01/26/2023    EGFRNORACEVR 37 (A) 01/26/2023       Pertinent Labs Reviewed: reviewed  Pertinent Medications Reviewed: reviewed  Scheduled Meds:   acetaminophen  1,000 mg Intravenous Q8H    " cefTRIAXone (ROCEPHIN) IVPB  2 g Intravenous Q24H    chlorhexidine  10 mL Mouth/Throat BID    enoxaparin  40 mg Subcutaneous BID    fluconazole  400 mg Oral Daily    metroNIDAZOLE  500 mg Oral Q8H    miconazole NITRATE 2 %   Topical (Top) BID    miconazole nitrate 2%   Topical (Top) BID    mupirocin   Nasal BID    pantoprazole  40 mg Intravenous BID    sucralfate  1 g Oral QID (AC & HS)     Continuous Infusions:   lactated ringers 100 mL/hr at 01/26/23 1253     PRN Meds:.acetaminophen, albuterol-ipratropium, dextrose 10%, dextrose 10%, diphenhydrAMINE, glucagon (human recombinant), glucose, glucose, hydrALAZINE, insulin aspart U-100, metoclopramide HCl, morphine, naloxone, ondansetron, prochlorperazine, sodium chloride 0.9%      Estimated/Assessed Needs    Weight Used For Calorie Calculations: (!) 235.9 kg (520 lb 1 oz)  Energy Calorie Requirements (kcal): 2919 (MSJ, AF x 1.2 per morbidly obese)     Protein Requirements: 137  Weight Used For Protein Calculations: 54.5 kg (120 lb 4.2 oz) (IBW used per BMI 89.27)  Fluid Requirements (mL): 2919  Estimated Fluid Requirement Method: RDA Method  RDA Method (mL): 2919  CHO Requirement: 365      Nutrition Prescription Ordered    Current Diet Order: NPO    Evaluation of Received Nutrient/Fluid Intake    % Kcal Needs: 0  % Protein Needs: 0  I/O: +3081.9  Energy Calories Required: not meeting needs  Protein Required: not meeting needs  Fluid Required: exceeds needs  Tolerance: not tolerating  % Intake of Estimated Energy Needs: 0 - 25 %  % Meal Intake: NPO    Nutrition Risk    Level of Risk/Frequency of Follow-up: high       Monitor and Evaluation    Food and Nutrient Intake: energy intake, food and beverage intake  Food and Nutrient Adminstration: diet order  Knowledge/Beliefs/Attitudes: food and nutrition knowledge/skill, beliefs and attitudes  Anthropometric Measurements: weight, weight change, body mass index  Biochemical Data, Medical Tests and Procedures: electrolyte and  renal panel, gastrointestinal profile, glucose/endocrine profile, inflammatory profile, lipid profile  Nutrition-Focused Physical Findings: overall appearance       Nutrition Follow-Up    RD Follow-up?: Yes  Keven Guadarrama, Registration Eligible, Provisional LDN

## 2023-01-26 NOTE — PT/OT/SLP EVAL
Physical Therapy Evaluation    Patient Name:  Chastity Hung   MRN:  [id number]    Recommendations:     Discharge Recommendations: nursing facility, skilled   Discharge Equipment Recommendations:  (TBD at next level of care)   Barriers to discharge: Decreased caregiver support    Assessment:     Chastity Hung is a 57 y.o. female admitted with a medical diagnosis of Incarcerated umbilical hernia.  She presents with the following impairments/functional limitations: weakness, impaired endurance, impaired sensation, impaired self care skills, impaired functional mobility, impaired balance, gait instability, decreased upper extremity function, decreased lower extremity function, decreased safety awareness, pain, edema, impaired skin.    Rehab Prognosis: Fair; patient would benefit from acute skilled PT services to address these deficits and reach maximum level of function.    Recent Surgery: Procedure(s):  REPAIR, HERNIA, VENTRAL, INCARCERATED, WITHOUT HISTORY OF PRIOR REPAIR  LAPAROTOMY, EXPLORATORY  BLOCK, TRANSVERSUS ABDOMINIS PLANE  LYSIS, ADHESIONS  EXCISION, SMALL INTESTINE  EXCISION, ULCER 1 Day Post-Op    Plan:     During this hospitalization, patient to be seen 3 x/week to address the identified rehab impairments via gait training, therapeutic activities, therapeutic exercises and progress toward the following goals:    Plan of Care Expires:  02/09/23    Subjective     Chief Complaint: Pt c/o of generalized pain, is experiencing knee pain with movement and pain in areas of altered skin integrity.  Patient/Family Comments/goals:   Pain/Comfort:  Pain Rating 1: 10/10  Location - Side 1: Bilateral  Location - Orientation 1: generalized  Pain Addressed 1: Reposition  Pain Rating Post-Intervention 1: 10/10    Patients cultural, spiritual, Church conflicts given the current situation: no    Living Environment:  Pt reports  living with her granddaughter in a 1 story home, 1 step to enter, no railing.  Prior  to admission, patients level of function was INDEP with bathing, dressing, driving, minimal household ambulation, would use a standard 4 leg chair  for support when ambulating.  Equipment used at home: none.  DME owned (not currently used): none.  Upon discharge, patient will have assistance from GRANDDAUGHTER.    Objective:     Communicated with nurse and epic prior to session.  Patient found supine with peripheral IV, central line, waters catheter, LISET drain, NG tube, oxygen, pulse ox (continuous), telemetry, blood pressure cuff (pressure relief matress)  upon PT entry to room.    General Precautions: Standard, fall  Orthopedic Precautions:N/A   Braces: N/A  Respiratory Status: Nasal cannula, flow 1.5 L/min    Exams:  Cognitive Exam:  Patient is oriented to Person, Place, Time, and Situation  Sensation:    -       Impaired  B UE and LE  Skin Integrity/Edema:      -       Skin integrity: multiple wounds on abdomen, B legs, B heels, sacral area  RLE ROM: NT due to pain  RLE Strength: NT due to pain  LLE ROM: NT due to pain  LLE Strength: NT due to pain    Functional Mobility:  Bed Mobility:     Rolling Right: total assistance and of 2 persons, attempted supine to sit, pt unable to completely roll onto R side due to pain, pt able to reach across her body to grab the bed rail with assistance but not able to assist with rolling.      AM-PAC 6 CLICK MOBILITY  Total Score:6       Treatment & Education:  Pt educated on role of PT in acute setting and POC. Educated on importance of position changes to relieve pressure on wounds. Educated on HEP (heelslides, hip abd, hip flex, ankle pumps, quad sets) in order to regain strength and help with pressure relief. Educated on increased risk of falling due to weakness, instructed to utilize call bell for assistance with bed mobility. Not safe to transfer at this time.    Patient left supine with all lines intact, call button in reach, and son present.    GOALS:   Multidisciplinary  Problems       Physical Therapy Goals          Problem: Physical Therapy    Goal Priority Disciplines Outcome Goal Variances Interventions   Physical Therapy Goal     PT, PT/OT      Description: Goals to be met by 2/9/23.  Pt will be able to roll L/R MOD A of 2.  Pt will be able to complete supine to sit MAX A of 2.  Pt will be able to complete sit to stand MAX A of 2.                       History:     Past Medical History:   Diagnosis Date    Central retinal vein occlusion of left eye 02/25/2014    Treated by Dr. Lopez.  [place]     Depression     Diabetes mellitus type 2, controlled     Glaucoma 02/2014    Glaucoma Suspect    Hyperlipidemia     Hypertension     Hyperuricemia 8/3/2017    Morbid obesity     Reactive airway disease     Urolithiasis        Past Surgical History:   Procedure Laterality Date    APPENDECTOMY      BREAST SURGERY Left 2014    CHOLECYSTECTOMY  10/2014    EXCISION, SMALL INTESTINE  1/25/2023    Procedure: EXCISION, SMALL INTESTINE;  Surgeon: Luis Angel Collins MD;  Location: [facility] OR;  Service: General;;  x2    HEMICOLECTOMY Right 10/2014    Including appendix, due to perforated appendix    INJECTION OF ANESTHETIC AGENT INTO TISSUE PLANE DEFINED BY TRANSVERSUS ABDOMINIS MUSCLE  1/25/2023    Procedure: BLOCK, TRANSVERSUS ABDOMINIS PLANE;  Surgeon: Luis Angel Collins MD;  Location: [facility] OR;  Service: General;;    KIDNEY STONE SURGERY  2018    LAPAROTOMY, EXPLORATORY  1/25/2023    Procedure: LAPAROTOMY, EXPLORATORY;  Surgeon: Luis Angel Collins MD;  Location: [facility] OR;  Service: General;;    LYSIS OF ADHESIONS  1/25/2023    Procedure: LYSIS, ADHESIONS;  Surgeon: Luis Angel Collins MD;  Location: [facility] OR;  Service: General;;    REPAIR, HERNIA, VENTRAL  1/25/2023    Procedure: REPAIR, HERNIA, VENTRAL, INCARCERATED, WITHOUT HISTORY OF PRIOR REPAIR;  Surgeon: Luis Angel Collins MD;  Location: [facility] OR;  Service: General;;  Reduction of incisional ventral hernias x2    SURGICAL REMOVAL OF ULCER  1/25/2023     Procedure: EXCISION, ULCER;  Surgeon: Luis Angel Collins MD;  Location: [facility];  Service: General;;  Gastric Ulcer    TOTAL VAGINAL HYSTERECTOMY      Secondary to uterine prolapse       Time Tracking:     PT Received On: 01/26/23  PT Start Time: 0855     PT Stop Time: 0920  PT Total Time (min): 25 min     Billable Minutes: Evaluation 10min and Therapeutic Activity 15min      01/26/2023

## 2023-01-26 NOTE — PLAN OF CARE
OT ramirez completed. Attempted to initiate sup>sit, but patient unable to tolerate with total A of 2 due to pain. Educated on supine therex. Recommedning SNF at d/c.

## 2023-01-26 NOTE — CONSULTS
[facility] - Intensive Care ([facility])  Wound Care    Patient Name:  Chastity Hung   MRN:  [id number]  Date: 1/26/2023  Diagnosis: Incarcerated umbilical hernia    History:     Past Medical History:   Diagnosis Date    Central retinal vein occlusion of left eye 02/25/2014    Treated by Dr. Lopez.  Ade[place]     Depression     Diabetes mellitus type 2, controlled     Glaucoma 02/2014    Glaucoma Suspect    Hyperlipidemia     Hypertension     Hyperuricemia 8/3/2017    Morbid obesity     Reactive airway disease     Urolithiasis        Social History     Socioeconomic History    Marital status: Single   Tobacco Use    Smoking status: Never    Smokeless tobacco: Never   Substance and Sexual Activity    Alcohol use: No    Drug use: No    Sexual activity: Never   Social History Narrative    Patient is single and has 3 children. She lives with her son and hersister who has bilateral LE amputations.. Patient retired as an  at the [employer].      Social Determinants of Health     Financial Resource Strain: Low Risk     Difficulty of Paying Living Expenses: Not very hard   Food Insecurity: Food Insecurity Present    Worried About Running Out of Food in the Last Year: Sometimes true    Ran Out of Food in the Last Year: Never true   Transportation Needs: Unmet Transportation Needs    Lack of Transportation (Medical): Yes    Lack of Transportation (Non-Medical): No   Physical Activity: Inactive    Days of Exercise per Week: 0 days    Minutes of Exercise per Session: 0 min   Stress: Stress Concern Present    Feeling of Stress : To some extent   Social Connections: Unknown    Frequency of Communication with Friends and Family: More than three times a week    Frequency of Social Gatherings with Friends and Family: Twice a week    Active Member of Clubs or Organizations: No    Attends Club or Organization Meetings: Never    Marital Status: Never    Housing Stability: Low Risk     Unable to Pay  "for Housing in the Last Year: No    Number of Places Lived in the Last Year: 1    Unstable Housing in the Last Year: No       Precautions:     Allergies as of 01/25/2023    (No Known Allergies)       [facility] Assessment Details/Treatment     Consulted on this 56 y/o F patient due to present on admission skin breakdown. Patient admitted yesterday with incarcerated hernia and hasPMH significant for morbid obesity(BMI 89), DM, HTN. She is awake and alert, in ICU POD 1 repair of incarcerated ventral hernia repair of perforated ulcer. Patient reports she used to "scoot" out of bed and get to bathroom by pushing a chair in front of her, but reports it has been several weekssince she has been able to get OOB. She reports her 15 y/o grandaughter lives with her and helps her when not at school, but is unable to help with basic care/incontinence care. She has been bed bound around 3 weeks, and unable to turn in bed as well per her report.  Skin assessed with assist of CNA and primary nurse. She is on specialty [facility] Bariatric CASSIE IMMERSE bed.   Right plantar heel DTPI noted that measures 7x5cm with purple discoloration.  Right lateral 5th toe DTPI noted that measures 1x1cm,maroon discoloration.  Left plantar and posterior heel DTPI noted that measures 12x6cm with maroon discoloration to edges, centrally serous fluid filled blister that is open at one edge to full thickness. Cleansed all of these wounds with saline and patted dry. Foam dressings applied.  Severe intertrigo noted to lower abdominal/pannus fold,bilateral groin folds,and severe IAD to bilateral medial thighs and perineum all with full thickness tissue loss, creamy drainage. Miconazole powder in use as well as IV diflucan. Hygiene care provided and InterDry applied into folds for moisture wicking. Recommend miconazole moisture barrier ointment to  bilateral medial thighs and labia region.  Patient then turned to left side with max assist (5 staff members). Sacrum " intact.   DTPIs noted to bilateral lower/lateral buttocks (R>L).   DTPI noted to right posterior thigh.   Stage 3 pressure injury noted to Left posterior thigh with moist red and yellow subcutaneous tissue to wound bed, irregular edges.   Unstageable pressure injuries noted to Bilateral Ischium with black/tan leathery eschar and surrounding purple and black discoloration with areas of full thickness tissue loss. This appears evolving DTPI to bilateral ischium, and is likely to continue to evolve and worsen due to nature of wound. These may eventually require general surgery debridement, should consider consult if/when that occurs.   Cleansed all with bath wipes and patted dry. Thick layer TRIAD hydrophilic wound dressing paste applied to all affected skin. Patient then positioned on right side with foam wedge support.  Patient does currently have a waters catheter in place, and I recommend maintaining waters catheter due to severe wounds to luanne-region.        01/26/23 0945   WOCN Assessment   WOCN Total Time (mins) 120   Visit Date 01/26/23   Visit Time 0945   Consult Type New   WOCN Speciality Wound;Continence   Wound pressure;deep tissue injury;moisture;At risk for pressure Injury   Continence Type Urinary;Fecal   Intervention assessed;applied;chart review;coordination of care;team conference;orders   Teaching on-going;complication;discharge   Pressure Injury Prevention    Check Moisture Management Pad Done   Sacral Foam Dressing Patient incontinent, barrier cream applied   Heel protection technique Pillow   Heel preventative measures Apply   Check Medical Devices Done        Altered Skin Integrity 01/26/23  lower;anterior Abdomen Intertrigo   Date First Assessed: 01/26/23   Altered Skin Integrity Present on Admission: yes  Side: (c)   Orientation: lower;anterior  Location: Abdomen  Primary Wound Type: Intertrigo   Wound Image       Description of Altered Skin Integrity   (not pressure-related skin injury)    Dressing Appearance Open to air   Drainage Amount Small   Drainage Characteristics/Odor Creamy   Appearance Red;Yellow;Adipose;Moist   Tissue loss description Full thickness   Red (%), Wound Tissue Color 50 %   Yellow (%), Wound Tissue Color 50 %   Periwound Area Denuded;Redness   Wound Edges Open;Irregular   Care Cleansed with:;Soap and water   Dressing Applied  (InterDry)   Periwound Care Cleansed with pH balanced cleanser;Dry periwound area maintained;Topical treatment applied   Dressing Change Due 01/31/23  (change InterDry pieces every 5 days)        Altered Skin Integrity 01/26/23 medial;proximal Thigh Incontinence associated dermatitis   Date First Assessed: 01/26/23   Altered Skin Integrity Present on Admission: yes  Orientation: medial;proximal  Location: Thigh  Primary Wound Type: Incontinence associated dermatitis   Dressing Appearance Open to air   Drainage Amount Scant   Drainage Characteristics/Odor Serosanguineous   Appearance Red;Yellow;Adipose;Moist   Tissue loss description Full thickness   Red (%), Wound Tissue Color 50 %   Yellow (%), Wound Tissue Color 50 %   Periwound Area Denuded;Moist   Wound Edges Irregular;Open   Care Cleansed with:;Soap and water;Applied:;Skin Barrier   Periwound Care Cleansed with pH balanced cleanser;Dry periwound area maintained;Topical treatment applied        Altered Skin Integrity 01/26/23  Buttocks Purple or maroon localized area of discolored intact skin or non-intact skin or a blood-filled blister.   Date First Assessed: 01/26/23   Altered Skin Integrity Present on Admission: yes  Side: (c)   Location: Buttocks  Description of Altered Skin Integrity: Purple or maroon localized area of discolored intact skin or non-intact skin or a blood-filled bli...   Wound Image    Description of Altered Skin Integrity Purple or maroon localized area of discolored intact skin or non-intact skin or a blood-filled blister.   Dressing Appearance Open to air   Drainage Amount None    Appearance Purple   Tissue loss description Not applicable   Periwound Area Intact   Wound Edges Defined;Irregular   Care Cleansed with:;Sterile normal saline;Applied:   Dressing Applied  (TRIAD paste)        Altered Skin Integrity 01/26/23 Left Ischial tuberosity Full thickness tissue loss. Base is covered by slough and/or eschar in the wound bed   Date First Assessed: 01/26/23   Altered Skin Integrity Present on Admission: yes  Side: Left  Location: Ischial tuberosity  Description of Altered Skin Integrity: Full thickness tissue loss. Base is covered by slough and/or eschar in the wound bed   Wound Image    Description of Altered Skin Integrity Full thickness tissue loss. Base is covered by slough and/or eschar in the wound bed   Dressing Appearance Open to air   Drainage Amount Scant   Drainage Characteristics/Odor Tan   Appearance Black;Tan;Eschar;Purple;Maroon   Tissue loss description Full thickness   Black (%), Wound Tissue Color 50 %   Red (%), Wound Tissue Color 50 %   Periwound Area Intact   Wound Edges Irregular   Wound Length (cm) 8 cm   Wound Width (cm) 16 cm   Wound Depth (cm) 0.1 cm   Wound Volume (cm^3) 12.8 cm^3   Wound Surface Area (cm^2) 128 cm^2   Care Cleansed with:;Soap and water;Applied:   Dressing Applied  (TRIAD paste)        Altered Skin Integrity 01/26/23 Right Ischial tuberosity Full thickness tissue loss. Base is covered by slough and/or eschar in the wound bed   Date First Assessed: 01/26/23   Altered Skin Integrity Present on Admission: yes  Side: Right  Location: Ischial tuberosity  Description of Altered Skin Integrity: Full thickness tissue loss. Base is covered by slough and/or eschar in the wound bed   Wound Image   (see Left Ischium LDA for picture)   Description of Altered Skin Integrity Full thickness tissue loss. Base is covered by slough and/or eschar in the wound bed   Dressing Appearance Open to air   Drainage Amount Scant   Drainage Characteristics/Odor Tan   Appearance  Black;Tan;Eschar   Tissue loss description Full thickness   Periwound Area Denuded;Redness   Wound Edges Irregular   Wound Length (cm) 8 cm   Wound Width (cm) 16 cm   Wound Depth (cm) 0.1 cm   Wound Volume (cm^3) 12.8 cm^3   Wound Surface Area (cm^2) 128 cm^2   Care Cleansed with:;Soap and water;Applied:   Dressing Applied  (TRIAD paste)        Altered Skin Integrity 01/26/23 Right posterior Thigh Purple or maroon localized area of discolored intact skin or non-intact skin or a blood-filled blister.   Date First Assessed: 01/26/23   Altered Skin Integrity Present on Admission: yes  Side: Right  Orientation: posterior  Location: Thigh  Description of Altered Skin Integrity: Purple or maroon localized area of discolored intact skin or non-intact skin...   Wound Image    Description of Altered Skin Integrity Purple or maroon localized area of discolored intact skin or non-intact skin or a blood-filled blister.   Dressing Appearance Open to air   Drainage Amount None   Appearance Purple   Tissue loss description Not applicable   Periwound Area Intact   Wound Length (cm) 6 cm   Wound Width (cm) 12 cm   Wound Surface Area (cm^2) 72 cm^2   Care Cleansed with:;Sterile normal saline;Applied:;Skin Barrier        Altered Skin Integrity 01/26/23 Left posterior Thigh Full thickness tissue loss. Subcutaneous fat may be visible but bone, tendon or muscle are not exposed   Date First Assessed: 01/26/23   Altered Skin Integrity Present on Admission: yes  Side: Left  Orientation: posterior  Location: Thigh  Description of Altered Skin Integrity: Full thickness tissue loss. Subcutaneous fat may be visible but bone, tendon ...   Wound Image    Description of Altered Skin Integrity Full thickness tissue loss. Subcutaneous fat may be visible but bone, tendon or muscle are not exposed   Dressing Appearance Open to air   Drainage Amount Small   Drainage Characteristics/Odor Tan   Appearance Red;Yellow;Maroon;Adipose   Tissue loss  description Full thickness   Periwound Area Denuded   Wound Edges Irregular   Care Cleansed with:;Sterile normal saline;Applied:   Dressing Applied  (TRIAD paste)        Altered Skin Integrity 01/26/23 Left plantar Heel Purple or maroon localized area of discolored intact skin or non-intact skin or a blood-filled blister.   Date First Assessed: 01/26/23   Altered Skin Integrity Present on Admission: yes  Side: Left  Orientation: plantar  Location: Heel  Description of Altered Skin Integrity: Purple or maroon localized area of discolored intact skin or non-intact skin or ...   Wound Image     Description of Altered Skin Integrity Purple or maroon localized area of discolored intact skin or non-intact skin or a blood-filled blister.   Dressing Appearance Open to air   Drainage Amount Scant   Drainage Characteristics/Odor Serous   Appearance Maroon;Red;Blistered   Tissue loss description Full thickness   Periwound Area Intact   Wound Edges Defined   Wound Length (cm) 12 cm   Wound Width (cm) 6 cm   Wound Depth (cm) 0.2 cm   Wound Volume (cm^3) 14.4 cm^3   Wound Surface Area (cm^2) 72 cm^2   Care Cleansed with:;Sterile normal saline;Applied:;Skin Barrier   Dressing Foam   Dressing Change Due 02/02/23        Altered Skin Integrity 01/26/23 Right plantar Heel Purple or maroon localized area of discolored intact skin or non-intact skin or a blood-filled blister.   Date First Assessed: 01/26/23   Altered Skin Integrity Present on Admission: yes  Side: Right  Orientation: plantar  Location: Heel  Description of Altered Skin Integrity: Purple or maroon localized area of discolored intact skin or non-intact skin or...   Wound Image    Description of Altered Skin Integrity Purple or maroon localized area of discolored intact skin or non-intact skin or a blood-filled blister.   Dressing Appearance Open to air   Drainage Amount None   Appearance Purple   Tissue loss description Not applicable   Periwound Area Intact   Wound Edges  Defined   Wound Length (cm) 7 cm   Wound Width (cm) 5 cm   Wound Surface Area (cm^2) 35 cm^2   Care Cleansed with:;Sterile normal saline;Applied:;Skin Barrier   Dressing Foam   Dressing Change Due 02/02/23        Altered Skin Integrity 01/26/23 Right lateral Toe, fifth Purple or maroon localized area of discolored intact skin or non-intact skin or a blood-filled blister.   Date First Assessed: 01/26/23   Altered Skin Integrity Present on Admission: yes  Side: Right  Orientation: lateral  Location: Toe, fifth  Description of Altered Skin Integrity: Purple or maroon localized area of discolored intact skin or non-intact s...   Wound Image    Description of Altered Skin Integrity Purple or maroon localized area of discolored intact skin or non-intact skin or a blood-filled blister.   Dressing Appearance Open to air   Drainage Amount None   Appearance Maroon   Tissue loss description Not applicable   Wound Length (cm) 1 cm   Wound Width (cm) 1 cm   Wound Surface Area (cm^2) 1 cm^2   Care Cleansed with:;Sterile normal saline;Applied:;Skin Barrier       Recommendations made to primary team for wound care, moisture management, pressure injury prevention. Patient on bariatric CASSIE immerse bed already, consult Case Management for discharge planning, potential placement due to woundcare needs and limited support at home . Orders placed.     01/26/2023

## 2023-01-26 NOTE — PT/OT/SLP PROGRESS
Occupational Therapy      Patient Name:  Chastity Hung   MRN:  [id number]    OT eval orders received. Chart review completed. Presented to room at 0820 and patient with nurse for bedside wound care. Will continue efforts.    ASIF Davis      1/26/2023

## 2023-01-26 NOTE — PLAN OF CARE
Nutrition: 1/26  1. Recommend pt PO diet be advanced to clear liquid diet when medically appropriate.   2. Recommend pt receives Boost breeze TID on tray with meals when medically appropriate.  3. Recommend pt receives Abdulkadir BID on tray with meals once PO diet is advanced to full liquid diet or when medically appropriate.   4. Weekly weight.  5. Collaboration of care with medical providers.    Keven Guadarrama, Registration Eligible, Provisional LDN

## 2023-01-26 NOTE — ANESTHESIA PROCEDURE NOTES
Arterial    Diagnosis: incarcerated hernia    Patient location during procedure: done in OR  Procedure start time: 1/25/2023 4:25 PM  Timeout: 1/25/2023 4:25 PM  Procedure end time: 1/25/2023 4:45 PM    Staffing  Authorizing Provider: Robby Burton MD  Performing Provider: Robby Burton MD    Anesthesiologist was present at the time of the procedure.    Preanesthetic Checklist  Completed: patient identified, IV checked, site marked, risks and benefits discussed, surgical consent, monitors and equipment checked, pre-op evaluation, timeout performed and anesthesia consent givenArterial  Skin Prep: chlorhexidine gluconate  Local Infiltration: none  Orientation: right  Location: radial    Catheter Size: 20 G  Catheter placement by Ultrasound guidance. Heme positive aspiration all ports.   Vessel Caliber: small, patent, compressibility normal  Vascular Doppler:  not done  Needle advanced into vessel with real time Ultrasound guidance.Insertion Attempts: 4 or more (x2 in Left wrist; x2 in right wrist)  Assessment  Dressing: secured with tape and tegaderm  Patient: Tolerated well  Additional Notes  Difficult arterial access 2/2 body habitus; A-line signal found to be dampened after positioning - catheter noted to be kinked and partially removed from skin - A-Line removed by MD

## 2023-01-26 NOTE — PROGRESS NOTES
e-ICU admit note                                         Reason for ICU admission:        Perforated gastric ulcer causing incarceration of small bowel within a incisional hernia    Repair, excision SB         HPI:    56 yo female with N/V and abdominal pain x 3 days.     ED workup revealed a non-reducible umbilical hernia with a draining wound at the umbilicus.    Taken emergently to OR       PMx:    Morbid Obesity  DM  HTN,  Kidney stones                      Significant labs:     WBC 25285  AST 70      Significant images:    CXR normal        I viewed the pt via camera at    8:50 pm:    96 sinus, 94%, R 22, 127/59              Asleep    NAD    Assessment/Plan:    Incarcerated umbilical hernia  Post op care  Cefepime  IV fluids        MIGUEL   Creat 1.6, follow  IV hydration    SUP  famotidine       Cutaneous candidiasis  IV Fluconazole  Miconazole powder BID  Wound care consult     HLD  statin    DM  SSI    HTN   Hold home losartan due to MIGUEL  Hydralazine prn    DVT ppx  Hep sc

## 2023-01-26 NOTE — SUBJECTIVE & OBJECTIVE
Interval History: pain controlled, marginal urine output  Was taking aleve 2 to 3 times a day for last 3 weeks  Last walked 3 weeks ago    Medications:  Continuous Infusions:   lactated ringers 100 mL/hr at 01/26/23 0600     Scheduled Meds:   acetaminophen  1,000 mg Intravenous Q8H    ceFEPime (MAXIPIME) IVPB  1 g Intravenous Q8H    chlorhexidine  10 mL Mouth/Throat BID    enoxaparin  40 mg Subcutaneous Daily    fluconazole (DIFLUCAN) IV (PEDS and ADULTS)  200 mg Intravenous Q24H    miconazole NITRATE 2 %   Topical (Top) BID    mupirocin   Nasal BID    pantoprazole  40 mg Intravenous BID    sucralfate  1 g Oral QID (AC & HS)     PRN Meds:acetaminophen, albuterol-ipratropium, dextrose 10%, dextrose 10%, diphenhydrAMINE, glucagon (human recombinant), glucose, glucose, hydrALAZINE, insulin aspart U-100, metoclopramide HCl, morphine, naloxone, ondansetron, prochlorperazine, sodium chloride 0.9%     Review of patient's allergies indicates:  No Known Allergies  Objective:     Vital Signs (Most Recent):  Temp: 97.5 °F (36.4 °C) (01/26/23 0300)  Pulse: 102 (01/26/23 0700)  Resp: (!) 25 (01/26/23 0700)  BP: (!) 111/56 (01/26/23 0700)  SpO2: 96 % (01/26/23 0700)   Vital Signs (24h Range):  Temp:  [97.2 °F (36.2 °C)-98.8 °F (37.1 °C)] 97.5 °F (36.4 °C)  Pulse:  [] 102  Resp:  [18-31] 25  SpO2:  [88 %-100 %] 96 %  BP: (103-180)/(41-77) 111/56     Weight: (!) 235.9 kg (520 lb)  Body mass index is 89.26 kg/m².    Intake/Output - Last 3 Shifts         01/24 0700 01/25 0659 01/25 0700 01/26 0659 01/26 0700 01/27 0659    I.V. (mL/kg)  2461.3 (10.4)     IV Piggyback  584.3     Total Intake(mL/kg)  3045.6 (12.9)     Urine (mL/kg/hr)  323     Drains  340     Total Output  663     Net  +2382.6                    Physical Exam  Vitals and nursing note reviewed.   Constitutional:       Appearance: She is well-developed. She is obese.   HENT:      Head: Normocephalic.      Nose:      Comments: ngt  Eyes:      Pupils: Pupils are  equal, round, and reactive to light.   Neck:      Thyroid: No thyromegaly.      Vascular: No JVD.      Trachea: No tracheal deviation.   Cardiovascular:      Rate and Rhythm: Normal rate and regular rhythm.      Heart sounds: Normal heart sounds.   Pulmonary:      Breath sounds: Normal breath sounds. No wheezing.   Abdominal:      General: Bowel sounds are normal. There is no distension.      Palpations: Abdomen is soft. Abdomen is not rigid. There is no mass.      Tenderness: There is no abdominal tenderness. There is no guarding or rebound.      Comments: Massively obese, incision dressed drains serosanguinous to sanguinous   Genitourinary:     Comments: evelin  Musculoskeletal:         General: Normal range of motion.      Right lower leg: Right lower leg edema: and venous stasis.      Left lower leg: Left lower leg edema: and venous stasis.   Lymphadenopathy:      Cervical: No cervical adenopathy.   Skin:     General: Skin is warm and dry.      Findings: No erythema or rash.   Neurological:      Mental Status: She is alert and oriented to person, place, and time.   Psychiatric:         Mood and Affect: Mood normal.         Behavior: Behavior normal.         Thought Content: Thought content normal.         Judgment: Judgment normal.       Significant Labs:  I have reviewed all pertinent lab results within the past 24 hours.  CBC:   Recent Labs   Lab 01/26/23 0417   WBC 18.17*   RBC 3.92*   HGB 10.3*   HCT 33.0*      MCV 84   MCH 26.3*   MCHC 31.2*     CMP:   Recent Labs   Lab 01/26/23 0417   *   CALCIUM 8.6*   ALBUMIN 1.6*   PROT 5.8*      K 5.0   CO2 19*      BUN 53*   CREATININE 1.6*   ALKPHOS 76   ALT 45*   AST 51*   BILITOT 1.0       Significant Diagnostics:  I have reviewed all pertinent imaging results/findings within the past 24 hours.  No new, cvl in position

## 2023-01-26 NOTE — HOSPITAL COURSE
1/26: perforated gastric ulcer, surgically corrected by surgery. Will cont on rocephin and flagyl for intra-abdominal coverage and UTI. PT/OT on case.   1/27 NAEON . The urine cx is (+) for GNR .  Remain NPO and eith a NGT . General surgery following .  1/28 ngt discontinued per surgery. Keep npo. Physical/occupational therapy consulted for discharge planning. Wbc trending down  1/29 cleared by surgery for clear liquid diet. Denies fever, dyspnea, nausea/vomiting. Physical/occupational therapy recommending skilled nursing facility placement. Case management consulted.  1/30 diet advanced. Etta drains with serous drainage. Possible drain removal tomorrow per Surgery. awaiting placement.  1/31 appetite improved. Etta drains draining serous fluids bilateral. Awaiting skilled nursing facility placement    After discussing with surgery, etta drains removed. Recommended follow up in 2 weeks for surgical staple removal. Hpylori negative. Continue proton pump inhibitor bid and carafate. May need outpatient follow up for possible egd in future. Nsaids added to allergy list.    Insurance approval received for skilled nursing facility placement at [facility]. Covid test required.     Patient seen and evaluated by me. Patient was determined to be suitable for d/c. Patient deemed stable for discharge to skilled nursing facility.

## 2023-01-26 NOTE — OP NOTE
[facility] Surgery ([facility])  Surgery Department  Operative Note    SUMMARY     Date of Procedure: 1/25/2023     Preop diagnosis was a incarcerated incisional hernia.      Postop diagnosis was a perforated gastric ulcer causing incarceration of small bowel within a incisional hernia    Procedure: Procedure(s):  REPAIR, HERNIA, VENTRAL, INCARCERATED, WITHOUT HISTORY OF PRIOR REPAIR  LAPAROTOMY, EXPLORATORY  BLOCK, TRANSVERSUS ABDOMINIS PLANE  LYSIS, ADHESIONS  EXCISION, SMALL INTESTINE  EXCISION, ULCER     Surgeon(s) and Role:     * Luis Angel Collins MD - Primary     * Aminata Denton DO - Assisting        Pre-Operative Diagnosis: Incarcerated ventral hernia [K43.6]    Post-Operative Diagnosis: Post-Op Diagnosis Codes:     * Incarcerated ventral hernia [K43.6]    Anesthesia: General    Operative Findings (including complications, if any):     An area of ileum incarcerated within a supraumbilical hernia with partial obstruction.  Perforated gastric ulcer approximately 2-1/2 cm    Description of Technical Procedures:     Patient was brought in the operative room placed on the operative table supine position.  General endotracheal anesthesia was induced.  A central venous line was placed.  A Singh catheter was placed.  Pneumatic compression devices could not be placed on the lower extremities because of their size.  An art line was placed by the anesthesia service but then it subsequently became dislodged.  Her abdomen was prepped and draped in the standard fashion.      A time-out was performed.      A midline incision was made above the umbilicus.  Subcutaneous tissues were dissected using electrocautery.  A hernia sac just above the umbilicus was identified it above that a 2nd hernia sac was identified.  The subcutaneous tissues were dissected free from the hernia sacs.      The upper hernia sac was opened and found to contain incarcerated omentum which was reduced.  The bridge between the hernia sac was opened  and incarcerated bowel was found.  The bowel was partially obstructed but adherent to the hernia sac and there was a 3 cm serosal tear and trying to free this.  The abdominal cavity was then entered.      We freed adhesions of the small bowel to the edges of the hernia sac into the abdominal wall.      We began inspecting the small bowel for any damage and notice that there was murky fluid from the pelvis.  The incision was extended superiorly and the sigmoid colon was found to be within normal limits.  The murky fluid was suction.  A hand was then placed into the upper abdomen were significant amounts of murky fluid were found.      The incision was once again extended superiorly and we identified anterior perforation of the stomach.      The stomach was then palpated and no other malalignment maladies could be felt.  A finger was inserted into the perforation and the posterior wall was felt to be intact.      The perforation was removed by elevating the area with [place] clamps.  A TA 60 stapler with a green staple load was then fired and the ulcer with a small rim of normal stomach was excised.  The area was oversewn with 2-0 silk in a interrupted fashion.  The lesser omentum was then tacked over the area.      We then inspected the small bowel in the area where the longer serosal tear was there was a smaller serosal tear adjacent to it.  There was a 3rd serosal tear about 20 cm away which was repaired with 3-0 silk in a Lembert fashion.      We decided to resect the area of the longer serosal tear and the smaller 1 adjacent to it.      A small-bowel resection was accomplished by approximating the limbs of the small bowel.  Two enterotomies were made.  The stapler BEATRIS 75 was inserted and fired.  A 2nd firing of the staple was used to close the 2 enterotomies in seal the anastomosis.  The mesentery was then taken down with the large bipolar cautery device, super jaws.  The staple line was oversewn with 3-0 silk in  a Lembert fashion.  The opening in the mesentery was closed with 3-0 Vicryl.  The limbs of the anastomosis were approximated.      We then in the abdominal cavity with generous amounts of normal saline.      We reinspected the anastomosis in 1 of the limbs was felt to be ischemic.  The decision was made to re-excise the area.      Once again the limbs of small bowel were approximated.  Two enterotomies were made in the area of the small bowel just proximal and distal to the previous anastomosis.  The BEATRIS 75 stapler was inserted and a side-to-side anastomosis was created.  A 2nd firing of the BEATRIS 75 stapler close the open end the anastomosis.  The mesentery was once again taken down with the EnSeal super jaw bipolar cautery.      The staple line was reinforced with 3-0 silk in interrupted fashion.  The mesentery was closed with 2 0 Vicryl in a running fashion.  The junction of the anastomosis was reinforced with 3-0 silk.      We then inspected the abdominal cavity there was no evidence of bleeding.  Jamel drains were placed through the abdominal wall and the upper 15 Jamel drain was placed in Morison's pouch.  The lower 19 Jamel drain was placed in the pelvis.  The drains were secured with 2-0 silk    An abdominal wall block was performed by infiltrating a mixture of Marcaine and Exparel into the layers of the abdominal wall on either side of the incision.      The incision was closed with looped PDS in a running fashion.  We did placed for internal retention sutures of 0 Prolene.  Each suture was tagged and held until the PDS portion of the closure was completed and then the Prolene suture was tied.      The wound was irrigated.  Cathi's fascia was approximated with 2 0 Vicryl interrupted fashion.  The deep dermis was approximated with 3-0 Vicryl in interrupted fashion.  The skin was closed with staples.  Dry sterile dressings were placed.      Patient tolerated procedure well.  She is transferred to the ICU in  hemodynamically stable but overall guarded condition.      Final sponge and instrument counts were correct        Significant Surgical Tasks Conducted by the Assistant(s), if Applicable:  All aspects of the procedure    Estimated Blood Loss (EBL):  75 mL   IV fluids 1500 mL   Marcaine 0.25% 30 mL   Exparel 266 mg           Implants: * No implants in log *    Specimens:   Specimen (24h ago, onward)       Start     Ordered    01/25/23 1821  Specimen to Pathology, Surgery General Surgery  Once        Comments: Pre-op Diagnosis: Incarcerated ventral hernia [K43.6]Procedure(s):REPAIR, HERNIA, VENTRAL, INCARCERATED, WITHOUT HISTORY OF PRIOR REPAIRLAPAROTOMY, EXPLORATORYBLOCK, TRANSVERSUS ABDOMINIS PLANELYSIS, ADHESIONSEXCISION, SMALL INTESTINEEXCISION, ULCER Number of specimens: 3Name of specimens: 1. Gastric Ulcer (PERM), 2. Ileum (PERM), 3. Hernia Sac (PERM)     References:    Click here for ordering Quick Tip   Question Answer Comment   Procedure Type: General Surgery    Specimen Class: Known or suspected malignancy    Which provider would you like to cc? CHRISTIE FRANKEL    Release to patient Immediate        01/25/23 1821                            Condition: Critical    Disposition: ICU - extubated and stable.    Attestation: I performed the procedure.

## 2023-01-26 NOTE — PLAN OF CARE
[facility] - Intensive Care (Hospital)  Initial Discharge Assessment       Primary Care Provider: Alexandra Dawkins MD    Admission Diagnosis: Morbid obesity [E66.01]  Edema [R60.9]  Incarcerated umbilical hernia [K42.0]  Incarcerated ventral hernia [K43.6]  Vomiting [R11.10]  Preop cardiovascular exam [Z01.810]  Incarcerated hernia [K46.0]  Chest pain [R07.9]  Skin breakdown [R23.8]  Perforated gastric ulcer [K25.5]    Admission Date: 1/25/2023  Expected Discharge Date:     Discharge Barriers Identified: Bariatric    Payor: [place] CROSS BLUE SHIELD / Plan: [facility] [place] [facility] / Product Type: Commercial /     Extended Emergency Contact Information  Primary Emergency Contact: Eran Ricardo  Address: [address] [place] [address] [facility]  Home Phone: [phone]  Mobile Phone: [phone]  Relation: Son  Secondary Emergency Contact: Brody RICARDO  Address: [address] [place] [address] [facility]  Home Phone: [phone]  Mobile Phone: [phone]  Relation: Son    Discharge Plan A: Skilled Nursing Facility  Discharge Plan B: [facility]      [id number] [place], [place] - [address] [facility]  [address]  [facility] [address]  Phone: [phone] Fax: [phone]    [facility] [address] [address]  Phone: [phone] Fax: [phone]      Initial Assessment (most recent)       Adult Discharge Assessment - 01/26/23 1546          Discharge Assessment    Assessment Type Discharge Planning Assessment     Confirmed/corrected address, phone number and insurance Yes     Confirmed Demographics Correct on Facesheet     Source of Information patient     Communicated ADOLFO with patient/caregiver Date not available/Unable to determine     Reason For Admission Incarcerated umbilical hernia with partial ileum obstruction and perforated  gastric ulcer     People in Home child(joycelyn), adult;grandchild(joycelyn)     Facility Arrived From: home     Do you expect to return to your current living situation? Yes     Do you have help at home or someone to help you manage your care at home? Yes     Who are your caregiver(s) and their phone number(s)? Ra Ochoa     Prior to hospitilization cognitive status: Alert/Oriented     Current cognitive status: Alert/Oriented     Walking or Climbing Stairs ambulation difficulty, requires equipment     Dressing/Bathing bathing difficulty, requires equipment     Home Accessibility wheelchair accessible     Home Layout Able to live on 1st floor     Equipment Currently Used at Home none     Readmission within 30 days? No     Patient currently being followed by outpatient case management? No     Do you currently have service(s) that help you manage your care at home? No     Do you take prescription medications? Yes     Do you have prescription coverage? Yes     Do you have any problems affording any of your prescribed medications? No     Is the patient taking medications as prescribed? yes     Who is going to help you get home at discharge? son     How do you get to doctors appointments? family or friend will provide     Are you on dialysis? No     Do you take coumadin? No     Discharge Plan A Skilled Nursing Facility     Discharge Plan B Home Health     DME Needed Upon Discharge  walker, rolling     Discharge Plan discussed with: Patient     Discharge Barriers Identified Bariatric                   Anticipated DC dispo: SNF   Prior Level of Function: Dependent with ADLs, lives with granddaughter and son is currently moving in   PCP:    Alexandra Dawkins MD     Comments:  CM met with patient at bedside to introduce role and discuss discharge planning. Patient states her 15 year old daughter lives with her. Her son, Ra, is currently moving into her home to assist with any patient needs.   CM discharge needs depends on  hospital progress. CM will continue following to assist with other needs.

## 2023-01-26 NOTE — ANESTHESIA POSTPROCEDURE EVALUATION
Anesthesia Post Evaluation    Patient: Chastity Hung    Procedure(s) Performed: Procedure(s):  REPAIR, HERNIA, VENTRAL, INCARCERATED, WITHOUT HISTORY OF PRIOR REPAIR  LAPAROTOMY, EXPLORATORY  BLOCK, TRANSVERSUS ABDOMINIS PLANE  LYSIS, ADHESIONS  EXCISION, SMALL INTESTINE  EXCISION, ULCER    Final Anesthesia Type: general      Patient location during evaluation: PACU  Patient participation: Yes- Able to Participate  Level of consciousness: awake and alert  Post-procedure vital signs: reviewed and not stable  Pain management: adequate  Airway patency: patent  CHALINO mitigation strategies: Preoperative initiation of continuous positive airway pressure (CPAP) or non-invasive positive pressure ventilation (NIPPV)  PONV status at discharge: No PONV  Anesthetic complications: no      Cardiovascular status: blood pressure returned to baseline  Respiratory status: unassisted, spontaneous ventilation and room air  Hydration status: euvolemic  Follow-up not needed.          Vitals Value Taken Time   /72 01/25/23 1859   Temp 98.8 01/25/23 1903   Pulse 95 01/25/23 1903   Resp 29 01/25/23 1903   SpO2 99 % 01/25/23 1903   Vitals shown include unvalidated device data.      No case tracking events are documented in the log.      Pain/Cyndy Score: Pain Rating Prior to Med Admin: 7 (1/25/2023 11:36 AM)  Cyndy Score: 10 (1/25/2023  3:22 PM)

## 2023-01-26 NOTE — SUBJECTIVE & OBJECTIVE
Interval History: No acute events overnight. Stepped down from icu.     Review of Systems   Constitutional:  Negative for chills and fever.   Respiratory:  Negative for shortness of breath and wheezing.    Gastrointestinal:  Positive for abdominal pain. Negative for nausea and vomiting.   Genitourinary:  Negative for flank pain.   Skin:  Positive for wound.   Neurological:  Negative for headaches.   Psychiatric/Behavioral:  The patient is not nervous/anxious.    Objective:     Vital Signs (Most Recent):  Temp: 98.1 °F (36.7 °C) (01/26/23 0700)  Pulse: 102 (01/26/23 0900)  Resp: (!) 22 (01/26/23 0900)  BP: (!) 126/58 (01/26/23 0900)  SpO2: 99 % (01/26/23 0900)   Vital Signs (24h Range):  Temp:  [97.2 °F (36.2 °C)-98.8 °F (37.1 °C)] 98.1 °F (36.7 °C)  Pulse:  [] 102  Resp:  [19-31] 22  SpO2:  [84 %-100 %] 99 %  BP: (103-180)/(41-72) 126/58     Weight: (!) 235.9 kg (520 lb)  Body mass index is 89.26 kg/m².    Intake/Output Summary (Last 24 hours) at 1/26/2023 1106  Last data filed at 1/26/2023 0905  Gross per 24 hour   Intake 3539.89 ml   Output 458 ml   Net 3081.89 ml      Physical Exam  Constitutional:       General: She is not in acute distress.     Appearance: She is well-developed. She is obese. She is not diaphoretic.   HENT:      Head: Normocephalic and atraumatic.   Eyes:      Pupils: Pupils are equal, round, and reactive to light.   Cardiovascular:      Rate and Rhythm: Normal rate and regular rhythm.      Heart sounds: Normal heart sounds. No murmur heard.    No friction rub. No gallop.   Pulmonary:      Effort: Pulmonary effort is normal. No respiratory distress.      Breath sounds: Normal breath sounds. No stridor. No wheezing or rales.   Abdominal:      General: Bowel sounds are normal. There is no distension.      Palpations: Abdomen is soft. There is no mass.      Tenderness: There is no abdominal tenderness. There is no guarding.   Skin:     General: Skin is warm.      Findings: No erythema.    Neurological:      Mental Status: She is alert and oriented to person, place, and time.       Significant Labs: All pertinent labs within the past 24 hours have been reviewed.    Significant Imaging: I have reviewed all pertinent imaging results/findings within the past 24 hours.

## 2023-01-26 NOTE — PROGRESS NOTES
[facility] - Intensive Care [facility])  General Surgery  Progress Note    Subjective:     History of Present Illness:    57-year-old female who presents to the emergency room with a 3 day history of epigastric pain associated with nausea and vomiting loss of appetite.  She states that she has not been able to keep anything down.  She says the bulge above her bellybutton is more prominent.      The patient is unable to go to the CT scanner due to her body mass index of 89.6.      The patient also has significant skin breakdown along the upper thighs and along the edges in undersurface of her very large abdominal wall pannus.  She has significant swelling and blistering of her lower extremities.      She does not report any fever or chills.  She is not report any chest pain.      The patient is essentially nonambulatory       her home medications, and is non-ambulatory at baseline. No further complaints or concerns at this time.      Arrival mode: EMS      Post-Op Info:  Procedure(s):  REPAIR, HERNIA, VENTRAL, INCARCERATED, WITHOUT HISTORY OF PRIOR REPAIR  LAPAROTOMY, EXPLORATORY  BLOCK, TRANSVERSUS ABDOMINIS PLANE  LYSIS, ADHESIONS  EXCISION, SMALL INTESTINE  EXCISION, ULCER   1 Day Post-Op     Interval History: pain controlled, marginal urine output  Was taking aleve 2 to 3 times a day for last 3 weeks  Last walked 3 weeks ago    Medications:  Continuous Infusions:   lactated ringers 100 mL/hr at 01/26/23 0600     Scheduled Meds:   acetaminophen  1,000 mg Intravenous Q8H    ceFEPime (MAXIPIME) IVPB  1 g Intravenous Q8H    chlorhexidine  10 mL Mouth/Throat BID    enoxaparin  40 mg Subcutaneous Daily    fluconazole (DIFLUCAN) IV (PEDS and ADULTS)  200 mg Intravenous Q24H    miconazole NITRATE 2 %   Topical (Top) BID    mupirocin   Nasal BID    pantoprazole  40 mg Intravenous BID    sucralfate  1 g Oral QID (AC & HS)     PRN Meds:acetaminophen, albuterol-ipratropium, dextrose 10%, dextrose 10%, diphenhydrAMINE,  glucagon (human recombinant), glucose, glucose, hydrALAZINE, insulin aspart U-100, metoclopramide HCl, morphine, naloxone, ondansetron, prochlorperazine, sodium chloride 0.9%     Review of patient's allergies indicates:  No Known Allergies  Objective:     Vital Signs (Most Recent):  Temp: 97.5 °F (36.4 °C) (01/26/23 0300)  Pulse: 102 (01/26/23 0700)  Resp: (!) 25 (01/26/23 0700)  BP: (!) 111/56 (01/26/23 0700)  SpO2: 96 % (01/26/23 0700)   Vital Signs (24h Range):  Temp:  [97.2 °F (36.2 °C)-98.8 °F (37.1 °C)] 97.5 °F (36.4 °C)  Pulse:  [] 102  Resp:  [18-31] 25  SpO2:  [88 %-100 %] 96 %  BP: (103-180)/(41-77) 111/56     Weight: (!) 235.9 kg (520 lb)  Body mass index is 89.26 kg/m².    Intake/Output - Last 3 Shifts         01/24 0700 01/25 0659 01/25 0700 01/26 0659 01/26 0700 01/27 0659    I.V. (mL/kg)  2461.3 (10.4)     IV Piggyback  584.3     Total Intake(mL/kg)  3045.6 (12.9)     Urine (mL/kg/hr)  323     Drains  340     Total Output  663     Net  +2382.6                    Physical Exam  Vitals and nursing note reviewed.   Constitutional:       Appearance: She is well-developed. She is obese.   HENT:      Head: Normocephalic.      Nose:      Comments: ngt  Eyes:      Pupils: Pupils are equal, round, and reactive to light.   Neck:      Thyroid: No thyromegaly.      Vascular: No JVD.      Trachea: No tracheal deviation.   Cardiovascular:      Rate and Rhythm: Normal rate and regular rhythm.      Heart sounds: Normal heart sounds.   Pulmonary:      Breath sounds: Normal breath sounds. No wheezing.   Abdominal:      General: Bowel sounds are normal. There is no distension.      Palpations: Abdomen is soft. Abdomen is not rigid. There is no mass.      Tenderness: There is no abdominal tenderness. There is no guarding or rebound.      Comments: Massively obese, incision dressed drains serosanguinous to sanguinous   Genitourinary:     Comments: waters  Musculoskeletal:         General: Normal range of motion.       Right lower leg: Right lower leg edema: and venous stasis.      Left lower leg: Left lower leg edema: and venous stasis.   Lymphadenopathy:      Cervical: No cervical adenopathy.   Skin:     General: Skin is warm and dry.      Findings: No erythema or rash.   Neurological:      Mental Status: She is alert and oriented to person, place, and time.   Psychiatric:         Mood and Affect: Mood normal.         Behavior: Behavior normal.         Thought Content: Thought content normal.         Judgment: Judgment normal.       Significant Labs:  I have reviewed all pertinent lab results within the past 24 hours.  CBC:   Recent Labs   Lab 01/26/23 0417   WBC 18.17*   RBC 3.92*   HGB 10.3*   HCT 33.0*      MCV 84   MCH 26.3*   MCHC 31.2*     CMP:   Recent Labs   Lab 01/26/23 0417   *   CALCIUM 8.6*   ALBUMIN 1.6*   PROT 5.8*      K 5.0   CO2 19*      BUN 53*   CREATININE 1.6*   ALKPHOS 76   ALT 45*   AST 51*   BILITOT 1.0       Significant Diagnostics:  I have reviewed all pertinent imaging results/findings within the past 24 hours.  No new, cvl in position    Assessment/Plan:     * Incarcerated umbilical hernia with partial ileum obstruction and perforated gastric ulcer  Likely secondary to perforated gastric ulcer    Perforated gastric ulcer  Excision and over sewn 1/25/23  ngt for 4 days  ivf  ppi iv bid  Check h- plori titer  Antibiotic till wbc normal  Monitor drains    MIGUEL (acute kidney injury)  monitor    Cutaneous candidiasis  Would recommend treatment of the cutaneous candidiasis both with oral Diflucan and topical ketoconazole ointment.  Barrier gauze or abdominal pads or other material should be placed to prevent    Venous stasis dermatitis of both lower extremities  Management per hospital Medicine    Type 2 diabetes mellitus, without long-term current use of insulin  Insulin sliding scale per hospital Medicine    Essential hypertension  Further management per hospital Medicine,   hold oral medication until ngt removed    Depression  Further management per hospital Medicine,  hold oral medication until ngt removed    Hyperlipidemia  Further management by hospital Medicine, hold oral medication until ngt removed      Transfer to floor per medicine and critical care    Luis Angel Collins MD  General Surgery  [facility] - Intensive Care ([facility])

## 2023-01-27 LAB
POCT GLUCOSE: 61 MG/DL (ref 70–110)
POCT GLUCOSE: 72 MG/DL (ref 70–110)
POCT GLUCOSE: 74 MG/DL (ref 70–110)
POCT GLUCOSE: 76 MG/DL (ref 70–110)
POCT GLUCOSE: 83 MG/DL (ref 70–110)
POCT GLUCOSE: 93 MG/DL (ref 70–110)

## 2023-01-27 PROCEDURE — 25000003 PHARM REV CODE 250: Performed by: SURGERY

## 2023-01-27 PROCEDURE — 27000221 HC OXYGEN, UP TO 24 HOURS

## 2023-01-27 PROCEDURE — 63600175 PHARM REV CODE 636 W HCPCS: Performed by: FAMILY MEDICINE

## 2023-01-27 PROCEDURE — 94799 UNLISTED PULMONARY SVC/PX: CPT

## 2023-01-27 PROCEDURE — 99900035 HC TECH TIME PER 15 MIN (STAT)

## 2023-01-27 PROCEDURE — 25000003 PHARM REV CODE 250: Performed by: FAMILY MEDICINE

## 2023-01-27 PROCEDURE — 63600175 PHARM REV CODE 636 W HCPCS: Performed by: SURGERY

## 2023-01-27 PROCEDURE — 63700000 PHARM REV CODE 250 ALT 637 W/O HCPCS: Performed by: FAMILY MEDICINE

## 2023-01-27 PROCEDURE — C9113 INJ PANTOPRAZOLE SODIUM, VIA: HCPCS | Performed by: SURGERY

## 2023-01-27 PROCEDURE — 25000003 PHARM REV CODE 250: Performed by: NURSE PRACTITIONER

## 2023-01-27 PROCEDURE — 11000001 HC ACUTE MED/SURG PRIVATE ROOM

## 2023-01-27 PROCEDURE — 63600175 PHARM REV CODE 636 W HCPCS: Performed by: INTERNAL MEDICINE

## 2023-01-27 PROCEDURE — 94761 N-INVAS EAR/PLS OXIMETRY MLT: CPT

## 2023-01-27 RX ORDER — ENOXAPARIN SODIUM 100 MG/ML
60 INJECTION SUBCUTANEOUS 2 TIMES DAILY
Status: DISCONTINUED | OUTPATIENT
Start: 2023-01-27 | End: 2023-02-01 | Stop reason: HOSPADM

## 2023-01-27 RX ORDER — ATORVASTATIN CALCIUM 10 MG/1
10 TABLET, FILM COATED ORAL DAILY
Status: DISCONTINUED | OUTPATIENT
Start: 2023-01-28 | End: 2023-01-29

## 2023-01-27 RX ADMIN — CHLORHEXIDINE GLUCONATE 0.12% ORAL RINSE 10 ML: 1.2 LIQUID ORAL at 09:01

## 2023-01-27 RX ADMIN — CEFTRIAXONE 2 G: 2 INJECTION, POWDER, FOR SOLUTION INTRAMUSCULAR; INTRAVENOUS at 02:01

## 2023-01-27 RX ADMIN — SODIUM CHLORIDE, POTASSIUM CHLORIDE, SODIUM LACTATE AND CALCIUM CHLORIDE: 600; 310; 30; 20 INJECTION, SOLUTION INTRAVENOUS at 02:01

## 2023-01-27 RX ADMIN — MICONAZOLE NITRATE: 20 OINTMENT TOPICAL at 09:01

## 2023-01-27 RX ADMIN — SODIUM CHLORIDE, POTASSIUM CHLORIDE, SODIUM LACTATE AND CALCIUM CHLORIDE: 600; 310; 30; 20 INJECTION, SOLUTION INTRAVENOUS at 06:01

## 2023-01-27 RX ADMIN — METRONIDAZOLE 500 MG: 500 TABLET ORAL at 02:01

## 2023-01-27 RX ADMIN — MICONAZOLE NITRATE 2 % TOPICAL POWDER: at 09:01

## 2023-01-27 RX ADMIN — PANTOPRAZOLE SODIUM 40 MG: 40 INJECTION, POWDER, FOR SOLUTION INTRAVENOUS at 09:01

## 2023-01-27 RX ADMIN — ENOXAPARIN SODIUM 40 MG: 40 INJECTION SUBCUTANEOUS at 09:01

## 2023-01-27 RX ADMIN — SUCRALFATE 1 G: 1 TABLET ORAL at 09:01

## 2023-01-27 RX ADMIN — METRONIDAZOLE 500 MG: 500 TABLET ORAL at 09:01

## 2023-01-27 RX ADMIN — MUPIROCIN: 20 OINTMENT TOPICAL at 09:01

## 2023-01-27 RX ADMIN — DEXTROSE MONOHYDRATE 125 ML: 100 INJECTION, SOLUTION INTRAVENOUS at 10:01

## 2023-01-27 RX ADMIN — METRONIDAZOLE 500 MG: 500 TABLET ORAL at 06:01

## 2023-01-27 RX ADMIN — ENOXAPARIN SODIUM 60 MG: 100 INJECTION SUBCUTANEOUS at 09:01

## 2023-01-27 RX ADMIN — FLUCONAZOLE 400 MG: 100 TABLET ORAL at 10:01

## 2023-01-27 RX ADMIN — SUCRALFATE 1 G: 1 TABLET ORAL at 11:01

## 2023-01-27 RX ADMIN — SODIUM CHLORIDE, POTASSIUM CHLORIDE, SODIUM LACTATE AND CALCIUM CHLORIDE: 600; 310; 30; 20 INJECTION, SOLUTION INTRAVENOUS at 04:01

## 2023-01-27 RX ADMIN — TRAZODONE HYDROCHLORIDE 25 MG: 50 TABLET ORAL at 10:01

## 2023-01-27 RX ADMIN — SUCRALFATE 1 G: 1 TABLET ORAL at 06:01

## 2023-01-27 RX ADMIN — SUCRALFATE 1 G: 1 TABLET ORAL at 04:01

## 2023-01-27 NOTE — ASSESSMENT & PLAN NOTE
Body mass index is 89.27 kg/m². Morbid obesity complicates all aspects of disease management from diagnostic modalities to treatment. Weight loss encouraged and health benefits explained to patient.

## 2023-01-27 NOTE — SUBJECTIVE & OBJECTIVE
Interval History:     Review of Systems   Constitutional:  Negative for chills and fever.   HENT: Negative.     Eyes: Negative.    Respiratory:  Negative for shortness of breath and wheezing.    Gastrointestinal:  Positive for abdominal pain. Negative for nausea and vomiting.   Endocrine: Negative.    Genitourinary: Negative.  Negative for flank pain.   Musculoskeletal: Negative.    Skin:  Positive for wound.   Allergic/Immunologic: Negative.    Neurological:  Negative for headaches.   Hematological: Negative.    Psychiatric/Behavioral:  The patient is not nervous/anxious.    Objective:     Vital Signs (Most Recent):  Temp: 98.4 °F (36.9 °C) (01/27/23 1524)  Pulse: 102 (01/27/23 1524)  Resp: 16 (01/27/23 1524)  BP: 137/64 (01/27/23 1524)  SpO2: 97 % (01/27/23 1524) Vital Signs (24h Range):  Temp:  [97.8 °F (36.6 °C)-99.4 °F (37.4 °C)] 98.4 °F (36.9 °C)  Pulse:  [] 102  Resp:  [16-32] 16  SpO2:  [96 %-98 %] 97 %  BP: (104-137)/(51-65) 137/64     Weight: (!) 235.9 kg (520 lb 1 oz)  Body mass index is 89.27 kg/m².    Intake/Output Summary (Last 24 hours) at 1/27/2023 1622  Last data filed at 1/27/2023 0847  Gross per 24 hour   Intake 3000.18 ml   Output 1450 ml   Net 1550.18 ml      Physical Exam  Vitals and nursing note reviewed.   Constitutional:       General: She is not in acute distress.     Appearance: She is well-developed. She is obese. She is not diaphoretic.   HENT:      Head: Normocephalic and atraumatic.   Eyes:      Pupils: Pupils are equal, round, and reactive to light.   Neck:      Thyroid: No thyromegaly.      Vascular: No JVD.      Trachea: No tracheal deviation.   Cardiovascular:      Rate and Rhythm: Normal rate and regular rhythm.      Heart sounds: Normal heart sounds. No murmur heard.    No friction rub. No gallop.   Pulmonary:      Effort: Pulmonary effort is normal. No respiratory distress.      Breath sounds: Normal breath sounds. No stridor. No wheezing or rales.   Abdominal:       General: Bowel sounds are normal. There is no distension.      Palpations: Abdomen is soft. Abdomen is not rigid. There is no mass.      Tenderness: There is no abdominal tenderness. There is no guarding or rebound.      Comments: Massively obese.  Incisions are dressed.  LISET is are serosanguineous   Musculoskeletal:         General: Normal range of motion.      Right lower leg: Edema present.      Left lower leg: Edema present.   Lymphadenopathy:      Cervical: No cervical adenopathy.   Skin:     General: Skin is warm and dry.      Findings: No erythema or rash.      Comments: There is a extensive rash with epithelial loss under the abdominal wall pannus, bilateral thighs and lateral abdominal wall with the pannus of the abdominal wall touch   Neurological:      Mental Status: She is alert and oriented to person, place, and time.   Psychiatric:         Mood and Affect: Mood normal.         Behavior: Behavior normal.         Thought Content: Thought content normal.         Judgment: Judgment normal.       Significant Labs: All pertinent labs within the past 24 hours have been reviewed.      Significant Imaging: I have reviewed all pertinent imaging results/findings within the past 24 hours.

## 2023-01-27 NOTE — SUBJECTIVE & OBJECTIVE
Interval History:  Patient states that her main discomfort is a NG tube.  She denies any significant abdominal pain.  Drains are serous.  Singh in place.  Urine output improved but slightly marginal    Medications:  Continuous Infusions:   lactated ringers 100 mL/hr at 01/27/23 0649     Scheduled Meds:   cefTRIAXone (ROCEPHIN) IVPB  2 g Intravenous Q24H    chlorhexidine  10 mL Mouth/Throat BID    enoxaparin  40 mg Subcutaneous BID    fluconazole  400 mg Oral Daily    metroNIDAZOLE  500 mg Oral Q8H    miconazole NITRATE 2 %   Topical (Top) BID    miconazole nitrate 2%   Topical (Top) BID    mupirocin   Nasal BID    pantoprazole  40 mg Intravenous BID    sucralfate  1 g Oral QID (AC & HS)     PRN Meds:acetaminophen, albuterol-ipratropium, dextrose 10%, dextrose 10%, diphenhydrAMINE, glucagon (human recombinant), glucose, glucose, hydrALAZINE, influenza, insulin aspart U-100, metoclopramide HCl, morphine, naloxone, ondansetron, prochlorperazine, sodium chloride 0.9%     Review of patient's allergies indicates:  No Known Allergies  Objective:     Vital Signs (Most Recent):  Temp: 98.6 °F (37 °C) (01/27/23 0752)  Pulse: 100 (01/27/23 0752)  Resp: 18 (01/27/23 0752)  BP: 119/65 (01/27/23 0752)  SpO2: 96 % (01/27/23 0752) Vital Signs (24h Range):  Temp:  [97.8 °F (36.6 °C)-98.6 °F (37 °C)] 98.6 °F (37 °C)  Pulse:  [] 100  Resp:  [18-34] 18  SpO2:  [95 %-98 %] 96 %  BP: (104-126)/(49-65) 119/65     Weight: (!) 235.9 kg (520 lb 1 oz)  Body mass index is 89.27 kg/m².    Intake/Output - Last 3 Shifts         01/25 0700 01/26 0659 01/26 0700 01/27 0659 01/27 0700 01/28 0659    I.V. (mL/kg) 2461.3 (10.4) 2425.6 (10.3)     IV Piggyback 584.3 1068.9     Total Intake(mL/kg) 3045.6 (12.9) 3494.5 (14.8)     Urine (mL/kg/hr) 323 1295 (0.2) 150 (0.2)    Drains 40 50     Total Output 363 1345 150    Net +2682.6 +2149.5 -150                   Physical Exam  Vitals and nursing note reviewed.   Constitutional:       Appearance:  She is well-developed. She is obese.   HENT:      Head: Normocephalic.   Eyes:      Pupils: Pupils are equal, round, and reactive to light.   Neck:      Thyroid: No thyromegaly.      Vascular: No JVD.      Trachea: No tracheal deviation.   Cardiovascular:      Rate and Rhythm: Normal rate and regular rhythm.      Heart sounds: Normal heart sounds.   Pulmonary:      Breath sounds: Normal breath sounds. No wheezing.   Abdominal:      General: Bowel sounds are normal. There is no distension.      Palpations: Abdomen is soft. Abdomen is not rigid. There is no mass.      Tenderness: There is no abdominal tenderness (incision). There is no guarding or rebound.      Comments: Massively obese.  Incisions are dressed.  LISET is are serosanguineous   Musculoskeletal:         General: Normal range of motion.      Right lower leg: Edema present.      Left lower leg: Edema present.   Lymphadenopathy:      Cervical: No cervical adenopathy.   Skin:     General: Skin is warm and dry.      Findings: No erythema or rash.      Comments: There is a extensive rash with epithelial loss under the abdominal wall pannus, bilateral thighs and lateral abdominal wall with the pannus of the abdominal wall touch   Neurological:      Mental Status: She is alert and oriented to person, place, and time.   Psychiatric:         Mood and Affect: Mood normal.         Behavior: Behavior normal.         Thought Content: Thought content normal.         Judgment: Judgment normal.       Significant Labs:  I have reviewed all pertinent lab results within the past 24 hours.  CBC:   Recent Labs   Lab 01/26/23 0417   WBC 18.17*   RBC 3.92*   HGB 10.3*   HCT 33.0*      MCV 84   MCH 26.3*   MCHC 31.2*     BMP:   Recent Labs   Lab 01/26/23 0417   *      K 5.0      CO2 19*   BUN 53*   CREATININE 1.6*   CALCIUM 8.6*   MG 2.1       Significant Diagnostics:  I have reviewed all pertinent imaging results/findings within the past 24 hours.  No  new

## 2023-01-27 NOTE — PLAN OF CARE
AAOx4. Pt refused to turn for admit assessment. Cardiac monitoring #5030. Pt remained free from fall and injury. Encouraged pt to turn Q2. No sign of any distress noted. Safety precautions alert. Pt asked to call for assistance if needed. IV access clean dry and intact infusing LR @100ml/hr. Chart checked reviewed. Plan of care continued.

## 2023-01-27 NOTE — PROGRESS NOTES
[facility] Medicine  Progress Note    Patient Name: Chastity Hung  MRN: [id number]  Patient Class: IP- Inpatient   Admission Date: 1/25/2023  Length of Stay: 2 days  Attending Physician: Miki Smallwood, *  Primary Care Provider: Alexandra Dawkins MD        Subjective:     Principal Problem:Perforated gastric ulcer        HPI:  The patient is a 58 yo female with Morbid Obesity - BMI 89, DM, HTN, Kidney stones who presented to ED with N/V and abdominal pain x 3 days. The patient reports abdominal pain at umbilical area is a sharp, 9/10 pain associated with N/V, Poor appetite, Fatigue, and Malaise. Pt also reports pain and wounds to lower abdomen and BLE legs making it difficult to ambulate. She has not ambulated for over 2 weeks. Pt reports noncompliance with home meds and self care.     In the ED, pt was found to have a non-reducible umbilical hernia with a draining wound at the umbilicus. Pt unable to have CT imaging due to body habitus. Afebrile, WBC 13, Serum Cr 1.6, mildly elevated LFTs/T bili  Abd xray showed nothing acute.     Dr. Collins with General surgery evaluated pt in ED and recommended urgent surgery for incarcerated umbilical hernia and ICU admission.       Overview/Hospital Course:  1/26: perforated gastric ulcer, surgically corrected by surgery. Will cont on rocephin and flagyl for intra-abdominal coverage and UTI. PT/OT on case.   1/27 NAEON . The urine cx is (+) for GNR .  Remain NPO and eith a NGT . General surgery following .      Interval History:     Review of Systems   Constitutional:  Negative for chills and fever.   HENT: Negative.     Eyes: Negative.    Respiratory:  Negative for shortness of breath and wheezing.    Gastrointestinal:  Positive for abdominal pain. Negative for nausea and vomiting.   Endocrine: Negative.    Genitourinary: Negative.  Negative for flank pain.   Musculoskeletal: Negative.    Skin:  Positive for wound.   Allergic/Immunologic:  Negative.    Neurological:  Negative for headaches.   Hematological: Negative.    Psychiatric/Behavioral:  The patient is not nervous/anxious.    Objective:     Vital Signs (Most Recent):  Temp: 98.4 °F (36.9 °C) (01/27/23 1524)  Pulse: 102 (01/27/23 1524)  Resp: 16 (01/27/23 1524)  BP: 137/64 (01/27/23 1524)  SpO2: 97 % (01/27/23 1524) Vital Signs (24h Range):  Temp:  [97.8 °F (36.6 °C)-99.4 °F (37.4 °C)] 98.4 °F (36.9 °C)  Pulse:  [] 102  Resp:  [16-32] 16  SpO2:  [96 %-98 %] 97 %  BP: (104-137)/(51-65) 137/64     Weight: (!) 235.9 kg (520 lb 1 oz)  Body mass index is 89.27 kg/m².    Intake/Output Summary (Last 24 hours) at 1/27/2023 1622  Last data filed at 1/27/2023 0847  Gross per 24 hour   Intake 3000.18 ml   Output 1450 ml   Net 1550.18 ml      Physical Exam  Vitals and nursing note reviewed.   Constitutional:       General: She is not in acute distress.     Appearance: She is well-developed. She is obese. She is not diaphoretic.   HENT:      Head: Normocephalic and atraumatic.   Eyes:      Pupils: Pupils are equal, round, and reactive to light.   Neck:      Thyroid: No thyromegaly.      Vascular: No JVD.      Trachea: No tracheal deviation.   Cardiovascular:      Rate and Rhythm: Normal rate and regular rhythm.      Heart sounds: Normal heart sounds. No murmur heard.    No friction rub. No gallop.   Pulmonary:      Effort: Pulmonary effort is normal. No respiratory distress.      Breath sounds: Normal breath sounds. No stridor. No wheezing or rales.   Abdominal:      General: Bowel sounds are normal. There is no distension.      Palpations: Abdomen is soft. Abdomen is not rigid. There is no mass.      Tenderness: There is no abdominal tenderness. There is no guarding or rebound.      Comments: Massively obese.  Incisions are dressed.  LISET is are serosanguineous   Musculoskeletal:         General: Normal range of motion.      Right lower leg: Edema present.      Left lower leg: Edema present.    Lymphadenopathy:      Cervical: No cervical adenopathy.   Skin:     General: Skin is warm and dry.      Findings: No erythema or rash.      Comments: There is a extensive rash with epithelial loss under the abdominal wall pannus, bilateral thighs and lateral abdominal wall with the pannus of the abdominal wall touch   Neurological:      Mental Status: She is alert and oriented to person, place, and time.   Psychiatric:         Mood and Affect: Mood normal.         Behavior: Behavior normal.         Thought Content: Thought content normal.         Judgment: Judgment normal.       Significant Labs: All pertinent labs within the past 24 hours have been reviewed.      Significant Imaging: I have reviewed all pertinent imaging results/findings within the past 24 hours.        Assessment/Plan:      * Perforated gastric ulcer  -Excision and over sewn 1/25/23  -ivf\  -ppi iv bid  -Check h- plori IgG titer        MIGUEL (acute kidney injury)  Patient with acute kidney injury likely due to IVVD/dehydration MIGUEL is currently worsening. Labs reviewed- Renal function/electrolytes with Estimated Creatinine Clearance: 76 mL/min (A) (based on SCr of 1.6 mg/dL (H)). according to latest data. Monitor urine output and serial BMP and adjust therapy as needed. Avoid nephrotoxins and renally dose meds for GFR listed above.     Gentle IV hydration   Avoid hypotension and nephrotoxic agents       Incarcerated umbilical hernia with partial ileum obstruction and perforated gastric ulcer  Cont NPO  Excision and over sewn 1/25/23  By surgery  Diet per surgery    Cutaneous candidiasis  Oral diflucan   Topical miconazole      Venous stasis dermatitis of both lower extremities  Consult wound care        Morbid obesity with BMI of 70 and over, adult  Body mass index is 89.27 kg/m². Morbid obesity complicates all aspects of disease management from diagnostic modalities to treatment. Weight loss encouraged and health benefits explained to  patient.         Diabetes mellitus type 2, controlled  Patient's FSGs are uncontrolled due to hyperglycemia on current medication regimen.  Last A1c reviewed-   Lab Results   Component Value Date    HGBA1C 6.4 (H) 01/25/2023     Most recent fingerstick glucose reviewed-   Recent Labs   Lab 01/26/23  2355 01/27/23  0440 01/27/23  1307 01/27/23  1521   POCTGLUCOSE 93 [phone]     Current correctional scale  Low  Maintain anti-hyperglycemic dose as follows-   Antihyperglycemics (From admission, onward)    Start     Stop Route Frequency Ordered    01/25/23 1515  insulin aspart U-100 pen 0-5 Units         -- SubQ Before meals & nightly PRN 01/25/23 1417        Hold Oral hypoglycemics while patient is in the hospital.    Essential hypertension  Hold Spironolactone and Losartan for now   Resume accordantly     Depression  Patient has persistent depression which is mild and is currently controlled. Will Continue anti-depressant medications. We will not consult psychiatry at this time. Patient does not display psychosis at this time. Continue to monitor closely and adjust plan of care as needed.    Restart Zoloft         Hyperlipidemia  Cont Statin         VTE Risk Mitigation (From admission, onward)         Ordered     enoxaparin injection 60 mg  2 times daily         01/27/23 1347     Place sequential compression device  Until discontinued         01/26/23 1307     IP VTE HIGH RISK PATIENT  Once         01/26/23 0753     Place sequential compression device  Until discontinued         01/25/23 1417                Discharge Planning   ADOLFO:      Code Status: Full Code   Is the patient medically ready for discharge?:     Reason for patient still in hospital (select all that apply): Treatment  Discharge Plan A: Skilled Nursing Facility                  Miki Smallwood MD  Department of Hospital Medicine   [facility] - Med Surg

## 2023-01-27 NOTE — ASSESSMENT & PLAN NOTE
Excision and over sewn 1/25/23  ngt for 4 days  ivf  ppi iv bid  Check h- plori IgG titer  Antibiotic till wbc normal  Monitor drains

## 2023-01-27 NOTE — CONSULTS
Swer received Skilled nursing facility consult for Patient. Swer reviewed patient's chart, for OT and PT recommendations, Therapy also recommended SNF, however due to Patient's weight, placement will be difficult.    Swer went and meet with Patient at bedside to discuss SNF placement. Swer let Patient know that therapy had recommended SNF, but due to weight, placement would be difficult. Swer informed Patient that mass referrals will be sent out and we can decide on choice, when placements accept.     Swer sent out mass referral and will continue to follow and assist as needed.

## 2023-01-27 NOTE — NURSING
Report called to receiving nurse. Pt transferred to [facility] via specialty bed with all belongings and tele box in place. Receiving nurse at bedside.

## 2023-01-27 NOTE — ASSESSMENT & PLAN NOTE
She would be discussed with her primary provider.  It is unlikely the patient loose significant weight without a structured program.      Currently she is in mobile likely due to her morbid obesity.      Disposition as far as meeting home needs verses skilled nursing versus nursing home per hospital Medicine

## 2023-01-27 NOTE — ASSESSMENT & PLAN NOTE
Patient has persistent depression which is mild and is currently controlled. Will Continue anti-depressant medications. We will not consult psychiatry at this time. Patient does not display psychosis at this time. Continue to monitor closely and adjust plan of care as needed.    Restart Zoloft

## 2023-01-27 NOTE — ASSESSMENT & PLAN NOTE
Patient's FSGs are uncontrolled due to hyperglycemia on current medication regimen.  Last A1c reviewed-   Lab Results   Component Value Date    HGBA1C 6.4 (H) 01/25/2023     Most recent fingerstick glucose reviewed-   Recent Labs   Lab 01/26/23  2355 01/27/23  0440 01/27/23  1307 01/27/23  1521   POCTGLUCOSE 93 [phone] 74     Current correctional scale  Low  Maintain anti-hyperglycemic dose as follows-   Antihyperglycemics (From admission, onward)    Start     Stop Route Frequency Ordered    01/25/23 1515  insulin aspart U-100 pen 0-5 Units         -- SubQ Before meals & nightly PRN 01/25/23 1417        Hold Oral hypoglycemics while patient is in the hospital.

## 2023-01-27 NOTE — PLAN OF CARE
Bia called some Nursing Facilities to find placement for Patient.    Bia called [facility], in [facility], to ask about accommodations for Patient. Per Ashley, she stated she was unsure if they had beds, due to having to move Patients due to a COVID outbreak. Ashley stated if Patient was able to ambulate or use a Wheelchair they could possible accommodate. Ashley asked for a call back Monday 1/30, for a more definite answer about available beds.     Bia called [facility], in [facility], to ask about accommodations for Patient. Per Dagmar, she would have to check for if they could order a bed to accommodate Patient's needs. Dagmar will check with their admission and give Bia a call back. Bia left name and call back number.

## 2023-01-27 NOTE — PROGRESS NOTES
[facility]  [facility] Surgery  Progress Note    Subjective:     History of Present Illness:    57-year-old female who presents to the emergency room with a 3 day history of epigastric pain associated with nausea and vomiting loss of appetite.  She states that she has not been able to keep anything down.  She says the bulge above her bellybutton is more prominent.      The patient is unable to go to the CT scanner due to her body mass index of 89.6.      The patient also has significant skin breakdown along the upper thighs and along the edges in undersurface of her very large abdominal wall pannus.  She has significant swelling and blistering of her lower extremities.      She does not report any fever or chills.  She is not report any chest pain.      The patient is essentially nonambulatory       her home medications, and is non-ambulatory at baseline. No further complaints or concerns at this time.      Arrival mode: EMS      Post-Op Info:  Procedure(s):  REPAIR, HERNIA, VENTRAL, INCARCERATED, WITHOUT HISTORY OF PRIOR REPAIR  LAPAROTOMY, EXPLORATORY  BLOCK, TRANSVERSUS ABDOMINIS PLANE  LYSIS, ADHESIONS  EXCISION, SMALL INTESTINE  EXCISION, ULCER   2 Days Post-Op     Interval History:  Patient states that her main discomfort is a NG tube.  She denies any significant abdominal pain.  Drains are serous.  Singh in place.  Urine output improved but slightly marginal    Medications:  Continuous Infusions:   lactated ringers 100 mL/hr at 01/27/23 0649     Scheduled Meds:   cefTRIAXone (ROCEPHIN) IVPB  2 g Intravenous Q24H    chlorhexidine  10 mL Mouth/Throat BID    enoxaparin  40 mg Subcutaneous BID    fluconazole  400 mg Oral Daily    metroNIDAZOLE  500 mg Oral Q8H    miconazole NITRATE 2 %   Topical (Top) BID    miconazole nitrate 2%   Topical (Top) BID    mupirocin   Nasal BID    pantoprazole  40 mg Intravenous BID    sucralfate  1 g Oral QID (AC & HS)     PRN Meds:acetaminophen,  albuterol-ipratropium, dextrose 10%, dextrose 10%, diphenhydrAMINE, glucagon (human recombinant), glucose, glucose, hydrALAZINE, influenza, insulin aspart U-100, metoclopramide HCl, morphine, naloxone, ondansetron, prochlorperazine, sodium chloride 0.9%     Review of patient's allergies indicates:  No Known Allergies  Objective:     Vital Signs (Most Recent):  Temp: 98.6 °F (37 °C) (01/27/23 0752)  Pulse: 100 (01/27/23 0752)  Resp: 18 (01/27/23 0752)  BP: 119/65 (01/27/23 0752)  SpO2: 96 % (01/27/23 0752) Vital Signs (24h Range):  Temp:  [97.8 °F (36.6 °C)-98.6 °F (37 °C)] 98.6 °F (37 °C)  Pulse:  [] 100  Resp:  [18-34] 18  SpO2:  [95 %-98 %] 96 %  BP: (104-126)/(49-65) 119/65     Weight: (!) 235.9 kg (520 lb 1 oz)  Body mass index is 89.27 kg/m².    Intake/Output - Last 3 Shifts         01/25 0700 01/26 0659 01/26 0700 01/27 0659 01/27 0700  01/28 0659    I.V. (mL/kg) 2461.3 (10.4) 2425.6 (10.3)     IV Piggyback 584.3 1068.9     Total Intake(mL/kg) 3045.6 (12.9) 3494.5 (14.8)     Urine (mL/kg/hr) 323 1295 (0.2) 150 (0.2)    Drains 40 50     Total Output 363 1345 150    Net +2682.6 +2149.5 -150                   Physical Exam  Vitals and nursing note reviewed.   Constitutional:       Appearance: She is well-developed. She is obese.   HENT:      Head: Normocephalic.   Eyes:      Pupils: Pupils are equal, round, and reactive to light.   Neck:      Thyroid: No thyromegaly.      Vascular: No JVD.      Trachea: No tracheal deviation.   Cardiovascular:      Rate and Rhythm: Normal rate and regular rhythm.      Heart sounds: Normal heart sounds.   Pulmonary:      Breath sounds: Normal breath sounds. No wheezing.   Abdominal:      General: Bowel sounds are normal. There is no distension.      Palpations: Abdomen is soft. Abdomen is not rigid. There is no mass.      Tenderness: There is no abdominal tenderness (incision). There is no guarding or rebound.      Comments: Massively obese.  Incisions are dressed.  LISET is  are serosanguineous   Musculoskeletal:         General: Normal range of motion.      Right lower leg: Edema present.      Left lower leg: Edema present.   Lymphadenopathy:      Cervical: No cervical adenopathy.   Skin:     General: Skin is warm and dry.      Findings: No erythema or rash.      Comments: There is a extensive rash with epithelial loss under the abdominal wall pannus, bilateral thighs and lateral abdominal wall with the pannus of the abdominal wall touch   Neurological:      Mental Status: She is alert and oriented to person, place, and time.   Psychiatric:         Mood and Affect: Mood normal.         Behavior: Behavior normal.         Thought Content: Thought content normal.         Judgment: Judgment normal.       Significant Labs:  I have reviewed all pertinent lab results within the past 24 hours.  CBC:   Recent Labs   Lab 01/26/23  0417   WBC 18.17*   RBC 3.92*   HGB 10.3*   HCT 33.0*      MCV 84   MCH 26.3*   MCHC 31.2*     BMP:   Recent Labs   Lab 01/26/23 0417   *      K 5.0      CO2 19*   BUN 53*   CREATININE 1.6*   CALCIUM 8.6*   MG 2.1       Significant Diagnostics:  I have reviewed all pertinent imaging results/findings within the past 24 hours.  No new    Assessment/Plan:     * Perforated gastric ulcer  Excision and over sewn 1/25/23  ngt for 4 days  ivf  ppi iv bid  Check h- plori IgG titer  Antibiotic till wbc normal  Monitor drains    MIGUEL (acute kidney injury)  monitor    Incarcerated umbilical hernia with partial ileum obstruction and perforated gastric ulcer  Likely secondary to perforated gastric ulcer    Cutaneous candidiasis  Would recommend treatment of the cutaneous candidiasis both with oral Diflucan and topical ketoconazole ointment.  Barrier gauze or abdominal pads or other material should be placed to prevent    Venous stasis dermatitis of both lower extremities  Management per hospital Medicine    Type 2 diabetes mellitus, without long-term  current use of insulin  Insulin sliding scale per hospital Medicine    Morbid obesity with BMI of 70 and over, adult  She would be discussed with her primary provider.  It is unlikely the patient loose significant weight without a structured program.      Currently she is in mobile likely due to her morbid obesity.      Disposition as far as meeting home needs verses skilled nursing versus nursing home per hospital Medicine    Essential hypertension  Further management per hospital Medicine,  hold oral medication until ngt removed    Depression  Further management per hospital Medicine,  hold oral medication until ngt removed    Hyperlipidemia  Further management by hospital Medicine, hold oral medication until ngt removed        Luis Angel Collins MD  General Surgery  [facility]

## 2023-01-27 NOTE — PROGRESS NOTES
Pharmacist Renal Dose Adjustment Note    Chastity Hung is a 57 y.o. female being treated with the medication lovenox     Patient Data:    Vital Signs (Most Recent):  Temp: 99.4 °F (37.4 °C) (01/27/23 1311)  Pulse: 99 (01/27/23 1311)  Resp: 16 (01/27/23 1311)  BP: 136/63 (01/27/23 1311)  SpO2: 96 % (01/27/23 1311) Vital Signs (72h Range):  Temp:  [97.2 °F (36.2 °C)-99.4 °F (37.4 °C)]   Pulse:  []   Resp:  [16-34]   BP: (103-180)/(41-77)   SpO2:  [84 %-100 %]      Recent Labs   Lab 01/25/23  1254 01/26/23  0417   CREATININE 1.6* 1.6*     Serum creatinine: 1.6 mg/dL (H) 01/26/23 0417  Estimated creatinine clearance: 76 mL/min (A)    Medication:lovenox 40 mg BID will be changed to lovenox 60mg BID for BMI >50     Pharmacist's Name: Shama Dey  Pharmacist's Extension: [phone]

## 2023-01-28 LAB
ANION GAP SERPL CALC-SCNC: 13 MMOL/L (ref 8–16)
ANISOCYTOSIS BLD QL SMEAR: SLIGHT
BACTERIA UR CULT: ABNORMAL
BASOPHILS NFR BLD: 0 % (ref 0–1.9)
BUN SERPL-MCNC: 37 MG/DL (ref 6–20)
BURR CELLS BLD QL SMEAR: ABNORMAL
CALCIUM SERPL-MCNC: 9.1 MG/DL (ref 8.7–10.5)
CHLORIDE SERPL-SCNC: 110 MMOL/L (ref 95–110)
CO2 SERPL-SCNC: 19 MMOL/L (ref 23–29)
CREAT SERPL-MCNC: 1.1 MG/DL (ref 0.5–1.4)
DACRYOCYTES BLD QL SMEAR: ABNORMAL
DIFFERENTIAL METHOD: ABNORMAL
EOSINOPHIL NFR BLD: 0 % (ref 0–8)
ERYTHROCYTE [DISTWIDTH] IN BLOOD BY AUTOMATED COUNT: 14.1 % (ref 11.5–14.5)
EST. GFR  (NO RACE VARIABLE): 58 ML/MIN/1.73 M^2
GLUCOSE SERPL-MCNC: 72 MG/DL (ref 70–110)
HCT VFR BLD AUTO: 31.3 % (ref 37–48.5)
HGB BLD-MCNC: 9.8 G/DL (ref 12–16)
IMM GRANULOCYTES # BLD AUTO: ABNORMAL K/UL (ref 0–0.04)
IMM GRANULOCYTES NFR BLD AUTO: ABNORMAL % (ref 0–0.5)
LYMPHOCYTES NFR BLD: 9 % (ref 18–48)
MCH RBC QN AUTO: 26.3 PG (ref 27–31)
MCHC RBC AUTO-ENTMCNC: 31.3 G/DL (ref 32–36)
MCV RBC AUTO: 84 FL (ref 82–98)
MONOCYTES NFR BLD: 3 % (ref 4–15)
MYELOCYTES NFR BLD MANUAL: 5 %
NEUTROPHILS NFR BLD: 83 % (ref 38–73)
NRBC BLD-RTO: 0 /100 WBC
OVALOCYTES BLD QL SMEAR: ABNORMAL
PLATELET # BLD AUTO: 256 K/UL (ref 150–450)
PMV BLD AUTO: 12.2 FL (ref 9.2–12.9)
POCT GLUCOSE: 107 MG/DL (ref 70–110)
POCT GLUCOSE: 72 MG/DL (ref 70–110)
POCT GLUCOSE: 89 MG/DL (ref 70–110)
POCT GLUCOSE: 94 MG/DL (ref 70–110)
POIKILOCYTOSIS BLD QL SMEAR: SLIGHT
POTASSIUM SERPL-SCNC: 4.5 MMOL/L (ref 3.5–5.1)
RBC # BLD AUTO: 3.72 M/UL (ref 4–5.4)
SODIUM SERPL-SCNC: 142 MMOL/L (ref 136–145)
WBC # BLD AUTO: 16.96 K/UL (ref 3.9–12.7)

## 2023-01-28 PROCEDURE — 63600175 PHARM REV CODE 636 W HCPCS: Performed by: SURGERY

## 2023-01-28 PROCEDURE — 36415 COLL VENOUS BLD VENIPUNCTURE: CPT | Performed by: SURGERY

## 2023-01-28 PROCEDURE — 85007 BL SMEAR W/DIFF WBC COUNT: CPT | Performed by: SURGERY

## 2023-01-28 PROCEDURE — C9113 INJ PANTOPRAZOLE SODIUM, VIA: HCPCS | Performed by: SURGERY

## 2023-01-28 PROCEDURE — 63600175 PHARM REV CODE 636 W HCPCS: Performed by: FAMILY MEDICINE

## 2023-01-28 PROCEDURE — 97530 THERAPEUTIC ACTIVITIES: CPT | Mod: CQ

## 2023-01-28 PROCEDURE — 27000221 HC OXYGEN, UP TO 24 HOURS

## 2023-01-28 PROCEDURE — 94799 UNLISTED PULMONARY SVC/PX: CPT

## 2023-01-28 PROCEDURE — 85027 COMPLETE CBC AUTOMATED: CPT | Performed by: SURGERY

## 2023-01-28 PROCEDURE — 36415 COLL VENOUS BLD VENIPUNCTURE: CPT | Performed by: FAMILY MEDICINE

## 2023-01-28 PROCEDURE — 11000001 HC ACUTE MED/SURG PRIVATE ROOM

## 2023-01-28 PROCEDURE — 86677 HELICOBACTER PYLORI ANTIBODY: CPT | Performed by: FAMILY MEDICINE

## 2023-01-28 PROCEDURE — 63700000 PHARM REV CODE 250 ALT 637 W/O HCPCS: Performed by: FAMILY MEDICINE

## 2023-01-28 PROCEDURE — 80048 BASIC METABOLIC PNL TOTAL CA: CPT | Performed by: SURGERY

## 2023-01-28 PROCEDURE — 94761 N-INVAS EAR/PLS OXIMETRY MLT: CPT

## 2023-01-28 PROCEDURE — 25000003 PHARM REV CODE 250: Performed by: FAMILY MEDICINE

## 2023-01-28 PROCEDURE — 25000003 PHARM REV CODE 250: Performed by: SURGERY

## 2023-01-28 PROCEDURE — 63600175 PHARM REV CODE 636 W HCPCS: Performed by: INTERNAL MEDICINE

## 2023-01-28 PROCEDURE — 99900035 HC TECH TIME PER 15 MIN (STAT)

## 2023-01-28 PROCEDURE — 25000003 PHARM REV CODE 250: Performed by: INTERNAL MEDICINE

## 2023-01-28 RX ORDER — DEXTROSE MONOHYDRATE, SODIUM CHLORIDE, AND POTASSIUM CHLORIDE 50; 1.49; 4.5 G/1000ML; G/1000ML; G/1000ML
INJECTION, SOLUTION INTRAVENOUS CONTINUOUS
Status: DISCONTINUED | OUTPATIENT
Start: 2023-01-28 | End: 2023-02-01 | Stop reason: HOSPADM

## 2023-01-28 RX ADMIN — FLUCONAZOLE 400 MG: 100 TABLET ORAL at 09:01

## 2023-01-28 RX ADMIN — CEFTRIAXONE 2 G: 2 INJECTION, POWDER, FOR SOLUTION INTRAMUSCULAR; INTRAVENOUS at 02:01

## 2023-01-28 RX ADMIN — MUPIROCIN: 20 OINTMENT TOPICAL at 10:01

## 2023-01-28 RX ADMIN — SUCRALFATE 1 G: 1 TABLET ORAL at 04:01

## 2023-01-28 RX ADMIN — MICONAZOLE NITRATE 2 % TOPICAL POWDER: at 10:01

## 2023-01-28 RX ADMIN — MICONAZOLE NITRATE 2 % TOPICAL POWDER: at 09:01

## 2023-01-28 RX ADMIN — SUCRALFATE 1 G: 1 TABLET ORAL at 05:01

## 2023-01-28 RX ADMIN — METRONIDAZOLE 500 MG: 500 TABLET ORAL at 09:01

## 2023-01-28 RX ADMIN — PANTOPRAZOLE SODIUM 40 MG: 40 INJECTION, POWDER, FOR SOLUTION INTRAVENOUS at 09:01

## 2023-01-28 RX ADMIN — SUCRALFATE 1 G: 1 TABLET ORAL at 09:01

## 2023-01-28 RX ADMIN — MICONAZOLE NITRATE: 20 OINTMENT TOPICAL at 09:01

## 2023-01-28 RX ADMIN — MORPHINE SULFATE 2 MG: 2 INJECTION, SOLUTION INTRAMUSCULAR; INTRAVENOUS at 09:01

## 2023-01-28 RX ADMIN — METRONIDAZOLE 500 MG: 500 TABLET ORAL at 05:01

## 2023-01-28 RX ADMIN — ATORVASTATIN CALCIUM 10 MG: 10 TABLET, FILM COATED ORAL at 09:01

## 2023-01-28 RX ADMIN — MUPIROCIN: 20 OINTMENT TOPICAL at 09:01

## 2023-01-28 RX ADMIN — DEXTROSE, SODIUM CHLORIDE, AND POTASSIUM CHLORIDE: 5; .45; .15 INJECTION INTRAVENOUS at 11:01

## 2023-01-28 RX ADMIN — SUCRALFATE 1 G: 1 TABLET ORAL at 11:01

## 2023-01-28 RX ADMIN — ENOXAPARIN SODIUM 60 MG: 100 INJECTION SUBCUTANEOUS at 09:01

## 2023-01-28 RX ADMIN — DEXTROSE, SODIUM CHLORIDE, AND POTASSIUM CHLORIDE: 5; .45; .15 INJECTION INTRAVENOUS at 09:01

## 2023-01-28 RX ADMIN — MORPHINE SULFATE 2 MG: 2 INJECTION, SOLUTION INTRAMUSCULAR; INTRAVENOUS at 11:01

## 2023-01-28 RX ADMIN — METRONIDAZOLE 500 MG: 500 TABLET ORAL at 01:01

## 2023-01-28 NOTE — ASSESSMENT & PLAN NOTE
Patient's FSGs are controlled on current medication regimen.  Last A1c reviewed-   Lab Results   Component Value Date    HGBA1C 6.4 (H) 01/25/2023     Most recent fingerstick glucose reviewed-   Recent Labs   Lab 01/27/23  1521 01/27/23 2022 01/27/23  2341 01/28/23  0437   POCTGLUCOSE 74 61* 72 72     Current correctional scale  Low  Maintain anti-hyperglycemic dose as follows-   Antihyperglycemics (From admission, onward)    Start     Stop Route Frequency Ordered    01/25/23 1515  insulin aspart U-100 pen 0-5 Units         -- SubQ Before meals & nightly PRN 01/25/23 1417        Hold Oral hypoglycemics while patient is in the hospital.

## 2023-01-28 NOTE — PROGRESS NOTES
[facility]  [facility]  Progress Note    Subjective:     History of Present Illness:    57-year-old female who presents to the emergency room with a 3 day history of epigastric pain associated with nausea and vomiting loss of appetite.  She states that she has not been able to keep anything down.  She says the bulge above her bellybutton is more prominent.      The patient is unable to go to the CT scanner due to her body mass index of 89.6.      The patient also has significant skin breakdown along the upper thighs and along the edges in undersurface of her very large abdominal wall pannus.  She has significant swelling and blistering of her lower extremities.      She does not report any fever or chills.  She is not report any chest pain.      The patient is essentially nonambulatory       her home medications, and is non-ambulatory at baseline. No further complaints or concerns at this time.      Arrival mode: EMS      Post-Op Info:  Procedure(s):  REPAIR, HERNIA, VENTRAL, INCARCERATED, WITHOUT HISTORY OF PRIOR REPAIR  LAPAROTOMY, EXPLORATORY  BLOCK, TRANSVERSUS ABDOMINIS PLANE  LYSIS, ADHESIONS  EXCISION, SMALL INTESTINE  EXCISION, ULCER   3 Days Post-Op     Interval History:  NG tube removed.  Continue antibiotics.  Check labs.  Will need to consider removing the Singh in a day or 2 once we make sure she is having adequate urine output    Medications:  Continuous Infusions:   dextrose 5 % and 0.45 % NaCl with KCl 20 mEq       Scheduled Meds:   atorvastatin  10 mg Per NG tube Daily    cefTRIAXone (ROCEPHIN) IVPB  2 g Intravenous Q24H    chlorhexidine  10 mL Mouth/Throat BID    enoxaparin  60 mg Subcutaneous BID    fluconazole  400 mg Oral Daily    metroNIDAZOLE  500 mg Oral Q8H    miconazole NITRATE 2 %   Topical (Top) BID    miconazole nitrate 2%   Topical (Top) BID    mupirocin   Nasal BID    pantoprazole  40 mg Intravenous BID    sucralfate  1 g Oral QID (AC & HS)     PRN  Meds:acetaminophen, albuterol-ipratropium, dextrose 10%, dextrose 10%, diphenhydrAMINE, glucagon (human recombinant), glucose, glucose, hydrALAZINE, influenza, insulin aspart U-100, metoclopramide HCl, morphine, naloxone, ondansetron, prochlorperazine, sodium chloride 0.9%, traZODone     Review of patient's allergies indicates:  No Known Allergies  Objective:     Vital Signs (Most Recent):  Temp: 98.5 °F (36.9 °C) (01/28/23 0723)  Pulse: 100 (01/28/23 0723)  Resp: 18 (01/28/23 0723)  BP: (!) 145/69 (01/28/23 0723)  SpO2: 98 % (01/28/23 0723)   Vital Signs (24h Range):  Temp:  [98.2 °F (36.8 °C)-99.6 °F (37.6 °C)] 98.5 °F (36.9 °C)  Pulse:  [] 100  Resp:  [16-18] 18  SpO2:  [96 %-98 %] 98 %  BP: (133-146)/(62-71) 145/69     Weight: (!) 235.9 kg (520 lb 1 oz)  Body mass index is 89.27 kg/m².    Intake/Output - Last 3 Shifts         01/26 0700  01/27 0659 01/27 0700 01/28 0659 01/28 0700 01/29 0659    I.V. (mL/kg) 2425.6 (10.3) 2244.5 (9.5)     NG/GT  270     IV Piggyback 1068.9 148.6     Total Intake(mL/kg) 3494.5 (14.8) 2663.1 (11.3)     Urine (mL/kg/hr) 1295 (0.2) 1450 (0.3)     Drains 50 55     Total Output 1345 1505     Net +2149.5 +1158.1                    Physical Exam  Vitals and nursing note reviewed.   Constitutional:       Appearance: She is well-developed. She is obese.   HENT:      Head: Normocephalic.   Eyes:      Pupils: Pupils are equal, round, and reactive to light.   Neck:      Thyroid: No thyromegaly.      Vascular: No JVD.      Trachea: No tracheal deviation.   Cardiovascular:      Rate and Rhythm: Normal rate and regular rhythm.      Heart sounds: Normal heart sounds.   Pulmonary:      Effort: Pulmonary effort is normal.      Breath sounds: Normal breath sounds. No wheezing.   Abdominal:      General: Bowel sounds are normal. There is no distension.      Palpations: Abdomen is soft. Abdomen is not rigid. There is no mass.      Tenderness: There is no abdominal tenderness. There is no  guarding or rebound.      Comments: Massively obese.  Drains are serous.  Incision   Genitourinary:     Comments: Singh  Musculoskeletal:         General: Normal range of motion.      Right lower leg: Edema (And venous stasis) present.      Left lower leg: Edema (and venous stasis) present.   Lymphadenopathy:      Cervical: No cervical adenopathy.   Skin:     General: Skin is warm and dry.      Findings: No erythema or rash.   Neurological:      Mental Status: She is oriented to person, place, and time.   Psychiatric:         Mood and Affect: Mood normal.         Behavior: Behavior normal.         Thought Content: Thought content normal.         Judgment: Judgment normal.       Significant Labs:  I have reviewed all pertinent lab results within the past 24 hours.  CBC:   Recent Labs   Lab 01/28/23  0642   WBC 16.96*   RBC 3.72*   HGB 9.8*   HCT 31.3*      MCV 84   MCH 26.3*   MCHC 31.3*     BMP:   Recent Labs   Lab 01/26/23  0417 01/28/23  0642   * 72    142   K 5.0 4.5    110   CO2 19* 19*   BUN 53* 37*   CREATININE 1.6* 1.1   CALCIUM 8.6* 9.1   MG 2.1  --        Significant Diagnostics:  I have reviewed all pertinent imaging results/findings within the past 24 hours.  No new    Assessment/Plan:     * Perforated gastric ulcer  Excision and over sewn 1/25/23  Discontinue NG tube  ivf  ppi iv bid  Check h- plori IgG titer  Antibiotic till wbc normal  Monitor drains    A.m. labs    MIGUEL (acute kidney injury)  monitor    Incarcerated umbilical hernia with partial ileum obstruction and perforated gastric ulcer  Likely secondary to perforated gastric ulcer    Cutaneous candidiasis  Would recommend treatment of the cutaneous candidiasis both with oral Diflucan and topical ketoconazole ointment.  Barrier gauze or abdominal pads or other material should be placed to prevent    Venous stasis dermatitis of both lower extremities  Management per hospital Medicine    Type 2 diabetes mellitus, without  long-term current use of insulin  Insulin sliding scale per hospital Medicine    Morbid obesity with BMI of 70 and over, adult  She would be discussed with her primary provider.  It is unlikely the patient loose significant weight without a structured program.      Currently she is in mobile likely due to her morbid obesity.      Disposition as far as meeting home needs verses skilled nursing versus nursing home per hospital Medicine    Essential hypertension  Further management per hospital Medicine,  hold oral medication until ngt removed    Depression  Further management per hospital Medicine,  hold oral medication until ngt removed    Hyperlipidemia  Further management by hospital Medicine, hold oral medication until ngt removed        Luis Angel Collins MD  General Surgery  [facility]

## 2023-01-28 NOTE — PLAN OF CARE
Problem: Adult Inpatient Plan of Care  Goal: Plan of Care Review  Outcome: Ongoing, Progressing  Patient had no adverse events during shift. Patient free of falls. Call light in reach. Side Rails x2. Pain well controlled with ordered medications. Patient repositions X2 assist, refuses to turn periodically throughout shift. IVF/ABX administered as ordered. VSS. Ointment/powder applied to bilateral folds per orders. Dressing to abdomen CDI. Pt refused heel boots and SCDs. Chart reviewed.

## 2023-01-28 NOTE — PLAN OF CARE
Bed locked, in lowest position. Call bell in reach. Purposeful rounding done every two hours. Blood glucose monitoring. Cardiac monitoring in use. Chart check complete. Will continue with plan of care.

## 2023-01-28 NOTE — SUBJECTIVE & OBJECTIVE
Interval History:  NG tube removed.  Continue antibiotics.  Check labs.  Will need to consider removing the Singh in a day or 2 once we make sure she is having adequate urine output    Medications:  Continuous Infusions:   dextrose 5 % and 0.45 % NaCl with KCl 20 mEq       Scheduled Meds:   atorvastatin  10 mg Per NG tube Daily    cefTRIAXone (ROCEPHIN) IVPB  2 g Intravenous Q24H    chlorhexidine  10 mL Mouth/Throat BID    enoxaparin  60 mg Subcutaneous BID    fluconazole  400 mg Oral Daily    metroNIDAZOLE  500 mg Oral Q8H    miconazole NITRATE 2 %   Topical (Top) BID    miconazole nitrate 2%   Topical (Top) BID    mupirocin   Nasal BID    pantoprazole  40 mg Intravenous BID    sucralfate  1 g Oral QID (AC & HS)     PRN Meds:acetaminophen, albuterol-ipratropium, dextrose 10%, dextrose 10%, diphenhydrAMINE, glucagon (human recombinant), glucose, glucose, hydrALAZINE, influenza, insulin aspart U-100, metoclopramide HCl, morphine, naloxone, ondansetron, prochlorperazine, sodium chloride 0.9%, traZODone     Review of patient's allergies indicates:  No Known Allergies  Objective:     Vital Signs (Most Recent):  Temp: 98.5 °F (36.9 °C) (01/28/23 0723)  Pulse: 100 (01/28/23 0723)  Resp: 18 (01/28/23 0723)  BP: (!) 145/69 (01/28/23 0723)  SpO2: 98 % (01/28/23 0723)   Vital Signs (24h Range):  Temp:  [98.2 °F (36.8 °C)-99.6 °F (37.6 °C)] 98.5 °F (36.9 °C)  Pulse:  [] 100  Resp:  [16-18] 18  SpO2:  [96 %-98 %] 98 %  BP: (133-146)/(62-71) 145/69     Weight: (!) 235.9 kg (520 lb 1 oz)  Body mass index is 89.27 kg/m².    Intake/Output - Last 3 Shifts         01/26 0700 01/27 0659 01/27 0700 01/28 0659 01/28 0700 01/29 0659    I.V. (mL/kg) 2425.6 (10.3) 2244.5 (9.5)     NG/GT  270     IV Piggyback 1068.9 148.6     Total Intake(mL/kg) 3494.5 (14.8) 2663.1 (11.3)     Urine (mL/kg/hr) 1295 (0.2) 1450 (0.3)     Drains 50 55     Total Output 1345 1505     Net +2149.5 +1158.1                    Physical Exam  Vitals and  nursing note reviewed.   Constitutional:       Appearance: She is well-developed. She is obese.   HENT:      Head: Normocephalic.   Eyes:      Pupils: Pupils are equal, round, and reactive to light.   Neck:      Thyroid: No thyromegaly.      Vascular: No JVD.      Trachea: No tracheal deviation.   Cardiovascular:      Rate and Rhythm: Normal rate and regular rhythm.      Heart sounds: Normal heart sounds.   Pulmonary:      Effort: Pulmonary effort is normal.      Breath sounds: Normal breath sounds. No wheezing.   Abdominal:      General: Bowel sounds are normal. There is no distension.      Palpations: Abdomen is soft. Abdomen is not rigid. There is no mass.      Tenderness: There is no abdominal tenderness. There is no guarding or rebound.      Comments: Massively obese.  Drains are serous.  Incision   Genitourinary:     Comments: Singh  Musculoskeletal:         General: Normal range of motion.      Right lower leg: Edema (And venous stasis) present.      Left lower leg: Edema (and venous stasis) present.   Lymphadenopathy:      Cervical: No cervical adenopathy.   Skin:     General: Skin is warm and dry.      Findings: No erythema or rash.   Neurological:      Mental Status: She is oriented to person, place, and time.   Psychiatric:         Mood and Affect: Mood normal.         Behavior: Behavior normal.         Thought Content: Thought content normal.         Judgment: Judgment normal.       Significant Labs:  I have reviewed all pertinent lab results within the past 24 hours.  CBC:   Recent Labs   Lab 01/28/23  0642   WBC 16.96*   RBC 3.72*   HGB 9.8*   HCT 31.3*      MCV 84   MCH 26.3*   MCHC 31.3*     BMP:   Recent Labs   Lab 01/26/23  0417 01/28/23  0642   * 72    142   K 5.0 4.5    110   CO2 19* 19*   BUN 53* 37*   CREATININE 1.6* 1.1   CALCIUM 8.6* 9.1   MG 2.1  --        Significant Diagnostics:  I have reviewed all pertinent imaging results/findings within the past 24 hours.  No  new

## 2023-01-28 NOTE — PT/OT/SLP PROGRESS
Physical Therapy  Treatment    Chastity Hung   MRN: [id number]   Admitting Diagnosis: Perforated gastric ulcer       PT Start Time: 0910     PT Stop Time: 0940    PT Total Time (min): 30 min       Billable Minutes:  Therapeutic Activity 30    Treatment Type: Treatment  PT/PTA: PTA     PTA Visit Number: 1       General Precautions: Standard, fall  Orthopedic Precautions: N/A  Braces: N/A    Spiritual, Cultural Beliefs, Mormonism Practices, Values that Affect Care: no    Subjective:  Communicated with LUCILLE prior to session.      Pain/Comfort  Pain Rating 1: 0/10  Pain Rating Post-Intervention 1: 0/10      Treatment and Education:    BED MOB AND ALL T/FS WITH TOTAL A X 2.     VC FOR UPPER EXTREMITY/LOWER EXTREMITY PLACEMENT AND SUPPORT AND HOW TO ASSIST SELF WITH ALL MOBILITY    SAT EOB X 15 MINUTES WITH BED INFLATED SBA WITH DYNAMIC BALANCE TO WASH FACE AND BRUSH TEETH WITH SET UP    PT IS PEASANT AND WILLING TO PARTICIPATE WITH ALL TX TODAY.      AM-PAC 6 CLICK MOBILITY  How much help from another person does this patient currently need?   1 = Unable, Total/Dependent Assistance  2 = A lot, Maximum/Moderate Assistance  3 = A little, Minimum/Contact Guard/Supervision  4 = None, Modified Dora/Independent    Turning over in bed (including adjusting bedclothes, sheets and blankets)?: 1  Sitting down on and standing up from a chair with arms (e.g., wheelchair, bedside commode, etc.): 1  Moving from lying on back to sitting on the side of the bed?: 1  Moving to and from a bed to a chair (including a wheelchair)?: 1  Need to walk in hospital room?: 1  Climbing 3-5 steps with a railing?: 1  Basic Mobility Total Score: 6    AM-PAC Raw Score CMS G-Code Modifier Level of Impairment Assistance   6 % Total / Unable   7 - 9 CM 80 - 100% Maximal Assist   10 - 14 CL 60 - 80% Moderate Assist   15 - 19 CK 40 - 60% Moderate Assist   20 - 22 CJ 20 - 40% Minimal Assist   23 CI 1-20% SBA / CGA   24 CH 0% Independent/  Mod I     Patient left supine with all lines intact, call button in reach, and INSTRUCTIONS FOR HER TO PERFORM BED MOB L/R TOT HE BEST OF HER ABILITY TO DEC SKIN BREAKSOWN RISK AND TO INCREASE USE OF MUSCLES .    Assessment:  Chastity Hung is a 57 y.o. female with a medical diagnosis of Perforated gastric ulcer and presents with .    Rehab identified problem list/impairments: weakness, impaired endurance, impaired sensation, impaired self care skills, impaired functional mobility, impaired balance, gait instability, decreased upper extremity function, decreased lower extremity function, decreased ROM, impaired skin, pain    Rehab potential is fair.    Activity tolerance: Good    Discharge recommendations: nursing facility, skilled      Barriers to discharge:      Equipment recommendations: bedside commode, wheelchair, walker, rolling (ALL EQUIPMENT NEEDS TO BE BARIATRIC)     GOALS:   Multidisciplinary Problems       Physical Therapy Goals          Problem: Physical Therapy    Goal Priority Disciplines Outcome Goal Variances Interventions   Physical Therapy Goal     PT, PT/OT      Description: Goals to be met by 2/9/23.  Pt will be able to roll L/R MOD A of 2.  Pt will be able to complete supine to sit MAX A of 2.  Pt will be able to complete sit to stand MAX A of 2.                       PLAN:    Patient to be seen 3 x/week to address the above listed problems via gait training, therapeutic activities, therapeutic exercises  Plan of Care expires: 02/09/23  Plan of Care reviewed with: patient         01/28/2023

## 2023-01-28 NOTE — PROGRESS NOTES
[facility] Medicine  Progress Note    Patient Name: Chastity Hung  MRN: [id number]  Patient Class: IP- Inpatient   Admission Date: 1/25/2023  Length of Stay: 3 days  Attending Physician: Waldo Garcia MD  Primary Care Provider: Alexandra Dawkins MD        Subjective:     Principal Problem:Perforated gastric ulcer        HPI:  The patient is a 58 yo female with Morbid Obesity - BMI 89, DM, HTN, Kidney stones who presented to ED with N/V and abdominal pain x 3 days. The patient reports abdominal pain at umbilical area is a sharp, 9/10 pain associated with N/V, Poor appetite, Fatigue, and Malaise. Pt also reports pain and wounds to lower abdomen and BLE legs making it difficult to ambulate. She has not ambulated for over 2 weeks. Pt reports noncompliance with home meds and self care.     In the ED, pt was found to have a non-reducible umbilical hernia with a draining wound at the umbilicus. Pt unable to have CT imaging due to body habitus. Afebrile, WBC 13, Serum Cr 1.6, mildly elevated LFTs/T bili  Abd xray showed nothing acute.     Dr. Collins with General surgery evaluated pt in ED and recommended urgent surgery for incarcerated umbilical hernia and ICU admission.       Overview/Hospital Course:  1/26: perforated gastric ulcer, surgically corrected by surgery. Will cont on rocephin and flagyl for intra-abdominal coverage and UTI. PT/OT on case.   1/27 NAEON . The urine cx is (+) for GNR .  Remain NPO and eith a NGT . General surgery following .  1/28 ngt discontinued per surgery. Keep npo. Physical/occupational therapy consulted for discharge planning. Awaiting key on Monday. Wbc trending down      Interval History: See hospital course for today      Review of Systems   Constitutional:  Positive for activity change, appetite change (hungry) and fatigue. Negative for fever.   Respiratory:  Negative for shortness of breath.    Cardiovascular:  Negative for chest pain.   Gastrointestinal:   Positive for abdominal pain. Negative for nausea and vomiting.   Musculoskeletal:  Positive for arthralgias, gait problem, joint swelling and myalgias.   Skin:  Positive for wound.   Neurological:  Positive for weakness.   Psychiatric/Behavioral:  Positive for dysphoric mood. Negative for agitation, behavioral problems, confusion and decreased concentration.    Objective:     Vital Signs (Most Recent):  Temp: 98.5 °F (36.9 °C) (01/28/23 0723)  Pulse: 100 (01/28/23 0723)  Resp: 18 (01/28/23 0723)  BP: (!) 145/69 (01/28/23 0723)  SpO2: 98 % (01/28/23 0723)   Vital Signs (24h Range):  Temp:  [98.2 °F (36.8 °C)-99.6 °F (37.6 °C)] 98.5 °F (36.9 °C)  Pulse:  [] 100  Resp:  [16-18] 18  SpO2:  [96 %-98 %] 98 %  BP: (133-146)/(62-71) 145/69     Weight: (!) 235.9 kg (520 lb 1 oz)  Body mass index is 89.27 kg/m².    Intake/Output Summary (Last 24 hours) at 1/28/2023 0933  Last data filed at 1/28/2023 0605  Gross per 24 hour   Intake 2663.06 ml   Output 1135 ml   Net 1528.06 ml      Physical Exam  Vitals and nursing note reviewed. Exam conducted with a chaperone present (surgery).   Constitutional:       General: She is not in acute distress.     Appearance: She is morbidly obese. She is ill-appearing. She is not toxic-appearing.   HENT:      Head: Normocephalic and atraumatic.   Cardiovascular:      Rate and Rhythm: Tachycardia present.   Pulmonary:      Effort: Pulmonary effort is normal. No respiratory distress.   Abdominal:      General: There is distension.      Palpations: Abdomen is soft.      Tenderness: There is abdominal tenderness.      Comments: Surgical incisions closed with staples, healing well  Dedrick drains to bilateral upper quadrants     Genitourinary:     Comments: waters  Musculoskeletal:      Right lower leg: Edema (venous stasis changes) present.      Left lower leg: Edema present.   Skin:     General: Skin is warm.      Findings: Lesion present.      Comments: Excoriated skin lateral abdomen     Neurological:      Mental Status: She is alert and oriented to person, place, and time.      Motor: Weakness present.   Psychiatric:         Mood and Affect: Mood is depressed.         Speech: Speech normal.         Behavior: Behavior is cooperative.       Significant Labs: All pertinent labs within the past 24 hours have been reviewed.    CBC:   Recent Labs   Lab 01/28/23  0642   WBC 16.96*   HGB 9.8*   HCT 31.3*        CMP:   Recent Labs   Lab 01/28/23  0642      K 4.5      CO2 19*   GLU 72   BUN 37*   CREATININE 1.1   CALCIUM 9.1   ANIONGAP 13       Significant Imaging: I have reviewed all pertinent imaging results/findings within the past 24 hours.      Assessment/Plan:      * Perforated gastric ulcer  Excision and over sewn 1/25/23  Maintenance fluids  ppi iv bid  Check h- plori IgG titer        MIGUEL (acute kidney injury)  Patient with acute kidney injury likely due to IVVD/dehydration MIGUEL is currently worsening. Labs reviewed- Renal function/electrolytes with Estimated Creatinine Clearance: 76 mL/min (A) (based on SCr of 1.6 mg/dL (H)). according to latest data. Monitor urine output and serial BMP and adjust therapy as needed. Avoid nephrotoxins and renally dose meds for GFR listed above.     Gentle IV hydration   Avoid hypotension and nephrotoxic agents       Incarcerated umbilical hernia with partial ileum obstruction and perforated gastric ulcer  Cont NPO  Excision and over sewn 1/25/23  By surgery  Diet per surgery    Cutaneous candidiasis  Oral diflucan   Topical miconazole       Venous stasis dermatitis of both lower extremities  Consult wound care        Morbid obesity with BMI of 70 and over, adult  Body mass index is 89.27 kg/m². Morbid obesity complicates all aspects of disease management from diagnostic modalities to treatment. Weight loss encouraged and health benefits explained to patient.   Follow up outpatient for structured weight loss plan      Diabetes mellitus type 2,  controlled  Patient's FSGs are controlled on current medication regimen.  Last A1c reviewed-   Lab Results   Component Value Date    HGBA1C 6.4 (H) 01/25/2023     Most recent fingerstick glucose reviewed-   Recent Labs   Lab 01/27/23  1521 01/27/23 2022 01/27/23  2341 01/28/23  0437   POCTGLUCOSE 74 61* 72 72     Current correctional scale  Low  Maintain anti-hyperglycemic dose as follows-   Antihyperglycemics (From admission, onward)    Start     Stop Route Frequency Ordered    01/25/23 1515  insulin aspart U-100 pen 0-5 Units         -- SubQ Before meals & nightly PRN 01/25/23 1417        Hold Oral hypoglycemics while patient is in the hospital.    Essential hypertension  Hold Spironolactone and Losartan for now   Resume accordantly     Depression  Patient has persistent depression which is mild and is currently controlled. Will Continue anti-depressant medications. We will not consult psychiatry at this time. Patient does not display psychosis at this time. Continue to monitor closely and adjust plan of care as needed.    Restart Zoloft when not npo      Hyperlipidemia  Npo since ngt discontinued      VTE Risk Mitigation (From admission, onward)         Ordered     enoxaparin injection 60 mg  2 times daily         01/27/23 1347     Place sequential compression device  Until discontinued         01/26/23 1307     IP VTE HIGH RISK PATIENT  Once         01/26/23 0753                Discharge Planning   ADOLFO:      Code Status: Full Code   Is the patient medically ready for discharge?:     Reason for patient still in hospital (select all that apply): Patient trending condition, Laboratory test, Treatment, Consult recommendations, PT / OT recommendations and Pending disposition  Discharge Plan A: Skilled Nursing Facility                  Waldo Garcia MD  Department of Hospital Medicine   [facility] - Med Surg

## 2023-01-28 NOTE — ASSESSMENT & PLAN NOTE
Patient has persistent depression which is mild and is currently controlled. Will Continue anti-depressant medications. We will not consult psychiatry at this time. Patient does not display psychosis at this time. Continue to monitor closely and adjust plan of care as needed.    Restart Zoloft when not npo

## 2023-01-28 NOTE — ASSESSMENT & PLAN NOTE
Excision and over sewn 1/25/23  Discontinue NG tube  ivf  ppi iv bid  Check h- plori IgG titer  Antibiotic till wbc normal  Monitor drains    A.m. labs

## 2023-01-28 NOTE — PLAN OF CARE
BED MOB AND ALL T/FS WITH TOTAL A X 2.     VC FOR UPPER EXTREMITY/LOWER EXTREMITY PLACEMENT AND SUPPORT AND HOW TO ASSIST SELF WITH ALL MOBILITY    SAT EOB X 15 MINUTES WITH BED INFLATED SBA WITH DYNAMIC BALANCE TO WASH FACE AND BRUSH TEETH WITH SET UP    PT IS PEASANT AND WILLING TO PARTICIPATE WITH ALL TX TODAY.

## 2023-01-28 NOTE — SUBJECTIVE & OBJECTIVE
Interval History: See hospital course for today      Review of Systems   Constitutional:  Positive for activity change, appetite change (hungry) and fatigue. Negative for fever.   Respiratory:  Negative for shortness of breath.    Cardiovascular:  Negative for chest pain.   Gastrointestinal:  Positive for abdominal pain. Negative for nausea and vomiting.   Musculoskeletal:  Positive for arthralgias, gait problem, joint swelling and myalgias.   Skin:  Positive for wound.   Neurological:  Positive for weakness.   Psychiatric/Behavioral:  Positive for dysphoric mood. Negative for agitation, behavioral problems, confusion and decreased concentration.    Objective:     Vital Signs (Most Recent):  Temp: 98.5 °F (36.9 °C) (01/28/23 0723)  Pulse: 100 (01/28/23 0723)  Resp: 18 (01/28/23 0723)  BP: (!) 145/69 (01/28/23 0723)  SpO2: 98 % (01/28/23 0723)   Vital Signs (24h Range):  Temp:  [98.2 °F (36.8 °C)-99.6 °F (37.6 °C)] 98.5 °F (36.9 °C)  Pulse:  [] 100  Resp:  [16-18] 18  SpO2:  [96 %-98 %] 98 %  BP: (133-146)/(62-71) 145/69     Weight: (!) 235.9 kg (520 lb 1 oz)  Body mass index is 89.27 kg/m².    Intake/Output Summary (Last 24 hours) at 1/28/2023 0933  Last data filed at 1/28/2023 0605  Gross per 24 hour   Intake 2663.06 ml   Output 1135 ml   Net 1528.06 ml      Physical Exam  Vitals and nursing note reviewed. Exam conducted with a chaperone present (surgery).   Constitutional:       General: She is not in acute distress.     Appearance: She is morbidly obese. She is ill-appearing. She is not toxic-appearing.   HENT:      Head: Normocephalic and atraumatic.   Cardiovascular:      Rate and Rhythm: Tachycardia present.   Pulmonary:      Effort: Pulmonary effort is normal. No respiratory distress.   Abdominal:      General: There is distension.      Palpations: Abdomen is soft.      Tenderness: There is abdominal tenderness.      Comments: Surgical incisions closed with staples, healing well  Dedrick drains to bilateral  upper quadrants     Genitourinary:     Comments: waters  Musculoskeletal:      Right lower leg: Edema (venous stasis changes) present.      Left lower leg: Edema present.   Skin:     General: Skin is warm.      Findings: Lesion present.      Comments: Excoriated skin lateral abdomen    Neurological:      Mental Status: She is alert and oriented to person, place, and time.      Motor: Weakness present.   Psychiatric:         Mood and Affect: Mood is depressed.         Speech: Speech normal.         Behavior: Behavior is cooperative.       Significant Labs: All pertinent labs within the past 24 hours have been reviewed.    CBC:   Recent Labs   Lab 01/28/23  0642   WBC 16.96*   HGB 9.8*   HCT 31.3*        CMP:   Recent Labs   Lab 01/28/23  0642      K 4.5      CO2 19*   GLU 72   BUN 37*   CREATININE 1.1   CALCIUM 9.1   ANIONGAP 13       Significant Imaging: I have reviewed all pertinent imaging results/findings within the past 24 hours.

## 2023-01-28 NOTE — ASSESSMENT & PLAN NOTE
Body mass index is 89.27 kg/m². Morbid obesity complicates all aspects of disease management from diagnostic modalities to treatment. Weight loss encouraged and health benefits explained to patient.   Follow up outpatient for structured weight loss plan

## 2023-01-29 PROBLEM — N17.9 AKI (ACUTE KIDNEY INJURY): Status: RESOLVED | Noted: 2023-01-25 | Resolved: 2023-01-29

## 2023-01-29 LAB
ANION GAP SERPL CALC-SCNC: 9 MMOL/L (ref 8–16)
BASOPHILS NFR BLD: 1 % (ref 0–1.9)
BUN SERPL-MCNC: 24 MG/DL (ref 6–20)
CALCIUM SERPL-MCNC: 9 MG/DL (ref 8.7–10.5)
CHLORIDE SERPL-SCNC: 109 MMOL/L (ref 95–110)
CO2 SERPL-SCNC: 21 MMOL/L (ref 23–29)
CREAT SERPL-MCNC: 1 MG/DL (ref 0.5–1.4)
DACRYOCYTES BLD QL SMEAR: ABNORMAL
DIFFERENTIAL METHOD: ABNORMAL
EOSINOPHIL NFR BLD: 0 % (ref 0–8)
ERYTHROCYTE [DISTWIDTH] IN BLOOD BY AUTOMATED COUNT: 14.2 % (ref 11.5–14.5)
EST. GFR  (NO RACE VARIABLE): >60 ML/MIN/1.73 M^2
GLUCOSE SERPL-MCNC: 162 MG/DL (ref 70–110)
HCT VFR BLD AUTO: 31.1 % (ref 37–48.5)
HGB BLD-MCNC: 9.6 G/DL (ref 12–16)
IMM GRANULOCYTES # BLD AUTO: ABNORMAL K/UL (ref 0–0.04)
IMM GRANULOCYTES NFR BLD AUTO: ABNORMAL % (ref 0–0.5)
LYMPHOCYTES NFR BLD: 9 % (ref 18–48)
MCH RBC QN AUTO: 26.2 PG (ref 27–31)
MCHC RBC AUTO-ENTMCNC: 30.9 G/DL (ref 32–36)
MCV RBC AUTO: 85 FL (ref 82–98)
METAMYELOCYTES NFR BLD MANUAL: 2 %
MONOCYTES NFR BLD: 7 % (ref 4–15)
MYELOCYTES NFR BLD MANUAL: 4 %
NEUTROPHILS NFR BLD: 75 % (ref 38–73)
NEUTS BAND NFR BLD MANUAL: 2 %
NRBC BLD-RTO: 0 /100 WBC
OVALOCYTES BLD QL SMEAR: ABNORMAL
PLATELET # BLD AUTO: 270 K/UL (ref 150–450)
PLATELET BLD QL SMEAR: ABNORMAL
PMV BLD AUTO: 11.1 FL (ref 9.2–12.9)
POCT GLUCOSE: 118 MG/DL (ref 70–110)
POCT GLUCOSE: 140 MG/DL (ref 70–110)
POCT GLUCOSE: 145 MG/DL (ref 70–110)
POCT GLUCOSE: 174 MG/DL (ref 70–110)
POTASSIUM SERPL-SCNC: 4.4 MMOL/L (ref 3.5–5.1)
RBC # BLD AUTO: 3.66 M/UL (ref 4–5.4)
SODIUM SERPL-SCNC: 139 MMOL/L (ref 136–145)
TARGETS BLD QL SMEAR: ABNORMAL
WBC # BLD AUTO: 13.97 K/UL (ref 3.9–12.7)

## 2023-01-29 PROCEDURE — 63600175 PHARM REV CODE 636 W HCPCS: Performed by: INTERNAL MEDICINE

## 2023-01-29 PROCEDURE — 99900035 HC TECH TIME PER 15 MIN (STAT)

## 2023-01-29 PROCEDURE — 85027 COMPLETE CBC AUTOMATED: CPT | Performed by: SURGERY

## 2023-01-29 PROCEDURE — 63600175 PHARM REV CODE 636 W HCPCS: Performed by: FAMILY MEDICINE

## 2023-01-29 PROCEDURE — C9113 INJ PANTOPRAZOLE SODIUM, VIA: HCPCS | Performed by: SURGERY

## 2023-01-29 PROCEDURE — 63600175 PHARM REV CODE 636 W HCPCS: Performed by: SURGERY

## 2023-01-29 PROCEDURE — 25000003 PHARM REV CODE 250: Performed by: INTERNAL MEDICINE

## 2023-01-29 PROCEDURE — 63700000 PHARM REV CODE 250 ALT 637 W/O HCPCS: Performed by: FAMILY MEDICINE

## 2023-01-29 PROCEDURE — 85007 BL SMEAR W/DIFF WBC COUNT: CPT | Performed by: SURGERY

## 2023-01-29 PROCEDURE — 27000221 HC OXYGEN, UP TO 24 HOURS

## 2023-01-29 PROCEDURE — 25000003 PHARM REV CODE 250: Performed by: SURGERY

## 2023-01-29 PROCEDURE — 80048 BASIC METABOLIC PNL TOTAL CA: CPT | Performed by: SURGERY

## 2023-01-29 PROCEDURE — 11000001 HC ACUTE MED/SURG PRIVATE ROOM

## 2023-01-29 PROCEDURE — 36415 COLL VENOUS BLD VENIPUNCTURE: CPT | Performed by: SURGERY

## 2023-01-29 PROCEDURE — 25000003 PHARM REV CODE 250: Performed by: FAMILY MEDICINE

## 2023-01-29 PROCEDURE — 94761 N-INVAS EAR/PLS OXIMETRY MLT: CPT

## 2023-01-29 RX ORDER — HYDROMORPHONE HYDROCHLORIDE 2 MG/ML
1 INJECTION, SOLUTION INTRAMUSCULAR; INTRAVENOUS; SUBCUTANEOUS
Status: DISCONTINUED | OUTPATIENT
Start: 2023-01-29 | End: 2023-02-01 | Stop reason: HOSPADM

## 2023-01-29 RX ORDER — HYDROCODONE BITARTRATE AND ACETAMINOPHEN 5; 325 MG/1; MG/1
1 TABLET ORAL EVERY 4 HOURS PRN
Status: DISCONTINUED | OUTPATIENT
Start: 2023-01-29 | End: 2023-02-01 | Stop reason: HOSPADM

## 2023-01-29 RX ORDER — LOSARTAN POTASSIUM 25 MG/1
25 TABLET ORAL DAILY
Status: DISCONTINUED | OUTPATIENT
Start: 2023-01-29 | End: 2023-02-01 | Stop reason: HOSPADM

## 2023-01-29 RX ORDER — SERTRALINE HYDROCHLORIDE 50 MG/1
100 TABLET, FILM COATED ORAL NIGHTLY
Status: DISCONTINUED | OUTPATIENT
Start: 2023-01-29 | End: 2023-02-01 | Stop reason: HOSPADM

## 2023-01-29 RX ORDER — HYDROCODONE BITARTRATE AND ACETAMINOPHEN 7.5; 325 MG/1; MG/1
1 TABLET ORAL EVERY 6 HOURS PRN
Status: DISCONTINUED | OUTPATIENT
Start: 2023-01-29 | End: 2023-02-01 | Stop reason: HOSPADM

## 2023-01-29 RX ORDER — ATORVASTATIN CALCIUM 10 MG/1
10 TABLET, FILM COATED ORAL NIGHTLY
Status: DISCONTINUED | OUTPATIENT
Start: 2023-01-30 | End: 2023-02-01 | Stop reason: HOSPADM

## 2023-01-29 RX ORDER — PANTOPRAZOLE SODIUM 40 MG/1
40 TABLET, DELAYED RELEASE ORAL 2 TIMES DAILY
Status: DISCONTINUED | OUTPATIENT
Start: 2023-01-29 | End: 2023-02-01 | Stop reason: HOSPADM

## 2023-01-29 RX ADMIN — PANTOPRAZOLE SODIUM 40 MG: 40 TABLET, DELAYED RELEASE ORAL at 09:01

## 2023-01-29 RX ADMIN — METRONIDAZOLE 500 MG: 500 TABLET ORAL at 09:01

## 2023-01-29 RX ADMIN — DEXTROSE, SODIUM CHLORIDE, AND POTASSIUM CHLORIDE: 5; .45; .15 INJECTION INTRAVENOUS at 07:01

## 2023-01-29 RX ADMIN — ENOXAPARIN SODIUM 60 MG: 100 INJECTION SUBCUTANEOUS at 09:01

## 2023-01-29 RX ADMIN — HYDROCODONE BITARTRATE AND ACETAMINOPHEN 1 TABLET: 7.5; 325 TABLET ORAL at 11:01

## 2023-01-29 RX ADMIN — DEXTROSE, SODIUM CHLORIDE, AND POTASSIUM CHLORIDE: 5; .45; .15 INJECTION INTRAVENOUS at 10:01

## 2023-01-29 RX ADMIN — SUCRALFATE 1 G: 1 TABLET ORAL at 05:01

## 2023-01-29 RX ADMIN — MUPIROCIN: 20 OINTMENT TOPICAL at 09:01

## 2023-01-29 RX ADMIN — SUCRALFATE 1 G: 1 TABLET ORAL at 09:01

## 2023-01-29 RX ADMIN — ATORVASTATIN CALCIUM 10 MG: 10 TABLET, FILM COATED ORAL at 09:01

## 2023-01-29 RX ADMIN — SUCRALFATE 1 G: 1 TABLET ORAL at 10:01

## 2023-01-29 RX ADMIN — LOSARTAN POTASSIUM 25 MG: 25 TABLET, FILM COATED ORAL at 11:01

## 2023-01-29 RX ADMIN — CEFTRIAXONE 2 G: 2 INJECTION, POWDER, FOR SOLUTION INTRAMUSCULAR; INTRAVENOUS at 01:01

## 2023-01-29 RX ADMIN — MICONAZOLE NITRATE 2 % TOPICAL POWDER: at 09:01

## 2023-01-29 RX ADMIN — METRONIDAZOLE 500 MG: 500 TABLET ORAL at 05:01

## 2023-01-29 RX ADMIN — FLUCONAZOLE 400 MG: 100 TABLET ORAL at 09:01

## 2023-01-29 RX ADMIN — MICONAZOLE NITRATE: 20 OINTMENT TOPICAL at 09:01

## 2023-01-29 RX ADMIN — CHLORHEXIDINE GLUCONATE 0.12% ORAL RINSE 10 ML: 1.2 LIQUID ORAL at 09:01

## 2023-01-29 RX ADMIN — PANTOPRAZOLE SODIUM 40 MG: 40 INJECTION, POWDER, FOR SOLUTION INTRAVENOUS at 09:01

## 2023-01-29 RX ADMIN — HYDROCODONE BITARTRATE AND ACETAMINOPHEN 1 TABLET: 5; 325 TABLET ORAL at 03:01

## 2023-01-29 RX ADMIN — METRONIDAZOLE 500 MG: 500 TABLET ORAL at 01:01

## 2023-01-29 RX ADMIN — SERTRALINE HYDROCHLORIDE 100 MG: 50 TABLET ORAL at 09:01

## 2023-01-29 NOTE — ASSESSMENT & PLAN NOTE
Hold Spironolactone and Losartan for now   Resume accordantly     1/29  Elevated blood pressures  Resume home losartan  Monitor need to resume spironolactone

## 2023-01-29 NOTE — PROGRESS NOTES
[facility] Medicine  Progress Note    Patient Name: Chastity Hung  MRN: [id number]  Patient Class: IP- Inpatient   Admission Date: 1/25/2023  Length of Stay: 4 days  Attending Physician: Waldo Garcia MD  Primary Care Provider: Alexandra Dawkins MD        Subjective:     Principal Problem:Perforated gastric ulcer        HPI:  The patient is a 58 yo female with Morbid Obesity - BMI 89, DM, HTN, Kidney stones who presented to ED with N/V and abdominal pain x 3 days. The patient reports abdominal pain at umbilical area is a sharp, 9/10 pain associated with N/V, Poor appetite, Fatigue, and Malaise. Pt also reports pain and wounds to lower abdomen and BLE legs making it difficult to ambulate. She has not ambulated for over 2 weeks. Pt reports noncompliance with home meds and self care.     In the ED, pt was found to have a non-reducible umbilical hernia with a draining wound at the umbilicus. Pt unable to have CT imaging due to body habitus. Afebrile, WBC 13, Serum Cr 1.6, mildly elevated LFTs/T bili  Abd xray showed nothing acute.     Dr. Collins with General surgery evaluated pt in ED and recommended urgent surgery for incarcerated umbilical hernia and ICU admission.       Overview/Hospital Course:  1/26: perforated gastric ulcer, surgically corrected by surgery. Will cont on rocephin and flagyl for intra-abdominal coverage and UTI. PT/OT on case.   1/27 NAEON . The urine cx is (+) for GNR .  Remain NPO and eith a NGT . General surgery following .  1/28 ngt discontinued per surgery. Keep npo. Physical/occupational therapy consulted for discharge planning. Wbc trending down  1/29 cleared by surgery for clear liquid diet. Denies fever, dyspnea, nausea/vomiting. Physical/occupational therapy recommending skilled nursing facility placement. Case management consulted.      Interval History: See hospital course for today      Review of Systems   Constitutional:  Positive for activity change, appetite  change (hungry) and fatigue. Negative for fever.   HENT:  Negative for trouble swallowing.    Respiratory:  Negative for shortness of breath.    Cardiovascular:  Negative for chest pain.   Gastrointestinal:  Positive for abdominal pain. Negative for nausea and vomiting.   Musculoskeletal:  Positive for arthralgias, gait problem and myalgias.   Skin:  Positive for wound.   Neurological:  Positive for weakness.   Psychiatric/Behavioral:  Positive for dysphoric mood.    Objective:     Vital Signs (Most Recent):  Temp: 98 °F (36.7 °C) (01/29/23 0711)  Pulse: 99 (01/29/23 0914)  Resp: 18 (01/29/23 0711)  BP: (!) 148/80 (01/29/23 0715)  SpO2: 95 % (01/29/23 0758)   Vital Signs (24h Range):  Temp:  [97.5 °F (36.4 °C)-98.6 °F (37 °C)] 98 °F (36.7 °C)  Pulse:  [] 99  Resp:  [17-19] 18  SpO2:  [91 %-95 %] 95 %  BP: (134-189)/(67-90) 148/80     Weight: (!) 235.9 kg (520 lb 1 oz)  Body mass index is 89.27 kg/m².    Intake/Output Summary (Last 24 hours) at 1/29/2023 1043  Last data filed at 1/29/2023 0642  Gross per 24 hour   Intake 1926.07 ml   Output 2000 ml   Net -73.93 ml      Physical Exam  Vitals and nursing note reviewed.   Constitutional:       General: She is not in acute distress.     Appearance: She is morbidly obese. She is ill-appearing. She is not toxic-appearing.   HENT:      Head: Normocephalic and atraumatic.   Cardiovascular:      Rate and Rhythm: Normal rate.   Pulmonary:      Effort: Pulmonary effort is normal. No respiratory distress.   Abdominal:      General: There is distension.      Palpations: Abdomen is soft.      Tenderness: There is abdominal tenderness.      Comments: Dedrick drains in bilateral upper quadrants with serous fluid   Musculoskeletal:      Right lower leg: Edema present.      Left lower leg: Edema present.   Skin:     General: Skin is warm.      Findings: Lesion and rash present.   Neurological:      Mental Status: She is alert and oriented to person, place, and time.      Motor:  Weakness present.   Psychiatric:         Mood and Affect: Mood is depressed.         Speech: Speech normal.         Behavior: Behavior is cooperative.       Significant Labs: All pertinent labs within the past 24 hours have been reviewed.  CBC:   Recent Labs   Lab 01/28/23  0642 01/29/23  0742   WBC 16.96* 13.97*   HGB 9.8* 9.6*   HCT 31.3* 31.1*    270     CMP:   Recent Labs   Lab 01/28/23  0642 01/29/23  0742    139   K 4.5 4.4    109   CO2 19* 21*   GLU 72 162*   BUN 37* 24*   CREATININE 1.1 1.0   CALCIUM 9.1 9.0   ANIONGAP 13 9       Significant Imaging: I have reviewed all pertinent imaging results/findings within the past 24 hours.      Assessment/Plan:      * Perforated gastric ulcer  Excision and over sewn 1/25/23  Fluids per surgery  Sucralfate per surgery  Check h- plori IgG titer  Po proton pump inhibitor bid         Incarcerated umbilical hernia with partial ileum obstruction and perforated gastric ulcer  Clear liquid diet per sgy  Excision and over sewn 1/25/23 by surgery      Cutaneous candidiasis  Oral diflucan   Topical miconazole       Venous stasis dermatitis of both lower extremities  Consult wound care        Morbid obesity with BMI of 70 and over, adult  Body mass index is 89.27 kg/m². Morbid obesity complicates all aspects of disease management from diagnostic modalities to treatment. Weight loss encouraged and health benefits explained to patient.   Follow up outpatient for structured weight loss plan      Diabetes mellitus type 2, controlled  Patient's FSGs are controlled on current medication regimen.  Last A1c reviewed-   Lab Results   Component Value Date    HGBA1C 6.4 (H) 01/25/2023     Most recent fingerstick glucose reviewed-   Recent Labs   Lab 01/28/23  1229 01/28/23  1556 01/28/23 2025 01/29/23  0440   POCTGLUCOSE 89 94 107 140*     Current correctional scale  Low  Maintain anti-hyperglycemic dose as follows-   Antihyperglycemics (From admission, onward)    Start      Stop Route Frequency Ordered    01/25/23 1515  insulin aspart U-100 pen 0-5 Units         -- SubQ Before meals & nightly PRN 01/25/23 1417        Hold Oral hypoglycemics while patient is in the hospital.  Controlled without oral or SQ insulin injections  Monitor need to increase insulin needs now on clear liquid diet     Essential hypertension  Hold Spironolactone and Losartan for now   Resume accordantly     1/29  Elevated blood pressures  Resume home losartan  Monitor need to resume spironolactone     Depression  Patient has persistent depression which is mild and is currently controlled. Will Continue anti-depressant medications. We will not consult psychiatry at this time. Patient does not display psychosis at this time. Continue to monitor closely and adjust plan of care as needed.    Resume zoloft daily      Hyperlipidemia  Cleared per sgy for clear liquid diet  Resume statin qhs      VTE Risk Mitigation (From admission, onward)         Ordered     enoxaparin injection 60 mg  2 times daily         01/27/23 1347     Place sequential compression device  Until discontinued         01/26/23 1307     IP VTE HIGH RISK PATIENT  Once         01/26/23 0753                Discharge Planning   ADOLFO:      Code Status: Full Code   Is the patient medically ready for discharge?:     Reason for patient still in hospital (select all that apply): Patient trending condition, Laboratory test, Treatment, Consult recommendations, PT / OT recommendations and Pending disposition  Discharge Plan A: Skilled Nursing Facility                  Waldo Garcia MD  Department of Hospital Medicine   [facility] - Med Surg

## 2023-01-29 NOTE — ASSESSMENT & PLAN NOTE
Patient has persistent depression which is mild and is currently controlled. Will Continue anti-depressant medications. We will not consult psychiatry at this time. Patient does not display psychosis at this time. Continue to monitor closely and adjust plan of care as needed.    Resume zoloft daily

## 2023-01-29 NOTE — PLAN OF CARE
Patient is stable. Meds given as ordered. Pain control with pain meds. Incision, CDI with LISET drains in place. Continuous cardiac monitoring in place. Wound care given to folds and sacral area. Pt refused to turn. Antwan light in reach. Chart orders reviewed.

## 2023-01-29 NOTE — PLAN OF CARE
Bed locked, in lowest position. Call bell in reach. Purposeful rounding done every two hours. Blood glucose monitoring. Cardiac monitoring in use. Antibiotics administered as ordered. Chart check complete. Will continue with plan of care.

## 2023-01-29 NOTE — PROGRESS NOTES
[facility]  General Surgery  Progress Note    Subjective:     History of Present Illness:    57-year-old female who presents to the emergency room with a 3 day history of epigastric pain associated with nausea and vomiting loss of appetite.  She states that she has not been able to keep anything down.  She says the bulge above her bellybutton is more prominent.      The patient is unable to go to the CT scanner due to her body mass index of 89.6.      The patient also has significant skin breakdown along the upper thighs and along the edges in undersurface of her very large abdominal wall pannus.  She has significant swelling and blistering of her lower extremities.      She does not report any fever or chills.  She is not report any chest pain.      The patient is essentially nonambulatory       her home medications, and is non-ambulatory at baseline. No further complaints or concerns at this time.      Arrival mode: EMS      Post-Op Info:  Procedure(s):  REPAIR, HERNIA, VENTRAL, INCARCERATED, WITHOUT HISTORY OF PRIOR REPAIR  LAPAROTOMY, EXPLORATORY  BLOCK, TRANSVERSUS ABDOMINIS PLANE  LYSIS, ADHESIONS  EXCISION, SMALL INTESTINE  EXCISION, ULCER   4 Days Post-Op     Interval History:  Mild pain.  Start clear liquids.  DC Singh.  Purulent    Medications:  Continuous Infusions:   dextrose 5 % and 0.45 % NaCl with KCl 20 mEq 100 mL/hr at 01/29/23 0722     Scheduled Meds:   atorvastatin  10 mg Per NG tube Daily    cefTRIAXone (ROCEPHIN) IVPB  2 g Intravenous Q24H    chlorhexidine  10 mL Mouth/Throat BID    enoxaparin  60 mg Subcutaneous BID    fluconazole  400 mg Oral Daily    metroNIDAZOLE  500 mg Oral Q8H    miconazole NITRATE 2 %   Topical (Top) BID    miconazole nitrate 2%   Topical (Top) BID    mupirocin   Nasal BID    pantoprazole  40 mg Intravenous BID    sucralfate  1 g Oral QID (AC & HS)     PRN Meds:acetaminophen, albuterol-ipratropium, dextrose 10%, dextrose 10%, diphenhydrAMINE, glucagon  (human recombinant), glucose, glucose, hydrALAZINE, HYDROcodone-acetaminophen, HYDROcodone-acetaminophen, HYDROmorphone, influenza, insulin aspart U-100, metoclopramide HCl, naloxone, ondansetron, prochlorperazine, sodium chloride 0.9%, traZODone     Review of patient's allergies indicates:  No Known Allergies  Objective:     Vital Signs (Most Recent):  Temp: 98 °F (36.7 °C) (01/29/23 0711)  Pulse: 99 (01/29/23 0914)  Resp: 18 (01/29/23 0711)  BP: (!) 148/80 (01/29/23 0715)  SpO2: 95 % (01/29/23 0758)   Vital Signs (24h Range):  Temp:  [97.5 °F (36.4 °C)-98.6 °F (37 °C)] 98 °F (36.7 °C)  Pulse:  [] 99  Resp:  [17-19] 18  SpO2:  [91 %-95 %] 95 %  BP: (134-189)/(67-90) 148/80     Weight: (!) 235.9 kg (520 lb 1 oz)  Body mass index is 89.27 kg/m².    Intake/Output - Last 3 Shifts         01/27 0700  01/28 0659 01/28 0700 01/29 0659 01/29 0700  01/30 0659    I.V. (mL/kg) 2244.5 (9.5) 1877.6 (8)     NG/      IV Piggyback 148.6 48.5     Total Intake(mL/kg) 2663.1 (11.3) 1926.1 (8.2)     Urine (mL/kg/hr) 1450 (0.3) 1950 (0.3)     Drains 55 50     Total Output 1505 2000     Net +1158.1 -73.9                    Physical Exam  Vitals reviewed.   Constitutional:       Appearance: She is well-developed. She is obese.   HENT:      Head: Normocephalic.   Eyes:      Pupils: Pupils are equal, round, and reactive to light.   Neck:      Thyroid: No thyromegaly.      Vascular: No JVD.      Trachea: No tracheal deviation.   Cardiovascular:      Rate and Rhythm: Normal rate and regular rhythm.      Heart sounds: Normal heart sounds.   Pulmonary:      Breath sounds: Normal breath sounds. No wheezing.   Abdominal:      General: Bowel sounds are normal. There is no distension.      Palpations: Abdomen is soft. Abdomen is not rigid. There is no mass.      Tenderness: There is no abdominal tenderness. There is no guarding or rebound.      Comments: Massively obese.  Incision is clean.  Drains are serous   Musculoskeletal:          General: Normal range of motion.      Right lower leg: Edema present.      Left lower leg: Edema present.   Lymphadenopathy:      Cervical: No cervical adenopathy.   Skin:     General: Skin is warm and dry.      Findings: No erythema or rash.   Neurological:      Mental Status: She is oriented to person, place, and time.       Significant Labs:  I have reviewed all pertinent lab results within the past 24 hours.  CBC:   Recent Labs   Lab 01/29/23  0742   WBC 13.97*   RBC 3.66*   HGB 9.6*   HCT 31.1*      MCV 85   MCH 26.2*   MCHC 30.9*     BMP:   Recent Labs   Lab 01/26/23  0417 01/28/23  0642 01/29/23  0742   *   < > 162*      < > 139   K 5.0   < > 4.4      < > 109   CO2 19*   < > 21*   BUN 53*   < > 24*   CREATININE 1.6*   < > 1.0   CALCIUM 8.6*   < > 9.0   MG 2.1  --   --     < > = values in this interval not displayed.       Significant Diagnostics:  I have reviewed all pertinent imaging results/findings within the past 24 hours.  No new x-rays    Assessment/Plan:     * Perforated gastric ulcer  Excision and over sewn 1/25/23  Clear liquids  Oral and IV narcotics   Await H pylori  ivf will be decreased  ppi orally b.i.d.  Decreased to  Antibiotic till wbc normal  Monitor drains    A.m. labs    MIGUEL (acute kidney injury)  monitor    Incarcerated umbilical hernia with partial ileum obstruction and perforated gastric ulcer  Likely secondary to perforated gastric ulcer    Cutaneous candidiasis  Would recommend treatment of the cutaneous candidiasis both with oral Diflucan and topical ketoconazole ointment.  Barrier gauze or abdominal pads or other material should be placed to prevent    Venous stasis dermatitis of both lower extremities  Management per hospital Medicine    Type 2 diabetes mellitus, without long-term current use of insulin  Insulin sliding scale per hospital Medicine    Morbid obesity with BMI of 70 and over, adult  She would be discussed with her primary provider.  It is  unlikely the patient loose significant weight without a structured program.      Currently she is in mobile likely due to her morbid obesity.      Disposition as far as meeting home needs verses skilled nursing versus nursing home per hospital Medicine    Essential hypertension  Further management per hospital Medicine,  hold oral medication until ngt removed    Depression  Further management per hospital Medicine,  hold oral medication until ngt removed    Hyperlipidemia  Further management by hospital Medicine, hold oral medication until ngt removed        Luis Angel Collins MD  General Surgery  [facility]

## 2023-01-29 NOTE — SUBJECTIVE & OBJECTIVE
Interval History: See hospital course for today      Review of Systems   Constitutional:  Positive for activity change, appetite change (hungry) and fatigue. Negative for fever.   HENT:  Negative for trouble swallowing.    Respiratory:  Negative for shortness of breath.    Cardiovascular:  Negative for chest pain.   Gastrointestinal:  Positive for abdominal pain. Negative for nausea and vomiting.   Musculoskeletal:  Positive for arthralgias, gait problem and myalgias.   Skin:  Positive for wound.   Neurological:  Positive for weakness.   Psychiatric/Behavioral:  Positive for dysphoric mood.    Objective:     Vital Signs (Most Recent):  Temp: 98 °F (36.7 °C) (01/29/23 0711)  Pulse: 99 (01/29/23 0914)  Resp: 18 (01/29/23 0711)  BP: (!) 148/80 (01/29/23 0715)  SpO2: 95 % (01/29/23 0758)   Vital Signs (24h Range):  Temp:  [97.5 °F (36.4 °C)-98.6 °F (37 °C)] 98 °F (36.7 °C)  Pulse:  [] 99  Resp:  [17-19] 18  SpO2:  [91 %-95 %] 95 %  BP: (134-189)/(67-90) 148/80     Weight: (!) 235.9 kg (520 lb 1 oz)  Body mass index is 89.27 kg/m².    Intake/Output Summary (Last 24 hours) at 1/29/2023 1043  Last data filed at 1/29/2023 0642  Gross per 24 hour   Intake 1926.07 ml   Output 2000 ml   Net -73.93 ml      Physical Exam  Vitals and nursing note reviewed.   Constitutional:       General: She is not in acute distress.     Appearance: She is morbidly obese. She is ill-appearing. She is not toxic-appearing.   HENT:      Head: Normocephalic and atraumatic.   Cardiovascular:      Rate and Rhythm: Normal rate.   Pulmonary:      Effort: Pulmonary effort is normal. No respiratory distress.   Abdominal:      General: There is distension.      Palpations: Abdomen is soft.      Tenderness: There is abdominal tenderness.      Comments: Dedrick drains in bilateral upper quadrants with serous fluid   Musculoskeletal:      Right lower leg: Edema present.      Left lower leg: Edema present.   Skin:     General: Skin is warm.      Findings:  Lesion and rash present.   Neurological:      Mental Status: She is alert and oriented to person, place, and time.      Motor: Weakness present.   Psychiatric:         Mood and Affect: Mood is depressed.         Speech: Speech normal.         Behavior: Behavior is cooperative.       Significant Labs: All pertinent labs within the past 24 hours have been reviewed.  CBC:   Recent Labs   Lab 01/28/23  0642 01/29/23  0742   WBC 16.96* 13.97*   HGB 9.8* 9.6*   HCT 31.3* 31.1*    270     CMP:   Recent Labs   Lab 01/28/23  0642 01/29/23  0742    139   K 4.5 4.4    109   CO2 19* 21*   GLU 72 162*   BUN 37* 24*   CREATININE 1.1 1.0   CALCIUM 9.1 9.0   ANIONGAP 13 9       Significant Imaging: I have reviewed all pertinent imaging results/findings within the past 24 hours.

## 2023-01-29 NOTE — ASSESSMENT & PLAN NOTE
Excision and over sewn 1/25/23  Clear liquids  Oral and IV narcotics   Await H pylori  ivf will be decreased  ppi orally b.i.d.  Decreased to  Antibiotic till wbc normal  Monitor drains    A.m. labs

## 2023-01-29 NOTE — PLAN OF CARE
Problem: Adult Inpatient Plan of Care  Goal: Plan of Care Review  Outcome: Ongoing, Progressing     Problem: Infection  Goal: Absence of Infection Signs and Symptoms  Outcome: Ongoing, Progressing     Problem: Bariatric Environmental Safety  Goal: Safety Maintained with Care  Outcome: Ongoing, Progressing     Problem: Skin Injury Risk Increased  Goal: Skin Health and Integrity  Outcome: Ongoing, Progressing     Problem: Diabetes Comorbidity  Goal: Blood Glucose Level Within Targeted Range  Outcome: Ongoing, Progressing     Problem: Fall Injury Risk  Goal: Absence of Fall and Fall-Related Injury  Outcome: Ongoing, Progressing     Problem: Impaired Wound Healing  Goal: Optimal Wound Healing  Outcome: Ongoing, Progressing    Patient remains free from injury. Safety precautions maintained. No s/s of acute distress. Pain controlled per MD order. IVF infusing. Cardiac monitoring in place. Singh, drain care, and wound care continued this shift. Blood glucose monitoring continued this shift. Chart and orders review completed. Pt education about care completed.

## 2023-01-29 NOTE — SUBJECTIVE & OBJECTIVE
Interval History:  Mild pain.  Start clear liquids.  DC Singh.  Purulent    Medications:  Continuous Infusions:   dextrose 5 % and 0.45 % NaCl with KCl 20 mEq 100 mL/hr at 01/29/23 0722     Scheduled Meds:   atorvastatin  10 mg Per NG tube Daily    cefTRIAXone (ROCEPHIN) IVPB  2 g Intravenous Q24H    chlorhexidine  10 mL Mouth/Throat BID    enoxaparin  60 mg Subcutaneous BID    fluconazole  400 mg Oral Daily    metroNIDAZOLE  500 mg Oral Q8H    miconazole NITRATE 2 %   Topical (Top) BID    miconazole nitrate 2%   Topical (Top) BID    mupirocin   Nasal BID    pantoprazole  40 mg Intravenous BID    sucralfate  1 g Oral QID (AC & HS)     PRN Meds:acetaminophen, albuterol-ipratropium, dextrose 10%, dextrose 10%, diphenhydrAMINE, glucagon (human recombinant), glucose, glucose, hydrALAZINE, HYDROcodone-acetaminophen, HYDROcodone-acetaminophen, HYDROmorphone, influenza, insulin aspart U-100, metoclopramide HCl, naloxone, ondansetron, prochlorperazine, sodium chloride 0.9%, traZODone     Review of patient's allergies indicates:  No Known Allergies  Objective:     Vital Signs (Most Recent):  Temp: 98 °F (36.7 °C) (01/29/23 0711)  Pulse: 99 (01/29/23 0914)  Resp: 18 (01/29/23 0711)  BP: (!) 148/80 (01/29/23 0715)  SpO2: 95 % (01/29/23 0758)   Vital Signs (24h Range):  Temp:  [97.5 °F (36.4 °C)-98.6 °F (37 °C)] 98 °F (36.7 °C)  Pulse:  [] 99  Resp:  [17-19] 18  SpO2:  [91 %-95 %] 95 %  BP: (134-189)/(67-90) 148/80     Weight: (!) 235.9 kg (520 lb 1 oz)  Body mass index is 89.27 kg/m².    Intake/Output - Last 3 Shifts         01/27 0700 01/28 0659 01/28 0700 01/29 0659 01/29 0700 01/30 0659    I.V. (mL/kg) 2244.5 (9.5) 1877.6 (8)     NG/      IV Piggyback 148.6 48.5     Total Intake(mL/kg) 2663.1 (11.3) 1926.1 (8.2)     Urine (mL/kg/hr) 1450 (0.3) 1950 (0.3)     Drains 55 50     Total Output 1505 2000     Net +1158.1 -73.9                    Physical Exam  Vitals reviewed.   Constitutional:       Appearance: She  is well-developed. She is obese.   HENT:      Head: Normocephalic.   Eyes:      Pupils: Pupils are equal, round, and reactive to light.   Neck:      Thyroid: No thyromegaly.      Vascular: No JVD.      Trachea: No tracheal deviation.   Cardiovascular:      Rate and Rhythm: Normal rate and regular rhythm.      Heart sounds: Normal heart sounds.   Pulmonary:      Breath sounds: Normal breath sounds. No wheezing.   Abdominal:      General: Bowel sounds are normal. There is no distension.      Palpations: Abdomen is soft. Abdomen is not rigid. There is no mass.      Tenderness: There is no abdominal tenderness. There is no guarding or rebound.      Comments: Massively obese.  Incision is clean.  Drains are serous   Musculoskeletal:         General: Normal range of motion.      Right lower leg: Edema present.      Left lower leg: Edema present.   Lymphadenopathy:      Cervical: No cervical adenopathy.   Skin:     General: Skin is warm and dry.      Findings: No erythema or rash.   Neurological:      Mental Status: She is oriented to person, place, and time.       Significant Labs:  I have reviewed all pertinent lab results within the past 24 hours.  CBC:   Recent Labs   Lab 01/29/23  0742   WBC 13.97*   RBC 3.66*   HGB 9.6*   HCT 31.1*      MCV 85   MCH 26.2*   MCHC 30.9*     BMP:   Recent Labs   Lab 01/26/23  0417 01/28/23  0642 01/29/23  0742   *   < > 162*      < > 139   K 5.0   < > 4.4      < > 109   CO2 19*   < > 21*   BUN 53*   < > 24*   CREATININE 1.6*   < > 1.0   CALCIUM 8.6*   < > 9.0   MG 2.1  --   --     < > = values in this interval not displayed.       Significant Diagnostics:  I have reviewed all pertinent imaging results/findings within the past 24 hours.  No new x-rays

## 2023-01-29 NOTE — ASSESSMENT & PLAN NOTE
Excision and over sewn 1/25/23  Fluids per surgery  Sucralfate per surgery  Check h- plori IgG titer  Po proton pump inhibitor bid

## 2023-01-29 NOTE — PROGRESS NOTES
Singh discontinued. 10cc removed from balloon. Patient tolerated well. Pt due to void by 7 pm. External catheter in place now.

## 2023-01-29 NOTE — CONSULTS
SNF consult noted, referrals sent Friday, pending acceptance.  Primary CM tspoke to [facility] on Friday 1/27.  Facility requested call back on Monday 1/30 for bed availability.  Will defer to primary CM.

## 2023-01-29 NOTE — ASSESSMENT & PLAN NOTE
Patient's FSGs are controlled on current medication regimen.  Last A1c reviewed-   Lab Results   Component Value Date    HGBA1C 6.4 (H) 01/25/2023     Most recent fingerstick glucose reviewed-   Recent Labs   Lab 01/28/23  1229 01/28/23  1556 01/28/23 2025 01/29/23  0440   POCTGLUCOSE 89 94 107 140*     Current correctional scale  Low  Maintain anti-hyperglycemic dose as follows-   Antihyperglycemics (From admission, onward)    Start     Stop Route Frequency Ordered    01/25/23 1515  insulin aspart U-100 pen 0-5 Units         -- SubQ Before meals & nightly PRN 01/25/23 1417        Hold Oral hypoglycemics while patient is in the hospital.  Controlled without oral or SQ insulin injections  Monitor need to increase insulin needs now on clear liquid diet

## 2023-01-30 LAB
ANION GAP SERPL CALC-SCNC: 8 MMOL/L (ref 8–16)
ANISOCYTOSIS BLD QL SMEAR: SLIGHT
BASOPHILS NFR BLD: 0 % (ref 0–1.9)
BUN SERPL-MCNC: 18 MG/DL (ref 6–20)
CALCIUM SERPL-MCNC: 9 MG/DL (ref 8.7–10.5)
CHLORIDE SERPL-SCNC: 108 MMOL/L (ref 95–110)
CO2 SERPL-SCNC: 24 MMOL/L (ref 23–29)
CREAT SERPL-MCNC: 1 MG/DL (ref 0.5–1.4)
DIFFERENTIAL METHOD: ABNORMAL
EOSINOPHIL NFR BLD: 4 % (ref 0–8)
ERYTHROCYTE [DISTWIDTH] IN BLOOD BY AUTOMATED COUNT: 14.4 % (ref 11.5–14.5)
EST. GFR  (NO RACE VARIABLE): >60 ML/MIN/1.73 M^2
GLUCOSE SERPL-MCNC: 134 MG/DL (ref 70–110)
H PYLORI IGG SERPL QL IA: NEGATIVE
HCT VFR BLD AUTO: 32.3 % (ref 37–48.5)
HGB BLD-MCNC: 9.7 G/DL (ref 12–16)
IMM GRANULOCYTES # BLD AUTO: ABNORMAL K/UL (ref 0–0.04)
IMM GRANULOCYTES NFR BLD AUTO: ABNORMAL % (ref 0–0.5)
LYMPHOCYTES NFR BLD: 23 % (ref 18–48)
MCH RBC QN AUTO: 26.1 PG (ref 27–31)
MCHC RBC AUTO-ENTMCNC: 30 G/DL (ref 32–36)
MCV RBC AUTO: 87 FL (ref 82–98)
METAMYELOCYTES NFR BLD MANUAL: 1 %
MONOCYTES NFR BLD: 15 % (ref 4–15)
MYELOCYTES NFR BLD MANUAL: 3 %
NEUTROPHILS NFR BLD: 54 % (ref 38–73)
NRBC BLD-RTO: 0 /100 WBC
OVALOCYTES BLD QL SMEAR: ABNORMAL
PLATELET # BLD AUTO: 287 K/UL (ref 150–450)
PLATELET BLD QL SMEAR: ABNORMAL
PMV BLD AUTO: 11.6 FL (ref 9.2–12.9)
POCT GLUCOSE: 131 MG/DL (ref 70–110)
POCT GLUCOSE: 142 MG/DL (ref 70–110)
POCT GLUCOSE: 147 MG/DL (ref 70–110)
POIKILOCYTOSIS BLD QL SMEAR: SLIGHT
POTASSIUM SERPL-SCNC: 4.3 MMOL/L (ref 3.5–5.1)
RBC # BLD AUTO: 3.71 M/UL (ref 4–5.4)
SODIUM SERPL-SCNC: 140 MMOL/L (ref 136–145)
WBC # BLD AUTO: 11.46 K/UL (ref 3.9–12.7)

## 2023-01-30 PROCEDURE — 36415 COLL VENOUS BLD VENIPUNCTURE: CPT | Performed by: SURGERY

## 2023-01-30 PROCEDURE — 63600175 PHARM REV CODE 636 W HCPCS: Performed by: INTERNAL MEDICINE

## 2023-01-30 PROCEDURE — 63700000 PHARM REV CODE 250 ALT 637 W/O HCPCS: Performed by: FAMILY MEDICINE

## 2023-01-30 PROCEDURE — 97530 THERAPEUTIC ACTIVITIES: CPT | Mod: CQ

## 2023-01-30 PROCEDURE — 99900035 HC TECH TIME PER 15 MIN (STAT)

## 2023-01-30 PROCEDURE — 85007 BL SMEAR W/DIFF WBC COUNT: CPT | Performed by: SURGERY

## 2023-01-30 PROCEDURE — 25000003 PHARM REV CODE 250: Performed by: FAMILY MEDICINE

## 2023-01-30 PROCEDURE — 94761 N-INVAS EAR/PLS OXIMETRY MLT: CPT

## 2023-01-30 PROCEDURE — 63600175 PHARM REV CODE 636 W HCPCS: Performed by: FAMILY MEDICINE

## 2023-01-30 PROCEDURE — 80048 BASIC METABOLIC PNL TOTAL CA: CPT | Performed by: SURGERY

## 2023-01-30 PROCEDURE — 63600175 PHARM REV CODE 636 W HCPCS: Performed by: SURGERY

## 2023-01-30 PROCEDURE — 97530 THERAPEUTIC ACTIVITIES: CPT

## 2023-01-30 PROCEDURE — 97535 SELF CARE MNGMENT TRAINING: CPT

## 2023-01-30 PROCEDURE — 11000001 HC ACUTE MED/SURG PRIVATE ROOM

## 2023-01-30 PROCEDURE — 85027 COMPLETE CBC AUTOMATED: CPT | Performed by: SURGERY

## 2023-01-30 PROCEDURE — 25000003 PHARM REV CODE 250: Performed by: SURGERY

## 2023-01-30 RX ADMIN — SUCRALFATE 1 G: 1 TABLET ORAL at 11:01

## 2023-01-30 RX ADMIN — MICONAZOLE NITRATE 2 % TOPICAL POWDER: at 09:01

## 2023-01-30 RX ADMIN — SERTRALINE HYDROCHLORIDE 100 MG: 50 TABLET ORAL at 09:01

## 2023-01-30 RX ADMIN — ENOXAPARIN SODIUM 60 MG: 100 INJECTION SUBCUTANEOUS at 09:01

## 2023-01-30 RX ADMIN — MICONAZOLE NITRATE: 20 OINTMENT TOPICAL at 09:01

## 2023-01-30 RX ADMIN — FLUCONAZOLE 400 MG: 100 TABLET ORAL at 09:01

## 2023-01-30 RX ADMIN — PANTOPRAZOLE SODIUM 40 MG: 40 TABLET, DELAYED RELEASE ORAL at 09:01

## 2023-01-30 RX ADMIN — SUCRALFATE 1 G: 1 TABLET ORAL at 06:01

## 2023-01-30 RX ADMIN — CHLORHEXIDINE GLUCONATE 0.12% ORAL RINSE 10 ML: 1.2 LIQUID ORAL at 09:01

## 2023-01-30 RX ADMIN — METRONIDAZOLE 500 MG: 500 TABLET ORAL at 06:01

## 2023-01-30 RX ADMIN — DEXTROSE, SODIUM CHLORIDE, AND POTASSIUM CHLORIDE: 5; .45; .15 INJECTION INTRAVENOUS at 06:01

## 2023-01-30 RX ADMIN — ATORVASTATIN CALCIUM 10 MG: 10 TABLET, FILM COATED ORAL at 09:01

## 2023-01-30 RX ADMIN — LOSARTAN POTASSIUM 25 MG: 25 TABLET, FILM COATED ORAL at 09:01

## 2023-01-30 RX ADMIN — HYDROCODONE BITARTRATE AND ACETAMINOPHEN 1 TABLET: 7.5; 325 TABLET ORAL at 11:01

## 2023-01-30 RX ADMIN — SUCRALFATE 1 G: 1 TABLET ORAL at 09:01

## 2023-01-30 RX ADMIN — METRONIDAZOLE 500 MG: 500 TABLET ORAL at 09:01

## 2023-01-30 RX ADMIN — MUPIROCIN: 20 OINTMENT TOPICAL at 09:01

## 2023-01-30 RX ADMIN — METRONIDAZOLE 500 MG: 500 TABLET ORAL at 02:01

## 2023-01-30 RX ADMIN — SUCRALFATE 1 G: 1 TABLET ORAL at 05:01

## 2023-01-30 RX ADMIN — CEFTRIAXONE 2 G: 2 INJECTION, POWDER, FOR SOLUTION INTRAMUSCULAR; INTRAVENOUS at 02:01

## 2023-01-30 NOTE — PT/OT/SLP PROGRESS
Physical Therapy      Patient Name:  Chasitty Hung   MRN:  [id number]    10:20 a.m.  Patient soiled. Nursing staff notified.  Will follow up later today.

## 2023-01-30 NOTE — ASSESSMENT & PLAN NOTE
Excision and over sewn 1/25/23  ADA diet  Oral and IV narcotics   Await H pylori IgG or pathologic analysis  Hep-Lock IV fluids  ppi orally b.i.d.  Continue antibiotics until H pylori status is known  Likely drain removal tomorrow.      Disposition per hospital Medicine

## 2023-01-30 NOTE — ASSESSMENT & PLAN NOTE
Leukocytosis resolved   Diet per sgy  Excision and over sewn 1/25/23 by surgery  hpylori pending  Empirically treating with rocephin and proton pump inhibitor   Also on flagyl for anaerobic/gut coverage

## 2023-01-30 NOTE — ASSESSMENT & PLAN NOTE
Hold Spironolactone and Losartan for now   Resume accordantly     1/29  Elevated blood pressures  Resume home losartan  Monitor need to resume spironolactone     1/30  Mildly elevated blood pressure  Continue monitoring need to resume home spironolactone

## 2023-01-30 NOTE — PROGRESS NOTES
[facility]  [facility]  Progress Note    Subjective:     History of Present Illness:    57-year-old female who presents to the emergency room with a 3 day history of epigastric pain associated with nausea and vomiting loss of appetite.  She states that she has not been able to keep anything down.  She says the bulge above her bellybutton is more prominent.      The patient is unable to go to the CT scanner due to her body mass index of 89.6.      The patient also has significant skin breakdown along the upper thighs and along the edges in undersurface of her very large abdominal wall pannus.  She has significant swelling and blistering of her lower extremities.      She does not report any fever or chills.  She is not report any chest pain.      The patient is essentially nonambulatory       her home medications, and is non-ambulatory at baseline. No further complaints or concerns at this time.      Arrival mode: EMS      Post-Op Info:  Procedure(s):  REPAIR, HERNIA, VENTRAL, INCARCERATED, WITHOUT HISTORY OF PRIOR REPAIR  LAPAROTOMY, EXPLORATORY  BLOCK, TRANSVERSUS ABDOMINIS PLANE  LYSIS, ADHESIONS  EXCISION, SMALL INTESTINE  EXCISION, ULCER   5 Days Post-Op     Interval History:  Tolerated clear liquids.  Advance to ADA diet.  Continue antibiotics as treatment for H pylori until H pylori IgG are pathology has returned.  B.i.d. PPI.  Disposition per Medicine    Medications:  Continuous Infusions:   dextrose 5 % and 0.45 % NaCl with KCl 20 mEq 50 mL/hr at 01/30/23 0649     Scheduled Meds:   atorvastatin  10 mg Oral QHS    cefTRIAXone (ROCEPHIN) IVPB  2 g Intravenous Q24H    chlorhexidine  10 mL Mouth/Throat BID    enoxaparin  60 mg Subcutaneous BID    fluconazole  400 mg Oral Daily    losartan  25 mg Oral Daily    metroNIDAZOLE  500 mg Oral Q8H    miconazole NITRATE 2 %   Topical (Top) BID    miconazole nitrate 2%   Topical (Top) BID    mupirocin   Nasal BID    pantoprazole  40 mg Oral BID     sertraline  100 mg Oral QHS    sucralfate  1 g Oral QID (AC & HS)     PRN Meds:acetaminophen, albuterol-ipratropium, dextrose 10%, dextrose 10%, diphenhydrAMINE, glucagon (human recombinant), glucose, glucose, hydrALAZINE, HYDROcodone-acetaminophen, HYDROcodone-acetaminophen, HYDROmorphone, influenza, insulin aspart U-100, metoclopramide HCl, naloxone, ondansetron, prochlorperazine, sodium chloride 0.9%, traZODone     Review of patient's allergies indicates:  No Known Allergies  Objective:     Vital Signs (Most Recent):  Temp: 98.4 °F (36.9 °C) (01/30/23 0747)  Pulse: 99 (01/30/23 0809)  Resp: 18 (01/30/23 0809)  BP: (!) 145/64 (01/30/23 0747)  SpO2: 96 % (01/30/23 0809)   Vital Signs (24h Range):  Temp:  [97.8 °F (36.6 °C)-98.6 °F (37 °C)] 98.4 °F (36.9 °C)  Pulse:  [90-99] 99  Resp:  [16-18] 18  SpO2:  [93 %-97 %] 96 %  BP: (136-174)/(64-88) 145/64     Weight: (!) 235.9 kg (520 lb 1 oz)  Body mass index is 89.27 kg/m².    Intake/Output - Last 3 Shifts         01/28 0700 01/29 0659 01/29 0700 01/30 0659 01/30 0700 01/31 0659    I.V. (mL/kg) 1877.6 (8) 1345.8 (5.7)     NG/GT       IV Piggyback 48.5 48.3     Total Intake(mL/kg) 1926.1 (8.2) 1394.1 (5.9)     Urine (mL/kg/hr) 1950 (0.3) 850 (0.2)     Drains 50 17.5     Total Output 2000 867.5     Net -73.9 +526.6                    Physical Exam  Vitals and nursing note reviewed.   Constitutional:       Appearance: She is well-developed. She is obese.   HENT:      Head: Normocephalic.   Eyes:      Pupils: Pupils are equal, round, and reactive to light.   Neck:      Thyroid: No thyromegaly.      Vascular: No JVD.      Trachea: No tracheal deviation.   Cardiovascular:      Rate and Rhythm: Normal rate and regular rhythm.      Heart sounds: Normal heart sounds.   Pulmonary:      Breath sounds: Normal breath sounds. No wheezing.   Abdominal:      General: Bowel sounds are normal. There is no distension.      Palpations: Abdomen is soft. Abdomen is not rigid. There is  no mass.      Tenderness: There is no abdominal tenderness. There is no guarding or rebound.      Comments: Massively obese.  Incision is clean.  Drains are serosanguineous   Musculoskeletal:         General: Normal range of motion.      Right lower leg: Edema present.      Left lower leg: Edema present.   Lymphadenopathy:      Cervical: No cervical adenopathy.   Skin:     General: Skin is warm and dry.      Findings: No erythema or rash.      Comments: Skin changes consistent with candidiasis, somewhat improved   Neurological:      Mental Status: She is oriented to person, place, and time.   Psychiatric:         Mood and Affect: Mood normal.         Behavior: Behavior normal.         Thought Content: Thought content normal.         Judgment: Judgment normal.       Significant Labs:  I have reviewed all pertinent lab results within the past 24 hours.  CBC:   Recent Labs   Lab 01/30/23  0532   WBC 11.46   RBC 3.71*   HGB 9.7*   HCT 32.3*      MCV 87   MCH 26.1*   MCHC 30.0*     BMP:   Recent Labs   Lab 01/26/23  0417 01/28/23  0642 01/30/23  0532   *   < > 134*      < > 140   K 5.0   < > 4.3      < > 108   CO2 19*   < > 24   BUN 53*   < > 18   CREATININE 1.6*   < > 1.0   CALCIUM 8.6*   < > 9.0   MG 2.1  --   --     < > = values in this interval not displayed.       Significant Diagnostics:  I have reviewed all pertinent imaging results/findings within the past 24 hours.  No new    Assessment/Plan:     * Perforated gastric ulcer   Excision and over sewn 1/25/23  ADA diet  Oral and IV narcotics   Await H pylori IgG or pathologic analysis  Hep-Lock IV fluids  ppi orally b.i.d.  Continue antibiotics until H pylori status is known  Likely drain removal tomorrow.      Disposition per hospital Medicine    Incarcerated umbilical hernia with partial ileum obstruction and perforated gastric ulcer  Likely secondary to perforated gastric ulcer    Cutaneous candidiasis  Would recommend treatment of the  cutaneous candidiasis both with oral Diflucan and topical ketoconazole ointment.  Barrier gauze or abdominal pads or other material should be placed to prevent    Venous stasis dermatitis of both lower extremities  Management per hospital Medicine    Type 2 diabetes mellitus, without long-term current use of insulin  Insulin sliding scale per hospital Medicine    Morbid obesity with BMI of 70 and over, adult  She would be discussed with her primary provider.  It is unlikely the patient loose significant weight without a structured program.      Currently she is in mobile likely due to her morbid obesity.      Disposition as far as meeting home needs verses skilled nursing versus nursing home per hospital Medicine    Essential hypertension  Further management per hospital Medicine,  hold oral medication until ngt removed    Depression  Further management per hospital Medicine,  hold oral medication until ngt removed    Hyperlipidemia  Further management by hospital Medicine, hold oral medication until ngt removed        Luis Angel Collins MD  General Surgery  [facility]

## 2023-01-30 NOTE — PROGRESS NOTES
[facility]  Progress Note    Patient Name: Chastity Hung  MRN: [id number]  Patient Class: IP- Inpatient   Admission Date: 1/25/2023  Length of Stay: 5 days  Attending Physician: Waldo Garcia MD  Primary Care Provider: Alexandra Dawkins MD        Subjective:     Principal Problem:Perforated gastric ulcer        HPI:  The patient is a 58 yo female with Morbid Obesity - BMI 89, DM, HTN, Kidney stones who presented to ED with N/V and abdominal pain x 3 days. The patient reports abdominal pain at umbilical area is a sharp, 9/10 pain associated with N/V, Poor appetite, Fatigue, and Malaise. Pt also reports pain and wounds to lower abdomen and BLE legs making it difficult to ambulate. She has not ambulated for over 2 weeks. Pt reports noncompliance with home meds and self care.     In the ED, pt was found to have a non-reducible umbilical hernia with a draining wound at the umbilicus. Pt unable to have CT imaging due to body habitus. Afebrile, WBC 13, Serum Cr 1.6, mildly elevated LFTs/T bili  Abd xray showed nothing acute.     Dr. Collins with General surgery evaluated pt in ED and recommended urgent surgery for incarcerated umbilical hernia and ICU admission.       Overview/Hospital Course:  1/26: perforated gastric ulcer, surgically corrected by surgery. Will cont on rocephin and flagyl for intra-abdominal coverage and UTI. PT/OT on case.   1/27 NAEON . The urine cx is (+) for GNR .  Remain NPO and eith a NGT . General surgery following .  1/28 ngt discontinued per surgery. Keep npo. Physical/occupational therapy consulted for discharge planning. Wbc trending down  1/29 cleared by surgery for clear liquid diet. Denies fever, dyspnea, nausea/vomiting. Physical/occupational therapy recommending skilled nursing facility placement. Case management consulted.  1/30 diet advanced. Dedrick drains with serous drainage. Possible drain removal tomorrow per Surgery. awaiting placement.      Interval  History: See hospital course for today      Review of Systems   Constitutional:  Positive for activity change (improved) and appetite change. Negative for fever.   Respiratory:  Negative for shortness of breath.    Cardiovascular:  Positive for leg swelling. Negative for chest pain.   Gastrointestinal:  Positive for abdominal distention. Negative for abdominal pain, constipation, nausea and vomiting.   Musculoskeletal:  Positive for gait problem.   Skin:  Positive for rash and wound.   Neurological:  Positive for weakness.   Psychiatric/Behavioral:  Positive for dysphoric mood.    Objective:     Vital Signs (Most Recent):  Temp: 98.4 °F (36.9 °C) (01/30/23 0747)  Pulse: 99 (01/30/23 0809)  Resp: 18 (01/30/23 0809)  BP: (!) 145/64 (01/30/23 0747)  SpO2: 96 % (01/30/23 0809)   Vital Signs (24h Range):  Temp:  [97.8 °F (36.6 °C)-98.6 °F (37 °C)] 98.4 °F (36.9 °C)  Pulse:  [90-99] 99  Resp:  [16-18] 18  SpO2:  [93 %-97 %] 96 %  BP: (136-174)/(64-88) 145/64     Weight: (!) 235.9 kg (520 lb 1 oz)  Body mass index is 89.27 kg/m².    Intake/Output Summary (Last 24 hours) at 1/30/2023 1054  Last data filed at 1/30/2023 0800  Gross per 24 hour   Intake 1574.08 ml   Output 867.5 ml   Net 706.58 ml      Physical Exam  Vitals and nursing note reviewed.   Constitutional:       General: She is not in acute distress.     Appearance: She is morbidly obese. She is ill-appearing. She is not toxic-appearing.   HENT:      Head: Normocephalic and atraumatic.   Cardiovascular:      Rate and Rhythm: Normal rate.   Pulmonary:      Effort: Pulmonary effort is normal. No respiratory distress.   Abdominal:      General: There is distension.      Palpations: Abdomen is soft.      Tenderness: There is no abdominal tenderness.      Comments: Abdominal incision with no overt signs or symptoms of infection  Surgical staples intact  Dedrick drain to bilateral upper quadrants draining serous fluid   Musculoskeletal:      Right lower leg: Edema present.       Left lower leg: Edema present.   Skin:     General: Skin is warm.      Findings: Lesion present.   Neurological:      Mental Status: She is oriented to person, place, and time. Mental status is at baseline.      Motor: Weakness present.   Psychiatric:         Mood and Affect: Mood is depressed.         Speech: Speech normal.         Behavior: Behavior is cooperative.       Significant Labs: All pertinent labs within the past 24 hours have been reviewed.    CBC:   Recent Labs   Lab 01/29/23  0742 01/30/23  0532   WBC 13.97* 11.46   HGB 9.6* 9.7*   HCT 31.1* 32.3*    287     CMP:   Recent Labs   Lab 01/29/23  0742 01/30/23  0532    140   K 4.4 4.3    108   CO2 21* 24   * 134*   BUN 24* 18   CREATININE 1.0 1.0   CALCIUM 9.0 9.0   ANIONGAP 9 8       Significant Imaging: I have reviewed all pertinent imaging results/findings within the past 24 hours.      Assessment/Plan:      * Perforated gastric ulcer  Excision and over sewn 1/25/23  Sucralfate per surgery  Check h- plori IgG titer  Po proton pump inhibitor bid   Intravenous rocephin       Incarcerated umbilical hernia with partial ileum obstruction and perforated gastric ulcer  Leukocytosis resolved   Diet per sgy  Excision and over sewn 1/25/23 by surgery  hpylori pending  Empirically treating with rocephin and proton pump inhibitor   Also on flagyl for anaerobic/gut coverage    Cutaneous candidiasis  Oral diflucan   Topical miconazole       Venous stasis dermatitis of both lower extremities  Consult wound care        Morbid obesity with BMI of 70 and over, adult  Body mass index is 89.27 kg/m². Morbid obesity complicates all aspects of disease management from diagnostic modalities to treatment. Weight loss encouraged and health benefits explained to patient.   Follow up outpatient for structured weight loss plan      Diabetes mellitus type 2, controlled  Patient's FSGs are controlled on current medication regimen.  Last A1c reviewed-    Lab Results   Component Value Date    HGBA1C 6.4 (H) 01/25/2023     Most recent fingerstick glucose reviewed-   Recent Labs   Lab 01/29/23  1129 01/29/23  1546 01/29/23 2007 01/30/23  0441   POCTGLUCOSE 118* 174* 145* 142*     Current correctional scale  Low  Maintain anti-hyperglycemic dose as follows-   Antihyperglycemics (From admission, onward)    Start     Stop Route Frequency Ordered    01/25/23 1515  insulin aspart U-100 pen 0-5 Units         -- SubQ Before meals & nightly PRN 01/25/23 1417        Hold Oral hypoglycemics while patient is in the hospital.  Controlled without oral or SQ insulin injections  Monitor need to increase insulin needs pending appetite/diet    Essential hypertension  Hold Spironolactone and Losartan for now   Resume accordantly     1/29  Elevated blood pressures  Resume home losartan  Monitor need to resume spironolactone     1/30  Mildly elevated blood pressure  Continue monitoring need to resume home spironolactone    Depression  Patient has persistent depression which is mild and is currently controlled. Will Continue anti-depressant medications. We will not consult psychiatry at this time. Patient does not display psychosis at this time. Continue to monitor closely and adjust plan of care as needed.    Resume zoloft daily      Hyperlipidemia  Diet per surgery  Resume statin qhs      VTE Risk Mitigation (From admission, onward)         Ordered     enoxaparin injection 60 mg  2 times daily         01/27/23 1347     Place sequential compression device  Until discontinued         01/26/23 1307     IP VTE HIGH RISK PATIENT  Once         01/26/23 0753                Discharge Planning   ADOLFO:      Code Status: Full Code   Is the patient medically ready for discharge?:     Reason for patient still in hospital (select all that apply): Patient trending condition, Laboratory test, Treatment, Consult recommendations, PT / OT recommendations and Pending disposition  Discharge Plan A: Skilled  Nursing Facility                  Waldo Garcia MD  Department of Hospital Medicine   [facility]

## 2023-01-30 NOTE — PT/OT/SLP PROGRESS
"Occupational Therapy  Treatment    Chastity Hung   MRN: [id number]   Admitting Diagnosis: Perforated gastric ulcer    OT Date of Treatment: 01/30/23   OT Start Time: 1040  OT Stop Time: 1105  OT Total Time (min): 25 min    Billable Minutes:  Self Care/Home Management 15 MINUTES and Therapeutic Activity 15 MINUTES    OT/ALTAF: OT          General Precautions: Standard, fall  Orthopedic Precautions: N/A  Braces: N/A  Respiratory Status: Nasal cannula, flow 5 L/min         Subjective:  Communicated with NURSE AND Epic CHART REVIEW prior to session.    Pain/Comfort  Pain Rating 1: 8/10 (MULTIPLE WOUNDS)  Location - Side 1: Bilateral  Location - Orientation 1: generalized    Objective:  Patient found with: Daniela, telemetry     Functional Mobility:  Bed Mobility:  ROLLING L<>R MAX A X 2-3   MAX A X 2 TO MAINTAIN SIDE LYING  Activities of Daily Living:  TOTAL A WITH JULIO C BRIEF  TOILET ING TOTAL A   AM-PAC 6 CLICK ADL   How much help from another person does this patient currently need?   1 = Unable, Total/Dependent Assistance  2 = A lot, Maximum/Moderate Assistance  3 = A little, Minimum/Contact Guard/Supervision  4 = None, Modified [place]/Independent    Putting on and taking off regular lower body clothing? : 1  Bathing (including washing, rinsing, drying)?: 1  Toileting, which includes using toilet, bedpan, or urinal? : 1  Putting on and taking off regular upper body clothing?: 1  Taking care of personal grooming such as brushing teeth?: 1  Eating meals?: 3  Daily Activity Total Score: 8     AM-PAC Raw Score CMS "G-Code Modifier Level of Impairment Assistance   6 % Total / Unable   7 - 8 CM 80 - 100% Maximal Assist   9-13 CL 60 - 80% Moderate Assist   14 - 19 CK 40 - 60% Moderate Assist   20 - 22 CJ 20 - 40% Minimal Assist   23 CI 1-20% SBA / CGA   24 CH 0% Independent/ Mod I       Patient left HOB elevated with all lines intact, call button in reach, NURSE notified, and NURSE LUCILLE AND CNA " present    ASSESSMENT:  Chastity Hung is a 57 y.o. female with a medical diagnosis of Perforated gastric ulcer and presents with DEBILITY AND GENERALIZED WEAKNESS    Rehab identified problem list/impairments:  weakness, impaired functional mobility, decreased safety awareness, impaired endurance, gait instability, impaired balance, impaired self care skills, decreased lower extremity function, decreased upper extremity function    Rehab potential is good.    Activity tolerance: Good    Discharge recommendations: nursing facility, skilled   Barriers to discharge:      Equipment recommendations:  (TBD)    GOALS:   Multidisciplinary Problems       Occupational Therapy Goals          Problem: Occupational Therapy    Goal Priority Disciplines Outcome Interventions   Occupational Therapy Goal     OT, PT/OT Ongoing, Progressing    Description: Goals to be met by: 2/9/23     Patient will increase functional independence with ADLs by performing:    Grooming while HOB elevated on bed with Set-up Assistance.  Rolling to Bilateral with Minimal Assistance.   Supine to sit with Moderate Assistance.                         Plan:  Patient to be seen 2 x/week to address the above listed problems via self-care/home management, therapeutic activities, therapeutic exercises  Plan of Care expires: 02/09/23  Plan of Care reviewed with: patient         01/30/2023

## 2023-01-30 NOTE — SUBJECTIVE & OBJECTIVE
Interval History:  Tolerated clear liquids.  Advance to ADA diet.  Continue antibiotics as treatment for H pylori until H pylori IgG are pathology has returned.  B.i.d. PPI.  Disposition per Medicine    Medications:  Continuous Infusions:   dextrose 5 % and 0.45 % NaCl with KCl 20 mEq 50 mL/hr at 01/30/23 0649     Scheduled Meds:   atorvastatin  10 mg Oral QHS    cefTRIAXone (ROCEPHIN) IVPB  2 g Intravenous Q24H    chlorhexidine  10 mL Mouth/Throat BID    enoxaparin  60 mg Subcutaneous BID    fluconazole  400 mg Oral Daily    losartan  25 mg Oral Daily    metroNIDAZOLE  500 mg Oral Q8H    miconazole NITRATE 2 %   Topical (Top) BID    miconazole nitrate 2%   Topical (Top) BID    mupirocin   Nasal BID    pantoprazole  40 mg Oral BID    sertraline  100 mg Oral QHS    sucralfate  1 g Oral QID (AC & HS)     PRN Meds:acetaminophen, albuterol-ipratropium, dextrose 10%, dextrose 10%, diphenhydrAMINE, glucagon (human recombinant), glucose, glucose, hydrALAZINE, HYDROcodone-acetaminophen, HYDROcodone-acetaminophen, HYDROmorphone, influenza, insulin aspart U-100, metoclopramide HCl, naloxone, ondansetron, prochlorperazine, sodium chloride 0.9%, traZODone     Review of patient's allergies indicates:  No Known Allergies  Objective:     Vital Signs (Most Recent):  Temp: 98.4 °F (36.9 °C) (01/30/23 0747)  Pulse: 99 (01/30/23 0809)  Resp: 18 (01/30/23 0809)  BP: (!) 145/64 (01/30/23 0747)  SpO2: 96 % (01/30/23 0809)   Vital Signs (24h Range):  Temp:  [97.8 °F (36.6 °C)-98.6 °F (37 °C)] 98.4 °F (36.9 °C)  Pulse:  [90-99] 99  Resp:  [16-18] 18  SpO2:  [93 %-97 %] 96 %  BP: (136-174)/(64-88) 145/64     Weight: (!) 235.9 kg (520 lb 1 oz)  Body mass index is 89.27 kg/m².    Intake/Output - Last 3 Shifts         01/28 0700 01/29 0659 01/29 0700 01/30 0659 01/30 0700 01/31 0659    I.V. (mL/kg) 1877.6 (8) 1345.8 (5.7)     NG/GT       IV Piggyback 48.5 48.3     Total Intake(mL/kg) 1926.1 (8.2) 1394.1 (5.9)     Urine (mL/kg/hr) 1950 (0.3)  850 (0.2)     Drains 50 17.5     Total Output 2000 867.5     Net -73.9 +526.6                    Physical Exam  Vitals and nursing note reviewed.   Constitutional:       Appearance: She is well-developed. She is obese.   HENT:      Head: Normocephalic.   Eyes:      Pupils: Pupils are equal, round, and reactive to light.   Neck:      Thyroid: No thyromegaly.      Vascular: No JVD.      Trachea: No tracheal deviation.   Cardiovascular:      Rate and Rhythm: Normal rate and regular rhythm.      Heart sounds: Normal heart sounds.   Pulmonary:      Breath sounds: Normal breath sounds. No wheezing.   Abdominal:      General: Bowel sounds are normal. There is no distension.      Palpations: Abdomen is soft. Abdomen is not rigid. There is no mass.      Tenderness: There is no abdominal tenderness. There is no guarding or rebound.      Comments: Massively obese.  Incision is clean.  Drains are serosanguineous   Musculoskeletal:         General: Normal range of motion.      Right lower leg: Edema present.      Left lower leg: Edema present.   Lymphadenopathy:      Cervical: No cervical adenopathy.   Skin:     General: Skin is warm and dry.      Findings: No erythema or rash.      Comments: Skin changes consistent with candidiasis, somewhat improved   Neurological:      Mental Status: She is oriented to person, place, and time.   Psychiatric:         Mood and Affect: Mood normal.         Behavior: Behavior normal.         Thought Content: Thought content normal.         Judgment: Judgment normal.       Significant Labs:  I have reviewed all pertinent lab results within the past 24 hours.  CBC:   Recent Labs   Lab 01/30/23  0532   WBC 11.46   RBC 3.71*   HGB 9.7*   HCT 32.3*      MCV 87   MCH 26.1*   MCHC 30.0*     BMP:   Recent Labs   Lab 01/26/23  0417 01/28/23  0642 01/30/23  0532   *   < > 134*      < > 140   K 5.0   < > 4.3      < > 108   CO2 19*   < > 24   BUN 53*   < > 18   CREATININE 1.6*   < >  1.0   CALCIUM 8.6*   < > 9.0   MG 2.1  --   --     < > = values in this interval not displayed.       Significant Diagnostics:  I have reviewed all pertinent imaging results/findings within the past 24 hours.  No new

## 2023-01-30 NOTE — SUBJECTIVE & OBJECTIVE
Interval History: See hospital course for today      Review of Systems   Constitutional:  Positive for activity change (improved) and appetite change. Negative for fever.   Respiratory:  Negative for shortness of breath.    Cardiovascular:  Positive for leg swelling. Negative for chest pain.   Gastrointestinal:  Positive for abdominal distention. Negative for abdominal pain, constipation, nausea and vomiting.   Musculoskeletal:  Positive for gait problem.   Skin:  Positive for rash and wound.   Neurological:  Positive for weakness.   Psychiatric/Behavioral:  Positive for dysphoric mood.    Objective:     Vital Signs (Most Recent):  Temp: 98.4 °F (36.9 °C) (01/30/23 0747)  Pulse: 99 (01/30/23 0809)  Resp: 18 (01/30/23 0809)  BP: (!) 145/64 (01/30/23 0747)  SpO2: 96 % (01/30/23 0809)   Vital Signs (24h Range):  Temp:  [97.8 °F (36.6 °C)-98.6 °F (37 °C)] 98.4 °F (36.9 °C)  Pulse:  [90-99] 99  Resp:  [16-18] 18  SpO2:  [93 %-97 %] 96 %  BP: (136-174)/(64-88) 145/64     Weight: (!) 235.9 kg (520 lb 1 oz)  Body mass index is 89.27 kg/m².    Intake/Output Summary (Last 24 hours) at 1/30/2023 1054  Last data filed at 1/30/2023 0800  Gross per 24 hour   Intake 1574.08 ml   Output 867.5 ml   Net 706.58 ml      Physical Exam  Vitals and nursing note reviewed.   Constitutional:       General: She is not in acute distress.     Appearance: She is morbidly obese. She is ill-appearing. She is not toxic-appearing.   HENT:      Head: Normocephalic and atraumatic.   Cardiovascular:      Rate and Rhythm: Normal rate.   Pulmonary:      Effort: Pulmonary effort is normal. No respiratory distress.   Abdominal:      General: There is distension.      Palpations: Abdomen is soft.      Tenderness: There is no abdominal tenderness.      Comments: Abdominal incision with no overt signs or symptoms of infection  Surgical staples intact  Dedrick drain to bilateral upper quadrants draining serous fluid   Musculoskeletal:      Right lower leg: Edema  present.      Left lower leg: Edema present.   Skin:     General: Skin is warm.      Findings: Lesion present.   Neurological:      Mental Status: She is oriented to person, place, and time. Mental status is at baseline.      Motor: Weakness present.   Psychiatric:         Mood and Affect: Mood is depressed.         Speech: Speech normal.         Behavior: Behavior is cooperative.       Significant Labs: All pertinent labs within the past 24 hours have been reviewed.    CBC:   Recent Labs   Lab 01/29/23  0742 01/30/23  0532   WBC 13.97* 11.46   HGB 9.6* 9.7*   HCT 31.1* 32.3*    287     CMP:   Recent Labs   Lab 01/29/23  0742 01/30/23  0532    140   K 4.4 4.3    108   CO2 21* 24   * 134*   BUN 24* 18   CREATININE 1.0 1.0   CALCIUM 9.0 9.0   ANIONGAP 9 8       Significant Imaging: I have reviewed all pertinent imaging results/findings within the past 24 hours.

## 2023-01-30 NOTE — ASSESSMENT & PLAN NOTE
Excision and over sewn 1/25/23  Sucralfate per surgery  Check h- plori IgG titer  Po proton pump inhibitor bid   Intravenous rocephin

## 2023-01-30 NOTE — PLAN OF CARE
Swer went and meet with Patient at bedside to discuss SNF placement. Swer informed Patient that there was a facility in [place], [place] who could possibly accept. Patient verbalized she did not want to go that far away. Swer let Patient know that other options are to wait for other SNF's or home with Home Health.    Patient verbalized understanding and stated she did not have a HH preference.     Swer will review SNF referrals sent and send out Home Health referrals.

## 2023-01-30 NOTE — PLAN OF CARE
Bed locked, in lowest position. Call bell in reach. Purposeful rounding done every two hours. Blood glucose monitoring. Cardiac monitoring in use. PChart check complete. Will continue with plan of care.

## 2023-01-30 NOTE — PT/OT/SLP PROGRESS
Physical Therapy  Treatment    Chastity Hung   MRN: [id number]   Admitting Diagnosis: Perforated gastric ulcer    PT Received On: 01/30/23  PT Start Time: 1050     PT Stop Time: 1115    PT Total Time (min): 25 min       Billable Minutes:  Therapeutic Activity 25    Treatment Type: Treatment  PT/PTA: PTA     PTA Visit Number: 2       General Precautions: Standard, fall  Orthopedic Precautions: N/A  Braces: N/A  Respiratory Status: Room air    Spiritual, Cultural Beliefs, Lutheran Practices, Values that Affect Care: no    Subjective:  Communicated with patient's nurse, Betzy, and completed Epic chart review prior to session.  Patient agreed to PT session.     Pain/Comfort  Pain Rating 1: 8/10 (MULTIPLE WOUND SITES WITH MOVEMENT)  Pain Addressed 1: Cessation of Activity, Other (see comments) (ACTIVITY PACING)    Objective:   Patient found with: Daniela, telemetry, Other (comments) (MIDLINE)    Patient still soiled after having checked on her earlier this a.m.  Assisted nurse and tech with cleaning and changing patient due to extensive physical assistance needed for bed mobility.    Roll R/L x1 trials: Total A of 2-3    Required Total A of 2 to sustain side lying during cleaning    Total A for cleaning throughout    Supine scoot towards HOB: Min A (bed in trend; x2 attempts)    Educated patient on importance of increased tolerance to upright position and direct impact on CV endurance and strength. Patient encouraged to utilize elevated HOB to simulate chair position until able to safely complete chair T/F. Patient given a minimum goal of majority of the day to be spent in upright, especially with all meals. Encouraged patient to perform AROM TE to BLE throughout the day within all available planes of motion. Re enforced importance of utilizing call light to meet needs in room and not attempt to get up without staff assistance. Patient verbalized understanding and agreed to comply.     AM-PAC 6 CLICK MOBILITY  How  much help from another person does this patient currently need?   1 = Unable, Total/Dependent Assistance  2 = A lot, Maximum/Moderate Assistance  3 = A little, Minimum/Contact Guard/Supervision  4 = None, Modified [place]/Independent    Turning over in bed (including adjusting bedclothes, sheets and blankets)?: 1  Sitting down on and standing up from a chair with arms (e.g., wheelchair, bedside commode, etc.): 1  Moving from lying on back to sitting on the side of the bed?: 1  Moving to and from a bed to a chair (including a wheelchair)?: 1  Need to walk in hospital room?: 1  Climbing 3-5 steps with a railing?: 1  Basic Mobility Total Score: 6    AM-PAC Raw Score CMS G-Code Modifier Level of Impairment Assistance   6 % Total / Unable   7 - 9 CM 80 - 100% Maximal Assist   10 - 14 CL 60 - 80% Moderate Assist   15 - 19 CK 40 - 60% Moderate Assist   20 - 22 CJ 20 - 40% Minimal Assist   23 CI 1-20% SBA / CGA   24 CH 0% Independent/ Mod I     Patient left HOB elevated with call button in reach and nurse present.    Assessment:  Chastity Hung is a 57 y.o. female with a medical diagnosis of Perforated gastric ulcer and presents with overall decline in functional mobility. Patient would continue to benefit from skilled PT to address functional limitations listed below in order to return to PLOF/decrease caregiver burden.      Rehab identified problem list/impairments: weakness, impaired endurance, impaired sensation, impaired self care skills, impaired functional mobility, impaired balance, decreased coordination, decreased upper extremity function, decreased lower extremity function, decreased safety awareness, pain, decreased ROM, impaired coordination, impaired skin, edema, impaired cardiopulmonary response to activity    Rehab potential is fair.    Activity tolerance: Fair    Discharge recommendations: nursing facility, skilled      Barriers to discharge:      Equipment recommendations: other (see  comments) (TBD BY NEXT LEVEL OF CARE)     GOALS:   Multidisciplinary Problems       Physical Therapy Goals          Problem: Physical Therapy    Goal Priority Disciplines Outcome Goal Variances Interventions   Physical Therapy Goal     PT, PT/OT      Description: Goals to be met by 2/9/23.  Pt will be able to roll L/R MOD A of 2.  Pt will be able to complete supine to sit MAX A of 2.  Pt will be able to complete sit to stand MAX A of 2.                       PLAN:    Patient to be seen 3 x/week to address the above listed problems via gait training, therapeutic activities, therapeutic exercises  Plan of Care expires: 02/09/23  Plan of Care reviewed with: patient         01/30/2023

## 2023-01-30 NOTE — PLAN OF CARE
01/30/23 1116   Post-Acute Status   Post-Acute Authorization Placement   Post-Acute Placement Status Pending payor review/awaiting authorization (if required)   Discharge Delays None known at this time   Discharge Plan   Discharge Plan A Skilled Nursing Facility       Patient has been accepted to [facility].  Patient's acceptance pending insurance auth and if facility can get Bariatric equipment.

## 2023-01-30 NOTE — PLAN OF CARE
Problem: Adult Inpatient Plan of Care  Goal: Plan of Care Review  Outcome: Ongoing, Progressing     Problem: Infection  Goal: Absence of Infection Signs and Symptoms  Outcome: Ongoing, Progressing     Problem: Bariatric Environmental Safety  Goal: Safety Maintained with Care  Outcome: Ongoing, Progressing     Problem: Skin Injury Risk Increased  Goal: Skin Health and Integrity  Outcome: Ongoing, Progressing     Problem: Diabetes Comorbidity  Goal: Blood Glucose Level Within Targeted Range  Outcome: Ongoing, Progressing     Problem: Fall Injury Risk  Goal: Absence of Fall and Fall-Related Injury  Outcome: Ongoing, Progressing     Problem: Impaired Wound Healing  Goal: Optimal Wound Healing  Outcome: Ongoing, Progressing    Patient remains free from injury. Safety precautions maintained. No s/s of acute distress. Pain controlled per MD order. IVF infusing. Cardiac monitoring in place. Blood glucose monitoring and drain care continued this shift. Chart and orders review completed. Pt education about care completed.     Pt is refusing four eyes on skin, turning and wound care to posterior side. Educated pt on purpose of four eyes on skin, turning and wound care.

## 2023-01-31 PROBLEM — N30.00 ACUTE CYSTITIS: Status: RESOLVED | Noted: 2023-01-31 | Resolved: 2023-01-31

## 2023-01-31 PROBLEM — N30.00 ACUTE CYSTITIS: Status: ACTIVE | Noted: 2023-01-31

## 2023-01-31 LAB
POCT GLUCOSE: 113 MG/DL (ref 70–110)
POCT GLUCOSE: 118 MG/DL (ref 70–110)
POCT GLUCOSE: 135 MG/DL (ref 70–110)
POCT GLUCOSE: 137 MG/DL (ref 70–110)
POCT GLUCOSE: 139 MG/DL (ref 70–110)
SARS-COV-2 RDRP RESP QL NAA+PROBE: NEGATIVE

## 2023-01-31 PROCEDURE — 94761 N-INVAS EAR/PLS OXIMETRY MLT: CPT

## 2023-01-31 PROCEDURE — 63700000 PHARM REV CODE 250 ALT 637 W/O HCPCS: Performed by: FAMILY MEDICINE

## 2023-01-31 PROCEDURE — 63600175 PHARM REV CODE 636 W HCPCS: Performed by: SURGERY

## 2023-01-31 PROCEDURE — 99900035 HC TECH TIME PER 15 MIN (STAT)

## 2023-01-31 PROCEDURE — U0002 COVID-19 LAB TEST NON-CDC: HCPCS | Performed by: FAMILY MEDICINE

## 2023-01-31 PROCEDURE — 97530 THERAPEUTIC ACTIVITIES: CPT

## 2023-01-31 PROCEDURE — 25000003 PHARM REV CODE 250: Performed by: FAMILY MEDICINE

## 2023-01-31 PROCEDURE — 63600175 PHARM REV CODE 636 W HCPCS: Performed by: INTERNAL MEDICINE

## 2023-01-31 PROCEDURE — 25000003 PHARM REV CODE 250: Performed by: SURGERY

## 2023-01-31 PROCEDURE — 11000001 HC ACUTE MED/SURG PRIVATE ROOM

## 2023-01-31 PROCEDURE — 97110 THERAPEUTIC EXERCISES: CPT

## 2023-01-31 RX ORDER — SUCRALFATE 1 G/1
1 TABLET ORAL
Start: 2023-01-31

## 2023-01-31 RX ORDER — FLUCONAZOLE 200 MG/1
400 TABLET ORAL DAILY
Qty: 60 TABLET | Refills: 0
Start: 2023-02-01 | End: 2023-02-04

## 2023-01-31 RX ORDER — PANTOPRAZOLE SODIUM 40 MG/1
TABLET, DELAYED RELEASE ORAL
Qty: 90 TABLET | Refills: 0
Start: 2023-01-31 | End: 2024-03-01

## 2023-01-31 RX ORDER — FLUCONAZOLE 200 MG/1
400 TABLET ORAL DAILY
Qty: 60 TABLET | Refills: 0
Start: 2023-02-01 | End: 2023-01-31 | Stop reason: SDUPTHER

## 2023-01-31 RX ORDER — MICONAZOLE NITRATE 2 %
POWDER (GRAM) TOPICAL 2 TIMES DAILY
Refills: 0
Start: 2023-01-31

## 2023-01-31 RX ADMIN — SUCRALFATE 1 G: 1 TABLET ORAL at 08:01

## 2023-01-31 RX ADMIN — PANTOPRAZOLE SODIUM 40 MG: 40 TABLET, DELAYED RELEASE ORAL at 09:01

## 2023-01-31 RX ADMIN — ENOXAPARIN SODIUM 60 MG: 100 INJECTION SUBCUTANEOUS at 09:01

## 2023-01-31 RX ADMIN — SERTRALINE HYDROCHLORIDE 100 MG: 50 TABLET ORAL at 08:01

## 2023-01-31 RX ADMIN — PANTOPRAZOLE SODIUM 40 MG: 40 TABLET, DELAYED RELEASE ORAL at 08:01

## 2023-01-31 RX ADMIN — FLUCONAZOLE 400 MG: 100 TABLET ORAL at 09:01

## 2023-01-31 RX ADMIN — DEXTROSE, SODIUM CHLORIDE, AND POTASSIUM CHLORIDE: 5; .45; .15 INJECTION INTRAVENOUS at 10:01

## 2023-01-31 RX ADMIN — ATORVASTATIN CALCIUM 10 MG: 10 TABLET, FILM COATED ORAL at 08:01

## 2023-01-31 RX ADMIN — SUCRALFATE 1 G: 1 TABLET ORAL at 05:01

## 2023-01-31 RX ADMIN — LOSARTAN POTASSIUM 25 MG: 25 TABLET, FILM COATED ORAL at 09:01

## 2023-01-31 RX ADMIN — SUCRALFATE 1 G: 1 TABLET ORAL at 06:01

## 2023-01-31 RX ADMIN — METRONIDAZOLE 500 MG: 500 TABLET ORAL at 06:01

## 2023-01-31 RX ADMIN — SUCRALFATE 1 G: 1 TABLET ORAL at 11:01

## 2023-01-31 RX ADMIN — ENOXAPARIN SODIUM 60 MG: 100 INJECTION SUBCUTANEOUS at 08:01

## 2023-01-31 RX ADMIN — MICONAZOLE NITRATE 2 % TOPICAL POWDER: at 08:01

## 2023-01-31 RX ADMIN — MICONAZOLE NITRATE 2 % TOPICAL POWDER: at 10:01

## 2023-01-31 RX ADMIN — MICONAZOLE NITRATE: 20 OINTMENT TOPICAL at 10:01

## 2023-01-31 NOTE — DISCHARGE INSTRUCTIONS
You have been started on new medication(s) from this hospitalization. Please take as directed and schedule hospital follow up appointment with your primary providers.    Wound care instructions: moisture management, pressure injury prevention. Bariatric CASSIE immerse bed

## 2023-01-31 NOTE — ASSESSMENT & PLAN NOTE
Excision and over sewn 1/25/23  Continue ADA diet  Oral and IV narcotics   H pylori IgG negative   Hep-Lock IV fluids  ppi orally b.i.d.      Disposition per hospital Medicine

## 2023-01-31 NOTE — PLAN OF CARE
[facility]  Discharge Final Note    Primary Care Provider: Alexandra Dawkins MD    Expected Discharge Date: 1/31/2023    Final Discharge Note (most recent)       Final Note - 01/31/23 1622          Final Note    Assessment Type Final Discharge Note     Anticipated Discharge Disposition Skilled Nursing Facility     Hospital Resources/Appts/Education Provided Post-Acute resouces added to AVS        Post-Acute Status    Post-Acute Authorization Placement     Post-Acute Placement Status Set-up Complete/Auth obtained                     Important Message from Medicare             Contact Info       Luis Angel Collins MD   Specialty: General Surgery    [address] [place] [id number]   Phone: [phone]       Next Steps: Schedule an appointment as soon as possible for a visit in 2 week(s)    Instructions: hospital follow up, surgical staple removal          DC Dispo: Nirmal Brown  Family notified: KENTON called and notified Eran castelan  Transport: stretcher per Nirmal Brown    All orders uploaded and nurse provided with number for report ([phone]).  CM requested charge nurse hard fax COVID neg to [phone] when available.

## 2023-01-31 NOTE — PLAN OF CARE
Patient remains free of falls and injuries during shift. Pain managed with PRN medications. Wound care performed. Refused to turn for wound care to buttocks or repositioning.Telemonitoring in place. Blood glucose monitoring. Call bell within reach. Chart check complete. Plan of care ongoing.

## 2023-01-31 NOTE — PROGRESS NOTES
[facility]  [facility] Surgery  Progress Note    Subjective:     History of Present Illness:    57-year-old female who presents to the emergency room with a 3 day history of epigastric pain associated with nausea and vomiting loss of appetite.  She states that she has not been able to keep anything down.  She says the bulge above her bellybutton is more prominent.      The patient is unable to go to the CT scanner due to her body mass index of 89.6.      The patient also has significant skin breakdown along the upper thighs and along the edges in undersurface of her very large abdominal wall pannus.  She has significant swelling and blistering of her lower extremities.      She does not report any fever or chills.  She is not report any chest pain.      The patient is essentially nonambulatory       her home medications, and is non-ambulatory at baseline. No further complaints or concerns at this time.      Arrival mode: EMS      Post-Op Info:  Procedure(s):  REPAIR, HERNIA, VENTRAL, INCARCERATED, WITHOUT HISTORY OF PRIOR REPAIR  LAPAROTOMY, EXPLORATORY  BLOCK, TRANSVERSUS ABDOMINIS PLANE  LYSIS, ADHESIONS  EXCISION, SMALL INTESTINE  EXCISION, ULCER   6 Days Post-Op     Interval History: Tolerating ordered diet without nausea and vomiting. Pain is controlled on current regiment. Reports having a BM.    Medications:  Continuous Infusions:   dextrose 5 % and 0.45 % NaCl with KCl 20 mEq 50 mL/hr at 01/31/23 0629     Scheduled Meds:   atorvastatin  10 mg Oral QHS    cefTRIAXone (ROCEPHIN) IVPB  2 g Intravenous Q24H    chlorhexidine  10 mL Mouth/Throat BID    enoxaparin  60 mg Subcutaneous BID    fluconazole  400 mg Oral Daily    losartan  25 mg Oral Daily    metroNIDAZOLE  500 mg Oral Q8H    miconazole NITRATE 2 %   Topical (Top) BID    miconazole nitrate 2%   Topical (Top) BID    pantoprazole  40 mg Oral BID    sertraline  100 mg Oral QHS    sucralfate  1 g Oral QID (AC & HS)     PRN  Meds:acetaminophen, albuterol-ipratropium, dextrose 10%, dextrose 10%, diphenhydrAMINE, glucagon (human recombinant), glucose, glucose, hydrALAZINE, HYDROcodone-acetaminophen, HYDROcodone-acetaminophen, HYDROmorphone, influenza, insulin aspart U-100, metoclopramide HCl, naloxone, ondansetron, prochlorperazine, sodium chloride 0.9%, traZODone     Review of patient's allergies indicates:  No Known Allergies  Objective:     Vital Signs (Most Recent):  Temp: 98 °F (36.7 °C) (01/31/23 0809)  Pulse: 90 (01/31/23 0809)  Resp: 18 (01/31/23 0809)  BP: 137/69 (01/31/23 0809)  SpO2: 97 % (01/31/23 0809) Vital Signs (24h Range):  Temp:  [97.9 °F (36.6 °C)-98.7 °F (37.1 °C)] 98 °F (36.7 °C)  Pulse:  [88-99] 90  Resp:  [16-19] 18  SpO2:  [95 %-98 %] 97 %  BP: (132-149)/(60-72) 137/69     Weight: (!) 235.9 kg (520 lb 1 oz)  Body mass index is 89.27 kg/m².    Intake/Output - Last 3 Shifts         01/29 0700  01/30 0659 01/30 0700  01/31 0659 01/31 0700  02/01 0659    P.O.  900     I.V. (mL/kg) 1345.8 (5.7) 1151.5 (4.9)     IV Piggyback 48.3      Total Intake(mL/kg) 1394.1 (5.9) 2051.5 (8.7)     Urine (mL/kg/hr) 850 (0.2) 300 (0.1)     Drains 17.5 60     Total Output 867.5 360     Net +526.6 +1691.5            Stool Occurrence  1 x             Physical Exam  Vitals and nursing note reviewed.   Constitutional:       General: She is not in acute distress.     Appearance: She is well-developed. She is obese.   HENT:      Head: Normocephalic and atraumatic.      Left Ear: External ear normal.   Cardiovascular:      Rate and Rhythm: Normal rate.   Pulmonary:      Effort: Pulmonary effort is normal. No respiratory distress.   Abdominal:      Comments: Abdomen soft with no TTP. Staples in place to midline incision, clean and dry without SOI. LISET drains x2 serosanguinous.    Musculoskeletal:      Cervical back: Neck supple.   Skin:     General: Skin is warm and dry.   Neurological:      Mental Status: She is alert and oriented to person,  place, and time.   Psychiatric:         Behavior: Behavior normal.       Significant Labs:  I have reviewed all pertinent lab results within the past 24 hours.  CBC:   Recent Labs   Lab 01/30/23  0532   WBC 11.46   RBC 3.71*   HGB 9.7*   HCT 32.3*      MCV 87   MCH 26.1*   MCHC 30.0*     CMP:   Recent Labs   Lab 01/26/23  0417 01/28/23  0642 01/30/23  0532   *   < > 134*   CALCIUM 8.6*   < > 9.0   ALBUMIN 1.6*  --   --    PROT 5.8*  --   --       < > 140   K 5.0   < > 4.3   CO2 19*   < > 24      < > 108   BUN 53*   < > 18   CREATININE 1.6*   < > 1.0   ALKPHOS 76  --   --    ALT 45*  --   --    AST 51*  --   --    BILITOT 1.0  --   --     < > = values in this interval not displayed.       Significant Diagnostics:  I have reviewed all pertinent imaging results/findings within the past 24 hours.  No new pertinent imaging.     Assessment/Plan:     * Perforated gastric ulcer   Excision and over sewn 1/25/23  Continue ADA diet  Oral and IV narcotics   H pylori IgG negative   Hep-Lock IV fluids  ppi orally b.i.d.      Disposition per hospital Medicine    Incarcerated umbilical hernia with partial ileum obstruction and perforated gastric ulcer  Likely secondary to perforated gastric ulcer    Cutaneous candidiasis  Would recommend treatment of the cutaneous candidiasis both with oral Diflucan and topical ketoconazole ointment.  Barrier gauze or abdominal pads or other material should be placed to prevent    Venous stasis dermatitis of both lower extremities  Management per hospital Medicine    Type 2 diabetes mellitus, without long-term current use of insulin  Insulin sliding scale per hospital Medicine    Morbid obesity with BMI of 70 and over, adult  She would be discussed with her primary provider.  It is unlikely the patient loose significant weight without a structured program.      Currently she is in mobile likely due to her morbid obesity.      Disposition as far as meeting home needs verses  skilled nursing versus nursing home per hospital Medicine    Essential hypertension  Okay for PO meds    Depression  Okay for po medications     Hyperlipidemia  Okay for po medications        Kirsten Salazar PA-C  General Surgery  [facility]

## 2023-01-31 NOTE — PROGRESS NOTES
[facility] Medicine  Progress Note    Patient Name: Chastity Hung  MRN: [id number]  Patient Class: IP- Inpatient   Admission Date: 1/25/2023  Length of Stay: 6 days  Attending Physician: Waldo Garcia MD  Primary Care Provider: Alexandra Dawkins MD        Subjective:     Principal Problem:Perforated gastric ulcer        HPI:  The patient is a 58 yo female with Morbid Obesity - BMI 89, DM, HTN, Kidney stones who presented to ED with N/V and abdominal pain x 3 days. The patient reports abdominal pain at umbilical area is a sharp, 9/10 pain associated with N/V, Poor appetite, Fatigue, and Malaise. Pt also reports pain and wounds to lower abdomen and BLE legs making it difficult to ambulate. She has not ambulated for over 2 weeks. Pt reports noncompliance with home meds and self care.     In the ED, pt was found to have a non-reducible umbilical hernia with a draining wound at the umbilicus. Pt unable to have CT imaging due to body habitus. Afebrile, WBC 13, Serum Cr 1.6, mildly elevated LFTs/T bili  Abd xray showed nothing acute.     Dr. Collins with General surgery evaluated pt in ED and recommended urgent surgery for incarcerated umbilical hernia and ICU admission.       Overview/Hospital Course:  1/26: perforated gastric ulcer, surgically corrected by surgery. Will cont on rocephin and flagyl for intra-abdominal coverage and UTI. PT/OT on case.   1/27 NAEON . The urine cx is (+) for GNR .  Remain NPO and eith a NGT . General surgery following .  1/28 ngt discontinued per surgery. Keep npo. Physical/occupational therapy consulted for discharge planning. Wbc trending down  1/29 cleared by surgery for clear liquid diet. Denies fever, dyspnea, nausea/vomiting. Physical/occupational therapy recommending skilled nursing facility placement. Case management consulted.  1/30 diet advanced. Dedrick drains with serous drainage. Possible drain removal tomorrow per Surgery. awaiting placement.  1/31 appetite  improved. Dedrick drains draining serous fluids bilateral. Awaiting skilled nursing facility placement      Interval History: See hospital course for today      Review of Systems   Constitutional:  Positive for activity change (improved), appetite change (improved) and fatigue. Negative for fever.   Respiratory:  Negative for shortness of breath.    Cardiovascular:  Negative for chest pain.   Gastrointestinal:  Positive for abdominal pain and constipation. Negative for nausea and vomiting.        Passing flatus   Musculoskeletal:  Positive for gait problem.   Skin:  Positive for wound.   Neurological:  Positive for weakness.   Psychiatric/Behavioral:  Negative for agitation, behavioral problems, confusion and decreased concentration.    Objective:     Vital Signs (Most Recent):  Temp: 98 °F (36.7 °C) (01/31/23 0809)  Pulse: 90 (01/31/23 0809)  Resp: 18 (01/31/23 0809)  BP: 137/69 (01/31/23 0809)  SpO2: 97 % (01/31/23 0809) Vital Signs (24h Range):  Temp:  [97.9 °F (36.6 °C)-98.7 °F (37.1 °C)] 98 °F (36.7 °C)  Pulse:  [88-99] 90  Resp:  [16-19] 18  SpO2:  [95 %-98 %] 97 %  BP: (132-149)/(60-72) 137/69     Weight: (!) 235.9 kg (520 lb 1 oz)  Body mass index is 89.27 kg/m².    Intake/Output Summary (Last 24 hours) at 1/31/2023 0908  Last data filed at 1/31/2023 0629  Gross per 24 hour   Intake 1871.46 ml   Output 360 ml   Net 1511.46 ml      Physical Exam  Vitals and nursing note reviewed.   Constitutional:       General: She is not in acute distress.     Appearance: She is morbidly obese. She is ill-appearing. She is not toxic-appearing.   HENT:      Head: Normocephalic and atraumatic.   Cardiovascular:      Rate and Rhythm: Normal rate.   Pulmonary:      Effort: Pulmonary effort is normal. No respiratory distress.   Abdominal:      General: There is distension.      Palpations: Abdomen is soft.      Tenderness: There is no abdominal tenderness.      Comments: Dedrick drains with serous fluid in bulb  Abdominal incision,  healing well  Surgical staples intact   Musculoskeletal:      Right lower leg: Edema present.      Left lower leg: Edema present.   Skin:     General: Skin is warm and dry.      Findings: Lesion present.   Neurological:      Mental Status: She is alert and oriented to person, place, and time.      Motor: Weakness present.   Psychiatric:         Mood and Affect: Mood is depressed.         Speech: Speech normal.         Behavior: Behavior normal. Behavior is cooperative.       Significant Labs: All pertinent labs within the past 24 hours have been reviewed.    CBC:   Recent Labs   Lab 01/30/23  0532   WBC 11.46   HGB 9.7*   HCT 32.3*        CMP:   Recent Labs   Lab 01/30/23  0532      K 4.3      CO2 24   *   BUN 18   CREATININE 1.0   CALCIUM 9.0   ANIONGAP 8       Significant Imaging: I have reviewed all pertinent imaging results/findings within the past 24 hours.      Assessment/Plan:      * Perforated gastric ulcer  Excision and over sewn 1/25/23  Sucralfate per surgery  hpylori negative   Po proton pump inhibitor bid   Discontinue antibiotic(s)       Acute cystitis  Received course of intravenous rocephin  Completed treatment       Incarcerated umbilical hernia with partial ileum obstruction and perforated gastric ulcer  Leukocytosis resolved   Diet per sgy  Excision and over sewn 1/25/23 by surgery  hpylori negative  De-escalating antibiotic(s)       Cutaneous candidiasis  Oral diflucan   Topical miconazole       Venous stasis dermatitis of both lower extremities  Consult wound care        Morbid obesity with BMI of 70 and over, adult  Body mass index is 89.27 kg/m². Morbid obesity complicates all aspects of disease management from diagnostic modalities to treatment. Weight loss encouraged and health benefits explained to patient.   Follow up outpatient for structured weight loss plan      Diabetes mellitus type 2, controlled  Patient's FSGs are controlled on current medication  regimen.  Last A1c reviewed-   Lab Results   Component Value Date    HGBA1C 6.4 (H) 01/25/2023     Most recent fingerstick glucose reviewed-   Recent Labs   Lab 01/30/23  1150 01/30/23  1718 01/30/23  2313 01/31/23  0441   POCTGLUCOSE 147* 131* 135* 118*     Current correctional scale  Low  Maintain anti-hyperglycemic dose as follows-   Antihyperglycemics (From admission, onward)    Start     Stop Route Frequency Ordered    01/25/23 1515  insulin aspart U-100 pen 0-5 Units         -- SubQ Before meals & nightly PRN 01/25/23 1417        Hold Oral hypoglycemics while patient is in the hospital.  Controlled without oral or SQ insulin injections  Monitor need to increase insulin needs pending appetite/diet    Essential hypertension  Controlled on losartan  Continue holding spironolactone     Depression  Patient has persistent depression which is mild and is currently controlled. Will Continue anti-depressant medications. We will not consult psychiatry at this time. Patient does not display psychosis at this time. Continue to monitor closely and adjust plan of care as needed.    Resume zoloft daily      Hyperlipidemia  Diet per surgery  Resume statin qhs        VTE Risk Mitigation (From admission, onward)         Ordered     enoxaparin injection 60 mg  2 times daily         01/27/23 1347     Place sequential compression device  Until discontinued         01/26/23 1307     IP VTE HIGH RISK PATIENT  Once         01/26/23 0753                Discharge Planning   ADOLFO:      Code Status: Full Code   Is the patient medically ready for discharge?:     Reason for patient still in hospital (select all that apply): Patient trending condition, Laboratory test, Treatment, Consult recommendations, PT / OT recommendations and Pending disposition  Discharge Plan A: Skilled Nursing Facility   Discharge Delays: None known at this time              Waldo Garcia MD  Department of Hospital Medicine   [facility]

## 2023-01-31 NOTE — PT/OT/SLP PROGRESS
Physical Therapy  Treatment    Chastity Hung   MRN: [id number]   Admitting Diagnosis: Perforated gastric ulcer    PT Received On: 01/31/23  PT Start Time: 0815     PT Stop Time: 0840    PT Total Time (min): 25 min       Billable Minutes:  Therapeutic Activity 15 and Therapeutic Exercise 10    Treatment Type: Treatment  PT/PTA: PT     PTA Visit Number: 0       General Precautions: Standard, fall  Orthopedic Precautions: N/A  Braces: N/A  Respiratory Status: Room air    Spiritual, Cultural Beliefs, Baptism Practices, Values that Affect Care: no    Subjective:  Communicated with NURSE BRADLEY AND Epic CHART REVIEW  prior to session.   PT AGREED TO TX     Pain/Comfort  Pain Rating 1: 8/10  Location - Side 1: Right  Location 1: leg  Pain Rating Post-Intervention 1: 8/10    Objective:   Patient found with: peripheral IV, telemetry, PureWick    Functional Mobility:  PT MET IN RM SUP IN BED. PT COMPLETED AP, HS, HIP ADD/ABD, QUAD SETS AND GLUT SETS X 10 REPS WITH LIMITED ROM. PT INITIATED ROLLING B WITH EMPHASIS ON BED MOBILITY. PT COMPLETED B X 2 TRIALS. PT SCOOTED TO HOB WITH B UE SUPPORT AND BED IN [place]. PT LEFT WITH HOB ELEVATED AND BREAKFAST SET UP.     AM-PAC 6 CLICK MOBILITY  How much help from another person does this patient currently need?   1 = Unable, Total/Dependent Assistance  2 = A lot, Maximum/Moderate Assistance  3 = A little, Minimum/Contact Guard/Supervision  4 = None, Modified [place]/Independent    Turning over in bed (including adjusting bedclothes, sheets and blankets)?: 2  Sitting down on and standing up from a chair with arms (e.g., wheelchair, bedside commode, etc.): 1  Moving from lying on back to sitting on the side of the bed?: 1  Moving to and from a bed to a chair (including a wheelchair)?: 1  Need to walk in hospital room?: 1  Climbing 3-5 steps with a railing?: 1  Basic Mobility Total Score: 7    AM-PAC Raw Score CMS G-Code Modifier Level of Impairment Assistance   6 CN  100% Total / Unable   7 - 9 CM 80 - 100% Maximal Assist   10 - 14 CL 60 - 80% Moderate Assist   15 - 19 CK 40 - 60% Moderate Assist   20 - 22 CJ 20 - 40% Minimal Assist   23 CI 1-20% SBA / CGA   24 CH 0% Independent/ Mod I     Patient left HOB elevated with call button in reach.    Assessment:  PT CONT TO BE LIMITED WITH GROSS FUNC MOBILITY     Rehab identified problem list/impairments: weakness, impaired endurance, impaired self care skills, impaired functional mobility, decreased coordination, impaired balance, decreased lower extremity function, pain, impaired skin, edema    Rehab potential is fair.    Activity tolerance: Poor    Discharge recommendations: nursing facility, skilled      Barriers to discharge:      Equipment recommendations: other (see comments) (TBD BY NEXT LEVEL OF CARE)     GOALS:   Multidisciplinary Problems       Physical Therapy Goals          Problem: Physical Therapy    Goal Priority Disciplines Outcome Goal Variances Interventions   Physical Therapy Goal     PT, PT/OT Ongoing, Progressing     Description: Goals to be met by 2/9/23.  Pt will be able to roll L/R MOD A of 2.  Pt will be able to complete supine to sit MAX A of 2.  Pt will be able to complete sit to stand MAX A of 2.                       PLAN:    Patient to be seen 3 x/week to address the above listed problems via therapeutic activities, therapeutic exercises  Plan of Care expires: 02/09/23  Plan of Care reviewed with: patient         01/31/2023

## 2023-01-31 NOTE — SUBJECTIVE & OBJECTIVE
Interval History: Tolerating ordered diet without nausea and vomiting. Pain is controlled on current regiment. Reports having a BM.    Medications:  Continuous Infusions:   dextrose 5 % and 0.45 % NaCl with KCl 20 mEq 50 mL/hr at 01/31/23 0629     Scheduled Meds:   atorvastatin  10 mg Oral QHS    cefTRIAXone (ROCEPHIN) IVPB  2 g Intravenous Q24H    chlorhexidine  10 mL Mouth/Throat BID    enoxaparin  60 mg Subcutaneous BID    fluconazole  400 mg Oral Daily    losartan  25 mg Oral Daily    metroNIDAZOLE  500 mg Oral Q8H    miconazole NITRATE 2 %   Topical (Top) BID    miconazole nitrate 2%   Topical (Top) BID    pantoprazole  40 mg Oral BID    sertraline  100 mg Oral QHS    sucralfate  1 g Oral QID (AC & HS)     PRN Meds:acetaminophen, albuterol-ipratropium, dextrose 10%, dextrose 10%, diphenhydrAMINE, glucagon (human recombinant), glucose, glucose, hydrALAZINE, HYDROcodone-acetaminophen, HYDROcodone-acetaminophen, HYDROmorphone, influenza, insulin aspart U-100, metoclopramide HCl, naloxone, ondansetron, prochlorperazine, sodium chloride 0.9%, traZODone     Review of patient's allergies indicates:  No Known Allergies  Objective:     Vital Signs (Most Recent):  Temp: 98 °F (36.7 °C) (01/31/23 0809)  Pulse: 90 (01/31/23 0809)  Resp: 18 (01/31/23 0809)  BP: 137/69 (01/31/23 0809)  SpO2: 97 % (01/31/23 0809) Vital Signs (24h Range):  Temp:  [97.9 °F (36.6 °C)-98.7 °F (37.1 °C)] 98 °F (36.7 °C)  Pulse:  [88-99] 90  Resp:  [16-19] 18  SpO2:  [95 %-98 %] 97 %  BP: (132-149)/(60-72) 137/69     Weight: (!) 235.9 kg (520 lb 1 oz)  Body mass index is 89.27 kg/m².    Intake/Output - Last 3 Shifts         01/29 0700  01/30 0659 01/30 0700  01/31 0659 01/31 0700  02/01 0659    P.O.  900     I.V. (mL/kg) 1345.8 (5.7) 1151.5 (4.9)     IV Piggyback 48.3      Total Intake(mL/kg) 1394.1 (5.9) 2051.5 (8.7)     Urine (mL/kg/hr) 850 (0.2) 300 (0.1)     Drains 17.5 60     Total Output 867.5 360     Net +526.6 +1691.5            Stool  Occurrence  1 x             Physical Exam  Vitals and nursing note reviewed.   Constitutional:       General: She is not in acute distress.     Appearance: She is well-developed. She is obese.   HENT:      Head: Normocephalic and atraumatic.      Left Ear: External ear normal.   Cardiovascular:      Rate and Rhythm: Normal rate.   Pulmonary:      Effort: Pulmonary effort is normal. No respiratory distress.   Abdominal:      Comments: Abdomen soft with no TTP. Staples in place to midline incision, clean and dry without SOI. LISET drains x2 serosanguinous.    Musculoskeletal:      Cervical back: Neck supple.   Skin:     General: Skin is warm and dry.   Neurological:      Mental Status: She is alert and oriented to person, place, and time.   Psychiatric:         Behavior: Behavior normal.       Significant Labs:  I have reviewed all pertinent lab results within the past 24 hours.  CBC:   Recent Labs   Lab 01/30/23  0532   WBC 11.46   RBC 3.71*   HGB 9.7*   HCT 32.3*      MCV 87   MCH 26.1*   MCHC 30.0*     CMP:   Recent Labs   Lab 01/26/23  0417 01/28/23  0642 01/30/23  0532   *   < > 134*   CALCIUM 8.6*   < > 9.0   ALBUMIN 1.6*  --   --    PROT 5.8*  --   --       < > 140   K 5.0   < > 4.3   CO2 19*   < > 24      < > 108   BUN 53*   < > 18   CREATININE 1.6*   < > 1.0   ALKPHOS 76  --   --    ALT 45*  --   --    AST 51*  --   --    BILITOT 1.0  --   --     < > = values in this interval not displayed.       Significant Diagnostics:  I have reviewed all pertinent imaging results/findings within the past 24 hours.  No new pertinent imaging.

## 2023-01-31 NOTE — DISCHARGE SUMMARY
[facility]  Discharge Summary      Patient Name: Chastity Hung  MRN: [id number]  JORGE: [id number]  Patient Class: IP- Inpatient  Admission Date: 1/25/2023  Hospital Length of Stay: 7 days  Discharge Date and Time:  02/01/2023 5:54 AM  Attending Physician: No att. providers found   Discharging Provider: Waldo Garcia MD  Primary Care Provider: Alexandra Dawkins MD    Primary Care Team: Networked reference to record PCT     HPI:   The patient is a 58 yo female with Morbid Obesity - BMI 89, DM, HTN, Kidney stones who presented to ED with N/V and abdominal pain x 3 days. The patient reports abdominal pain at umbilical area is a sharp, 9/10 pain associated with N/V, Poor appetite, Fatigue, and Malaise. Pt also reports pain and wounds to lower abdomen and BLE legs making it difficult to ambulate. She has not ambulated for over 2 weeks. Pt reports noncompliance with home meds and self care.     In the ED, pt was found to have a non-reducible umbilical hernia with a draining wound at the umbilicus. Pt unable to have CT imaging due to body habitus. Afebrile, WBC 13, Serum Cr 1.6, mildly elevated LFTs/T bili  Abd xray showed nothing acute.     Dr. Collins with General surgery evaluated pt in ED and recommended urgent surgery for incarcerated umbilical hernia and ICU admission.       Procedure(s):  REPAIR, HERNIA, VENTRAL, INCARCERATED, WITHOUT HISTORY OF PRIOR REPAIR  LAPAROTOMY, EXPLORATORY  BLOCK, TRANSVERSUS ABDOMINIS PLANE  LYSIS, ADHESIONS  EXCISION, SMALL INTESTINE  EXCISION, ULCER      Hospital Course:   1/26: perforated gastric ulcer, surgically corrected by surgery. Will cont on rocephin and flagyl for intra-abdominal coverage and UTI. PT/OT on case.   1/27 NAEON . The urine cx is (+) for GNR .  Remain NPO and eith a NGT . General surgery following .  1/28 ngt discontinued per surgery. Keep npo. Physical/occupational therapy consulted for discharge planning. Wbc trending down  1/29  cleared by surgery for clear liquid diet. Denies fever, dyspnea, nausea/vomiting. Physical/occupational therapy recommending skilled nursing facility placement. Case management consulted.  1/30 diet advanced. Dedrick drains with serous drainage. Possible drain removal tomorrow per Surgery. awaiting placement.  1/31 appetite improved. Dedrick drains draining serous fluids bilateral. Awaiting skilled nursing facility placement    After discussing with surgery, dedrick drains removed. Recommended follow up in 2 weeks for surgical staple removal. Hpylori negative. Continue proton pump inhibitor bid and carafate. May need outpatient follow up for possible egd in future. Nsaids added to allergy list.    Insurance approval received for skilled nursing facility placement at [facility]. Covid test required.     Patient seen and evaluated by me. Patient was determined to be suitable for d/c. Patient deemed stable for discharge to skilled nursing facility.  Ambulance transport arrived on 2/1/23     Goals of Care Treatment Preferences:  Code Status: Full Code      Consults:   Consults (From admission, onward)          Status Ordering Provider     Inpatient virtual consult to Hospital Medicine  Once        Provider:  (Not yet assigned)    Completed KARLEE MORALES     Inpatient consult to Social Work  Once        Provider:  (Not yet assigned)    Completed KARLEE MORALES     IP consult to case management  Once        Provider:  (Not yet assigned)    Completed ANTHONY GR     Inpatient consult to Critical Care Medicine  Once        Provider:  RENO Li    Completed CHRISTIE FRANKEL     Inpatient consult to Social Work  Once        Provider:  (Not yet assigned)    Completed CHRISTIE FRANKEL     Inpatient consult to Registered Dietitian/Nutritionist  Once        Provider:  (Not yet assigned)    Completed CHRISTIE FRANKEL            No new Assessment & Plan notes have been filed under this hospital service since the last note was  generated.  Service: Hospital Medicine    Final Active Diagnoses:    Diagnosis Date Noted POA    PRINCIPAL PROBLEM:  Perforated gastric ulcer [K25.5] 01/25/2023 Yes    Venous stasis dermatitis of both lower extremities [I87.2] 01/25/2023 Yes    Cutaneous candidiasis [B37.2] 01/25/2023 Yes    Incarcerated umbilical hernia with partial ileum obstruction and perforated gastric ulcer [K42.0] 01/25/2023 Yes    Morbid obesity with BMI of 70 and over, adult [E66.01, Z68.45] 01/11/2016 Not Applicable    Diabetes mellitus type 2, controlled [E11.9]  Yes    Essential hypertension [I10]  Yes    Depression [F32.A] 07/11/2013 Yes    Hyperlipidemia [E78.5] 07/11/2013 Yes      Problems Resolved During this Admission:    Diagnosis Date Noted Date Resolved POA    Acute cystitis [N30.00] 01/31/2023 01/31/2023 Yes     how some people would answer that do not you [K43.0] 01/25/2023 01/25/2023 Yes    MIGUEL (acute kidney injury) [N17.9] 01/25/2023 01/29/2023 Yes       Discharged Condition: stable    Disposition: Skilled Nursing Facility    Follow Up:   Follow-up Information       Luis Angel Collins MD. Schedule an appointment as soon as possible for a visit in 2 week(s).    Specialty: General Surgery  Why: hospital follow up, surgical staple removal  Contact information:  [id number]  [place] [address] [phone]                           Patient Instructions:      Diet Cardiac     Diet diabetic     No dressing needed   Order Comments: Wound care instructions: moisture management, pressure injury prevention. bariatric CASSIE immerse bed     Activity as tolerated       Significant Diagnostic Studies: Labs:   CMP   No results for input(s): NA, K, CL, CO2, GLU, BUN, CREATININE, CALCIUM, PROT, ALBUMIN, BILITOT, ALKPHOS, AST, ALT, ANIONGAP, ESTGFRAFRICA, EGFRNONAA in the last 48 hours.  , CBC   No results for input(s): WBC, HGB, HCT, PLT in the last 48 hours.  , A1C:   Recent Labs   Lab 01/25/23  1935   HGBA1C 6.4*    and All labs  within the past 24 hours have been reviewed  Microbiology:   Urine Culture    Lab Results   Component Value Date    LABURIN ESCHERICHIA COLI  50,000 - 99,999 cfu/ml   (A) 01/25/2023     Radiology: X-Ray: CXR: portable and KUB: X-Ray Abdomen AP 1 View (KUB):   Results for orders placed or performed during the hospital encounter of 01/25/23   X-Ray Abdomen AP 1 View    Narrative    EXAMINATION:  XR ABDOMEN AP 1 VIEW    CLINICAL HISTORY:  vomiting ; Vomiting, unspecified    TECHNIQUE:  AP View(s) of the abdomen was performed.    COMPARISON:  None    FINDINGS:  Three images were provided for review.  There are limited by the patient's body habitus.  Bowel-gas pattern is grossly unremarkable.  Negative for definite free air.  There are mild degenerative changes of the spine and pelvis.  Elevation of the right hemidiaphragm with adjacent scarring or atelectasis.      Impression    1.  Negative for acute process, considering the technical limitations described above.      Electronically signed by: Kofi Avina MD  Date:    01/25/2023  Time:    10:54     Cardiac Graphics: Echocardiogram:   Transthoracic echo (TTE) complete (Cupid Only):   Results for orders placed or performed during the hospital encounter of 01/25/23   Echo   Result Value Ref Range    BSA 3.26 m2    LA WIDTH 3.42 cm    TDI SEPTAL 0.09 m/s    LV LATERAL E/E' RATIO 16.33 m/s    LV SEPTAL E/E' RATIO 10.89 m/s    TDI LATERAL 0.06 m/s    LVIDd 2.97 (A) 3.5 - 6.0 cm    IVS 1.43 (A) 0.6 - 1.1 cm    Posterior Wall 1.38 (A) 0.6 - 1.1 cm    Ao root annulus 2.78 cm    LVIDs 2.05 (A) 2.1 - 4.0 cm    FS 31 28 - 44 %    LA volume 22.40 cm3    Sinus 2.81 cm    STJ 3.06 cm    Ascending aorta 3.07 cm    LV mass 139.46 g    LA size 2.67 cm    TAPSE 1.74 cm    Left Ventricle Relative Wall Thickness 0.93 cm    AV mean gradient 23 mmHg    AV valve area 1.39 cm2    AV Velocity Ratio 0.44     AV index (prosthetic) 0.47     MV valve area p 1/2 method 3.86 cm2    E/A ratio 0.90      Mean e' 0.08 m/s    E wave deceleration time 196.75 msec    IVRT 79.92 msec    LVOT diameter 1.95 cm    LVOT area 3.0 cm2    LVOT peak ifeanyi 1.15 m/s    LVOT peak VTI 26.98 cm    Ao peak ifeanyi 2.59 m/s    Ao VTI 57.75 cm    RVOT peak ifeanyi 1.04 m/s    RVOT peak VTI 16.52 cm    LVOT stroke volume 80.53 cm3    AV peak gradient 27 mmHg    PV mean gradient 2.25 mmHg    E/E' ratio 13.07 m/s    MV Peak E Ifeanyi 0.98 m/s    TR Max Ifeanyi 3.37 m/s    MV stenosis pressure 1/2 time 57.06 ms    MV Peak A Ifeanyi 1.09 m/s    LV Systolic Volume 13.59 mL    LV Systolic Volume Index 4.6 mL/m2    LV Diastolic Volume 34.27 mL    LV Diastolic Volume Index 11.66 mL/m2    LA Volume Index 7.6 mL/m2    LV Mass Index 47 g/m2    RA Major Axis 3.15 cm    Left Atrium Minor Axis 2.70 cm    Left Atrium Major Axis 3.10 cm    Triscuspid Valve Regurgitation Peak Gradient 45 mmHg    RA Width 2.66 cm    Right Atrial Pressure (from IVC) 3 mmHg    EF 65 %    TV rest pulmonary artery pressure 48 mmHg    Narrative    · The left ventricle is small with moderate concentric hypertrophy and   normal systolic function.  · There is pulmonary hypertension.  · The estimated ejection fraction is 65%.  · Grade I left ventricular diastolic dysfunction.  · Normal right ventricular size with normal right ventricular systolic   function.  · There is mild-to-moderate aortic valve stenosis.  · Aortic valve area is 1.39 cm2; peak velocity is 2.59 m/s; mean gradient   is 23 mmHg.  · Mild tricuspid regurgitation.  · Normal central venous pressure (3 mmHg).  · The estimated PA systolic pressure is 48 mmHg.          Pending Diagnostic Studies:       Procedure Component Value Units Date/Time    H.Pylori Antibody IgG [[id number]] Collected: 01/28/23 0642    Order Status: Sent Lab Status: In process Updated: 01/28/23 0642    Specimen: Blood     Specimen to Pathology, Surgery General Surgery [[id number]] Collected: 01/25/23 1822    Order Status: Sent Lab Status: In process Updated: 01/26/23  1143    Specimen: Tissue     Urinalysis, Reflex to Urine Culture Urine, Catheterized [[id number]] Collected: 01/25/23 1948    Order Status: Sent Lab Status: In process Updated: 01/25/23 2028    Specimen: Urine            Medications:  Reconciled Home Medications:      Medication List        START taking these medications      fluconazole 200 MG Tab  Commonly known as: DIFLUCAN  Take 2 tablets (400 mg total) by mouth once daily. for 3 days     * miconazole NITRATE 2 % 2 % top powder  Commonly known as: MICOTIN  Apply topically 2 (two) times daily.     * miconazole nitrate 2% 2 % Oint  Commonly known as: MICOTIN  Apply topically 2 (two) times daily.     pantoprazole 40 MG tablet  Commonly known as: PROTONIX  Take 1 tablet (40 mg total) by mouth 2 (two) times daily for 30 days, THEN 1 tablet (40 mg total) once daily.  Start taking on: January 31, 2023     sucralfate 1 gram tablet  Commonly known as: CARAFATE  Take 1 tablet (1 g total) by mouth 4 (four) times daily before meals and nightly.           * This list has 2 medication(s) that are the same as other medications prescribed for you. Read the directions carefully, and ask your doctor or other care provider to review them with you.                CONTINUE taking these medications      atorvastatin 10 MG tablet  Commonly known as: LIPITOR  TAKE 1 TABLET(10 MG) BY MOUTH EVERY DAY     blood sugar diagnostic Strp  1 strip by Misc.(Non-Drug; Combo Route) route 3 (three) times daily.     febuxostat 40 mg Tab  Commonly known as: ULORIC  TAKE 1 TABLET(40 MG) BY MOUTH EVERY DAY     ferrous gluconate 324 MG tablet  Commonly known as: FERGON  Take 324 mg by mouth daily with breakfast.     gabapentin 300 MG capsule  Commonly known as: NEURONTIN  TAKE 1 CAPSULE(300 MG) BY MOUTH THREE TIMES DAILY     lancets 28 gauge Misc  Commonly known as: FREESTYLE LANCETS  USE THREE TIMES DAILY     losartan 25 MG tablet  Commonly known as: COZAAR  Take 1 tablet (25 mg total) by mouth once  daily.     metFORMIN 1000 MG tablet  Commonly known as: GLUCOPHAGE  Take 1 tablet (1,000 mg total) by mouth 2 (two) times daily with meals.     multivitamin with minerals tablet  Take 1 tablet by mouth once daily.     sertraline 100 MG tablet  Commonly known as: ZOLOFT  TAKE 1 TABLET(100 MG) BY MOUTH EVERY DAY     VITAMIN B-12 100 MCG tablet  Generic drug: cyanocobalamin  Take 100 mcg by mouth once daily.     vitamin D 1000 units Tab  Commonly known as: VITAMIN D3  Take 1,000 Units by mouth once daily.     zinc sulfate 50 mg zinc (220 mg) capsule  Commonly known as: ZINCATE  Take 220 mg by mouth 3 (three) times daily.            STOP taking these medications      glimepiride 1 MG tablet  Commonly known as: AMARYL     meloxicam 15 MG tablet  Commonly known as: MOBIC     spironolactone 25 MG tablet  Commonly known as: ALDACTONE              Indwelling Lines/Drains at time of discharge:   Lines/Drains/Airways       Central Venous Catheter Line  Duration             Percutaneous Central Line Insertion/Assessment - Triple Lumen  01/25/23 1801 right internal jugular 5 days              Drain  Duration                  Closed/Suction Drain 01/25/23 1750 Left LUQ Bulb 19 Fr. 5 days         Closed/Suction Drain 01/25/23 1755 Right RUQ Bulb 15 Fr. 5 days    Female External Urinary Catheter 01/29/23 1117 2 days                    Time spent on the discharge of patient: 39 minutes         Waldo Garcia MD  Department of Hospital Medicine  [facility] - Med Surg

## 2023-01-31 NOTE — PLAN OF CARE
DISCHARGE INSTRUCTIONS REVIEWED WITH PATIENT AT BEDSIDE WITH VERBALIZATION OF UNDERSTANDING.  SALINE LOCK WILL BE REMOVED FROM LEFT HAND AND RIGHT IJ TLC ONCE AMBULANCE TRANSPORT HAS ARRIVED. PURWICK SUCTION DISCONNECTED. PATIENT AWAITING AMBULANCE TRANSPORT TO [facility].

## 2023-01-31 NOTE — PROGRESS NOTES
"[facility] - Med Surg  Adult Nutrition  Progress Note    SUMMARY     Recommendations  Recommendation/Intervention:   1. Recommend for Pt to continue Diabetic Diet.   2. Recommend for Pt to receive Abdulkadir BID to assist with wound healing.   3. Recommend Pt to receive Boost Glucose Control BID on tray with meals.    4. Recommend for Pt weight to be monitored weekly.  Goals:   1. Pt will consume >50% of meals and supplements by RD follow up.  Nutrition Goal Status: new  Communication of RD Recs: other (comment) (POC: Sticky Note)    Assessment and Plan    Nutrition Problem  Increase nutrient needs     Related to (etiology):   Increased  demand for protein    Signs and Symptoms (as evidenced by):   Wound healing     Interventions(treatment strategy):  Recommendation/Intervention:   1. Recommend for Pt to continue Diabetic Diet.   2. Recommend for Pt to receive Abdulkadir to assist with wound healing.   3. Recommend Pt to receive Boost Glucose Control BID on tray with meals.    4. Recommend for pt weight to be monitored weekly.  5. Collaboration of care with medical providers.     Nutrition Diagnosis Status:   New      Malnutrition Assessment                                   Reason for Assessment    Reason For Assessment: RD follow-up  Diagnosis: other (see comments) (Perforated Gastric Ulcer)  Relevant Medical History: Perforated gastric ulcer,Hypertension Hyperlipidemia,Diabetes mellitus type 2,Acute cystitis, Venous stasis dermatitis of both lower extremities  Interdisciplinary Rounds: did not attend  General Information Comments:   1/26: 58 y/o female admitted with current principal problem of Incarcerated umbilical hernia with partial ileum obstruction and perforated gastric ulcer. Pt presented to ED with c/o  "epigastric pain associated with nausea and vomiting loss of appetite.  She states that she has not been able to keep anything down.  She says the bulge above her bellybutton is more prominent." Pt is currently on NPO " "diet. "Perforated gastric ulcer, surgically corrected by surgery." NFPE not performed per not appropriate d/t ICU status. Pt is charted to weigh 520lbs, BMI 89.26, morbidly obese. Per chart review the pt has significant skin breakdown along the upper thighs and along the edges in undersurface of her very large abdominal wall pannus.  She has significant swelling and blistering of her lower extremities. Pt LBM was 1/25. All pertinent labs and medications reviewed. Dietitian will continue to monitor.    Follow-up: 1/31: Pt states appetite is fair and only consuming 25%-50% of meals; 0% of supplements Pt requested Boost Glucose Control instead of Boost Breeze. Last wt charted on 1/26 at 235.9kg( 520lbs, Morbid Obese). Pt states she is experencing adominal distension but, denies abdonimal pain, N/V/D, or difficulties chewing/ swallowing. LBM: 1/29. Per chart review 1/31; Pt has generalized edema in left and right leg. Also per chart review pt significant skin breakdown along the upper thighs and along the edges in undersurface of her very large abdominal wall pannus has improved but are not fully healed.  Hayden score: 11. RD will continue to monitor.  Nutrition Discharge Planning: Diabetic Diet    Nutrition Risk Screen    Nutrition Risk Screen: reduced oral intake over the last month    Nutrition/Diet History    Spiritual, Cultural Beliefs, Holiness Practices, Values that Affect Care: no  Food Allergies: NKFA  Factors Affecting Nutritional Intake: abdominal distension, decreased appetite    Anthropometrics    Temp: 98.2 °F (36.8 °C)  Height: 5' 4" (162.6 cm)  Height (inches): 64 in  Weight Method: Bed Scale  Weight: (!) 235.9 kg (520 lb 1 oz)  Weight (lb): (!) 520.07 lb  Ideal Body Weight (IBW), Female: 120 lb  % Ideal Body Weight, Female (lb): 433.39 %  BMI (Calculated): 89.2  BMI Grade: greater than 40 - morbid obesity     Wt Readings from Last 15 Encounters:   01/31/23 (!) 235.9 kg (520 lb 1 oz)   05/10/22 (!) " 223.9 kg (493 lb 9.8 oz)   03/07/22 (!) 218.8 kg (482 lb 4.1 oz)   10/13/20 (!) 232.2 kg (511 lb 14.5 oz)   05/21/19 (!) 232.9 kg (513 lb 7.2 oz)   10/04/17 (!) 224.1 kg (494 lb 0.8 oz)   08/02/17 (!) 224.1 kg (494 lb 0.8 oz)   06/21/17 (!) 231.3 kg (510 lb)   06/09/17 (!) 231.6 kg (510 lb 9.4 oz)   05/19/17 (!) 225.5 kg (497 lb 2.2 oz)   05/24/16 (!) 214.4 kg (472 lb 10.7 oz)   01/11/16 (!) 195.8 kg (431 lb 10.6 oz)   01/05/16 (!) 198.4 kg (437 lb 6.3 oz)   04/24/15 (!) 177.5 kg (391 lb 4 oz)   12/09/14 (!) 192.1 kg (423 lb 9.6 oz)     Lab/Procedures/Meds    Pertinent Labs Reviewed: reviewed  Pertinent Labs Comments: Glucose 134(H)  Pertinent Medications Reviewed: reviewed  BMP  Lab Results   Component Value Date     01/30/2023    K 4.3 01/30/2023     01/30/2023    CO2 24 01/30/2023    BUN 18 01/30/2023    CREATININE 1.0 01/30/2023    CALCIUM 9.0 01/30/2023    ANIONGAP 8 01/30/2023    EGFRNORACEVR >60 01/30/2023   Scheduled Meds:   atorvastatin  10 mg Oral QHS    enoxaparin  60 mg Subcutaneous BID    fluconazole  400 mg Oral Daily    losartan  25 mg Oral Daily    miconazole NITRATE 2 %   Topical (Top) BID    miconazole nitrate 2%   Topical (Top) BID    pantoprazole  40 mg Oral BID    sertraline  100 mg Oral QHS    sucralfate  1 g Oral QID (AC & HS)     Continuous Infusions:   dextrose 5 % and 0.45 % NaCl with KCl 20 mEq 50 mL/hr at 01/31/23 1004     PRN Meds:.acetaminophen, albuterol-ipratropium, dextrose 10%, dextrose 10%, diphenhydrAMINE, glucagon (human recombinant), glucose, glucose, hydrALAZINE, HYDROcodone-acetaminophen, HYDROcodone-acetaminophen, HYDROmorphone, influenza, insulin aspart U-100, metoclopramide HCl, naloxone, ondansetron, prochlorperazine, sodium chloride 0.9%, traZODone   Estimated/Assessed Needs    Weight Used For Calorie Calculations: 100 kg (220 lb 7.4 oz) (Used ABW due to %)  Energy Calorie Requirements (kcal): 1872 (MSJ, AF x 1.2)  Energy Need Method: [place]  Protein Requirements: 125-150 (1.25-1.5g/kg; ABW due to IBW % 433)  Weight Used For Protein Calculations: 100 kg (220 lb 7.4 oz)  Fluid Requirements (mL): 2919  Estimated Fluid Requirement Method: RDA Method  RDA Method (mL): 1872  CHO Requirement: 234g      Nutrition Prescription Ordered    Current Diet Order: Diabetic Diet    Evaluation of Received Nutrient/Fluid Intake    % Kcal Needs: 0  % Protein Needs: 0  1/26: I/O: +3081.9  1/29: I/O +8134.2  Energy Calories Required: not meeting needs  Protein Required: not meeting needs  Fluid Required: meeting needs  Tolerance: tolerating  % Intake of Estimated Energy Needs: 25 - 50 %  % Meal Intake: 25 - 50 %    Nutrition Risk    Level of Risk/Frequency of Follow-up: high     Monitor and Evaluation    Food and Nutrient Intake: energy intake, food and beverage intake  Food and Nutrient Adminstration: diet order  Knowledge/Beliefs/Attitudes: food and nutrition knowledge/skill, beliefs and attitudes  Anthropometric Measurements: weight, weight change, body mass index  Biochemical Data, Medical Tests and Procedures: electrolyte and renal panel, gastrointestinal profile, lipid profile, glucose/endocrine profile, inflammatory profile  Nutrition-Focused Physical Findings: overall appearance     Nutrition Follow-Up    RD Follow-up?: Yes    Yoly Plasencia Dietetic Intern

## 2023-01-31 NOTE — ASSESSMENT & PLAN NOTE
Excision and over sewn 1/25/23  Sucralfate per surgery  hpylori negative   Po proton pump inhibitor bid   Discontinue antibiotic(s)

## 2023-01-31 NOTE — SUBJECTIVE & OBJECTIVE
Interval History: See hospital course for today      Review of Systems   Constitutional:  Positive for activity change (improved), appetite change (improved) and fatigue. Negative for fever.   Respiratory:  Negative for shortness of breath.    Cardiovascular:  Negative for chest pain.   Gastrointestinal:  Positive for abdominal pain and constipation. Negative for nausea and vomiting.        Passing flatus   Musculoskeletal:  Positive for gait problem.   Skin:  Positive for wound.   Neurological:  Positive for weakness.   Psychiatric/Behavioral:  Negative for agitation, behavioral problems, confusion and decreased concentration.    Objective:     Vital Signs (Most Recent):  Temp: 98 °F (36.7 °C) (01/31/23 0809)  Pulse: 90 (01/31/23 0809)  Resp: 18 (01/31/23 0809)  BP: 137/69 (01/31/23 0809)  SpO2: 97 % (01/31/23 0809) Vital Signs (24h Range):  Temp:  [97.9 °F (36.6 °C)-98.7 °F (37.1 °C)] 98 °F (36.7 °C)  Pulse:  [88-99] 90  Resp:  [16-19] 18  SpO2:  [95 %-98 %] 97 %  BP: (132-149)/(60-72) 137/69     Weight: (!) 235.9 kg (520 lb 1 oz)  Body mass index is 89.27 kg/m².    Intake/Output Summary (Last 24 hours) at 1/31/2023 0908  Last data filed at 1/31/2023 0629  Gross per 24 hour   Intake 1871.46 ml   Output 360 ml   Net 1511.46 ml      Physical Exam  Vitals and nursing note reviewed.   Constitutional:       General: She is not in acute distress.     Appearance: She is morbidly obese. She is ill-appearing. She is not toxic-appearing.   HENT:      Head: Normocephalic and atraumatic.   Cardiovascular:      Rate and Rhythm: Normal rate.   Pulmonary:      Effort: Pulmonary effort is normal. No respiratory distress.   Abdominal:      General: There is distension.      Palpations: Abdomen is soft.      Tenderness: There is no abdominal tenderness.      Comments: Dedrick drains with serous fluid in bulb  Abdominal incision, healing well  Surgical staples intact   Musculoskeletal:      Right lower leg: Edema present.      Left lower  leg: Edema present.   Skin:     General: Skin is warm and dry.      Findings: Lesion present.   Neurological:      Mental Status: She is alert and oriented to person, place, and time.      Motor: Weakness present.   Psychiatric:         Mood and Affect: Mood is depressed.         Speech: Speech normal.         Behavior: Behavior normal. Behavior is cooperative.       Significant Labs: All pertinent labs within the past 24 hours have been reviewed.    CBC:   Recent Labs   Lab 01/30/23  0532   WBC 11.46   HGB 9.7*   HCT 32.3*        CMP:   Recent Labs   Lab 01/30/23  0532      K 4.3      CO2 24   *   BUN 18   CREATININE 1.0   CALCIUM 9.0   ANIONGAP 8       Significant Imaging: I have reviewed all pertinent imaging results/findings within the past 24 hours.

## 2023-01-31 NOTE — ASSESSMENT & PLAN NOTE
Patient's FSGs are controlled on current medication regimen.  Last A1c reviewed-   Lab Results   Component Value Date    HGBA1C 6.4 (H) 01/25/2023     Most recent fingerstick glucose reviewed-   Recent Labs   Lab 01/30/23  1150 01/30/23  1718 01/30/23  2313 01/31/23  0441   POCTGLUCOSE 147* 131* 135* 118*     Current correctional scale  Low  Maintain anti-hyperglycemic dose as follows-   Antihyperglycemics (From admission, onward)    Start     Stop Route Frequency Ordered    01/25/23 1515  insulin aspart U-100 pen 0-5 Units         -- SubQ Before meals & nightly PRN 01/25/23 1417        Hold Oral hypoglycemics while patient is in the hospital.  Controlled without oral or SQ insulin injections  Monitor need to increase insulin needs pending appetite/diet

## 2023-01-31 NOTE — PLAN OF CARE
142/PASRR uploaded to [facility]. CM called Nirm[facility] Brown regarding patient. Per admissions bariatric equipment has been ordered, pending delivery. Facility has also submitted for SNF auth.

## 2023-01-31 NOTE — PLAN OF CARE
Nutrition Interventions(treatment strategy): 1/31/2023  Recommendation/Intervention:   1. Recommend for Pt to continue Diabetic Diet.   2. Recommend for Pt to receive Abdulkadir to assist with wound healing.   3. Recommend Pt to receive Boost Glucose Control BID on tray with meals.    4. Recommend for pt weight to be monitored weekly.  5. Collaboration of care with medical providers.     Yoly Plasencia, Dietetic Intern

## 2023-01-31 NOTE — ASSESSMENT & PLAN NOTE
Leukocytosis resolved   Diet per sgy  Excision and over sewn 1/25/23 by surgery  hpylori negative  De-escalating antibiotic(s)

## 2023-02-01 VITALS
TEMPERATURE: 99 F | OXYGEN SATURATION: 95 % | HEIGHT: 64 IN | SYSTOLIC BLOOD PRESSURE: 159 MMHG | DIASTOLIC BLOOD PRESSURE: 70 MMHG | BODY MASS INDEX: 50.02 KG/M2 | WEIGHT: 293 LBS | RESPIRATION RATE: 17 BRPM | HEART RATE: 96 BPM

## 2023-02-01 LAB — POCT GLUCOSE: 119 MG/DL (ref 70–110)

## 2023-02-01 PROCEDURE — 25000003 PHARM REV CODE 250: Performed by: SURGERY

## 2023-02-01 PROCEDURE — 63600175 PHARM REV CODE 636 W HCPCS: Performed by: SURGERY

## 2023-02-01 RX ADMIN — SUCRALFATE 1 G: 1 TABLET ORAL at 05:02

## 2023-02-01 RX ADMIN — DEXTROSE, SODIUM CHLORIDE, AND POTASSIUM CHLORIDE: 5; .45; .15 INJECTION INTRAVENOUS at 05:02

## 2023-02-01 RX ADMIN — HYDRALAZINE HYDROCHLORIDE 10 MG: 20 INJECTION, SOLUTION INTRAMUSCULAR; INTRAVENOUS at 12:02

## 2023-02-01 NOTE — PHYSICIAN QUERY
PT Name: Chastity Hung  MR #: [id number]     DOCUMENTATION CLARIFICATION      CDS/: Kaylee Agee               Contact information: [email or web address]  This form is a permanent document in the medical record.    Query Date: February 1, 2023    By submitting this query, we are merely seeking further clarification of documentation to reflect the severity of illness of your patient. Please utilize your independent clinical judgment when addressing the question(s) below.     The Medical Record contains the following:   Indicators   Supporting Clinical Findings Location in Medical Record   x Gastrointestinal Ulcer Documented Perforated gastric ulcer causing incarceration of small bowel within a incisional hernia    Incarcerated umbilical hernia with partial ileum obstruction and perforated gastric ulcer  Likely secondary to perforated gastric ulcer      Op Note 01/25    General Surgery PN 01/26    EGD/Colonscopy Findings      Pathology Findings      Radiology Findings     x Treatment/Medication Excision, Ulcer  Identified anterior perforation of the stomach.    The stomach was then palpated and no other malalignment maladies could be felt.  A finger was inserted into the perforation and the posterior wall was felt to be intact.    The perforation was removed by elevating the area with Xiomara clamps.  A TA 60 stapler with a green staple load was then fired and the ulcer with a small rim of normal stomach was excised.  The area was oversewn with 2-0 silk in a interrupted fashion.  The lesser omentum was then tacked over the area.     Excision and over sewn 1/25/23  Ngt for 4 days  Ivf  Ppi iv bid  Antibiotic till wbc normal  Monitor drains   Op Note 01/25                  General Surgery PN 01/26      Other          Please further specify the acuity of the  ___Gastric__ ulcer:    [x   ] Acute   [   ] Chronic   [   ] Unspecified   [   ] Other (please specify): ___________         Please document in your  progress notes daily for the duration of treatment until resolved, and include in your discharge summary.  Form No. [id number]

## 2023-02-01 NOTE — PLAN OF CARE
Problem: Pain Acute  Goal: Acceptable Pain Control and Functional Ability  Outcome: Ongoing, Progressing     Problem: Infection  Goal: Absence of Infection Signs and Symptoms  Outcome: Ongoing, Progressing     Problem: Bariatric Environmental Safety  Goal: Safety Maintained with Care  Outcome: Ongoing, Progressing     Problem: Impaired Wound Healing  Goal: Optimal Wound Healing  Outcome: Ongoing, Progressing   Pt remains free from falls/injuries this shift. Safety precautions maintained. Pt refuses to turn q2. Educated pt on importance of repositioning. Performed wound care. [facility] called to inform me they would not be able to come get pt until 0600 Wednesday morning. Chart check completed.

## 2023-02-01 NOTE — NURSING
[facility] arrived to transfer pt to [facility]. Right IJ removed, peripheral IV removed. Discharge paperwork gone over with pt. Pt has no questions. Pt and belongings left with [facility] ems.

## 2023-02-01 NOTE — CONSULTS
Thank you for your consult to [facility]. We have reviewed the patient chart. This patient does meet criteria for [facility] service at this time. Will assume care on 02/01/23 at 7AM.

## 2023-02-01 NOTE — PHYSICIAN QUERY
PT Name: Chastity Hung  MR #: [id number]    DOCUMENTATION CLARIFICATION     CDS/: Kaylee Agee               Contact information: [email or web address]    This form is a permanent document in the medical record.     Query Date: February 1, 2023    Dear Provider,  By submitting this query, we are merely seeking further clarification of documentation. Please utilize your independent clinical judgment when addressing the question(s) below.    The medical record contains the following:  Supporting Clinical Findings Location in Medical Record   Perforated gastric ulcer causing incarceration of small bowel within a incisional hernia  Procedure(s):  REPAIR, HERNIA, VENTRAL, INCARCERATED, WITHOUT HISTORY OF PRIOR REPAIR  LAPAROTOMY, EXPLORATORY  BLOCK, TRANSVERSUS ABDOMINIS PLANE  LYSIS, ADHESIONS  EXCISION, SMALL INTESTINE  EXCISION, ULCER     The bowel was partially obstructed but adherent to the hernia sac and there was a 3 cm serosal tear and trying to free this.   We then inspected the small bowel in the area where the longer serosal tear was there was a smaller serosal tear adjacent to it. There was a 3rd serosal tear about 20 cm away which was repaired with 3-0 silk in a Lembert fashion.    We decided to resect the area of the longer serosal tear and the smaller 1 adjacent to it.   Op Note 01/25       Please clarify if __Serosal tears___ (as it relates to _Incarcerated ventral hernia repair, Exploratory laparotomy, Adhesions lysis, Small intestine excision and Ulcer excision_) is:      [ x ] Inherent/Integral to the procedure   [  ] Complication of the procedure   [  ] Present, but not a complication of the procedure   [  ] Intended/required to complete procedure   [  ] Other (please specify): __________________       Please document in your progress notes daily for the duration of treatment until resolved and include in your discharge summary.

## 2023-02-02 LAB
FINAL PATHOLOGIC DIAGNOSIS: NORMAL
Lab: NORMAL

## 2023-02-03 ENCOUNTER — TELEPHONE (OUTPATIENT)
Dept: SURGERY | Facility: CLINIC | Age: 58
End: 2023-02-03
Payer: COMMERCIAL

## 2023-02-03 ENCOUNTER — PATIENT OUTREACH (OUTPATIENT)
Dept: ADMINISTRATIVE | Facility: HOSPITAL | Age: 58
End: 2023-02-03
Payer: COMMERCIAL

## 2023-02-03 NOTE — PROGRESS NOTES
Working the mammogram report Called patient to discuss scheduling a mammogram Patient states she's currently in rehab and will need to call back to schedule

## 2023-02-03 NOTE — TELEPHONE ENCOUNTER
----- Message from Sangeetha Babcock sent at 2/3/2023 11:35 AM CST -----  Contact: [phone]  Good Afternoon     Patient would like to schedule a visit with Luis Angel Collins from surgery    Please call and advise

## 2023-02-13 ENCOUNTER — TELEPHONE (OUTPATIENT)
Dept: SURGERY | Facility: CLINIC | Age: 58
End: 2023-02-13
Payer: COMMERCIAL

## 2023-02-13 NOTE — TELEPHONE ENCOUNTER
Returned call to patient. States that she is waiting for EMS to pick her up and bring her in for her post op appointment. Advised that I would have Dr. Collins's nurse give her a call back. Understanding verbalized. Contacted staff and they are going to give her a call.

## 2023-02-13 NOTE — TELEPHONE ENCOUNTER
----- Message from Jayden Mike sent at 2/13/2023  3:23 PM CST -----  Regarding: late arrival  Contact: ziggy  Patient stated that she is still waiting on ambulance to arrive to pick her up. Please call her back at [phone].        Thanks  DD

## 2023-02-15 ENCOUNTER — TELEPHONE (OUTPATIENT)
Dept: SURGERY | Facility: CLINIC | Age: 58
End: 2023-02-15
Payer: COMMERCIAL

## 2023-02-15 NOTE — TELEPHONE ENCOUNTER
----- Message from Meena Mcdowell sent at 2/15/2023  3:39 PM CST -----  Contact: Mic(Nurse)  from [facility]  Mic stated that pt has a open wound that needs attention, stated that it is infected, wanted to know if pt can see pcp sooner. Please call her , stated that it is not bad off to where as pt needs to go to the ER.

## 2023-02-15 NOTE — TELEPHONE ENCOUNTER
Returned call to patient, she stated that her staples were removed at the nursing facility where she is residing, the patient also stated that the incision looks infected and has a drainage coming from the wound.The patient was advised to go to the ER for an evaluation due to she cancelled her post op appointment for tomorrow and rescheduled it for Monday with Dr. Collins. The patient was advised the importance of having her wound checked due to the infection. The patient verbalized understanding.

## 2023-02-20 ENCOUNTER — OFFICE VISIT (OUTPATIENT)
Dept: SURGERY | Facility: CLINIC | Age: 58
End: 2023-02-20
Payer: COMMERCIAL

## 2023-02-20 VITALS — HEIGHT: 64 IN | BODY MASS INDEX: 50.02 KG/M2 | WEIGHT: 293 LBS

## 2023-02-20 DIAGNOSIS — E66.01 MORBID OBESITY WITH BMI OF 70 AND OVER, ADULT: ICD-10-CM

## 2023-02-20 DIAGNOSIS — T81.49XA WOUND INFECTION AFTER SURGERY: Primary | ICD-10-CM

## 2023-02-20 DIAGNOSIS — K25.1 ACUTE GASTRIC ULCER WITH PERFORATION: ICD-10-CM

## 2023-02-20 PROCEDURE — 3044F HG A1C LEVEL LT 7.0%: CPT | Mod: CPTII,S$GLB,, | Performed by: SURGERY

## 2023-02-20 PROCEDURE — 1159F PR MEDICATION LIST DOCUMENTED IN MEDICAL RECORD: ICD-10-PCS | Mod: CPTII,S$GLB,, | Performed by: SURGERY

## 2023-02-20 PROCEDURE — 99999 PR PBB SHADOW E&M-EST. PATIENT-LVL III: ICD-10-PCS | Mod: PBBFAC,,, | Performed by: SURGERY

## 2023-02-20 PROCEDURE — 1159F MED LIST DOCD IN RCRD: CPT | Mod: CPTII,S$GLB,, | Performed by: SURGERY

## 2023-02-20 PROCEDURE — 4010F ACE/ARB THERAPY RXD/TAKEN: CPT | Mod: CPTII,S$GLB,, | Performed by: SURGERY

## 2023-02-20 PROCEDURE — 3044F PR MOST RECENT HEMOGLOBIN A1C LEVEL <7.0%: ICD-10-PCS | Mod: CPTII,S$GLB,, | Performed by: SURGERY

## 2023-02-20 PROCEDURE — 3008F PR BODY MASS INDEX (BMI) DOCUMENTED: ICD-10-PCS | Mod: CPTII,S$GLB,, | Performed by: SURGERY

## 2023-02-20 PROCEDURE — 99024 POSTOP FOLLOW-UP VISIT: CPT | Mod: S$GLB,,, | Performed by: SURGERY

## 2023-02-20 PROCEDURE — 99024 PR POST-OP FOLLOW-UP VISIT: ICD-10-PCS | Mod: S$GLB,,, | Performed by: SURGERY

## 2023-02-20 PROCEDURE — 99999 PR PBB SHADOW E&M-EST. PATIENT-LVL III: CPT | Mod: PBBFAC,,, | Performed by: SURGERY

## 2023-02-20 PROCEDURE — 4010F PR ACE/ARB THEARPY RXD/TAKEN: ICD-10-PCS | Mod: CPTII,S$GLB,, | Performed by: SURGERY

## 2023-02-20 PROCEDURE — 3008F BODY MASS INDEX DOCD: CPT | Mod: CPTII,S$GLB,, | Performed by: SURGERY

## 2023-02-20 NOTE — PROGRESS NOTES
Subjective:       Patient ID: Chastity Hung is a 57 y.o. female.    Laparotomy and excision of a gastric ulcer    Chief Complaint: Post-op Evaluation    Patient underwent an exploratory laparotomy for what is believed to be an incarcerated obstructed incisional hernia.  She was found to have a perforated gastric ulcer.  This was excised.  H pylori was negative.  Path was negative for malignancy.  She is on twice a day beta-blocker.  She presents for her follow-up visit.  Staples removed.  She is 2 areas of drainage from the wound but other than that she states she is doing well.  She is not been able to walk for several months now.      She is morbidly obese with a BMI of 89  Review of Systems   Gastrointestinal: Negative.    Skin:         Drainage from the upper portion of the     Objective:      Physical Exam  Vitals reviewed.   Constitutional:       Appearance: She is obese.   Abdominal:      Comments: Massively obese.  The incision is clean except for 2 openings of the upper portion which has some mild purulent drainage   Skin:     Comments: The area of candidiasis in the skin folds has improved   Neurological:      Mental Status: She is alert.         Final Pathologic Diagnosis RELIAPATH DIAGNOSIS:   A.  STOMACH, PARTIAL EXCISION:   - Ulcerated gastric mucosa with transmural inflammation and serosal   adhesions, see comment.   - No fungal organisms identified py PAS-F stain.   - No Helicobacter pylori organisms identified by *I[facility].   - Negative for intestinal metaplasia, dysplasia, or malignancy.   B.  ILEUM, PARTIAL RESECTION:   - Small bowel with focal serosal adhesions and abscess formation.   - Mucosa, no diagnostic alteration.   - Five(5) benign lymph nodes identified.   - Surgical margins, viable.   - Negative for dysplasia or malignancy.   C.  HERNIA SAC, REPAIR:   - Benign, partially mesothelial lined fibrovascular adipose tissue,   consistent with hernia sac.   COMMENT:   * Immunohistochemical  "stain for pancytokeratin was performed on block A1, and   highlights intact gastric mucosa with no invasive tumor cells identified.   MOSES BRUNO M.D.   Report attached.   Performing site:   [address] [address]   "Disclaimer:  This case diagnosis was rendered completely by the outside   consultation pathologist and the case is electronically signed by an [facility]   pathologist listed below solely to release the report into the medical   record."      Assessment:     Patient has recovered markedly well after laparotomy and excision of a gastric ulcer.  She does have a wound infection.    Plan:       Local wound care with packing of the wound once or twice a day with dry gauze or ribbon gauze per the [facility] wound care nurse.      Follow-up in surgery in several weeks.  Will notify the GI department about the potential need for upper endoscopy      "

## 2023-02-20 NOTE — PATIENT INSTRUCTIONS
The incision is healing well except for the 2 openings but the should close up over time.      You need to ask the wound care nurse at the nursing home to packed the incisions this will be highlighted in the note.      A copy of your pathology is enclosed.      You should be hearing from the GI department about arranging an endoscopy procedure to make sure that the ulcer has healed.  They would need to look down your esophagus and into her stomach.      I will see you back in 3 to 4 weeks

## 2023-02-22 ENCOUNTER — TELEPHONE (OUTPATIENT)
Dept: SURGERY | Facility: CLINIC | Age: 58
End: 2023-02-22
Payer: COMMERCIAL

## 2023-02-22 NOTE — TELEPHONE ENCOUNTER
Returned call to home Health Nurse Bailey, She stated that she needs wound care orders for the patient, orders were given per Dr. Collins's Office visit note on recent office visit 02/20/2023, Home health Nurse David verbalized understanding of orders.

## 2023-03-08 DIAGNOSIS — E11.9 TYPE 2 DIABETES MELLITUS WITHOUT COMPLICATION: ICD-10-CM

## 2023-03-15 ENCOUNTER — HOSPITAL ENCOUNTER (INPATIENT)
Facility: HOSPITAL | Age: 58
LOS: 9 days | Discharge: NURSING, REHAB OR LONG-TERM CARE | DRG: 682 | End: 2023-03-24
Attending: EMERGENCY MEDICINE | Admitting: FAMILY MEDICINE
Payer: COMMERCIAL

## 2023-03-15 DIAGNOSIS — R53.1 WEAKNESS: ICD-10-CM

## 2023-03-15 DIAGNOSIS — J45.909 REACTIVE AIRWAY DISEASE WITHOUT COMPLICATION, UNSPECIFIED ASTHMA SEVERITY, UNSPECIFIED WHETHER PERSISTENT: ICD-10-CM

## 2023-03-15 DIAGNOSIS — E87.0 HYPERNATREMIA: ICD-10-CM

## 2023-03-15 DIAGNOSIS — N19 RENAL FAILURE: ICD-10-CM

## 2023-03-15 DIAGNOSIS — N17.0 ACUTE RENAL FAILURE WITH TUBULAR NECROSIS: ICD-10-CM

## 2023-03-15 DIAGNOSIS — E87.6 HYPOKALEMIA: ICD-10-CM

## 2023-03-15 DIAGNOSIS — E86.1 INTRAVASCULAR VOLUME DEPLETION: ICD-10-CM

## 2023-03-15 DIAGNOSIS — N17.9 ACUTE RENAL FAILURE, UNSPECIFIED ACUTE RENAL FAILURE TYPE: Primary | ICD-10-CM

## 2023-03-15 DIAGNOSIS — D64.9 CHRONIC ANEMIA: ICD-10-CM

## 2023-03-15 DIAGNOSIS — E66.01 MORBID OBESITY WITH BMI OF 70 AND OVER, ADULT: ICD-10-CM

## 2023-03-15 LAB
ALBUMIN SERPL BCP-MCNC: 1.8 G/DL (ref 3.5–5.2)
ALP SERPL-CCNC: 101 U/L (ref 55–135)
ALT SERPL W/O P-5'-P-CCNC: 21 U/L (ref 10–44)
AMPHET+METHAMPHET UR QL: NEGATIVE
ANION GAP SERPL CALC-SCNC: 14 MMOL/L (ref 8–16)
AST SERPL-CCNC: 27 U/L (ref 10–40)
BACTERIA #/AREA URNS HPF: 0 /HPF
BARBITURATES UR QL SCN>200 NG/ML: NEGATIVE
BASOPHILS # BLD AUTO: 0.04 K/UL (ref 0–0.2)
BASOPHILS NFR BLD: 0.3 % (ref 0–1.9)
BENZODIAZ UR QL SCN>200 NG/ML: NEGATIVE
BILIRUB SERPL-MCNC: 0.3 MG/DL (ref 0.1–1)
BILIRUB UR QL STRIP: NEGATIVE
BUN SERPL-MCNC: 69 MG/DL (ref 6–20)
BZE UR QL SCN: NEGATIVE
CALCIUM SERPL-MCNC: 8.9 MG/DL (ref 8.7–10.5)
CANNABINOIDS UR QL SCN: NEGATIVE
CHLORIDE SERPL-SCNC: 118 MMOL/L (ref 95–110)
CK SERPL-CCNC: 49 U/L (ref 20–180)
CLARITY UR: ABNORMAL
CO2 SERPL-SCNC: 14 MMOL/L (ref 23–29)
COLOR UR: YELLOW
CREAT SERPL-MCNC: 6.4 MG/DL (ref 0.5–1.4)
CREAT UR-MCNC: 165.2 MG/DL (ref 15–325)
DIFFERENTIAL METHOD: ABNORMAL
EOSINOPHIL # BLD AUTO: 0.1 K/UL (ref 0–0.5)
EOSINOPHIL NFR BLD: 1 % (ref 0–8)
ERYTHROCYTE [DISTWIDTH] IN BLOOD BY AUTOMATED COUNT: 17.3 % (ref 11.5–14.5)
EST. GFR  (NO RACE VARIABLE): 7 ML/MIN/1.73 M^2
GLUCOSE SERPL-MCNC: 78 MG/DL (ref 70–110)
GLUCOSE UR QL STRIP: NEGATIVE
HCT VFR BLD AUTO: 32.3 % (ref 37–48.5)
HGB BLD-MCNC: 9.8 G/DL (ref 12–16)
HGB UR QL STRIP: NEGATIVE
HYALINE CASTS #/AREA URNS LPF: 0 /LPF
IMM GRANULOCYTES # BLD AUTO: 0.05 K/UL (ref 0–0.04)
IMM GRANULOCYTES NFR BLD AUTO: 0.4 % (ref 0–0.5)
KETONES UR QL STRIP: NEGATIVE
LEUKOCYTE ESTERASE UR QL STRIP: NEGATIVE
LYMPHOCYTES # BLD AUTO: 3 K/UL (ref 1–4.8)
LYMPHOCYTES NFR BLD: 23.9 % (ref 18–48)
MAGNESIUM SERPL-MCNC: 1 MG/DL (ref 1.6–2.6)
MCH RBC QN AUTO: 26.7 PG (ref 27–31)
MCHC RBC AUTO-ENTMCNC: 30.3 G/DL (ref 32–36)
MCV RBC AUTO: 88 FL (ref 82–98)
METHADONE UR QL SCN>300 NG/ML: NEGATIVE
MICROSCOPIC COMMENT: ABNORMAL
MONOCYTES # BLD AUTO: 1.1 K/UL (ref 0.3–1)
MONOCYTES NFR BLD: 8.4 % (ref 4–15)
NEUTROPHILS # BLD AUTO: 8.2 K/UL (ref 1.8–7.7)
NEUTROPHILS NFR BLD: 66 % (ref 38–73)
NITRITE UR QL STRIP: NEGATIVE
NRBC BLD-RTO: 0 /100 WBC
OPIATES UR QL SCN: NEGATIVE
PCP UR QL SCN>25 NG/ML: NEGATIVE
PH UR STRIP: 5 [PH] (ref 5–8)
PHOSPHATE SERPL-MCNC: 5.6 MG/DL (ref 2.7–4.5)
PLATELET # BLD AUTO: 249 K/UL (ref 150–450)
PMV BLD AUTO: 12.9 FL (ref 9.2–12.9)
POCT GLUCOSE: 72 MG/DL (ref 70–110)
POCT GLUCOSE: 80 MG/DL (ref 70–110)
POTASSIUM SERPL-SCNC: 4.8 MMOL/L (ref 3.5–5.1)
PROT SERPL-MCNC: 6.9 G/DL (ref 6–8.4)
PROT UR QL STRIP: ABNORMAL
RBC # BLD AUTO: 3.67 M/UL (ref 4–5.4)
RBC #/AREA URNS HPF: 1 /HPF (ref 0–4)
SODIUM SERPL-SCNC: 146 MMOL/L (ref 136–145)
SODIUM UR-SCNC: 42 MMOL/L (ref 20–250)
SP GR UR STRIP: 1.02 (ref 1–1.03)
SQUAMOUS #/AREA URNS HPF: 1 /HPF
TOXICOLOGY INFORMATION: NORMAL
UNIDENT CRYS URNS QL MICRO: ABNORMAL
URATE SERPL-MCNC: 12.2 MG/DL (ref 2.4–5.7)
URN SPEC COLLECT METH UR: ABNORMAL
UROBILINOGEN UR STRIP-ACNC: NEGATIVE EU/DL
WBC # BLD AUTO: 12.48 K/UL (ref 3.9–12.7)
WBC #/AREA URNS HPF: 0 /HPF (ref 0–5)
WBC CLUMPS URNS QL MICRO: ABNORMAL

## 2023-03-15 PROCEDURE — 93005 ELECTROCARDIOGRAM TRACING: CPT

## 2023-03-15 PROCEDURE — 63600175 PHARM REV CODE 636 W HCPCS: Performed by: FAMILY MEDICINE

## 2023-03-15 PROCEDURE — 93010 ELECTROCARDIOGRAM REPORT: CPT | Mod: ,,, | Performed by: INTERNAL MEDICINE

## 2023-03-15 PROCEDURE — 81000 URINALYSIS NONAUTO W/SCOPE: CPT | Mod: 59 | Performed by: EMERGENCY MEDICINE

## 2023-03-15 PROCEDURE — 83735 ASSAY OF MAGNESIUM: CPT | Performed by: EMERGENCY MEDICINE

## 2023-03-15 PROCEDURE — 85025 COMPLETE CBC W/AUTO DIFF WBC: CPT | Performed by: EMERGENCY MEDICINE

## 2023-03-15 PROCEDURE — 80307 DRUG TEST PRSMV CHEM ANLYZR: CPT | Performed by: EMERGENCY MEDICINE

## 2023-03-15 PROCEDURE — 93010 EKG 12-LEAD: ICD-10-PCS | Mod: ,,, | Performed by: INTERNAL MEDICINE

## 2023-03-15 PROCEDURE — 82550 ASSAY OF CK (CPK): CPT | Performed by: EMERGENCY MEDICINE

## 2023-03-15 PROCEDURE — 25000003 PHARM REV CODE 250: Performed by: EMERGENCY MEDICINE

## 2023-03-15 PROCEDURE — 84100 ASSAY OF PHOSPHORUS: CPT | Performed by: EMERGENCY MEDICINE

## 2023-03-15 PROCEDURE — 11000001 HC ACUTE MED/SURG PRIVATE ROOM

## 2023-03-15 PROCEDURE — 99285 EMERGENCY DEPT VISIT HI MDM: CPT | Mod: 25

## 2023-03-15 PROCEDURE — 25000003 PHARM REV CODE 250: Performed by: FAMILY MEDICINE

## 2023-03-15 PROCEDURE — 82962 GLUCOSE BLOOD TEST: CPT

## 2023-03-15 PROCEDURE — 84540 ASSAY OF URINE/UREA-N: CPT | Performed by: EMERGENCY MEDICINE

## 2023-03-15 PROCEDURE — 84550 ASSAY OF BLOOD/URIC ACID: CPT | Performed by: EMERGENCY MEDICINE

## 2023-03-15 PROCEDURE — 80053 COMPREHEN METABOLIC PANEL: CPT | Performed by: EMERGENCY MEDICINE

## 2023-03-15 PROCEDURE — 84300 ASSAY OF URINE SODIUM: CPT | Performed by: EMERGENCY MEDICINE

## 2023-03-15 RX ORDER — GLUCAGON 1 MG
1 KIT INJECTION
Status: DISCONTINUED | OUTPATIENT
Start: 2023-03-15 | End: 2023-03-24 | Stop reason: HOSPADM

## 2023-03-15 RX ORDER — MAGNESIUM SULFATE HEPTAHYDRATE 40 MG/ML
2 INJECTION, SOLUTION INTRAVENOUS ONCE
Status: COMPLETED | OUTPATIENT
Start: 2023-03-15 | End: 2023-03-15

## 2023-03-15 RX ORDER — ONDANSETRON 4 MG/1
4 TABLET, ORALLY DISINTEGRATING ORAL EVERY 6 HOURS PRN
Status: DISCONTINUED | OUTPATIENT
Start: 2023-03-15 | End: 2023-03-24 | Stop reason: HOSPADM

## 2023-03-15 RX ORDER — SODIUM CHLORIDE 9 MG/ML
INJECTION, SOLUTION INTRAVENOUS CONTINUOUS
Status: DISCONTINUED | OUTPATIENT
Start: 2023-03-15 | End: 2023-03-16

## 2023-03-15 RX ORDER — FEBUXOSTAT 40 MG/1
40 TABLET, FILM COATED ORAL DAILY
Status: DISCONTINUED | OUTPATIENT
Start: 2023-03-15 | End: 2023-03-24 | Stop reason: HOSPADM

## 2023-03-15 RX ORDER — INSULIN ASPART 100 [IU]/ML
0-5 INJECTION, SOLUTION INTRAVENOUS; SUBCUTANEOUS EVERY 6 HOURS PRN
Status: DISCONTINUED | OUTPATIENT
Start: 2023-03-15 | End: 2023-03-24 | Stop reason: HOSPADM

## 2023-03-15 RX ORDER — ACETAMINOPHEN 325 MG/1
650 TABLET ORAL EVERY 6 HOURS PRN
Status: DISCONTINUED | OUTPATIENT
Start: 2023-03-15 | End: 2023-03-24 | Stop reason: HOSPADM

## 2023-03-15 RX ORDER — ALPRAZOLAM 0.5 MG/1
0.5 TABLET ORAL 2 TIMES DAILY PRN
COMMUNITY
Start: 2023-02-13

## 2023-03-15 RX ORDER — HYDRALAZINE HYDROCHLORIDE 20 MG/ML
10 INJECTION INTRAMUSCULAR; INTRAVENOUS EVERY 6 HOURS PRN
Status: DISCONTINUED | OUTPATIENT
Start: 2023-03-15 | End: 2023-03-24 | Stop reason: HOSPADM

## 2023-03-15 RX ADMIN — SODIUM CHLORIDE 1000 ML: 9 INJECTION, SOLUTION INTRAVENOUS at 11:03

## 2023-03-15 RX ADMIN — SODIUM CHLORIDE: 9 INJECTION, SOLUTION INTRAVENOUS at 06:03

## 2023-03-15 RX ADMIN — SODIUM CHLORIDE 1000 ML: 9 INJECTION, SOLUTION INTRAVENOUS at 01:03

## 2023-03-15 RX ADMIN — MAGNESIUM SULFATE HEPTAHYDRATE 2 G: 40 INJECTION, SOLUTION INTRAVENOUS at 03:03

## 2023-03-15 RX ADMIN — FEBUXOSTAT 40 MG: 40 TABLET, FILM COATED ORAL at 03:03

## 2023-03-15 NOTE — ASSESSMENT & PLAN NOTE
Patient's FSGs are controlled on current medication regimen.  Last A1c reviewed-   Lab Results   Component Value Date    HGBA1C 6.4 (H) 01/25/2023     Most recent fingerstick glucose reviewed-   Recent Labs   Lab 03/15/23  1054   POCTGLUCOSE 80     Current correctional scale  Low  Maintain anti-hyperglycemic dose as follows-   Antihyperglycemics (From admission, onward)    Start     Stop Route Frequency Ordered    03/15/23 1412  insulin aspart U-100 pen 0-5 Units         -- SubQ Every 6 hours PRN 03/15/23 1312        Hold Oral hypoglycemics while patient is in the hospital.

## 2023-03-15 NOTE — ED NOTES
Pt catheterized with in and out cath, cleaned and changed brief with assistance of 4 other nurses/techs.

## 2023-03-15 NOTE — SUBJECTIVE & OBJECTIVE
Past Medical History:   Diagnosis Date    Central retinal vein occlusion of left eye 02/25/2014    Treated by Dr. Lopez.  [place]     Depression     Diabetes mellitus type 2, controlled     Glaucoma 02/2014    Glaucoma Suspect    Hyperlipidemia     Hypertension     Hyperuricemia 8/3/2017    Morbid obesity     Reactive airway disease     Urolithiasis        Past Surgical History:   Procedure Laterality Date    APPENDECTOMY      BREAST SURGERY Left 2014    CHOLECYSTECTOMY  10/2014    EXCISION, SMALL INTESTINE  1/25/2023    Procedure: EXCISION, SMALL INTESTINE;  Surgeon: Luis Angel Collins MD;  Location: [facility] OR;  Service: General;;  x2    HEMICOLECTOMY Right 10/2014    Including appendix, due to perforated appendix    INJECTION OF ANESTHETIC AGENT INTO TISSUE PLANE DEFINED BY TRANSVERSUS ABDOMINIS MUSCLE  1/25/2023    Procedure: BLOCK, TRANSVERSUS ABDOMINIS PLANE;  Surgeon: Luis Angel Collins MD;  Location: [facility] OR;  Service: General;;    KIDNEY STONE SURGERY  2018    LAPAROTOMY, EXPLORATORY  1/25/2023    Procedure: LAPAROTOMY, EXPLORATORY;  Surgeon: Luis Angel Collins MD;  Location: [facility] OR;  Service: General;;    LYSIS OF ADHESIONS  1/25/2023    Procedure: LYSIS, ADHESIONS;  Surgeon: Luis Angel Collins MD;  Location: [facility] OR;  Service: General;;    REPAIR, HERNIA, VENTRAL  1/25/2023    Procedure: REPAIR, HERNIA, VENTRAL, INCARCERATED, WITHOUT HISTORY OF PRIOR REPAIR;  Surgeon: Luis Angel Collins MD;  Location: [facility] OR;  Service: General;;  Reduction of incisional ventral hernias x2    SURGICAL REMOVAL OF ULCER  1/25/2023    Procedure: EXCISION, ULCER;  Surgeon: Luis Angel Collins MD;  Location: [facility] OR;  Service: General;;  Gastric Ulcer    TOTAL VAGINAL HYSTERECTOMY      Secondary to uterine prolapse       Review of patient's allergies indicates:   Allergen Reactions    Nsaids (non-steroidal anti-inflammatory drug) Other (See Comments)     Gastric ulcer perforation        No current facility-administered medications on  file prior to encounter.     Current Outpatient Medications on File Prior to Encounter   Medication Sig    ALPRAZolam (XANAX) 0.5 MG tablet Take 0.5 mg by mouth 2 (two) times daily as needed.    atorvastatin (LIPITOR) 10 MG tablet TAKE 1 TABLET(10 MG) BY MOUTH EVERY DAY    blood sugar diagnostic Strp 1 strip by Misc.(Non-Drug; Combo Route) route 3 (three) times daily.    cyanocobalamin (VITAMIN B-12) 100 MCG tablet Take 100 mcg by mouth once daily.    febuxostat (ULORIC) 40 mg Tab TAKE 1 TABLET(40 MG) BY MOUTH EVERY DAY    ferrous gluconate (FERGON) 324 MG tablet Take 324 mg by mouth daily with breakfast.    gabapentin (NEURONTIN) 300 MG capsule TAKE 1 CAPSULE(300 MG) BY MOUTH THREE TIMES DAILY    lancets (FREESTYLE LANCETS) 28 gauge Misc USE THREE TIMES DAILY    losartan (COZAAR) 25 MG tablet Take 1 tablet (25 mg total) by mouth once daily.    metFORMIN (GLUCOPHAGE) 1000 MG tablet Take 1 tablet (1,000 mg total) by mouth 2 (two) times daily with meals.    miconazole NITRATE 2 % (MICOTIN) 2 % top powder Apply topically 2 (two) times daily.    miconazole nitrate 2% (MICOTIN) 2 % Oint Apply topically 2 (two) times daily.    multivitamin with minerals tablet Take 1 tablet by mouth once daily.    pantoprazole (PROTONIX) 40 MG tablet Take 1 tablet (40 mg total) by mouth 2 (two) times daily for 30 days, THEN 1 tablet (40 mg total) once daily.    sertraline (ZOLOFT) 100 MG tablet TAKE 1 TABLET(100 MG) BY MOUTH EVERY DAY    sucralfate (CARAFATE) 1 gram tablet Take 1 tablet (1 g total) by mouth 4 (four) times daily before meals and nightly.    vitamin D (VITAMIN D3) 1000 units Tab Take 1,000 Units by mouth once daily.    zinc sulfate (ZINCATE) 50 mg zinc (220 mg) capsule Take 220 mg by mouth 3 (three) times daily.     Family History       Problem Relation (Age of Onset)    Breast cancer Paternal Aunt    Cancer Father    Diabetes Mother, Sister, Son    Heart disease Father, Mother, Sister    Hypertension Father, Mother,  Sister, Son, Sister    Kidney disease Mother    Kidney failure Sister    Peripheral vascular disease Sister    Schizophrenia Father    Stroke Mother, Sister          Tobacco Use    Smoking status: Never    Smokeless tobacco: Never   Substance and Sexual Activity    Alcohol use: No    Drug use: No    Sexual activity: Never     Review of Systems   Unable to perform ROS: Acuity of condition   Objective:     Vital Signs (Most Recent):  Temp: 97.6 °F (36.4 °C) (03/15/23 1046)  Pulse: 105 (03/15/23 1200)  Resp: 20 (03/15/23 1200)  BP: (!) 155/65 (03/15/23 1200)  SpO2: 100 % (03/15/23 1200)   Vital Signs (24h Range):  Temp:  [97.6 °F (36.4 °C)] 97.6 °F (36.4 °C)  Pulse:  [104-105] 105  Resp:  [16-20] 20  SpO2:  [96 %-100 %] 100 %  BP: (126-155)/(58-95) 155/65     Weight: (!) 221.8 kg (489 lb)  Body mass index is 83.94 kg/m².    Physical Exam  Constitutional:       General: She is not in acute distress.     Appearance: She is well-developed. She is obese. She is not diaphoretic.   HENT:      Head: Normocephalic and atraumatic.   Eyes:      Pupils: Pupils are equal, round, and reactive to light.   Cardiovascular:      Rate and Rhythm: Normal rate and regular rhythm.      Heart sounds: Normal heart sounds. No murmur heard.    No friction rub. No gallop.   Pulmonary:      Effort: Pulmonary effort is normal. No respiratory distress.      Breath sounds: Normal breath sounds. No stridor. No wheezing or rales.   Abdominal:      General: Bowel sounds are normal. There is no distension.      Palpations: Abdomen is soft. There is no mass.      Tenderness: There is no abdominal tenderness. There is no guarding.   Musculoskeletal:      Right lower leg: Edema present.      Left lower leg: Edema present.   Skin:     General: Skin is warm.      Findings: No erythema.   Neurological:      General: No focal deficit present.      Mental Status: She is disoriented.      Comments: Follows commands           CRANIAL NERVES     CN III, IV, VI    Pupils are equal, round, and reactive to light.     Significant Labs:   Results for orders placed or performed during the hospital encounter of 03/15/23   Urinalysis, Reflex to Urine Culture Urine, Catheterized    Specimen: Urine   Result Value Ref Range    Specimen UA Urine, Catheterized     Color, UA Yellow Yellow, Straw, Carine    Appearance, UA Hazy (A) Clear    pH, UA 5.0 5.0 - 8.0    Specific Gravity, UA 1.020 1.005 - 1.030    Protein, UA 1+ (A) Negative    Glucose, UA Negative Negative    Ketones, UA Negative Negative    Bilirubin (UA) Negative Negative    Occult Blood UA Negative Negative    Nitrite, UA Negative Negative    Urobilinogen, UA Negative <2.0 EU/dL    Leukocytes, UA Negative Negative   Drug screen panel, emergency   Result Value Ref Range    Benzodiazepines Negative Negative    Methadone metabolites Negative Negative    Cocaine (Metab.) Negative Negative    Opiate Scrn, Ur Negative Negative    Barbiturate Screen, Ur Negative Negative    Amphetamine Screen, Ur Negative Negative    THC Negative Negative    Phencyclidine Negative Negative    Creatinine, Urine 165.2 15.0 - 325.0 mg/dL    Toxicology Information SEE COMMENT    CBC auto differential   Result Value Ref Range    WBC 12.48 3.90 - 12.70 K/uL    RBC 3.67 (L) 4.00 - 5.40 M/uL    Hemoglobin 9.8 (L) 12.0 - 16.0 g/dL    Hematocrit 32.3 (L) 37.0 - 48.5 %    MCV 88 82 - 98 fL    MCH 26.7 (L) 27.0 - 31.0 pg    MCHC 30.3 (L) 32.0 - 36.0 g/dL    RDW 17.3 (H) 11.5 - 14.5 %    Platelets 249 150 - 450 K/uL    MPV 12.9 9.2 - 12.9 fL    Immature Granulocytes 0.4 0.0 - 0.5 %    Gran # (ANC) 8.2 (H) 1.8 - 7.7 K/uL    Immature Grans (Abs) 0.05 (H) 0.00 - 0.04 K/uL    Lymph # 3.0 1.0 - 4.8 K/uL    Mono # 1.1 (H) 0.3 - 1.0 K/uL    Eos # 0.1 0.0 - 0.5 K/uL    Baso # 0.04 0.00 - 0.20 K/uL    nRBC 0 0 /100 WBC    Gran % 66.0 38.0 - 73.0 %    Lymph % 23.9 18.0 - 48.0 %    Mono % 8.4 4.0 - 15.0 %    Eosinophil % 1.0 0.0 - 8.0 %    Basophil % 0.3 0.0 - 1.9 %     Differential Method Automated    Comprehensive metabolic panel   Result Value Ref Range    Sodium 146 (H) 136 - 145 mmol/L    Potassium 4.8 3.5 - 5.1 mmol/L    Chloride 118 (H) 95 - 110 mmol/L    CO2 14 (L) 23 - 29 mmol/L    Glucose 78 70 - 110 mg/dL    BUN 69 (H) 6 - 20 mg/dL    Creatinine 6.4 (H) 0.5 - 1.4 mg/dL    Calcium 8.9 8.7 - 10.5 mg/dL    Total Protein 6.9 6.0 - 8.4 g/dL    Albumin 1.8 (L) 3.5 - 5.2 g/dL    Total Bilirubin 0.3 0.1 - 1.0 mg/dL    Alkaline Phosphatase 101 55 - 135 U/L    AST 27 10 - 40 U/L    ALT 21 10 - 44 U/L    Anion Gap 14 8 - 16 mmol/L    eGFR 7 (A) >60 mL/min/1.73 m^2   CPK   Result Value Ref Range    CPK 49 20 - 180 U/L   Urinalysis Microscopic   Result Value Ref Range    RBC, UA 1 0 - 4 /hpf    WBC, UA 0 0 - 5 /hpf    WBC Clumps, UA Moderate (A) None-Rare    Bacteria 0 None-Occ /hpf    Squam Epithel, UA 1 /hpf    Hyaline Casts, UA 0 0-1/lpf /lpf    Unclass Elizabeth UA Occasional None-Moderate    Microscopic Comment SEE COMMENT    Magnesium   Result Value Ref Range    Magnesium 1.0 (L) 1.6 - 2.6 mg/dL   Phosphorus   Result Value Ref Range    Phosphorus 5.6 (H) 2.7 - 4.5 mg/dL   Uric Acid   Result Value Ref Range    Uric Acid 12.2 (H) 2.4 - 5.7 mg/dL   POCT glucose   Result Value Ref Range    POCT Glucose 80 70 - 110 mg/dL        Significant Imaging: X-Ray Chest AP Portable  Narrative: EXAMINATION:  XR CHEST AP PORTABLE    CLINICAL HISTORY:  Weakness    TECHNIQUE:  Single frontal portable view of the chest was performed.    COMPARISON:  X-ray dated 01/25/2023    FINDINGS:  Cardiomediastinal silhouette is grossly unchanged.  Trachea remains midline.  Mild central interstitial prominence noted.  Lungs are otherwise clear.  No significant pleural effusion or pneumothorax.  No acute bony abnormality.  Impression: Mild central interstitial prominence may be related to vascular congestion.    Electronically signed by: Anthony Lama  Date:    03/15/2023  Time:    13:35

## 2023-03-15 NOTE — HPI
Patient is a 57 y.o. aa female with a PMHx of DM2, HLD, HTN, morbid obesity, and reactive airway disease who presents to the Emergency Department for slurred speech since this morning. Unable to obtain hx due to confusion, hx obtained via chart review. Patient was last seen normal last night. She was started on xanax a couple of days ago. No other issues reported. In the ED, no focal weakness was noted however patient was confused. Unable to obtain CT scan due to body habitus. Labs significant for BUN/Cr: 69/6.4, HCO3: 14, M.0, Uric acid: 12.2. Patient was bolused 2 L NS. HM consulted and patient admitted for acute renal failure.

## 2023-03-15 NOTE — ASSESSMENT & PLAN NOTE
Creatinine 6.4  Baseline 1.0 (2 months ago)  Pt clinically dry on exam  Possibly prerenal  Avoid nephrotoxic agents  2L NS bolus  Cont on mIVF   Renal US  Urine Na, Cr, urea   Nephrology consulted

## 2023-03-15 NOTE — ED PROVIDER NOTES
"SCRIBE #1 NOTE: I, Randi Ryan, am scribing for, and in the presence of, Elizabeth Mandujano MD. I have scribed the entire note.      History      Chief Complaint   Patient presents with    Slurred Speech     Pt sent from [facility] for lethargy and slurred speech since this morning. Pt started taking xanax for anxiety on Monday. LKW "last night". Pt is GCS 14, disoriented to time and situation       Review of patient's allergies indicates:   Allergen Reactions    Nsaids (non-steroidal anti-inflammatory drug) Other (See Comments)     Gastric ulcer perforation         HPI   HPI    3/15/2023, 11:10 AM   History obtained from the patient and EMS  HPI/ROS limited secondary to mental status change      History of Present Illness: Chastity Hung is a 57 y.o. female patient with a PMHx of DM2, HLD, HTN, morbid obesity, and reactive airway disease who presents to the Emergency Department for slurred speech since this morning. Pt's last known normal is "last night". Pt was recently started on Xanax 2 days ago. Pt also c/o bilateral hand weakness. Pt is disoriented.        Arrival mode: EMS    PCP: Alexandra Dawkins MD       Past Medical History:  Past Medical History:   Diagnosis Date    Central retinal vein occlusion of left eye 02/25/2014    Treated by Dr. Lopez.  [place]     Depression     Diabetes mellitus type 2, controlled     Glaucoma 02/2014    Glaucoma Suspect    Hyperlipidemia     Hypertension     Hyperuricemia 8/3/2017    Morbid obesity     Reactive airway disease     Urolithiasis        Past Surgical History:  Past Surgical History:   Procedure Laterality Date    APPENDECTOMY      BREAST SURGERY Left 2014    CHOLECYSTECTOMY  10/2014    EXCISION, SMALL INTESTINE  1/25/2023    Procedure: EXCISION, SMALL INTESTINE;  Surgeon: Luis Angel Collins MD;  Location: [facility] OR;  Service: General;;  x2    HEMICOLECTOMY Right 10/2014    Including appendix, due to perforated appendix    INJECTION OF ANESTHETIC AGENT INTO " TISSUE PLANE DEFINED BY TRANSVERSUS ABDOMINIS MUSCLE  1/25/2023    Procedure: BLOCK, TRANSVERSUS ABDOMINIS PLANE;  Surgeon: Luis Angel Collins MD;  Location: [facility] OR;  Service: General;;    KIDNEY STONE SURGERY  2018    LAPAROTOMY, EXPLORATORY  1/25/2023    Procedure: LAPAROTOMY, EXPLORATORY;  Surgeon: Luis Angel Collins MD;  Location: [facility] OR;  Service: General;;    LYSIS OF ADHESIONS  1/25/2023    Procedure: LYSIS, ADHESIONS;  Surgeon: Luis Angel Collins MD;  Location: [facility] OR;  Service: General;;    REPAIR, HERNIA, VENTRAL  1/25/2023    Procedure: REPAIR, HERNIA, VENTRAL, INCARCERATED, WITHOUT HISTORY OF PRIOR REPAIR;  Surgeon: Luis Angel Collins MD;  Location: [facility] OR;  Service: General;;  Reduction of incisional ventral hernias x2    SURGICAL REMOVAL OF ULCER  1/25/2023    Procedure: EXCISION, ULCER;  Surgeon: Luis Angel Collins MD;  Location: [facility] OR;  Service: General;;  Gastric Ulcer    TOTAL VAGINAL HYSTERECTOMY      Secondary to uterine prolapse         Family History:  Family History   Problem Relation Age of Onset    Hypertension Father     Cancer Father         Myelodysplasia    Heart disease Father     Schizophrenia Father         Paranoid type    Diabetes Mother     Hypertension Mother     Stroke Mother     Heart disease Mother     Kidney disease Mother     Diabetes Sister     Hypertension Sister     Stroke Sister     Kidney failure Sister     Diabetes Son     Hypertension Son     Peripheral vascular disease Sister         S/P AKA, BKA    Hypertension Sister     Heart disease Sister     Breast cancer Paternal Aunt        Social History:  Social History     Tobacco Use    Smoking status: Never    Smokeless tobacco: Never   Substance and Sexual Activity    Alcohol use: No    Drug use: No    Sexual activity: Never       ROS   Review of Systems   Unable to perform ROS: Mental status change     Physical Exam      Initial Vitals [03/15/23 1046]   BP Pulse Resp Temp SpO2   (!) 139/95 104 16 97.6 °F (36.4 °C) 98 %       MAP       --          Physical Exam  Nursing Notes and Vital Signs Reviewed.  Constitutional: Patient is in no acute distress. Morbidly obese. Chronically debilitated.  Head: Atraumatic. Normocephalic.  Eyes: PERRL. EOM intact. Conjunctivae are not pale. No scleral icterus.  ENT: Mucous membranes are dry. Oropharynx is clear and symmetric.    Neck: Supple. Full ROM. No lymphadenopathy.  Cardiovascular: Regular rate. Regular rhythm. No murmurs, rubs, or gallops. Distal pulses are 2+ and symmetric.  Pulmonary/Chest: No respiratory distress. Clear to auscultation bilaterally. No wheezing or rales.  Abdominal: Soft and non-distended.  There is no tenderness.  No rebound, guarding, or rigidity.  There is a healing midline upper abdominal surgical incision site with packing in place. No signs of acute infection.  Musculoskeletal: Moves all extremities. No obvious deformities. No edema.  Skin: Warm and dry.  Neurological:  Thick tongue. No slurred speech. Disoriented to place and time. Pt appears over-medicated. Smile is symmetric. No facial drop. Equal  strength.      ED Course    Critical Care    Date/Time: 3/15/2023 12:30 PM  Performed by: Elizabeth Mandujano MD  Authorized by: Elizabeth Mandujano MD   Direct patient critical care time: 12 minutes  Additional history critical care time: 7 minutes  Ordering / reviewing critical care time: 6 minutes  Documentation critical care time: 7 minutes  Consulting other physicians critical care time: 9 minutes  Total critical care time (exclusive of procedural time) : 41 minutes  Critical care time was exclusive of separately billable procedures and treating other patients and teaching time.  Critical care was necessary to treat or prevent imminent or life-threatening deterioration of the following conditions: renal failure.  Critical care was time spent personally by me on the following activities: blood draw for specimens, development of treatment plan with patient or  surrogate, discussions with consultants, interpretation of cardiac output measurements, evaluation of patient's response to treatment, examination of patient, obtaining history from patient or surrogate, ordering and performing treatments and interventions, ordering and review of laboratory studies, pulse oximetry, re-evaluation of patient's condition and review of old charts.      ED Vital Signs:  Vitals:    03/15/23 1046 03/15/23 1050 03/15/23 1100 03/15/23 1200   BP: (!) 139/95  (!) 126/58 (!) 155/65   Pulse: 104  105 105   Resp: 16  20 20   Temp: 97.6 °F (36.4 °C)      TempSrc: Oral      SpO2: 98%  96% 100%   Weight:  (!) 221.8 kg (489 lb)         Abnormal Lab Results:  Labs Reviewed   URINALYSIS, REFLEX TO URINE CULTURE - Abnormal; Notable for the following components:       Result Value    Appearance, UA Hazy (*)     Protein, UA 1+ (*)     All other components within normal limits    Narrative:     Specimen Source->Urine   CBC W/ AUTO DIFFERENTIAL - Abnormal; Notable for the following components:    RBC 3.67 (*)     Hemoglobin 9.8 (*)     Hematocrit 32.3 (*)     MCH 26.7 (*)     MCHC 30.3 (*)     RDW 17.3 (*)     Gran # (ANC) 8.2 (*)     Immature Grans (Abs) 0.05 (*)     Mono # 1.1 (*)     All other components within normal limits   COMPREHENSIVE METABOLIC PANEL - Abnormal; Notable for the following components:    Sodium 146 (*)     Chloride 118 (*)     CO2 14 (*)     BUN 69 (*)     Creatinine 6.4 (*)     Albumin 1.8 (*)     eGFR 7 (*)     All other components within normal limits   URINALYSIS MICROSCOPIC - Abnormal; Notable for the following components:    WBC Clumps, UA Moderate (*)     All other components within normal limits    Narrative:     Specimen Source->Urine   MAGNESIUM - Abnormal; Notable for the following components:    Magnesium 1.0 (*)     All other components within normal limits   PHOSPHORUS - Abnormal; Notable for the following components:    Phosphorus 5.6 (*)     All other components  within normal limits   URIC ACID - Abnormal; Notable for the following components:    Uric Acid 12.2 (*)     All other components within normal limits   DRUG SCREEN PANEL, URINE EMERGENCY    Narrative:     Specimen Source->Urine   CK   MAGNESIUM   PHOSPHORUS   CREATININE, SERUM   SODIUM, URINE, RANDOM   URIC ACID   UREA NITROGEN, URINE, RANDOM   SODIUM, URINE, RANDOM   POCT GLUCOSE   POCT GLUCOSE MONITORING CONTINUOUS        All Lab Results:  Results for orders placed or performed during the hospital encounter of 03/15/23   Urinalysis, Reflex to Urine Culture Urine, Catheterized    Specimen: Urine   Result Value Ref Range    Specimen UA Urine, Catheterized     Color, UA Yellow Yellow, Straw, Carine    Appearance, UA Hazy (A) Clear    pH, UA 5.0 5.0 - 8.0    Specific Gravity, UA 1.020 1.005 - 1.030    Protein, UA 1+ (A) Negative    Glucose, UA Negative Negative    Ketones, UA Negative Negative    Bilirubin (UA) Negative Negative    Occult Blood UA Negative Negative    Nitrite, UA Negative Negative    Urobilinogen, UA Negative <2.0 EU/dL    Leukocytes, UA Negative Negative   Drug screen panel, emergency   Result Value Ref Range    Benzodiazepines Negative Negative    Methadone metabolites Negative Negative    Cocaine (Metab.) Negative Negative    Opiate Scrn, Ur Negative Negative    Barbiturate Screen, Ur Negative Negative    Amphetamine Screen, Ur Negative Negative    THC Negative Negative    Phencyclidine Negative Negative    Creatinine, Urine 165.2 15.0 - 325.0 mg/dL    Toxicology Information SEE COMMENT    CBC auto differential   Result Value Ref Range    WBC 12.48 3.90 - 12.70 K/uL    RBC 3.67 (L) 4.00 - 5.40 M/uL    Hemoglobin 9.8 (L) 12.0 - 16.0 g/dL    Hematocrit 32.3 (L) 37.0 - 48.5 %    MCV 88 82 - 98 fL    MCH 26.7 (L) 27.0 - 31.0 pg    MCHC 30.3 (L) 32.0 - 36.0 g/dL    RDW 17.3 (H) 11.5 - 14.5 %    Platelets 249 150 - 450 K/uL    MPV 12.9 9.2 - 12.9 fL    Immature Granulocytes 0.4 0.0 - 0.5 %    Gran #  (ANC) 8.2 (H) 1.8 - 7.7 K/uL    Immature Grans (Abs) 0.05 (H) 0.00 - 0.04 K/uL    Lymph # 3.0 1.0 - 4.8 K/uL    Mono # 1.1 (H) 0.3 - 1.0 K/uL    Eos # 0.1 0.0 - 0.5 K/uL    Baso # 0.04 0.00 - 0.20 K/uL    nRBC 0 0 /100 WBC    Gran % 66.0 38.0 - 73.0 %    Lymph % 23.9 18.0 - 48.0 %    Mono % 8.4 4.0 - 15.0 %    Eosinophil % 1.0 0.0 - 8.0 %    Basophil % 0.3 0.0 - 1.9 %    Differential Method Automated    Comprehensive metabolic panel   Result Value Ref Range    Sodium 146 (H) 136 - 145 mmol/L    Potassium 4.8 3.5 - 5.1 mmol/L    Chloride 118 (H) 95 - 110 mmol/L    CO2 14 (L) 23 - 29 mmol/L    Glucose 78 70 - 110 mg/dL    BUN 69 (H) 6 - 20 mg/dL    Creatinine 6.4 (H) 0.5 - 1.4 mg/dL    Calcium 8.9 8.7 - 10.5 mg/dL    Total Protein 6.9 6.0 - 8.4 g/dL    Albumin 1.8 (L) 3.5 - 5.2 g/dL    Total Bilirubin 0.3 0.1 - 1.0 mg/dL    Alkaline Phosphatase 101 55 - 135 U/L    AST 27 10 - 40 U/L    ALT 21 10 - 44 U/L    Anion Gap 14 8 - 16 mmol/L    eGFR 7 (A) >60 mL/min/1.73 m^2   CPK   Result Value Ref Range    CPK 49 20 - 180 U/L   Urinalysis Microscopic   Result Value Ref Range    RBC, UA 1 0 - 4 /hpf    WBC, UA 0 0 - 5 /hpf    WBC Clumps, UA Moderate (A) None-Rare    Bacteria 0 None-Occ /hpf    Squam Epithel, UA 1 /hpf    Hyaline Casts, UA 0 0-1/lpf /lpf    Unclass Elizabeth UA Occasional None-Moderate    Microscopic Comment SEE COMMENT    Magnesium   Result Value Ref Range    Magnesium 1.0 (L) 1.6 - 2.6 mg/dL   Phosphorus   Result Value Ref Range    Phosphorus 5.6 (H) 2.7 - 4.5 mg/dL   Uric Acid   Result Value Ref Range    Uric Acid 12.2 (H) 2.4 - 5.7 mg/dL   POCT glucose   Result Value Ref Range    POCT Glucose 80 70 - 110 mg/dL         Imaging Results:  Imaging Results              X-Ray Chest AP Portable (Final result)  Result time 03/15/23 13:35:07      Final result by Anthony Lama MD (03/15/23 13:35:07)                   Impression:      Mild central interstitial prominence may be related to vascular  congestion.      Electronically signed by: Anthony Lama  Date:    03/15/2023  Time:    13:35               Narrative:    EXAMINATION:  XR CHEST AP PORTABLE    CLINICAL HISTORY:  Weakness    TECHNIQUE:  Single frontal portable view of the chest was performed.    COMPARISON:  X-ray dated 01/25/2023    FINDINGS:  Cardiomediastinal silhouette is grossly unchanged.  Trachea remains midline.  Mild central interstitial prominence noted.  Lungs are otherwise clear.  No significant pleural effusion or pneumothorax.  No acute bony abnormality.                                     The EKG was ordered, reviewed, and independently interpreted by the ED provider.  Interpretation time: 13:04  Rate: 102 BPM  Rhythm: sinus tachycardia  Interpretation: Left axis deviation. Minimal voltage criteria for LVH, may be normal variant (R in aVL). No STEMI.             The Emergency Provider reviewed the vital signs and test results, which are outlined above.    ED Discussion     12:53 PM: Discussed case with Dr. Martinez ([facility]). Dr. Jacobs agrees with current care and management of pt and accepts admission.   Admitting Service: [facility]  Admitting Physician: Dr. Jacobs  Admit to: Inpatient Med Tele    12:54 PM: Re-evaluated pt. I have discussed test results, shared treatment plan, and the need for admission with patient and family at bedside. Pt and family express understanding at this time and agree with all information. All questions answered. Pt and family have no further questions or concerns at this time. Pt is ready for admit.           ED Medication(s):  Medications   0.9%  NaCl infusion (has no administration in time range)   glucagon (human recombinant) injection 1 mg (has no administration in time range)   dextrose 10% bolus 125 mL 125 mL (has no administration in time range)   dextrose 10% bolus 250 mL 250 mL (has no administration in time range)   insulin aspart U-100 pen 0-5 Units (has no administration in time  range)   ondansetron disintegrating tablet 4 mg (has no administration in time range)   acetaminophen tablet 650 mg (has no administration in time range)   magnesium sulfate 2g in water 50mL IVPB (premix) (has no administration in time range)   febuxostat tablet 40 mg (has no administration in time range)   hydrALAZINE injection 10 mg (has no administration in time range)   sodium chloride 0.9% bolus 1,000 mL 1,000 mL (0 mLs Intravenous Stopped 3/15/23 1313)   sodium chloride 0.9% bolus 1,000 mL 1,000 mL (1,000 mLs Intravenous New Bag 3/15/23 1318)           New Prescriptions    No medications on file         Medical Decision Making    Medical Decision Making:   History:   Old Records Summarized: records from previous admission(s).       <> Summary of Records: Recently admitted for possible incarcerated hernia, underwent ex lap and then noted to have perforated gastric ulcer, patient eventually discharged to Nursing Home.   Clinical Tests:   Lab Tests: Ordered and Reviewed  Medical Tests: Ordered and Reviewed  ED Management:  Patient here for disorientation, confusion, exam and history significantly limited due to patient's body habitus and clinical orientation, lab work reviewed and patient noted to be in acute renal failure, recently also started on xanax 2 days ago, unable to get CT head due to patient's body habitus however don't feel she is having a stroke, nephrology and hospital medicine consulted for admission, patient started on hydration.          Scribe Attestation:   Scribe #1: I performed the above scribed service and the documentation accurately describes the services I performed. I attest to the accuracy of the note.    Attending:   Physician Attestation Statement for Scribe #1: I, Elizabeth Mandujano MD, personally performed the services described in this documentation, as scribed by Randi Ryan, in my presence, and it is both accurate and complete.          Clinical Impression       ICD-10-CM  ICD-9-CM   1. Acute renal failure, unspecified acute renal failure type  N17.9 584.9   2. Weakness  R53.1 780.79   3. Renal failure  N19 586   4. Intravascular volume depletion  E86.1 276.52   5. Morbid obesity with BMI of 70 and over, adult  E66.01 278.01    Z68.45 V85.45   6. Chronic anemia  D64.9 285.9       Disposition:   Disposition: Admitted  Condition: Manuela Mandujano MD  03/15/23 1454

## 2023-03-15 NOTE — PHARMACY MED REC
"Admission Medication History     The home medication history was taken by Mark Anthony Muhammad.    You may go to "Admission" then "Reconcile Home Medications" tabs to review and/or act upon these items.     The home medication list has been updated by the Pharmacy department.   Please read ALL comments highlighted in yellow.   Please address this information as you see fit.    Feel free to contact us if you have any questions or require assistance.      Medications listed below were obtained from: [employer] and Medical records      Mark Anthony Muhammad  [id number]    Current Outpatient Medications on File Prior to Encounter   Medication Sig Dispense Refill Last Dose    ALPRAZolam (XANAX) 0.5 MG tablet Take 0.5 mg by mouth 2 (two) times daily as needed.       atorvastatin (LIPITOR) 10 MG tablet TAKE 1 TABLET(10 MG) BY MOUTH EVERY DAY 90 tablet 1     blood sugar diagnostic Strp 1 strip by Misc.(Non-Drug; Combo Route) route 3 (three) times daily. 100 strip 11     cyanocobalamin (VITAMIN B-12) 100 MCG tablet Take 100 mcg by mouth once daily.       febuxostat (ULORIC) 40 mg Tab TAKE 1 TABLET(40 MG) BY MOUTH EVERY DAY 90 tablet 1     ferrous gluconate (FERGON) 324 MG tablet Take 324 mg by mouth daily with breakfast.       gabapentin (NEURONTIN) 300 MG capsule TAKE 1 CAPSULE(300 MG) BY MOUTH THREE TIMES DAILY 270 capsule 2     lancets (FREESTYLE LANCETS) 28 gauge Misc USE THREE TIMES DAILY 100 each 11     losartan (COZAAR) 25 MG tablet Take 1 tablet (25 mg total) by mouth once daily. 90 tablet 3     metFORMIN (GLUCOPHAGE) 1000 MG tablet Take 1 tablet (1,000 mg total) by mouth 2 (two) times daily with meals. 180 tablet 1     miconazole NITRATE 2 % (MICOTIN) 2 % top powder Apply topically 2 (two) times daily.  0     miconazole nitrate 2% (MICOTIN) 2 % Oint Apply topically 2 (two) times daily.  0     multivitamin with minerals tablet Take 1 tablet by mouth once daily.       pantoprazole (PROTONIX) 40 MG tablet " Take 1 tablet (40 mg total) by mouth 2 (two) times daily for 30 days, THEN 1 tablet (40 mg total) once daily. 90 tablet 0     sertraline (ZOLOFT) 100 MG tablet TAKE 1 TABLET(100 MG) BY MOUTH EVERY DAY 90 tablet 2     sucralfate (CARAFATE) 1 gram tablet Take 1 tablet (1 g total) by mouth 4 (four) times daily before meals and nightly.       vitamin D (VITAMIN D3) 1000 units Tab Take 1,000 Units by mouth once daily.       zinc sulfate (ZINCATE) 50 mg zinc (220 mg) capsule Take 220 mg by mouth 3 (three) times daily.                            .

## 2023-03-15 NOTE — H&P
"[facility] - Emergency Dept.  [facility] Medicine  History & Physical    Patient Name: Chastity Hung  MRN: [id number]  Patient Class: IP- Inpatient  Admission Date: 3/15/2023  Attending Physician: Henok Jacobs MD  Primary Care Provider: Alexandra Dawkins MD         Patient information was obtained from patient and ER records.     Subjective:     Principal Problem:Acute renal failure    Chief Complaint:   Chief Complaint   Patient presents with    Slurred Speech     Pt sent from [facility] for lethargy and slurred speech since this morning. Pt started taking xanax for anxiety on Monday. LKW "last night". Pt is GCS 14, disoriented to time and situation        HPI: Patient is a 57 y.o. aa female with a PMHx of DM2, HLD, HTN, morbid obesity, and reactive airway disease who presents to the Emergency Department for slurred speech since this morning. Unable to obtain hx due to confusion, hx obtained via chart review. Patient was last seen normal last night. She was started on xanax a couple of days ago. No other issues reported. In the ED, no focal weakness was noted however patient was confused. Unable to obtain CT scan due to body habitus. Labs significant for BUN/Cr: 69/6.4, HCO3: 14, M.0, Uric acid: 12.2. Patient was bolused 2 L NS. HM consulted and patient admitted for acute renal failure.           Past Medical History:   Diagnosis Date    Central retinal vein occlusion of left eye 2014    Treated by Dr. Lopez.  [place]     Depression     Diabetes mellitus type 2, controlled     Glaucoma 2014    Glaucoma Suspect    Hyperlipidemia     Hypertension     Hyperuricemia 8/3/2017    Morbid obesity     Reactive airway disease     Urolithiasis        Past Surgical History:   Procedure Laterality Date    APPENDECTOMY      BREAST SURGERY Left     CHOLECYSTECTOMY  10/2014    EXCISION, SMALL INTESTINE  2023    Procedure: EXCISION, SMALL INTESTINE;  Surgeon: Luis Angel Collins MD;  Location: [facility] OR;  " Service: General;;  x2    HEMICOLECTOMY Right 10/2014    Including appendix, due to perforated appendix    INJECTION OF ANESTHETIC AGENT INTO TISSUE PLANE DEFINED BY TRANSVERSUS ABDOMINIS MUSCLE  1/25/2023    Procedure: BLOCK, TRANSVERSUS ABDOMINIS PLANE;  Surgeon: Luis Angel Collins MD;  Location: [facility] OR;  Service: General;;    KIDNEY STONE SURGERY  2018    LAPAROTOMY, EXPLORATORY  1/25/2023    Procedure: LAPAROTOMY, EXPLORATORY;  Surgeon: Luis Angel Collins MD;  Location: [facility] OR;  Service: General;;    LYSIS OF ADHESIONS  1/25/2023    Procedure: LYSIS, ADHESIONS;  Surgeon: Luis Angel Collins MD;  Location: [facility] OR;  Service: General;;    REPAIR, HERNIA, VENTRAL  1/25/2023    Procedure: REPAIR, HERNIA, VENTRAL, INCARCERATED, WITHOUT HISTORY OF PRIOR REPAIR;  Surgeon: Luis Angel Collins MD;  Location: [facility] OR;  Service: General;;  Reduction of incisional ventral hernias x2    SURGICAL REMOVAL OF ULCER  1/25/2023    Procedure: EXCISION, ULCER;  Surgeon: Luis Angel Collins MD;  Location: [facility] OR;  Service: General;;  Gastric Ulcer    TOTAL VAGINAL HYSTERECTOMY      Secondary to uterine prolapse       Review of patient's allergies indicates:   Allergen Reactions    Nsaids (non-steroidal anti-inflammatory drug) Other (See Comments)     Gastric ulcer perforation        No current facility-administered medications on file prior to encounter.     Current Outpatient Medications on File Prior to Encounter   Medication Sig    ALPRAZolam (XANAX) 0.5 MG tablet Take 0.5 mg by mouth 2 (two) times daily as needed.    atorvastatin (LIPITOR) 10 MG tablet TAKE 1 TABLET(10 MG) BY MOUTH EVERY DAY    blood sugar diagnostic Strp 1 strip by Misc.(Non-Drug; Combo Route) route 3 (three) times daily.    cyanocobalamin (VITAMIN B-12) 100 MCG tablet Take 100 mcg by mouth once daily.    febuxostat (ULORIC) 40 mg Tab TAKE 1 TABLET(40 MG) BY MOUTH EVERY DAY    ferrous gluconate (FERGON) 324 MG tablet Take 324 mg by mouth daily with  breakfast.    gabapentin (NEURONTIN) 300 MG capsule TAKE 1 CAPSULE(300 MG) BY MOUTH THREE TIMES DAILY    lancets (FREESTYLE LANCETS) 28 gauge Misc USE THREE TIMES DAILY    losartan (COZAAR) 25 MG tablet Take 1 tablet (25 mg total) by mouth once daily.    metFORMIN (GLUCOPHAGE) 1000 MG tablet Take 1 tablet (1,000 mg total) by mouth 2 (two) times daily with meals.    miconazole NITRATE 2 % (MICOTIN) 2 % top powder Apply topically 2 (two) times daily.    miconazole nitrate 2% (MICOTIN) 2 % Oint Apply topically 2 (two) times daily.    multivitamin with minerals tablet Take 1 tablet by mouth once daily.    pantoprazole (PROTONIX) 40 MG tablet Take 1 tablet (40 mg total) by mouth 2 (two) times daily for 30 days, THEN 1 tablet (40 mg total) once daily.    sertraline (ZOLOFT) 100 MG tablet TAKE 1 TABLET(100 MG) BY MOUTH EVERY DAY    sucralfate (CARAFATE) 1 gram tablet Take 1 tablet (1 g total) by mouth 4 (four) times daily before meals and nightly.    vitamin D (VITAMIN D3) 1000 units Tab Take 1,000 Units by mouth once daily.    zinc sulfate (ZINCATE) 50 mg zinc (220 mg) capsule Take 220 mg by mouth 3 (three) times daily.     Family History       Problem Relation (Age of Onset)    Breast cancer Paternal Aunt    Cancer Father    Diabetes Mother, Sister, Son    Heart disease Father, Mother, Sister    Hypertension Father, Mother, Sister, Son, Sister    Kidney disease Mother    Kidney failure Sister    Peripheral vascular disease Sister    Schizophrenia Father    Stroke Mother, Sister          Tobacco Use    Smoking status: Never    Smokeless tobacco: Never   Substance and Sexual Activity    Alcohol use: No    Drug use: No    Sexual activity: Never     Review of Systems   Unable to perform ROS: Acuity of condition   Objective:     Vital Signs (Most Recent):  Temp: 97.6 °F (36.4 °C) (03/15/23 1046)  Pulse: 105 (03/15/23 1200)  Resp: 20 (03/15/23 1200)  BP: (!) 155/65 (03/15/23 1200)  SpO2: 100 % (03/15/23  1200)   Vital Signs (24h Range):  Temp:  [97.6 °F (36.4 °C)] 97.6 °F (36.4 °C)  Pulse:  [104-105] 105  Resp:  [16-20] 20  SpO2:  [96 %-100 %] 100 %  BP: (126-155)/(58-95) 155/65     Weight: (!) 221.8 kg (489 lb)  Body mass index is 83.94 kg/m².    Physical Exam  Constitutional:       General: She is not in acute distress.     Appearance: She is well-developed. She is obese. She is not diaphoretic.   HENT:      Head: Normocephalic and atraumatic.   Eyes:      Pupils: Pupils are equal, round, and reactive to light.   Cardiovascular:      Rate and Rhythm: Normal rate and regular rhythm.      Heart sounds: Normal heart sounds. No murmur heard.    No friction rub. No gallop.   Pulmonary:      Effort: Pulmonary effort is normal. No respiratory distress.      Breath sounds: Normal breath sounds. No stridor. No wheezing or rales.   Abdominal:      General: Bowel sounds are normal. There is no distension.      Palpations: Abdomen is soft. There is no mass.      Tenderness: There is no abdominal tenderness. There is no guarding.   Musculoskeletal:      Right lower leg: Edema present.      Left lower leg: Edema present.   Skin:     General: Skin is warm.      Findings: No erythema.   Neurological:      General: No focal deficit present.      Mental Status: She is disoriented.      Comments: Follows commands           CRANIAL NERVES     CN III, IV, VI   Pupils are equal, round, and reactive to light.     Significant Labs:   Results for orders placed or performed during the hospital encounter of 03/15/23   Urinalysis, Reflex to Urine Culture Urine, Catheterized    Specimen: Urine   Result Value Ref Range    Specimen UA Urine, Catheterized     Color, UA Yellow Yellow, Straw, Carine    Appearance, UA Hazy (A) Clear    pH, UA 5.0 5.0 - 8.0    Specific Gravity, UA 1.020 1.005 - 1.030    Protein, UA 1+ (A) Negative    Glucose, UA Negative Negative    Ketones, UA Negative Negative    Bilirubin (UA) Negative Negative    Occult Blood UA  Negative Negative    Nitrite, UA Negative Negative    Urobilinogen, UA Negative <2.0 EU/dL    Leukocytes, UA Negative Negative   Drug screen panel, emergency   Result Value Ref Range    Benzodiazepines Negative Negative    Methadone metabolites Negative Negative    Cocaine (Metab.) Negative Negative    Opiate Scrn, Ur Negative Negative    Barbiturate Screen, Ur Negative Negative    Amphetamine Screen, Ur Negative Negative    THC Negative Negative    Phencyclidine Negative Negative    Creatinine, Urine 165.2 15.0 - 325.0 mg/dL    Toxicology Information SEE COMMENT    CBC auto differential   Result Value Ref Range    WBC 12.48 3.90 - 12.70 K/uL    RBC 3.67 (L) 4.00 - 5.40 M/uL    Hemoglobin 9.8 (L) 12.0 - 16.0 g/dL    Hematocrit 32.3 (L) 37.0 - 48.5 %    MCV 88 82 - 98 fL    MCH 26.7 (L) 27.0 - 31.0 pg    MCHC 30.3 (L) 32.0 - 36.0 g/dL    RDW 17.3 (H) 11.5 - 14.5 %    Platelets 249 150 - 450 K/uL    MPV 12.9 9.2 - 12.9 fL    Immature Granulocytes 0.4 0.0 - 0.5 %    Gran # (ANC) 8.2 (H) 1.8 - 7.7 K/uL    Immature Grans (Abs) 0.05 (H) 0.00 - 0.04 K/uL    Lymph # 3.0 1.0 - 4.8 K/uL    Mono # 1.1 (H) 0.3 - 1.0 K/uL    Eos # 0.1 0.0 - 0.5 K/uL    Baso # 0.04 0.00 - 0.20 K/uL    nRBC 0 0 /100 WBC    Gran % 66.0 38.0 - 73.0 %    Lymph % 23.9 18.0 - 48.0 %    Mono % 8.4 4.0 - 15.0 %    Eosinophil % 1.0 0.0 - 8.0 %    Basophil % 0.3 0.0 - 1.9 %    Differential Method Automated    Comprehensive metabolic panel   Result Value Ref Range    Sodium 146 (H) 136 - 145 mmol/L    Potassium 4.8 3.5 - 5.1 mmol/L    Chloride 118 (H) 95 - 110 mmol/L    CO2 14 (L) 23 - 29 mmol/L    Glucose 78 70 - 110 mg/dL    BUN 69 (H) 6 - 20 mg/dL    Creatinine 6.4 (H) 0.5 - 1.4 mg/dL    Calcium 8.9 8.7 - 10.5 mg/dL    Total Protein 6.9 6.0 - 8.4 g/dL    Albumin 1.8 (L) 3.5 - 5.2 g/dL    Total Bilirubin 0.3 0.1 - 1.0 mg/dL    Alkaline Phosphatase 101 55 - 135 U/L    AST 27 10 - 40 U/L    ALT 21 10 - 44 U/L    Anion Gap 14 8 - 16 mmol/L    eGFR 7 (A) >60  mL/min/1.73 m^2   CPK   Result Value Ref Range    CPK 49 20 - 180 U/L   Urinalysis Microscopic   Result Value Ref Range    RBC, UA 1 0 - 4 /hpf    WBC, UA 0 0 - 5 /hpf    WBC Clumps, UA Moderate (A) None-Rare    Bacteria 0 None-Occ /hpf    Squam Epithel, UA 1 /hpf    Hyaline Casts, UA 0 0-1/lpf /lpf    Unclass Elizabeth UA Occasional None-Moderate    Microscopic Comment SEE COMMENT    Magnesium   Result Value Ref Range    Magnesium 1.0 (L) 1.6 - 2.6 mg/dL   Phosphorus   Result Value Ref Range    Phosphorus 5.6 (H) 2.7 - 4.5 mg/dL   Uric Acid   Result Value Ref Range    Uric Acid 12.2 (H) 2.4 - 5.7 mg/dL   POCT glucose   Result Value Ref Range    POCT Glucose 80 70 - 110 mg/dL        Significant Imaging: X-Ray Chest AP Portable  Narrative: EXAMINATION:  XR CHEST AP PORTABLE    CLINICAL HISTORY:  Weakness    TECHNIQUE:  Single frontal portable view of the chest was performed.    COMPARISON:  X-ray dated 01/25/2023    FINDINGS:  Cardiomediastinal silhouette is grossly unchanged.  Trachea remains midline.  Mild central interstitial prominence noted.  Lungs are otherwise clear.  No significant pleural effusion or pneumothorax.  No acute bony abnormality.  Impression: Mild central interstitial prominence may be related to vascular congestion.    Electronically signed by: Anthony Lama  Date:    03/15/2023  Time:    13:35        Assessment/Plan:     * Acute renal failure  Creatinine 6.4  Baseline 1.0 (2 months ago)  Pt clinically dry on exam  Possibly prerenal  Avoid nephrotoxic agents  2L NS bolus  Cont on mIVF   Renal US  Urine Na, Cr, urea   Nephrology consulted       Hyperuricemia  Will cont febuxostat       Diabetes mellitus type 2, controlled  Patient's FSGs are controlled on current medication regimen.  Last A1c reviewed-   Lab Results   Component Value Date    HGBA1C 6.4 (H) 01/25/2023     Most recent fingerstick glucose reviewed-   Recent Labs   Lab 03/15/23  1054   POCTGLUCOSE 80     Current correctional scale   Low  Maintain anti-hyperglycemic dose as follows-   Antihyperglycemics (From admission, onward)    Start     Stop Route Frequency Ordered    03/15/23 1412  insulin aspart U-100 pen 0-5 Units         -- SubQ Every 6 hours PRN 03/15/23 1312        Hold Oral hypoglycemics while patient is in the hospital.    Essential hypertension  Hydralazine prn          VTE Risk Mitigation (From admission, onward)    None                   Henok Jacobs MD  Department of Hospital Medicine  '[facility] - Emergency Dept.

## 2023-03-16 PROBLEM — E87.4 METABOLIC ACIDOSIS WITH RESPIRATORY ACIDOSIS: Status: ACTIVE | Noted: 2023-03-16

## 2023-03-16 PROBLEM — R19.7 DIARRHEA: Status: ACTIVE | Noted: 2023-03-16

## 2023-03-16 PROBLEM — E87.5 HYPERKALEMIA: Status: ACTIVE | Noted: 2023-03-16

## 2023-03-16 LAB
ACANTHOCYTES BLD QL SMEAR: PRESENT
ALBUMIN SERPL BCP-MCNC: 1.6 G/DL (ref 3.5–5.2)
ALLENS TEST: ABNORMAL
ANION GAP SERPL CALC-SCNC: 12 MMOL/L (ref 8–16)
ANION GAP SERPL CALC-SCNC: 15 MMOL/L (ref 8–16)
ANION GAP SERPL CALC-SCNC: 16 MMOL/L (ref 8–16)
ANION GAP SERPL CALC-SCNC: 18 MMOL/L (ref 8–16)
ANISOCYTOSIS BLD QL SMEAR: SLIGHT
BASOPHILS # BLD AUTO: 0.06 K/UL (ref 0–0.2)
BASOPHILS NFR BLD: 0.6 % (ref 0–1.9)
BUN SERPL-MCNC: 68 MG/DL (ref 6–20)
BUN SERPL-MCNC: 69 MG/DL (ref 6–20)
BUN SERPL-MCNC: 71 MG/DL (ref 6–20)
BUN SERPL-MCNC: 73 MG/DL (ref 6–20)
CALCIUM SERPL-MCNC: 7.7 MG/DL (ref 8.7–10.5)
CALCIUM SERPL-MCNC: 8 MG/DL (ref 8.7–10.5)
CALCIUM SERPL-MCNC: 8.1 MG/DL (ref 8.7–10.5)
CALCIUM SERPL-MCNC: 8.7 MG/DL (ref 8.7–10.5)
CHLORIDE SERPL-SCNC: 120 MMOL/L (ref 95–110)
CHLORIDE SERPL-SCNC: 120 MMOL/L (ref 95–110)
CHLORIDE SERPL-SCNC: 123 MMOL/L (ref 95–110)
CHLORIDE SERPL-SCNC: 123 MMOL/L (ref 95–110)
CO2 SERPL-SCNC: 10 MMOL/L (ref 23–29)
CO2 SERPL-SCNC: 12 MMOL/L (ref 23–29)
CO2 SERPL-SCNC: 16 MMOL/L (ref 23–29)
CO2 SERPL-SCNC: 9 MMOL/L (ref 23–29)
CREAT SERPL-MCNC: 6.4 MG/DL (ref 0.5–1.4)
CREAT SERPL-MCNC: 6.6 MG/DL (ref 0.5–1.4)
CREAT UR-MCNC: 119 MG/DL (ref 15–325)
DACRYOCYTES BLD QL SMEAR: ABNORMAL
DELSYS: ABNORMAL
DIFFERENTIAL METHOD: ABNORMAL
EOSINOPHIL # BLD AUTO: 0.2 K/UL (ref 0–0.5)
EOSINOPHIL NFR BLD: 1.9 % (ref 0–8)
ERYTHROCYTE [DISTWIDTH] IN BLOOD BY AUTOMATED COUNT: 17.6 % (ref 11.5–14.5)
EST. GFR  (NO RACE VARIABLE): 7 ML/MIN/1.73 M^2
FIO2: 21
GLUCOSE SERPL-MCNC: 122 MG/DL (ref 70–110)
GLUCOSE SERPL-MCNC: 70 MG/DL (ref 70–110)
GLUCOSE SERPL-MCNC: 70 MG/DL (ref 70–110)
GLUCOSE SERPL-MCNC: 86 MG/DL (ref 70–110)
HCO3 UR-SCNC: 15.9 MMOL/L (ref 24–28)
HCT VFR BLD AUTO: 38.1 % (ref 37–48.5)
HGB BLD-MCNC: 11.4 G/DL (ref 12–16)
IMM GRANULOCYTES # BLD AUTO: 0.27 K/UL (ref 0–0.04)
IMM GRANULOCYTES NFR BLD AUTO: 2.5 % (ref 0–0.5)
LYMPHOCYTES # BLD AUTO: 2.5 K/UL (ref 1–4.8)
LYMPHOCYTES NFR BLD: 23.1 % (ref 18–48)
MAGNESIUM SERPL-MCNC: 1.6 MG/DL (ref 1.6–2.6)
MCH RBC QN AUTO: 26.5 PG (ref 27–31)
MCHC RBC AUTO-ENTMCNC: 29.9 G/DL (ref 32–36)
MCV RBC AUTO: 88 FL (ref 82–98)
MODE: ABNORMAL
MONOCYTES # BLD AUTO: 1.1 K/UL (ref 0.3–1)
MONOCYTES NFR BLD: 10.5 % (ref 4–15)
NEUTROPHILS # BLD AUTO: 6.7 K/UL (ref 1.8–7.7)
NEUTROPHILS NFR BLD: 61.4 % (ref 38–73)
NRBC BLD-RTO: 0 /100 WBC
PCO2 BLDA: 41.4 MMHG (ref 35–45)
PH SMN: 7.19 [PH] (ref 7.35–7.45)
PHOSPHATE SERPL-MCNC: 6.6 MG/DL (ref 2.7–4.5)
PLATELET # BLD AUTO: 218 K/UL (ref 150–450)
PLATELET BLD QL SMEAR: ABNORMAL
PMV BLD AUTO: 12.7 FL (ref 9.2–12.9)
PO2 BLDA: 83 MMHG (ref 80–100)
POC BE: -12 MMOL/L
POC SATURATED O2: 93 % (ref 95–100)
POCT GLUCOSE: 117 MG/DL (ref 70–110)
POCT GLUCOSE: 137 MG/DL (ref 70–110)
POCT GLUCOSE: 66 MG/DL (ref 70–110)
POCT GLUCOSE: 72 MG/DL (ref 70–110)
POCT GLUCOSE: 81 MG/DL (ref 70–110)
POTASSIUM SERPL-SCNC: 4.1 MMOL/L (ref 3.5–5.1)
POTASSIUM SERPL-SCNC: 4.1 MMOL/L (ref 3.5–5.1)
POTASSIUM SERPL-SCNC: 4.5 MMOL/L (ref 3.5–5.1)
POTASSIUM SERPL-SCNC: 4.5 MMOL/L (ref 3.5–5.1)
POTASSIUM SERPL-SCNC: 5.4 MMOL/L (ref 3.5–5.1)
POTASSIUM SERPL-SCNC: 6.1 MMOL/L (ref 3.5–5.1)
RBC # BLD AUTO: 4.31 M/UL (ref 4–5.4)
SAMPLE: ABNORMAL
SITE: ABNORMAL
SODIUM SERPL-SCNC: 147 MMOL/L (ref 136–145)
SODIUM SERPL-SCNC: 148 MMOL/L (ref 136–145)
SODIUM SERPL-SCNC: 149 MMOL/L (ref 136–145)
SODIUM SERPL-SCNC: 150 MMOL/L (ref 136–145)
SODIUM UR-SCNC: 67 MMOL/L (ref 20–250)
UUN UR-MCNC: 304 MG/DL (ref 140–1050)
WBC # BLD AUTO: 10.84 K/UL (ref 3.9–12.7)

## 2023-03-16 PROCEDURE — 87177 OVA AND PARASITES SMEARS: CPT | Performed by: FAMILY MEDICINE

## 2023-03-16 PROCEDURE — 25000242 PHARM REV CODE 250 ALT 637 W/ HCPCS: Performed by: FAMILY MEDICINE

## 2023-03-16 PROCEDURE — 80069 RENAL FUNCTION PANEL: CPT | Performed by: FAMILY MEDICINE

## 2023-03-16 PROCEDURE — 94761 N-INVAS EAR/PLS OXIMETRY MLT: CPT

## 2023-03-16 PROCEDURE — 82803 BLOOD GASES ANY COMBINATION: CPT

## 2023-03-16 PROCEDURE — 25000003 PHARM REV CODE 250: Performed by: FAMILY MEDICINE

## 2023-03-16 PROCEDURE — 84300 ASSAY OF URINE SODIUM: CPT | Performed by: INTERNAL MEDICINE

## 2023-03-16 PROCEDURE — 85025 COMPLETE CBC W/AUTO DIFF WBC: CPT | Performed by: FAMILY MEDICINE

## 2023-03-16 PROCEDURE — 87209 SMEAR COMPLEX STAIN: CPT | Performed by: FAMILY MEDICINE

## 2023-03-16 PROCEDURE — 80048 BASIC METABOLIC PNL TOTAL CA: CPT | Performed by: INTERNAL MEDICINE

## 2023-03-16 PROCEDURE — 25000003 PHARM REV CODE 250: Performed by: INTERNAL MEDICINE

## 2023-03-16 PROCEDURE — 82570 ASSAY OF URINE CREATININE: CPT | Performed by: INTERNAL MEDICINE

## 2023-03-16 PROCEDURE — 87045 FECES CULTURE AEROBIC BACT: CPT | Performed by: FAMILY MEDICINE

## 2023-03-16 PROCEDURE — 99900035 HC TECH TIME PER 15 MIN (STAT)

## 2023-03-16 PROCEDURE — 99223 1ST HOSP IP/OBS HIGH 75: CPT | Mod: ,,, | Performed by: INTERNAL MEDICINE

## 2023-03-16 PROCEDURE — 25000003 PHARM REV CODE 250: Mod: TB,JG | Performed by: FAMILY MEDICINE

## 2023-03-16 PROCEDURE — 80048 BASIC METABOLIC PNL TOTAL CA: CPT | Mod: 91 | Performed by: FAMILY MEDICINE

## 2023-03-16 PROCEDURE — 36415 COLL VENOUS BLD VENIPUNCTURE: CPT | Performed by: INTERNAL MEDICINE

## 2023-03-16 PROCEDURE — 87427 SHIGA-LIKE TOXIN AG IA: CPT | Mod: 59 | Performed by: FAMILY MEDICINE

## 2023-03-16 PROCEDURE — 94640 AIRWAY INHALATION TREATMENT: CPT

## 2023-03-16 PROCEDURE — 36415 COLL VENOUS BLD VENIPUNCTURE: CPT | Performed by: FAMILY MEDICINE

## 2023-03-16 PROCEDURE — 87046 STOOL CULTR AEROBIC BACT EA: CPT | Mod: 59 | Performed by: FAMILY MEDICINE

## 2023-03-16 PROCEDURE — 36600 WITHDRAWAL OF ARTERIAL BLOOD: CPT

## 2023-03-16 PROCEDURE — 11000001 HC ACUTE MED/SURG PRIVATE ROOM

## 2023-03-16 PROCEDURE — 99223 PR INITIAL HOSPITAL CARE,LEVL III: ICD-10-PCS | Mod: ,,, | Performed by: INTERNAL MEDICINE

## 2023-03-16 PROCEDURE — 83735 ASSAY OF MAGNESIUM: CPT | Performed by: FAMILY MEDICINE

## 2023-03-16 RX ORDER — SODIUM POLYSTYRENE SULFONATE 15 G/60ML
15 SUSPENSION ORAL; RECTAL ONCE
Status: COMPLETED | OUTPATIENT
Start: 2023-03-16 | End: 2023-03-16

## 2023-03-16 RX ORDER — CALCIUM GLUCONATE 20 MG/ML
1 INJECTION, SOLUTION INTRAVENOUS EVERY 10 MIN PRN
Status: DISCONTINUED | OUTPATIENT
Start: 2023-03-16 | End: 2023-03-24 | Stop reason: HOSPADM

## 2023-03-16 RX ORDER — CALCIUM GLUCONATE 20 MG/ML
1 INJECTION, SOLUTION INTRAVENOUS ONCE
Status: COMPLETED | OUTPATIENT
Start: 2023-03-16 | End: 2023-03-16

## 2023-03-16 RX ORDER — MICONAZOLE NITRATE 2 %
POWDER (GRAM) TOPICAL 2 TIMES DAILY
Status: DISCONTINUED | OUTPATIENT
Start: 2023-03-16 | End: 2023-03-24 | Stop reason: HOSPADM

## 2023-03-16 RX ORDER — ALBUTEROL SULFATE 0.83 MG/ML
10 SOLUTION RESPIRATORY (INHALATION) ONCE
Status: COMPLETED | OUTPATIENT
Start: 2023-03-16 | End: 2023-03-16

## 2023-03-16 RX ORDER — SODIUM BICARBONATE 650 MG/1
650 TABLET ORAL 3 TIMES DAILY
Status: DISCONTINUED | OUTPATIENT
Start: 2023-03-16 | End: 2023-03-19

## 2023-03-16 RX ORDER — SODIUM BICARBONATE 650 MG/1
1300 TABLET ORAL 3 TIMES DAILY
Status: DISCONTINUED | OUTPATIENT
Start: 2023-03-16 | End: 2023-03-16

## 2023-03-16 RX ORDER — MUPIROCIN 20 MG/G
OINTMENT TOPICAL 2 TIMES DAILY
Status: COMPLETED | OUTPATIENT
Start: 2023-03-16 | End: 2023-03-21

## 2023-03-16 RX ADMIN — ALBUTEROL SULFATE 10 MG: 2.5 SOLUTION RESPIRATORY (INHALATION) at 11:03

## 2023-03-16 RX ADMIN — MUPIROCIN: 20 OINTMENT TOPICAL at 08:03

## 2023-03-16 RX ADMIN — MICONAZOLE NITRATE: 20 POWDER TOPICAL at 08:03

## 2023-03-16 RX ADMIN — SODIUM BICARBONATE: 84 INJECTION, SOLUTION INTRAVENOUS at 08:03

## 2023-03-16 RX ADMIN — CALCIUM GLUCONATE 1 G: 20 INJECTION, SOLUTION INTRAVENOUS at 12:03

## 2023-03-16 RX ADMIN — SODIUM BICARBONATE: 84 INJECTION, SOLUTION INTRAVENOUS at 10:03

## 2023-03-16 RX ADMIN — SODIUM CHLORIDE: 9 INJECTION, SOLUTION INTRAVENOUS at 05:03

## 2023-03-16 RX ADMIN — SODIUM POLYSTYRENE SULFONATE 15 G: 15 SUSPENSION ORAL; RECTAL at 01:03

## 2023-03-16 RX ADMIN — MUPIROCIN: 20 OINTMENT TOPICAL at 09:03

## 2023-03-16 RX ADMIN — VANCOMYCIN HYDROCHLORIDE 125 MG: KIT at 05:03

## 2023-03-16 NOTE — ASSESSMENT & PLAN NOTE
Creatinine 6.4  Baseline 1.0 (2 months ago)  Pt clinically dry on exam  Possibly prerenal  Avoid nephrotoxic agents  2L NS bolus  Cont on mIVF   Renal US  Urine Na, Cr, urea   Nephrology consulted     3/16  fena 1.12%  Nephrology following

## 2023-03-16 NOTE — HPI
"Thank you for referring the pt to us. H/o and chart were reviewed. Pt was seen and examined. Pt is a 56 y/o female in assisted living, who presented with change in mental status. Pt has morbid obesity (close to 500 lbs) and reactive airway disease. On presentation, pt has MIGUEL (Cr>6), severe metabolic acidosis (s bicarb 9), and hypernatremia. On further evaluation, nurse aid reported "diarrhea" when changing the pt. Pt herself is a poor historian, but seems to see she has been having diarrhea. No records of NSAIds given or recent abx found.    "

## 2023-03-16 NOTE — ASSESSMENT & PLAN NOTE
Previously treated with antibiotic(s) for uti during previous hospitalization  Uncertain whether patient received antibiotic(s) during skilled nursing facility stay  Treat empirically for cdiff  Contact precautions  Stool studies pending

## 2023-03-16 NOTE — SUBJECTIVE & OBJECTIVE
Past Medical History:   Diagnosis Date    Central retinal vein occlusion of left eye 02/25/2014    Treated by Dr. Lopez.  [place]     Depression     Diabetes mellitus type 2, controlled     Glaucoma 02/2014    Glaucoma Suspect    Hyperlipidemia     Hypertension     Hyperuricemia 8/3/2017    Morbid obesity     Reactive airway disease     Urolithiasis        Past Surgical History:   Procedure Laterality Date    APPENDECTOMY      BREAST SURGERY Left 2014    CHOLECYSTECTOMY  10/2014    EXCISION, SMALL INTESTINE  1/25/2023    Procedure: EXCISION, SMALL INTESTINE;  Surgeon: Luis Angel Collins MD;  Location: [facility] OR;  Service: General;;  x2    HEMICOLECTOMY Right 10/2014    Including appendix, due to perforated appendix    INJECTION OF ANESTHETIC AGENT INTO TISSUE PLANE DEFINED BY TRANSVERSUS ABDOMINIS MUSCLE  1/25/2023    Procedure: BLOCK, TRANSVERSUS ABDOMINIS PLANE;  Surgeon: Luis Angel Collins MD;  Location: [facility] OR;  Service: General;;    KIDNEY STONE SURGERY  2018    LAPAROTOMY, EXPLORATORY  1/25/2023    Procedure: LAPAROTOMY, EXPLORATORY;  Surgeon: Luis Angel Collins MD;  Location: [facility] OR;  Service: General;;    LYSIS OF ADHESIONS  1/25/2023    Procedure: LYSIS, ADHESIONS;  Surgeon: Luis Angel Collins MD;  Location: [facility] OR;  Service: General;;    REPAIR, HERNIA, VENTRAL  1/25/2023    Procedure: REPAIR, HERNIA, VENTRAL, INCARCERATED, WITHOUT HISTORY OF PRIOR REPAIR;  Surgeon: Luis Angel Collins MD;  Location: [facility] OR;  Service: General;;  Reduction of incisional ventral hernias x2    SURGICAL REMOVAL OF ULCER  1/25/2023    Procedure: EXCISION, ULCER;  Surgeon: Luis Angel Collins MD;  Location: [facility] OR;  Service: General;;  Gastric Ulcer    TOTAL VAGINAL HYSTERECTOMY      Secondary to uterine prolapse       Review of patient's allergies indicates:   Allergen Reactions    Nsaids (non-steroidal anti-inflammatory drug) Other (See Comments)     Gastric ulcer perforation      Current Facility-Administered Medications    Medication Frequency    acetaminophen tablet 650 mg Q6H PRN    calcium gluconate 1 g in NS IVPB (premixed) Q10 Min PRN    dextrose 10% bolus 125 mL 125 mL PRN    dextrose 10% bolus 250 mL 250 mL PRN    febuxostat tablet 40 mg Daily    glucagon (human recombinant) injection 1 mg PRN    hydrALAZINE injection 10 mg Q6H PRN    insulin aspart U-100 pen 0-5 Units Q6H PRN    miconazole NITRATE 2 % top powder BID    mupirocin 2 % ointment BID    ondansetron disintegrating tablet 4 mg Q6H PRN    sodium bicarbonate 150 mEq in dextrose 5 % (D5W) 1,150 mL infusion Continuous    sodium bicarbonate tablet 650 mg TID    vancomycin 125 mg/5 mL oral solution 125 mg Q6H     Family History       Problem Relation (Age of Onset)    Breast cancer Paternal Aunt    Cancer Father    Diabetes Mother, Sister, Son    Heart disease Father, Mother, Sister    Hypertension Father, Mother, Sister, Son, Sister    Kidney disease Mother    Kidney failure Sister    Peripheral vascular disease Sister    Schizophrenia Father    Stroke Mother, Sister          Tobacco Use    Smoking status: Never    Smokeless tobacco: Never   Substance and Sexual Activity    Alcohol use: No    Drug use: No    Sexual activity: Never     Review of Systems   Unable to perform ROS: Mental status change   Objective:     Vital Signs (Most Recent):  Temp: 98 °F (36.7 °C) (03/16/23 1552)  Pulse: 110 (03/16/23 1645)  Resp: 16 (03/16/23 1552)  BP: (!) 128/56 (03/16/23 1552)  SpO2: (!) 92 % (03/16/23 1552)   Vital Signs (24h Range):  Temp:  [97.4 °F (36.3 °C)-98 °F (36.7 °C)] 98 °F (36.7 °C)  Pulse:  [] 110  Resp:  [14-20] 16  SpO2:  [92 %-98 %] 92 %  BP: (103-139)/(51-71) 128/56     Weight: (!) 217.3 kg (479 lb 1 oz) (03/15/23 2319)  Body mass index is 82.23 kg/m².  Body surface area is 3.13 meters squared.    I/O last 3 completed shifts:  In: 1138.8 [I.V.:1138.8]  Out: 140 [Urine:140]    Physical Exam  Vitals and nursing note reviewed.   Constitutional:       Appearance:  Normal appearance. She is obese. She is ill-appearing.   Cardiovascular:      Rate and Rhythm: Normal rate and regular rhythm.      Pulses: Normal pulses.      Heart sounds: Normal heart sounds.   Pulmonary:      Effort: Pulmonary effort is normal.      Breath sounds: Normal breath sounds.   Abdominal:      Palpations: Abdomen is soft.      Tenderness: There is no abdominal tenderness.   Musculoskeletal:      Right lower leg: Edema present.      Left lower leg: Edema present.   Neurological:      Mental Status: She is alert.      Comments: confused       Significant Labs:reviewed  BMP  Lab Results   Component Value Date     (H) 03/16/2023    K 4.5 03/16/2023    K 4.5 03/16/2023     (H) 03/16/2023    CO2 12 (L) 03/16/2023    BUN 71 (H) 03/16/2023    CREATININE 6.4 (H) 03/16/2023    CALCIUM 8.7 03/16/2023    ANIONGAP 15 03/16/2023    EGFRNORACEVR 7 (A) 03/16/2023     Lab Results   Component Value Date    WBC 10.84 03/16/2023    HGB 11.4 (L) 03/16/2023    HCT 38.1 03/16/2023    MCV 88 03/16/2023     03/16/2023       ABG  Recent Labs   Lab 03/16/23  0930   PH 7.193*   PO2 83   PCO2 41.4   HCO3 15.9*   BE -12     Urine Na 67, urine cr 119; FENa 2.5%        Significant Imaging: reviewed

## 2023-03-16 NOTE — SUBJECTIVE & OBJECTIVE
Interval History: See hospital course for today      Review of Systems   Unable to perform ROS: Mental status change (son provides collateral)   Gastrointestinal:  Positive for diarrhea.   Musculoskeletal:         Bedbound   Neurological:  Positive for speech difficulty (slurred speech per son).   Psychiatric/Behavioral:  Positive for dysphoric mood.    Objective:     Vital Signs (Most Recent):  Temp: 98 °F (36.7 °C) (03/16/23 1156)  Pulse: 103 (03/16/23 1156)  Resp: 14 (03/16/23 1156)  BP: (!) 130/55 (03/16/23 1156)  SpO2: 98 % (03/16/23 1156)   Vital Signs (24h Range):  Temp:  [97.4 °F (36.3 °C)-98 °F (36.7 °C)] 98 °F (36.7 °C)  Pulse:  [] 103  Resp:  [14-20] 14  SpO2:  [94 %-98 %] 98 %  BP: (103-139)/(51-71) 130/55     Weight: (!) 217.3 kg (479 lb 1 oz)  Body mass index is 82.23 kg/m².    Intake/Output Summary (Last 24 hours) at 3/16/2023 1502  Last data filed at 3/16/2023 0605  Gross per 24 hour   Intake 1138.78 ml   Output 140 ml   Net 998.78 ml      Physical Exam  Vitals and nursing note reviewed. Exam conducted with a chaperone present (son).   Constitutional:       General: She is not in acute distress.     Appearance: She is morbidly obese. She is ill-appearing and toxic-appearing.   HENT:      Head: Normocephalic and atraumatic.   Cardiovascular:      Rate and Rhythm: Tachycardia present.   Pulmonary:      Effort: Pulmonary effort is normal. No respiratory distress.   Abdominal:      Palpations: Abdomen is soft.      Tenderness: There is no abdominal tenderness.   Genitourinary:     Comments: waters  Skin:     General: Skin is warm.      Findings: Lesion present.   Neurological:      Mental Status: She is lethargic, disoriented and confused.      Motor: Weakness present.       Significant Labs: All pertinent labs within the past 24 hours have been reviewed.    CBC:   Recent Labs   Lab 03/15/23  1134 03/16/23  0637   WBC 12.48 10.84   HGB 9.8* 11.4*   HCT 32.3* 38.1    218     CMP:   Recent Labs    Lab 03/15/23  1134 03/16/23  0637 03/16/23  0927 03/16/23  1220   * 150* 149* 147*   K 4.8 6.1* 5.4* 4.5  4.5   * 123* 123* 120*   CO2 14* 9* 10* 12*   GLU 78 70 70 86   BUN 69* 73* 69* 71*   CREATININE 6.4* 6.6* 6.4* 6.4*   CALCIUM 8.9 8.0* 7.7* 8.7   PROT 6.9  --   --   --    ALBUMIN 1.8* 1.6*  --   --    BILITOT 0.3  --   --   --    ALKPHOS 101  --   --   --    AST 27  --   --   --    ALT 21  --   --   --    ANIONGAP 14 18* 16 15         Significant Imaging: I have reviewed all pertinent imaging results/findings within the past 24 hours.  U/S: I have reviewed all pertinent results/findings within the past 24 hours and my personal findings are:  no hydronephrosis

## 2023-03-16 NOTE — CONSULTS
"[facility] Surg  Nephrology  Consult Note      Patient Name: Chastity Hung  MRN: [id number]  Admission Date: 3/15/2023  Hospital Length of Stay: 1 days  Attending Provider: Waldo Garcia MD   Primary Care Physician: Alexandra Dawkins MD  Principal Problem:Acute renal failure    Reason for consult: MIGUEL, metabolic acidosis, hyperkalemia, hypernatermia    Consults  Subjective:     HPI: Thank you for referring the pt to us. H/o and chart were reviewed. Pt was seen and examined. Pt is a 56 y/o female in assisted living, who presented with change in mental status. Pt has morbid obesity (close to 500 lbs) and reactive airway disease. On presentation, pt has MIGUEL (Cr>6), severe metabolic acidosis (s bicarb 9), and hypernatremia. On further evaluation, nurse aid reported "diarrhea" when changing the pt. Pt herself is a poor historian, but seems to see she has been having diarrhea. No records of NSAIds given or recent abx found.        Past Medical History:   Diagnosis Date    Central retinal vein occlusion of left eye 02/25/2014    Treated by Dr. Lopez.  Adele     Depression     Diabetes mellitus type 2, controlled     Glaucoma 02/2014    Glaucoma Suspect    Hyperlipidemia     Hypertension     Hyperuricemia 8/3/2017    Morbid obesity     Reactive airway disease     Urolithiasis        Past Surgical History:   Procedure Laterality Date    APPENDECTOMY      BREAST SURGERY Left 2014    CHOLECYSTECTOMY  10/2014    EXCISION, SMALL INTESTINE  1/25/2023    Procedure: EXCISION, SMALL INTESTINE;  Surgeon: Luis Angel Collins MD;  Location: [facility] OR;  Service: General;;  x2    HEMICOLECTOMY Right 10/2014    Including appendix, due to perforated appendix    INJECTION OF ANESTHETIC AGENT INTO TISSUE PLANE DEFINED BY TRANSVERSUS ABDOMINIS MUSCLE  1/25/2023    Procedure: BLOCK, TRANSVERSUS ABDOMINIS PLANE;  Surgeon: Luis Angel Collins MD;  Location: [facility] OR;  Service: General;;    KIDNEY STONE SURGERY  2018    LAPAROTOMY, EXPLORATORY  " 1/25/2023    Procedure: LAPAROTOMY, EXPLORATORY;  Surgeon: Luis Angel Collins MD;  Location: [facility] OR;  Service: General;;    LYSIS OF ADHESIONS  1/25/2023    Procedure: LYSIS, ADHESIONS;  Surgeon: Luis Angel Collins MD;  Location: [facility] OR;  Service: General;;    REPAIR, HERNIA, VENTRAL  1/25/2023    Procedure: REPAIR, HERNIA, VENTRAL, INCARCERATED, WITHOUT HISTORY OF PRIOR REPAIR;  Surgeon: Luis Angel Collins MD;  Location: [facility] OR;  Service: General;;  Reduction of incisional ventral hernias x2    SURGICAL REMOVAL OF ULCER  1/25/2023    Procedure: EXCISION, ULCER;  Surgeon: Luis Angel Collins MD;  Location: [facility] OR;  Service: General;;  Gastric Ulcer    TOTAL VAGINAL HYSTERECTOMY      Secondary to uterine prolapse       Review of patient's allergies indicates:   Allergen Reactions    Nsaids (non-steroidal anti-inflammatory drug) Other (See Comments)     Gastric ulcer perforation      Current Facility-Administered Medications   Medication Frequency    acetaminophen tablet 650 mg Q6H PRN    calcium gluconate 1 g in NS IVPB (premixed) Q10 Min PRN    dextrose 10% bolus 125 mL 125 mL PRN    dextrose 10% bolus 250 mL 250 mL PRN    febuxostat tablet 40 mg Daily    glucagon (human recombinant) injection 1 mg PRN    hydrALAZINE injection 10 mg Q6H PRN    insulin aspart U-100 pen 0-5 Units Q6H PRN    miconazole NITRATE 2 % top powder BID    mupirocin 2 % ointment BID    ondansetron disintegrating tablet 4 mg Q6H PRN    sodium bicarbonate 150 mEq in dextrose 5 % (D5W) 1,150 mL infusion Continuous    sodium bicarbonate tablet 650 mg TID    vancomycin 125 mg/5 mL oral solution 125 mg Q6H     Family History       Problem Relation (Age of Onset)    Breast cancer Paternal Aunt    Cancer Father    Diabetes Mother, Sister, Son    Heart disease Father, Mother, Sister    Hypertension Father, Mother, Sister, Son, Sister    Kidney disease Mother    Kidney failure Sister    Peripheral vascular disease Sister    Schizophrenia Father    Stroke  Mother, Sister          Tobacco Use    Smoking status: Never    Smokeless tobacco: Never   Substance and Sexual Activity    Alcohol use: No    Drug use: No    Sexual activity: Never     Review of Systems   Unable to perform ROS: Mental status change   Objective:     Vital Signs (Most Recent):  Temp: 98 °F (36.7 °C) (03/16/23 1552)  Pulse: 110 (03/16/23 1645)  Resp: 16 (03/16/23 1552)  BP: (!) 128/56 (03/16/23 1552)  SpO2: (!) 92 % (03/16/23 1552)   Vital Signs (24h Range):  Temp:  [97.4 °F (36.3 °C)-98 °F (36.7 °C)] 98 °F (36.7 °C)  Pulse:  [] 110  Resp:  [14-20] 16  SpO2:  [92 %-98 %] 92 %  BP: (103-139)/(51-71) 128/56     Weight: (!) 217.3 kg (479 lb 1 oz) (03/15/23 2319)  Body mass index is 82.23 kg/m².  Body surface area is 3.13 meters squared.    I/O last 3 completed shifts:  In: 1138.8 [I.V.:1138.8]  Out: 140 [Urine:140]    Physical Exam  Vitals and nursing note reviewed.   Constitutional:       Appearance: Normal appearance. She is obese. She is ill-appearing.   Cardiovascular:      Rate and Rhythm: Normal rate and regular rhythm.      Pulses: Normal pulses.      Heart sounds: Normal heart sounds.   Pulmonary:      Effort: Pulmonary effort is normal.      Breath sounds: Normal breath sounds.   Abdominal:      Palpations: Abdomen is soft.      Tenderness: There is no abdominal tenderness.   Musculoskeletal:      Right lower leg: Edema present.      Left lower leg: Edema present.   Neurological:      Mental Status: She is alert.      Comments: confused       Significant Labs:reviewed  BMP  Lab Results   Component Value Date     (H) 03/16/2023    K 4.5 03/16/2023    K 4.5 03/16/2023     (H) 03/16/2023    CO2 12 (L) 03/16/2023    BUN 71 (H) 03/16/2023    CREATININE 6.4 (H) 03/16/2023    CALCIUM 8.7 03/16/2023    ANIONGAP 15 03/16/2023    EGFRNORACEVR 7 (A) 03/16/2023     Lab Results   Component Value Date    WBC 10.84 03/16/2023    HGB 11.4 (L) 03/16/2023    HCT 38.1 03/16/2023    MCV 88  03/16/2023     03/16/2023       ABG  Recent Labs   Lab 03/16/23  0930   PH 7.193*   PO2 83   PCO2 41.4   HCO3 15.9*   BE -12     Urine Na 67, urine cr 119; FENa 2.5%        Significant Imaging: reviewed    Assessment/Plan:     58 y/o female presented with MIGUEL and diarrhea:    Renal/  * Acute renal failure  MIGUEL, metabolic acidosis, and hypernatremia. Suspect due to diarrhea.  hyperkalemia on presentation is not c/w with diarrhea  Suspect diarrhea is chronic, and hyperkalemia can occur with chronic diarrhea when MIGUEL has occurred, specially if pt is on RAAS blockade. Pt was on an ARB.    ABG shows 2 processes: metabolic acidosis (diarrhea and renal failure) and respiratory acidosis (obesity induced hypoventilation and reactive airway disease)    Labs have improved with the applied treatment  s Cr stable, not worse  Hyperkalemia resolved  Hyponatremia improved  Metabolic acidosis improved  Cr stable, not worse    Continue IVF's/bicarbonate infusion in hypotonic solutions  S/p lokelma, repeat prn  Repeat labs    Endocrine  Morbid obesity with BMI of 70 and over, adult  Reviewed the chart  Comorbidity for other medical issues    Diabetes mellitus type 2, controlled  Reviewed the chart    GI  Diarrhea  Suspect chronic  Consider C diff  Agree with stool studies      Plans and recommendations:  As discussed above  Total time spent 70 minutes including time needed to review the records, the   patient evaluation, documentation, face-to-face discussion with the patient,   more than 50% of the time was spent on coordination of care and counseling.    Level V visit.    Louis Esposito MD   Nephrology  [facility] S[facility]

## 2023-03-16 NOTE — CONSULTS
[facility] Surg  Wound Care    Patient Name:  Chastity Hung   MRN:  [id number]  Date: 3/16/2023  Diagnosis: Acute renal failure    History:     Past Medical History:   Diagnosis Date    Central retinal vein occlusion of left eye 02/25/2014    Treated by Dr. Lopez.  Adele Burrows     Diabetes mellitus type 2, controlled     Glaucoma 02/2014    Glaucoma Suspect    Hyperlipidemia     Hypertension     Hyperuricemia 8/3/2017    Morbid obesity     Reactive airway disease     Urolithiasis        Social History     Socioeconomic History    Marital status: Single   Tobacco Use    Smoking status: Never    Smokeless tobacco: Never   Substance and Sexual Activity    Alcohol use: No    Drug use: No    Sexual activity: Never   Social History Narrative    Patient is single and has 3 children. She lives with her son and hersister who has bilateral LE amputations.. Patient retired as an  at the [employer].      Social Determinants of Health     Financial Resource Strain: Low Risk     Difficulty of Paying Living Expenses: Not very hard   Food Insecurity: Food Insecurity Present    Worried About Running Out of Food in the Last Year: Sometimes true    Ran Out of Food in the Last Year: Never true   Transportation Needs: Unmet Transportation Needs    Lack of Transportation (Medical): Yes    Lack of Transportation (Non-Medical): No   Physical Activity: Inactive    Days of Exercise per Week: 0 days    Minutes of Exercise per Session: 0 min   Stress: Stress Concern Present    Feeling of Stress : To some extent   Social Connections: Unknown    Frequency of Communication with Friends and Family: More than three times a week    Frequency of Social Gatherings with Friends and Family: Twice a week    Active Member of Clubs or Organizations: No    Attends Club or Organization Meetings: Never    Marital Status: Never    Housing Stability: Low Risk     Unable to Pay for Housing in the Last  Year: No    Number of Places Lived in the Last Year: 1    Unstable Housing in the Last Year: No       Precautions:     Allergies as of 03/15/2023 - Reviewed 03/15/2023   Allergen Reaction Noted    Nsaids (non-steroidal anti-inflammatory drug) Other (See Comments) 01/31/2023       [facility] Assessment Details/Treatment     Consulted on this 58 y/o F patient due to present on admission skin breakdown. Patient admitted yesterday with acute renal failure. PMHx of DM2, HLD, HTN, morbid obesity, and reactive airway disease.  He is awake and alert. She is on specialty [facility] Bariatric bed. Assessment performed with max assist from RN and PCTs. Right plantar heel DTPI healing well with minimal maroon discoloration and one area now open to full thickness at the center. Foam dressing in place.  Right lateral 5th toe DTPI noted that measures 1x1cm,maroon discoloration, intact.   Left plantar and posterior heel DTPI noted that measures 12x6cm with maroon discoloration to edges, open to full thickness at the center. Cleansed all of these wounds with saline and patted dry. Foam dressings applied.  Intertrigo noted to lower abdominal/pannus fold, bilateral groin folds, and IAD to bilateral medial thighs and perineum all healing well since last admission. Miconazole powder ordered. DTPIs noted to bilateral lower/lateral buttocks (R>L).   DTPI noted to right posterior thigh.   Stage 3 pressure injury noted to Left posterior thigh with moist red and yellow subcutaneous tissue to wound bed, irregular edges.   Unstageable pressure injuries noted to Bilateral Ischium now stage 3 pressure injuries with moist red wound bed and healing well.   Cleansed all with bath wipes and patted dry. Zinc moisture barrier applied to all affected skin. Heel boots in use. Will follow.     03/16/2023

## 2023-03-16 NOTE — PROGRESS NOTES
[facility] Medicine  Progress Note    Patient Name: Chastity Hung  MRN: [id number]  Patient Class: IP- Inpatient   Admission Date: 3/15/2023  Length of Stay: 1 days  Attending Physician: Waldo Garcia MD  Primary Care Provider: Alexandra Dawkins MD        Subjective:     Principal Problem:Acute renal failure        HPI:  Patient is a 57 y.o. aa female with a PMHx of DM2, HLD, HTN, morbid obesity, and reactive airway disease who presents to the Emergency Department for slurred speech since this morning. Unable to obtain hx due to confusion, hx obtained via chart review. Patient was last seen normal last night. She was started on xanax a couple of days ago. No other issues reported. In the ED, no focal weakness was noted however patient was confused. Unable to obtain CT scan due to body habitus. Labs significant for BUN/Cr: 69/6.4, HCO3: 14, M.0, Uric acid: 12.2. Patient was bolused 2 L NS. HM consulted and patient admitted for acute renal failure.           Overview/Hospital Course:  3/16 admitted for acute renal failure, respiratory and metabolic acidosis. Started on bicarb gtt. Hyperkalemia noted. Patient/family noted diarrhea at skilled nursing facility. Son endorses steady decline in health 2/2 depression.      Interval History: See hospital course for today      Review of Systems   Unable to perform ROS: Mental status change (son provides collateral)   Gastrointestinal:  Positive for diarrhea.   Musculoskeletal:         Bedbound   Neurological:  Positive for speech difficulty (slurred speech per son).   Psychiatric/Behavioral:  Positive for dysphoric mood.    Objective:     Vital Signs (Most Recent):  Temp: 98 °F (36.7 °C) (23 1156)  Pulse: 103 (23 1156)  Resp: 14 (23 1156)  BP: (!) 130/55 (23 1156)  SpO2: 98 % (23 1156)   Vital Signs (24h Range):  Temp:  [97.4 °F (36.3 °C)-98 °F (36.7 °C)] 98 °F (36.7 °C)  Pulse:  [] 103  Resp:  [14-20] 14  SpO2:  [94  %-98 %] 98 %  BP: (103-139)/(51-71) 130/55     Weight: (!) 217.3 kg (479 lb 1 oz)  Body mass index is 82.23 kg/m².    Intake/Output Summary (Last 24 hours) at 3/16/2023 1502  Last data filed at 3/16/2023 0605  Gross per 24 hour   Intake 1138.78 ml   Output 140 ml   Net 998.78 ml      Physical Exam  Vitals and nursing note reviewed. Exam conducted with a chaperone present (son).   Constitutional:       General: She is not in acute distress.     Appearance: She is morbidly obese. She is ill-appearing and toxic-appearing.   HENT:      Head: Normocephalic and atraumatic.   Cardiovascular:      Rate and Rhythm: Tachycardia present.   Pulmonary:      Effort: Pulmonary effort is normal. No respiratory distress.   Abdominal:      Palpations: Abdomen is soft.      Tenderness: There is no abdominal tenderness.   Genitourinary:     Comments: waters  Skin:     General: Skin is warm.      Findings: Lesion present.   Neurological:      Mental Status: She is lethargic, disoriented and confused.      Motor: Weakness present.       Significant Labs: All pertinent labs within the past 24 hours have been reviewed.    CBC:   Recent Labs   Lab 03/15/23  1134 03/16/23  0637   WBC 12.48 10.84   HGB 9.8* 11.4*   HCT 32.3* 38.1    218     CMP:   Recent Labs   Lab 03/15/23  1134 03/16/23  0637 03/16/23  0927 03/16/23  1220   * 150* 149* 147*   K 4.8 6.1* 5.4* 4.5  4.5   * 123* 123* 120*   CO2 14* 9* 10* 12*   GLU 78 70 70 86   BUN 69* 73* 69* 71*   CREATININE 6.4* 6.6* 6.4* 6.4*   CALCIUM 8.9 8.0* 7.7* 8.7   PROT 6.9  --   --   --    ALBUMIN 1.8* 1.6*  --   --    BILITOT 0.3  --   --   --    ALKPHOS 101  --   --   --    AST 27  --   --   --    ALT 21  --   --   --    ANIONGAP 14 18* 16 15         Significant Imaging: I have reviewed all pertinent imaging results/findings within the past 24 hours.  U/S: I have reviewed all pertinent results/findings within the past 24 hours and my personal findings are:  no  hydronephrosis      Assessment/Plan:      * Acute renal failure  Creatinine 6.4  Baseline 1.0 (2 months ago)  Pt clinically dry on exam  Possibly prerenal  Avoid nephrotoxic agents  2L NS bolus  Cont on mIVF   Renal US  Urine Na, Cr, urea   Nephrology consulted     3/16  fena 1.12%  Nephrology following    Diarrhea  Previously treated with antibiotic(s) for uti during previous hospitalization  Uncertain whether patient received antibiotic(s) during skilled nursing facility stay  Treat empirically for cdiff  Contact precautions  Stool studies pending    Metabolic acidosis with respiratory acidosis  Bicarb gtt and po as tolerated      Hyperkalemia  Improved with treatment, calcium gluconate, albuterol, kayexalate rectal      Hyperuricemia  Will cont febuxostat       Diabetes mellitus type 2, controlled  Patient's FSGs are controlled on current medication regimen.  Last A1c reviewed-   Lab Results   Component Value Date    HGBA1C 6.4 (H) 01/25/2023     Most recent fingerstick glucose reviewed-   Recent Labs   Lab 03/15/23  1743 03/16/23  0209 03/16/23  0544 03/16/23  1159   POCTGLUCOSE 72 66* 72 81     Current correctional scale  Low  Maintain anti-hyperglycemic dose as follows-   Antihyperglycemics (From admission, onward)    Start     Stop Route Frequency Ordered    03/15/23 1412  insulin aspart U-100 pen 0-5 Units         -- SubQ Every 6 hours PRN 03/15/23 1312        Hold Oral hypoglycemics while patient is in the hospital.    Essential hypertension  Hydralazine prn  Hold losartan      VTE Risk Mitigation (From admission, onward)    None          Discharge Planning   ADOLFO:      Code Status: Full Code   Is the patient medically ready for discharge?:     Reason for patient still in hospital (select all that apply): Patient new problem, Patient trending condition, Laboratory test, Treatment, Imaging and Consult recommendations  Discharge Plan A: Home with family                  Waldo Garcia MD  Department of Hospital  Medicine   [facility] Surg

## 2023-03-16 NOTE — ASSESSMENT & PLAN NOTE
MIGUEL, metabolic acidosis, and hypernatremia. Suspect due to diarrhea.  hyperkalemia on presentation is not c/w with diarrhea  Suspect diarrhea is chronic, and hyperkalemia can occur with chronic diarrhea when MIGUEL has occurred, specially if pt is on RAAS blockade. Pt was on an ARB.    Labs have improved with the applied treatment  s Cr stable, not worse  Hyperkalemia resolved  Hyponatremia improved  Metabolic acidosis improved  Cr stable, not worse    Continue IVF's/bicarbonate infusion in hypotonic solutions\  Repeat labs

## 2023-03-16 NOTE — HOSPITAL COURSE
3/16 admitted for acute renal failure, respiratory and metabolic acidosis. Started on bicarb gtt. Hyperkalemia noted. Patient/family noted diarrhea at skilled nursing facility. Son endorses steady decline in health 2/2 depression.  3/17 significant drop in hb/hct. Likely dilutional in setting of dehydration. Occult stool negative. Blood transfusion pending. Nephrology following for acute renal failure and hypernatremia. Mentation improving  3/18 mentation waxes and wanes. Labs either stable or improving. Picc line pending  3/19 mentation improved. Diet advanced per speech recommendations. Tolerated picc line placement.  3/20 She is aao x 3 in nad . Admitted with a Dx of metabolic encephalopathy  , acute diarrhea  and MIGUEL .  Nephrology consulted .  Pt most likely develop ATN due to  IVVD . The diarrhea has improved . She was started on po vanc  by previous MD for possible C.diff . The diarrhea has improved .The urine cx is (+) for GNR .   3/21 NAEON . She is more awake . The diarrhea has improved .  Kidney function slowly improving . CM consulted for placement . She has a good UOP .   3/22   The kidney function  is improving .  SW consulted for NH placement .  Urine cx (+) for klebsiella pneumoniae  sensitive to rocephin .  3/23 NAEON . CM consulted for NH placement . She ahs a good UOP . S/P IV rocpehin for UTI .  3/24 Awaiting NH placement . NAEON .  No new complains .    Pt was seen and examiend at bedside . She was determined to be suitable for d/c .  [facility] will [facility] d/[facility].  The kidney function is improving with a good UOP . Case D/W Nephrology which agree to d/c home . She need to f/u with nephrology n 1 to 2 week . Repeat CMP in 1 to 2 week.  The diarrhea has resolved . She is tolerating po  intake .  Losartan and metformin d/c  due to MIGUEL . Started on Norvasc and Insulin sliding scale as need .  Consider to start ozempic at  the NH   Cont PPI BID due to recent Perforated PUD . Need to F/U with  general surgery as scheduled before .

## 2023-03-16 NOTE — PLAN OF CARE
[facility]  Initial Discharge Assessment       Primary Care Provider: Alexandra Dawkins MD    Admission Diagnosis: Renal failure [N19]  Weakness [R53.1]  Intravascular volume depletion [E86.1]  Chronic anemia [D64.9]  Morbid obesity with BMI of 70 and over, adult [E66.01, Z68.45]  Acute renal failure, unspecified acute renal failure type [N17.9]    Admission Date: 3/15/2023  Expected Discharge Date: per attending         Payor: [facility] BLUE SHIELD / Plan: BCBS [facility] LOCAL PLUS / Product Type: Commercial /     Extended Emergency Contact Information  Primary Emergency Contact: Eran Rangel  Address: [address] [place] [id number] [facility]  Home Phone: [phone]  Mobile Phone: [phone]  Relation: Son  Secondary Emergency Contact: elvie rangel  Mobile Phone: [phone]  Relation: Son    Discharge Plan A: Home with family         [id number] [place] - [address] [facility]  [address]  [facility] [address]  Phone: [phone] Fax: [phone]    [facility] [address] [address]  Phone: [phone] Fax: [phone]      Initial Assessment (most recent)       Adult Discharge Assessment - 03/16/23 1357          Discharge Assessment    Assessment Type Discharge Planning Assessment     Confirmed/corrected address, phone number and insurance Yes     Confirmed Demographics Correct on Facesheet     Source of Information family     When was your last doctors appointment? --   n/a    Communicated ADOLFO with patient/caregiver Date not available/Unable to determine     Reason For Admission renal failure     People in Home child(joycelyn), adult     Prior to hospitilization cognitive status: Alert/Oriented     Current cognitive status: Alert/Oriented     Equipment Currently Used at Home none     Readmission within 30 days? No     Do you currently  have service(s) that help you manage your care at home? No     Do you take prescription medications? Yes     Do you have prescription coverage? Yes     Coverage bcbs     Do you have any problems affording any of your prescribed medications? No     Is the patient taking medications as prescribed? yes     Who is going to help you get home at discharge? son- elvie     How do you get to doctors appointments? family or friend will provide     Are you on dialysis? No     Do you take coumadin? No     Discharge Plan A Home with family                   SW to f/u as needed.

## 2023-03-16 NOTE — PLAN OF CARE
Patient remains free of falls and injuries during shift. No signs of pain or discomfort noted.Patient remains disoriented to time, place, and situation.Telemonitoring in place. Blood glucose monitoring. IV fluids infusing as ordered. Singh in place. Call bell within reach. Chart check complete. Plan of care ongoing.

## 2023-03-16 NOTE — CONSULTS
Patient not medically appropriate for full assessment currently d/t poor orientation. Dietitian will continue to follow and complete full assessment on patient at RD follow up.    Please re-consult as needed.  Thank You!  Keven Guadarrama, Registration Eligible, Provisional LDN

## 2023-03-16 NOTE — PLAN OF CARE
Pt more alert today,     Purposeful rounding every 2hours    VS wnl  Cardiac monitoring in use, pt is ST tele monitor # [id number]  Blood glucose monitoring   Fall precautions in place, remains injury free      IVFs changed to bicarb    Accurate I&Os    Bed locked at lowest position  Call light within reach    Chart check complete  Will cont with POC     Woc done  Stool and urine sent

## 2023-03-16 NOTE — ASSESSMENT & PLAN NOTE
Patient's FSGs are controlled on current medication regimen.  Last A1c reviewed-   Lab Results   Component Value Date    HGBA1C 6.4 (H) 01/25/2023     Most recent fingerstick glucose reviewed-   Recent Labs   Lab 03/15/23  1743 03/16/23  0209 03/16/23  0544 03/16/23  1159   POCTGLUCOSE 72 66* 72 81     Current correctional scale  Low  Maintain anti-hyperglycemic dose as follows-   Antihyperglycemics (From admission, onward)    Start     Stop Route Frequency Ordered    03/15/23 1412  insulin aspart U-100 pen 0-5 Units         -- SubQ Every 6 hours PRN 03/15/23 1312        Hold Oral hypoglycemics while patient is in the hospital.

## 2023-03-17 PROBLEM — D64.9 ANEMIA: Status: ACTIVE | Noted: 2023-03-17

## 2023-03-17 PROBLEM — E87.6 HYPOKALEMIA: Status: ACTIVE | Noted: 2023-03-17

## 2023-03-17 LAB
ABO + RH BLD: NORMAL
ALBUMIN SERPL BCP-MCNC: 1.1 G/DL (ref 3.5–5.2)
ANION GAP SERPL CALC-SCNC: 10 MMOL/L (ref 8–16)
BASOPHILS # BLD AUTO: 0.02 K/UL (ref 0–0.2)
BASOPHILS NFR BLD: 0.2 % (ref 0–1.9)
BLD GP AB SCN CELLS X3 SERPL QL: NORMAL
BLD PROD TYP BPU: NORMAL
BLOOD UNIT EXPIRATION DATE: NORMAL
BLOOD UNIT TYPE CODE: 5100
BLOOD UNIT TYPE: NORMAL
BUN SERPL-MCNC: 58 MG/DL (ref 6–20)
CALCIUM SERPL-MCNC: 6.2 MG/DL (ref 8.7–10.5)
CHLORIDE SERPL-SCNC: 125 MMOL/L (ref 95–110)
CO2 SERPL-SCNC: 15 MMOL/L (ref 23–29)
CODING SYSTEM: NORMAL
CREAT SERPL-MCNC: 5.1 MG/DL (ref 0.5–1.4)
CROSSMATCH INTERPRETATION: NORMAL
DIFFERENTIAL METHOD: ABNORMAL
DISPENSE STATUS: NORMAL
EOSINOPHIL # BLD AUTO: 0.2 K/UL (ref 0–0.5)
EOSINOPHIL NFR BLD: 2.6 % (ref 0–8)
ERYTHROCYTE [DISTWIDTH] IN BLOOD BY AUTOMATED COUNT: 17.4 % (ref 11.5–14.5)
EST. GFR  (NO RACE VARIABLE): 9 ML/MIN/1.73 M^2
FERRITIN SERPL-MCNC: 324 NG/ML (ref 20–300)
GLUCOSE SERPL-MCNC: 102 MG/DL (ref 70–110)
HCT VFR BLD AUTO: 22.7 % (ref 37–48.5)
HGB BLD-MCNC: 6.9 G/DL (ref 12–16)
IMM GRANULOCYTES # BLD AUTO: 0.07 K/UL (ref 0–0.04)
IMM GRANULOCYTES NFR BLD AUTO: 0.8 % (ref 0–0.5)
IRON SERPL-MCNC: 39 UG/DL (ref 30–160)
LYMPHOCYTES # BLD AUTO: 1.9 K/UL (ref 1–4.8)
LYMPHOCYTES NFR BLD: 20.6 % (ref 18–48)
MAGNESIUM SERPL-MCNC: 1 MG/DL (ref 1.6–2.6)
MCH RBC QN AUTO: 26.5 PG (ref 27–31)
MCHC RBC AUTO-ENTMCNC: 30.4 G/DL (ref 32–36)
MCV RBC AUTO: 87 FL (ref 82–98)
MONOCYTES # BLD AUTO: 1.3 K/UL (ref 0.3–1)
MONOCYTES NFR BLD: 14.3 % (ref 4–15)
NEUTROPHILS # BLD AUTO: 5.7 K/UL (ref 1.8–7.7)
NEUTROPHILS NFR BLD: 61.5 % (ref 38–73)
NRBC BLD-RTO: 0 /100 WBC
NUM UNITS TRANS PACKED RBC: NORMAL
OB PNL STL: NEGATIVE
PHOSPHATE SERPL-MCNC: 3.5 MG/DL (ref 2.7–4.5)
PLATELET # BLD AUTO: 193 K/UL (ref 150–450)
PMV BLD AUTO: 12.7 FL (ref 9.2–12.9)
POCT GLUCOSE: 123 MG/DL (ref 70–110)
POCT GLUCOSE: 128 MG/DL (ref 70–110)
POCT GLUCOSE: 131 MG/DL (ref 70–110)
POCT GLUCOSE: 95 MG/DL (ref 70–110)
POTASSIUM SERPL-SCNC: 3.2 MMOL/L (ref 3.5–5.1)
RBC # BLD AUTO: 2.6 M/UL (ref 4–5.4)
SATURATED IRON: 32 % (ref 20–50)
SODIUM SERPL-SCNC: 150 MMOL/L (ref 136–145)
TOTAL IRON BINDING CAPACITY: 123 UG/DL (ref 250–450)
TRANSFERRIN SERPL-MCNC: 83 MG/DL (ref 200–375)
WBC # BLD AUTO: 9.23 K/UL (ref 3.9–12.7)

## 2023-03-17 PROCEDURE — 85025 COMPLETE CBC W/AUTO DIFF WBC: CPT | Performed by: FAMILY MEDICINE

## 2023-03-17 PROCEDURE — 86900 BLOOD TYPING SEROLOGIC ABO: CPT | Performed by: FAMILY MEDICINE

## 2023-03-17 PROCEDURE — 83735 ASSAY OF MAGNESIUM: CPT | Performed by: FAMILY MEDICINE

## 2023-03-17 PROCEDURE — 25000003 PHARM REV CODE 250: Performed by: FAMILY MEDICINE

## 2023-03-17 PROCEDURE — 25000003 PHARM REV CODE 250: Performed by: INTERNAL MEDICINE

## 2023-03-17 PROCEDURE — 97535 SELF CARE MNGMENT TRAINING: CPT

## 2023-03-17 PROCEDURE — 82728 ASSAY OF FERRITIN: CPT | Performed by: FAMILY MEDICINE

## 2023-03-17 PROCEDURE — 92610 EVALUATE SWALLOWING FUNCTION: CPT

## 2023-03-17 PROCEDURE — P9016 RBC LEUKOCYTES REDUCED: HCPCS | Performed by: FAMILY MEDICINE

## 2023-03-17 PROCEDURE — 84466 ASSAY OF TRANSFERRIN: CPT | Performed by: FAMILY MEDICINE

## 2023-03-17 PROCEDURE — 86920 COMPATIBILITY TEST SPIN: CPT | Performed by: FAMILY MEDICINE

## 2023-03-17 PROCEDURE — 80069 RENAL FUNCTION PANEL: CPT | Performed by: FAMILY MEDICINE

## 2023-03-17 PROCEDURE — 36415 COLL VENOUS BLD VENIPUNCTURE: CPT | Performed by: FAMILY MEDICINE

## 2023-03-17 PROCEDURE — 36430 TRANSFUSION BLD/BLD COMPNT: CPT

## 2023-03-17 PROCEDURE — 11000001 HC ACUTE MED/SURG PRIVATE ROOM

## 2023-03-17 PROCEDURE — 92523 SPEECH SOUND LANG COMPREHEN: CPT

## 2023-03-17 PROCEDURE — 82272 OCCULT BLD FECES 1-3 TESTS: CPT | Performed by: FAMILY MEDICINE

## 2023-03-17 PROCEDURE — 99232 PR SUBSEQUENT HOSPITAL CARE,LEVL II: ICD-10-PCS | Mod: ,,, | Performed by: INTERNAL MEDICINE

## 2023-03-17 PROCEDURE — 99232 SBSQ HOSP IP/OBS MODERATE 35: CPT | Mod: ,,, | Performed by: INTERNAL MEDICINE

## 2023-03-17 RX ORDER — CALCIUM GLUCONATE 98 MG/ML
1 INJECTION, SOLUTION INTRAVENOUS ONCE
Status: DISCONTINUED | OUTPATIENT
Start: 2023-03-17 | End: 2023-03-17

## 2023-03-17 RX ORDER — SODIUM CHLORIDE 9 MG/ML
INJECTION, SOLUTION INTRAVENOUS
Status: DISCONTINUED | OUTPATIENT
Start: 2023-03-17 | End: 2023-03-24 | Stop reason: HOSPADM

## 2023-03-17 RX ORDER — HYDROCODONE BITARTRATE AND ACETAMINOPHEN 500; 5 MG/1; MG/1
TABLET ORAL
Status: DISCONTINUED | OUTPATIENT
Start: 2023-03-17 | End: 2023-03-24 | Stop reason: HOSPADM

## 2023-03-17 RX ORDER — MAGNESIUM SULFATE 1 G/100ML
1 INJECTION INTRAVENOUS ONCE
Status: DISCONTINUED | OUTPATIENT
Start: 2023-03-17 | End: 2023-03-18

## 2023-03-17 RX ORDER — CALCIUM GLUCONATE 20 MG/ML
1 INJECTION, SOLUTION INTRAVENOUS ONCE
Status: COMPLETED | OUTPATIENT
Start: 2023-03-17 | End: 2023-03-17

## 2023-03-17 RX ADMIN — VANCOMYCIN HYDROCHLORIDE 125 MG: KIT at 05:03

## 2023-03-17 RX ADMIN — SODIUM CHLORIDE: 9 INJECTION, SOLUTION INTRAVENOUS at 09:03

## 2023-03-17 RX ADMIN — SODIUM BICARBONATE: 84 INJECTION, SOLUTION INTRAVENOUS at 04:03

## 2023-03-17 RX ADMIN — MUPIROCIN: 20 OINTMENT TOPICAL at 08:03

## 2023-03-17 RX ADMIN — VANCOMYCIN HYDROCHLORIDE 125 MG: KIT at 11:03

## 2023-03-17 RX ADMIN — SODIUM BICARBONATE: 84 INJECTION, SOLUTION INTRAVENOUS at 05:03

## 2023-03-17 RX ADMIN — CALCIUM GLUCONATE 1 G: 20 INJECTION, SOLUTION INTRAVENOUS at 09:03

## 2023-03-17 RX ADMIN — MICONAZOLE NITRATE: 20 POWDER TOPICAL at 09:03

## 2023-03-17 RX ADMIN — POTASSIUM BICARBONATE 20 MEQ: 391 TABLET, EFFERVESCENT ORAL at 08:03

## 2023-03-17 RX ADMIN — VANCOMYCIN HYDROCHLORIDE 125 MG: KIT at 12:03

## 2023-03-17 RX ADMIN — SODIUM BICARBONATE 650 MG: 650 TABLET ORAL at 08:03

## 2023-03-17 RX ADMIN — MUPIROCIN: 20 OINTMENT TOPICAL at 09:03

## 2023-03-17 RX ADMIN — MICONAZOLE NITRATE: 20 POWDER TOPICAL at 08:03

## 2023-03-17 NOTE — PLAN OF CARE
Plan of care discussed with pt's son. Pt's son verbalized understanding. Free from injury. No s/s of distress noted. Vitals stable. IV infusing. 1RBC transfused. Meds as ordered. Pain controlled. Diet tolerated. Tele monitor in place. Q2 hour rounding. No complaints. Will continue to monitor pt. 12 hour chart review.

## 2023-03-17 NOTE — ASSESSMENT & PLAN NOTE
Creatinine 6.4  Baseline 1.0 (2 months ago)  Pt clinically dry on exam  Possibly prerenal  Avoid nephrotoxic agents  2L NS bolus  Cont on mIVF   Renal US  Urine Na, Cr, urea   Nephrology consulted     3/16  fena 1.12%  Nephrology following    3/17  Creatinine improving  uop not documented  Yesterdays' uop poor  Strict I/o

## 2023-03-17 NOTE — ASSESSMENT & PLAN NOTE
Worsening  No overt signs or symptoms of bleeding  Multifactorial: nutritional, toby?, dilutional  Blood transfusion pending  Iron studies pending  Stool occult negative

## 2023-03-17 NOTE — PT/OT/SLP EVAL
Speech Language Pathology Evaluation  Cognitive/Bedside Swallow    Patient Name:  Chastity Hung   MRN:  [id number]  Admitting Diagnosis: Acute renal failure    Recommendations:                  General Recommendations:  Dysphagia therapy and Cognitive-linguistic therapy  Diet recommendations: Thin liquids - IDDSI Level 0, Full liquids   Aspiration Precautions: Assistance with meals, Feed only when awake/alert, Frequent oral care, HOB to 90 degrees, Meds whole 1 at a time, Remain upright 30 minutes post meal, Small bites/sips, and Standard aspiration precautions   General Precautions: Standard, aspiration  Communication strategies:  provide increased time to answer    History:     Past Medical History:   Diagnosis Date    Central retinal vein occlusion of left eye 02/25/2014    Treated by Dr. Lopez.  [place]     Depression     Diabetes mellitus type 2, controlled     Glaucoma 02/2014    Glaucoma Suspect    Hyperlipidemia     Hypertension     Hyperuricemia 8/3/2017    Morbid obesity     Reactive airway disease     Urolithiasis        Past Surgical History:   Procedure Laterality Date    APPENDECTOMY      BREAST SURGERY Left 2014    CHOLECYSTECTOMY  10/2014    EXCISION, SMALL INTESTINE  1/25/2023    Procedure: EXCISION, SMALL INTESTINE;  Surgeon: Luis Angel Collins MD;  Location: [facility] OR;  Service: General;;  x2    HEMICOLECTOMY Right 10/2014    Including appendix, due to perforated appendix    INJECTION OF ANESTHETIC AGENT INTO TISSUE PLANE DEFINED BY TRANSVERSUS ABDOMINIS MUSCLE  1/25/2023    Procedure: BLOCK, TRANSVERSUS ABDOMINIS PLANE;  Surgeon: Luis Angel Collins MD;  Location: [facility] OR;  Service: General;;    KIDNEY STONE SURGERY  2018    LAPAROTOMY, EXPLORATORY  1/25/2023    Procedure: LAPAROTOMY, EXPLORATORY;  Surgeon: Luis Angel Collins MD;  Location: [facility] OR;  Service: General;;    LYSIS OF ADHESIONS  1/25/2023    Procedure: LYSIS, ADHESIONS;  Surgeon: Luis Angel Collins MD;  Location: [facility] OR;  Service:  General;;    REPAIR, HERNIA, VENTRAL  1/25/2023    Procedure: REPAIR, HERNIA, VENTRAL, INCARCERATED, WITHOUT HISTORY OF PRIOR REPAIR;  Surgeon: Luis Angel Collins MD;  Location: [facility] OR;  Service: General;;  Reduction of incisional ventral hernias x2    SURGICAL REMOVAL OF ULCER  1/25/2023    Procedure: EXCISION, ULCER;  Surgeon: Luis Angel Collins MD;  Location: [facility] OR;  Service: General;;  Gastric Ulcer    TOTAL VAGINAL HYSTERECTOMY      Secondary to uterine prolapse       Social History: Patient lives at home and 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  She is independent with ADL's, including personal care/hygiene, ordering takeout food and medication administration.  She is ambulatory but no longer drives.  Son's in room stated she has had a functional decline since previous sx about a month ago and has residing at [facility] in [place], receiving PT/OT services.  Family stated pt has only been OOB once since previous hospitalization.    Prior Intubation HX:  intubated for sx/previous hospitalization over a month ago.    Modified Barium Swallow: N/A    Chest X-Rays:   XR CHEST AP PORTABLE     CLINICAL HISTORY:  Weakness     TECHNIQUE:  Single frontal portable view of the chest was performed.     COMPARISON:  X-ray dated 01/25/2023     FINDINGS:  Cardiomediastinal silhouette is grossly unchanged.  Trachea remains midline.  Mild central interstitial prominence noted.  Lungs are otherwise clear.  No significant pleural effusion or pneumothorax.  No acute bony abnormality.     Impression:     Mild central interstitial prominence may be related to vascular congestion.        Electronically signed by: Anthony Lama  Date:                                            03/15/2023  Time:                                           13:35    Prior diet: Pt consumes a regular diet and denied dysphagia hx.  Reported decreased PO intake a few days prior to admit with noted cognitive decline.    Subjective     Pt seen bedside for ST evaluation.   Alert/easily arousable but fluctuating mentation.  No c/o pain.  Pt's two sons present in room--H/P provided by them.  Patient/family goals: to improve cognition/communication with return to PLOF    Pain/Comfort:  Pain Rating 1: 0/10  Pain Rating Post-Intervention 1: 0/10  Pain Rating 2: 0/10  Pain Rating Post-Intervention 2: 0/10    Respiratory Status: Room air    Objective:     Cognitive Status:    Oriented to person (name) and sons at bedside.  Disoriented to place, time and situation/event.  Impaired attention, memory recall, executive functioning.   Perseveration's of speech noted.     Receptive Language:   Comprehension:   WFL during basic ST questioning    Pragmatics:    Flat affect, fatigued    Expressive Language:  Verbal:    Pt able to produce sentence-length utterances      Motor Speech:  Dysarthria noted but pt also fatigued with fluctuating KAZ and impaired cognition.  OM with generalized weakness s/t debility from prolonged hospitalization and bed-bound status.    Voice:   WFL    Oral Musculature Evaluation  Oral Musculature: general weakness  Dentition: present and adequate  Secretion Management: adequate  Mucosal Quality: good  Mandibular Strength and Mobility: impaired  Oral Labial Strength and Mobility: WFL  Lingual Strength and Mobility: impaired strength  Velar Elevation: WFL  Buccal Strength and Mobility: WFL  Volitional Cough: present  Volitional Swallow: present  Voice Prior to PO Intake: WFL    Bedside Swallow Eval:   Consistencies Assessed:  Pt consumed thin liquids, pureed solids and soft/chopped solids during bedside CSE.  Pt agreeable to consume all po presentations provided by SLP.    Oral Phase:   Slow oral transit time    Pharyngeal Phase:   no overt clinical signs/symptoms of aspiration    Compensatory Strategies  Aspiration precautions    Treatment: Pt recommended for IDDSI 0-thin liquids (full liquid diet) with ongoing swallowing assessment and diet advancement as mentation  improves. Aggressive oral care recommended.  ST to follow for cognitive tx to meet PLOF.  Recommended SNF at d/c.    Assessment:     Chastity Hung is a 57 y.o. female with an SLP diagnosis of Dysphagia and Cognitive-Linguistic Impairment in the setting of acute renal failure, metabolic/respiratory acidosis and anemia with reported recent sx, requiring prolonged hospitalization, SNF admission and debility/functional decline.  She is recommended for  IDDSI 0-thin liquids (full liquid diet) with ongoing swallowing assessment and diet advancement as mentation improves. Aggressive oral care. ST to also follow for cognitive tx to meet PLOF.  Recommended SNF at d/c.    Goals:   Multidisciplinary Problems       SLP Goals          Problem: SLP    Goal Priority Disciplines Outcome   SLP Goal     SLP    Description: 1.  Pt will consume full liquid diet (IDDSI 0-thin liquids) without incident.  2.  Ongoing bedside swallowing evaluation with diet advancement as mentation improves.  3.  Pt will improve cognitive-communicative impairment to PLOF related to orientation/memory recall and reasoning.                       Plan:     Patient to be seen:  2 x/week, 3 x/week   Plan of Care expires:  03/24/23  Plan of Care reviewed with:  patient   SLP Follow-Up:  Yes       Discharge recommendations:  Discharge Facility/Level of Care Needs: nursing facility, skilled   Barriers to Discharge:  None    Time Tracking:     SLP Treatment Date:   03/17/23  Speech Start Time:  1600  Speech Stop Time:  1645     Speech Total Time (min):  45 min    Billable Minutes: Eval 15 minutes , Eval Swallow and Oral Function 15 minutes, and Self Care/Home Management Training 15 minutes    03/17/2023

## 2023-03-17 NOTE — ASSESSMENT & PLAN NOTE
Patient's FSGs are controlled on current medication regimen.  Last A1c reviewed-   Lab Results   Component Value Date    HGBA1C 6.4 (H) 01/25/2023     Most recent fingerstick glucose reviewed-   Recent Labs   Lab 03/16/23  1720 03/16/23  2141 03/17/23  0032 03/17/23  0511   POCTGLUCOSE 117* 137* 131* 128*     Current correctional scale  Low  Maintain anti-hyperglycemic dose as follows-   Antihyperglycemics (From admission, onward)    Start     Stop Route Frequency Ordered    03/15/23 1412  insulin aspart U-100 pen 0-5 Units         -- SubQ Every 6 hours PRN 03/15/23 1312        Hold Oral hypoglycemics while patient is in the hospital.

## 2023-03-17 NOTE — PROGRESS NOTES
[facility] Medicine  Progress Note    Patient Name: Chastity Hung  MRN: [id number]  Patient Class: IP- Inpatient   Admission Date: 3/15/2023  Length of Stay: 2 days  Attending Physician: Waldo Garcia MD  Primary Care Provider: Alexandra Dawkins MD        Subjective:     Principal Problem:Acute renal failure        HPI:  Patient is a 57 y.o. aa female with a PMHx of DM2, HLD, HTN, morbid obesity, and reactive airway disease who presents to the Emergency Department for slurred speech since this morning. Unable to obtain hx due to confusion, hx obtained via chart review. Patient was last seen normal last night. She was started on xanax a couple of days ago. No other issues reported. In the ED, no focal weakness was noted however patient was confused. Unable to obtain CT scan due to body habitus. Labs significant for BUN/Cr: 69/6.4, HCO3: 14, M.0, Uric acid: 12.2. Patient was bolused 2 L NS. HM consulted and patient admitted for acute renal failure.           Overview/Hospital Course:  3/16 admitted for acute renal failure, respiratory and metabolic acidosis. Started on bicarb gtt. Hyperkalemia noted. Patient/family noted diarrhea at skilled nursing facility. Son endorses steady decline in health 2/2 depression.  3/17 significant drop in hb/hct. Likely dilutional in setting of dehydration. Occult stool negative. Blood transfusion pending. Nephrology following for acute renal failure and hypernatremia. Mentation improving      Interval History: See hospital course for today      Review of Systems   Unable to perform ROS: Mental status change (son provides collateral)   HENT:  Positive for trouble swallowing (improved).    Objective:     Vital Signs (Most Recent):  Temp: 98.5 °F (36.9 °C) (23 1223)  Pulse: 95 (23 1223)  Resp: 18 (23 1223)  BP: 134/76 (23 1223)  SpO2: 96 % (23 1223) Vital Signs (24h Range):  Temp:  [97.6 °F (36.4 °C)-99.1 °F (37.3 °C)] 98.5 °F (36.9  °C)  Pulse:  [] 95  Resp:  [12-19] 18  SpO2:  [92 %-96 %] 96 %  BP: ()/(49-76) 134/76     Weight: (!) 217.3 kg (479 lb 1 oz)  Body mass index is 82.23 kg/m².    Intake/Output Summary (Last 24 hours) at 3/17/2023 1301  Last data filed at 3/17/2023 0624  Gross per 24 hour   Intake 2597.5 ml   Output --   Net 2597.5 ml      Physical Exam  Vitals and nursing note reviewed. Exam conducted with a chaperone present.   Constitutional:       General: She is not in acute distress.     Appearance: She is morbidly obese. She is ill-appearing and toxic-appearing.   HENT:      Head: Normocephalic and atraumatic.   Cardiovascular:      Rate and Rhythm: Normal rate.   Pulmonary:      Effort: Pulmonary effort is normal. No respiratory distress.   Abdominal:      Palpations: Abdomen is soft.      Tenderness: There is no abdominal tenderness.      Comments: Abdominal incision   Bandage clean, dry, intact    Musculoskeletal:      Right lower leg: Edema present.      Left lower leg: Edema present.   Skin:     General: Skin is warm.      Findings: Lesion present.   Neurological:      Mental Status: She is lethargic, disoriented and confused.      Motor: Weakness present.       Significant Labs: All pertinent labs within the past 24 hours have been reviewed.  ABGs:   Recent Labs   Lab 03/16/23  0930   PH 7.193*   PCO2 41.4   HCO3 15.9*   POCSATURATED 93*   BE -12   PO2 83       CBC:   Recent Labs   Lab 03/16/23  0637 03/17/23  0523   WBC 10.84 9.23   HGB 11.4* 6.9*   HCT 38.1 22.7*    193     CMP:   Recent Labs   Lab 03/16/23  0637 03/16/23  0927 03/16/23  1220 03/16/23  1717 03/17/23  0523   *   < > 147* 148* 150*   K 6.1*   < > 4.5  4.5 4.1  4.1 3.2*   *   < > 120* 120* 125*   CO2 9*   < > 12* 16* 15*   GLU 70   < > 86 122* 102   BUN 73*   < > 71* 68* 58*   CREATININE 6.6*   < > 6.4* 6.4* 5.1*   CALCIUM 8.0*   < > 8.7 8.1* 6.2*   ALBUMIN 1.6*  --   --   --  1.1*   ANIONGAP 18*   < > 15 12 10    < > =  values in this interval not displayed.     Magnesium:   Recent Labs   Lab 03/16/23  0637 03/17/23  0523   MG 1.6 1.0*         Significant Imaging: I have reviewed all pertinent imaging results/findings within the past 24 hours.      Assessment/Plan:      * Acute renal failure  Creatinine 6.4  Baseline 1.0 (2 months ago)  Pt clinically dry on exam  Possibly prerenal  Avoid nephrotoxic agents  2L NS bolus  Cont on mIVF   Renal US  Urine Na, Cr, urea   Nephrology consulted     3/16  fena 1.12%  Nephrology following    3/17  Creatinine improving  uop not documented  Yesterdays' uop poor  Strict I/o    Hypokalemia  Initially hyperkalemia requiring kayexalate, albuterol and calcium gluconate  Now hypokalemia in setting of hypomagnesemia   replete      Anemia  Worsening  No overt signs or symptoms of bleeding  Multifactorial: nutritional, toby?, dilutional  Blood transfusion pending  Iron studies pending  Stool occult negative       Diarrhea  Previously treated with antibiotic(s) for uti during previous hospitalization  Uncertain whether patient received antibiotic(s) during skilled nursing facility stay  Treat empirically for cdiff  Contact precautions  Stool studies pending    Metabolic acidosis with respiratory acidosis  Bicarb gtt and po as tolerated  Unable to tolerate po    Hyperkalemia  Improved with treatment, calcium gluconate, albuterol, kayexalate rectal    Resolved      Hyperuricemia  Will cont febuxostat       Morbid obesity with BMI of 70 and over, adult  Body mass index is 82.23 kg/m². Morbid obesity complicates all aspects of disease management from diagnostic modalities to treatment. Weight loss encouraged and health benefits explained to patient.     Has been bedbound for this year    Diabetes mellitus type 2, controlled  Patient's FSGs are controlled on current medication regimen.  Last A1c reviewed-   Lab Results   Component Value Date    HGBA1C 6.4 (H) 01/25/2023     Most recent fingerstick glucose  reviewed-   Recent Labs   Lab 03/16/23  1720 03/16/23  2141 03/17/23  0032 03/17/23  0511   POCTGLUCOSE 117* 137* 131* 128*     Current correctional scale  Low  Maintain anti-hyperglycemic dose as follows-   Antihyperglycemics (From admission, onward)    Start     Stop Route Frequency Ordered    03/15/23 1412  insulin aspart U-100 pen 0-5 Units         -- SubQ Every 6 hours PRN 03/15/23 1312        Hold Oral hypoglycemics while patient is in the hospital.    Essential hypertension  Normotensive to hypo  Hydralazine prn  Hold losartan    VTE Risk Mitigation (From admission, onward)    None          Discharge Planning   ADOLFO:      Code Status: Full Code   Is the patient medically ready for discharge?:     Reason for patient still in hospital (select all that apply): Patient new problem, Patient trending condition, Laboratory test, Treatment and Consult recommendations  Discharge Plan A: Home with family                  Waldo Garcia MD  Department of Hospital Medicine   '[place] - Med Surg

## 2023-03-17 NOTE — PLAN OF CARE
Patient remains free of falls and injuries during shift. Pain noted with movement. Powder applied to folds. Blood glucose monitoring. IV sodium bicarb infusing. BM during shift and incontinence care done.Telemonitoring in place. Call bell within reach. Chart check complete. Plan of care ongoing.

## 2023-03-17 NOTE — PROGRESS NOTES
[facility]  Nephrology  Progress Note    Patient Name: Chastity Hung  MRN: [id number]  Admission Date: 3/15/2023  Hospital Length of Stay: 2 days  Attending Provider: Waldo Garcia MD   Primary Care Physician: Alexandra Dawkins MD  Principal Problem:Acute renal failure      Subjective:     Interval History:   - more responsive today, at my exam intermittent  - may need PICC  - two sons at bedside, updated    Review of patient's allergies indicates:   Allergen Reactions    Nsaids (non-steroidal anti-inflammatory drug) Other (See Comments)     Gastric ulcer perforation      Current Facility-Administered Medications   Medication Frequency    0.9%  NaCl infusion (for blood administration) Q24H PRN    0.9%  NaCl infusion PRN    acetaminophen tablet 650 mg Q6H PRN    calcium gluconate 1 g in NS IVPB (premixed) Q10 Min PRN    dextrose 10% bolus 125 mL 125 mL PRN    dextrose 10% bolus 250 mL 250 mL PRN    febuxostat tablet 40 mg Daily    glucagon (human recombinant) injection 1 mg PRN    hydrALAZINE injection 10 mg Q6H PRN    insulin aspart U-100 pen 0-5 Units Q6H PRN    magnesium sulfate in dextrose IVPB (premix) 1 g Once    miconazole NITRATE 2 % top powder BID    mupirocin 2 % ointment BID    ondansetron disintegrating tablet 4 mg Q6H PRN    potassium bicarbonate disintegrating tablet 20 mEq BID    sodium bicarbonate 150 mEq in dextrose 5 % (D5W) 1,150 mL infusion Continuous    sodium bicarbonate tablet 650 mg TID    vancomycin 125 mg/5 mL oral solution 125 mg Q6H       Objective:     Vital Signs (Most Recent):  Temp: 98.1 °F (36.7 °C) (03/17/23 1532)  Pulse: 96 (03/17/23 1532)  Resp: 18 (03/17/23 1532)  BP: (!) 122/56 (03/17/23 1532)  SpO2: (!) 92 % (03/17/23 1532)   Vital Signs (24h Range):  Temp:  [97.6 °F (36.4 °C)-99.1 °F (37.3 °C)] 98.1 °F (36.7 °C)  Pulse:  [] 96  Resp:  [12-19] 18  SpO2:  [92 %-96 %] 92 %  BP: ()/(49-76) 122/56     Weight: (!) 217.3 kg (479 lb 1 oz) (03/15/23 5579)  Body  mass index is 82.23 kg/m².  Body surface area is 3.13 meters squared.    I/O last 3 completed shifts:  In: 3736.3 [I.V.:3736.3]  Out: 140 [Urine:140]    Physical Exam  Constitutional:       General: She is not in acute distress.     Appearance: She is obese.   HENT:      Head: Atraumatic.   Neurological:      Mental Status: She is easily aroused.       Significant Labs:        Assessment/Plan:     Active Diagnoses:    Diagnosis Date Noted POA    PRINCIPAL PROBLEM:  Acute renal failure [N17.9] 03/15/2023 Yes    Anemia [D64.9] 03/17/2023 Yes    Hypokalemia [E87.6] 03/17/2023 No    Hyperkalemia [E87.5] 03/16/2023 No    Metabolic acidosis with respiratory acidosis [E87.4] 03/16/2023 Yes    Diarrhea [R19.7] 03/16/2023 Yes    Hyperuricemia [E79.0] 08/03/2017 Yes    Morbid obesity with BMI of 70 and over, adult [E66.01, Z68.45] 01/11/2016 Not Applicable    Diabetes mellitus type 2, controlled [E11.9]  Yes    Essential hypertension [I10]  Yes      Problems Resolved During this Admission:     MIGUEL with baseline creatinine around 1mg/dL  - MIGUEL improving    Metabolic Acidosis  - continue bicarb gtts but will decrease bicarb due to hypernatremia    Hypernatremia  - secondary to little po intake (decreased LOC, unable to answer thirst drive) plus bicarb  - as above will decrease bicarb amount (decrease tonicity)   - as she awakens she will increase po fluids    Hypokalemia  - may need IV repletion    OK for PICC if needed    Hayden Harris MD  Nephrology

## 2023-03-17 NOTE — SUBJECTIVE & OBJECTIVE
Interval History: See hospital course for today      Review of Systems   Unable to perform ROS: Mental status change (son provides collateral)   HENT:  Positive for trouble swallowing (improved).    Objective:     Vital Signs (Most Recent):  Temp: 98.5 °F (36.9 °C) (03/17/23 1223)  Pulse: 95 (03/17/23 1223)  Resp: 18 (03/17/23 1223)  BP: 134/76 (03/17/23 1223)  SpO2: 96 % (03/17/23 1223) Vital Signs (24h Range):  Temp:  [97.6 °F (36.4 °C)-99.1 °F (37.3 °C)] 98.5 °F (36.9 °C)  Pulse:  [] 95  Resp:  [12-19] 18  SpO2:  [92 %-96 %] 96 %  BP: ()/(49-76) 134/76     Weight: (!) 217.3 kg (479 lb 1 oz)  Body mass index is 82.23 kg/m².    Intake/Output Summary (Last 24 hours) at 3/17/2023 1301  Last data filed at 3/17/2023 0624  Gross per 24 hour   Intake 2597.5 ml   Output --   Net 2597.5 ml      Physical Exam  Vitals and nursing note reviewed. Exam conducted with a chaperone present.   Constitutional:       General: She is not in acute distress.     Appearance: She is morbidly obese. She is ill-appearing and toxic-appearing.   HENT:      Head: Normocephalic and atraumatic.   Cardiovascular:      Rate and Rhythm: Normal rate.   Pulmonary:      Effort: Pulmonary effort is normal. No respiratory distress.   Abdominal:      Palpations: Abdomen is soft.      Tenderness: There is no abdominal tenderness.      Comments: Abdominal incision   Bandage clean, dry, intact    Musculoskeletal:      Right lower leg: Edema present.      Left lower leg: Edema present.   Skin:     General: Skin is warm.      Findings: Lesion present.   Neurological:      Mental Status: She is lethargic, disoriented and confused.      Motor: Weakness present.       Significant Labs: All pertinent labs within the past 24 hours have been reviewed.  ABGs:   Recent Labs   Lab 03/16/23  0930   PH 7.193*   PCO2 41.4   HCO3 15.9*   POCSATURATED 93*   BE -12   PO2 83       CBC:   Recent Labs   Lab 03/16/23  0637 03/17/23  0523   WBC 10.84 9.23   HGB 11.4*  6.9*   HCT 38.1 22.7*    193     CMP:   Recent Labs   Lab 03/16/23  0637 03/16/23  0927 03/16/23  1220 03/16/23  1717 03/17/23  0523   *   < > 147* 148* 150*   K 6.1*   < > 4.5  4.5 4.1  4.1 3.2*   *   < > 120* 120* 125*   CO2 9*   < > 12* 16* 15*   GLU 70   < > 86 122* 102   BUN 73*   < > 71* 68* 58*   CREATININE 6.6*   < > 6.4* 6.4* 5.1*   CALCIUM 8.0*   < > 8.7 8.1* 6.2*   ALBUMIN 1.6*  --   --   --  1.1*   ANIONGAP 18*   < > 15 12 10    < > = values in this interval not displayed.     Magnesium:   Recent Labs   Lab 03/16/23  0637 03/17/23  0523   MG 1.6 1.0*         Significant Imaging: I have reviewed all pertinent imaging results/findings within the past 24 hours.

## 2023-03-17 NOTE — ASSESSMENT & PLAN NOTE
Body mass index is 82.23 kg/m². Morbid obesity complicates all aspects of disease management from diagnostic modalities to treatment. Weight loss encouraged and health benefits explained to patient.     Has been bedbound for this year

## 2023-03-17 NOTE — ASSESSMENT & PLAN NOTE
Initially hyperkalemia requiring kayexalate, albuterol and calcium gluconate  Now hypokalemia in setting of hypomagnesemia   replete

## 2023-03-18 PROBLEM — E87.5 HYPERKALEMIA: Status: RESOLVED | Noted: 2023-03-16 | Resolved: 2023-03-18

## 2023-03-18 LAB
ALBUMIN SERPL BCP-MCNC: 1.4 G/DL (ref 3.5–5.2)
ANION GAP SERPL CALC-SCNC: 13 MMOL/L (ref 8–16)
ANION GAP SERPL CALC-SCNC: 13 MMOL/L (ref 8–16)
BACTERIA #/AREA URNS HPF: ABNORMAL /HPF
BASOPHILS # BLD AUTO: 0.04 K/UL (ref 0–0.2)
BASOPHILS NFR BLD: 0.5 % (ref 0–1.9)
BILIRUB UR QL STRIP: NEGATIVE
BUN SERPL-MCNC: 74 MG/DL (ref 6–20)
BUN SERPL-MCNC: 76 MG/DL (ref 6–20)
CALCIUM SERPL-MCNC: 7.9 MG/DL (ref 8.7–10.5)
CALCIUM SERPL-MCNC: 7.9 MG/DL (ref 8.7–10.5)
CHLORIDE SERPL-SCNC: 108 MMOL/L (ref 95–110)
CHLORIDE SERPL-SCNC: 112 MMOL/L (ref 95–110)
CLARITY UR: ABNORMAL
CO2 SERPL-SCNC: 22 MMOL/L (ref 23–29)
CO2 SERPL-SCNC: 26 MMOL/L (ref 23–29)
COLOR UR: YELLOW
CREAT SERPL-MCNC: 6.4 MG/DL (ref 0.5–1.4)
CREAT SERPL-MCNC: 6.7 MG/DL (ref 0.5–1.4)
DIFFERENTIAL METHOD: ABNORMAL
EOSINOPHIL # BLD AUTO: 0.6 K/UL (ref 0–0.5)
EOSINOPHIL NFR BLD: 7.1 % (ref 0–8)
ERYTHROCYTE [DISTWIDTH] IN BLOOD BY AUTOMATED COUNT: 17.1 % (ref 11.5–14.5)
EST. GFR  (NO RACE VARIABLE): 7 ML/MIN/1.73 M^2
EST. GFR  (NO RACE VARIABLE): 7 ML/MIN/1.73 M^2
GLUCOSE SERPL-MCNC: 88 MG/DL (ref 70–110)
GLUCOSE SERPL-MCNC: 98 MG/DL (ref 70–110)
GLUCOSE UR QL STRIP: NEGATIVE
HCT VFR BLD AUTO: 28.9 % (ref 37–48.5)
HGB BLD-MCNC: 9.2 G/DL (ref 12–16)
HGB UR QL STRIP: ABNORMAL
HYALINE CASTS #/AREA URNS LPF: 0 /LPF
IMM GRANULOCYTES # BLD AUTO: 0.05 K/UL (ref 0–0.04)
IMM GRANULOCYTES NFR BLD AUTO: 0.6 % (ref 0–0.5)
KETONES UR QL STRIP: NEGATIVE
LEUKOCYTE ESTERASE UR QL STRIP: ABNORMAL
LYMPHOCYTES # BLD AUTO: 2.6 K/UL (ref 1–4.8)
LYMPHOCYTES NFR BLD: 30.4 % (ref 18–48)
MAGNESIUM SERPL-MCNC: 1.1 MG/DL (ref 1.6–2.6)
MAGNESIUM SERPL-MCNC: 1.1 MG/DL (ref 1.6–2.6)
MCH RBC QN AUTO: 27.4 PG (ref 27–31)
MCHC RBC AUTO-ENTMCNC: 31.8 G/DL (ref 32–36)
MCV RBC AUTO: 86 FL (ref 82–98)
MICROSCOPIC COMMENT: ABNORMAL
MONOCYTES # BLD AUTO: 1.1 K/UL (ref 0.3–1)
MONOCYTES NFR BLD: 13.1 % (ref 4–15)
NEUTROPHILS # BLD AUTO: 4.1 K/UL (ref 1.8–7.7)
NEUTROPHILS NFR BLD: 48.3 % (ref 38–73)
NITRITE UR QL STRIP: NEGATIVE
NRBC BLD-RTO: 0 /100 WBC
PH UR STRIP: 6 [PH] (ref 5–8)
PHOSPHATE SERPL-MCNC: 3.7 MG/DL (ref 2.7–4.5)
PLATELET # BLD AUTO: 233 K/UL (ref 150–450)
PMV BLD AUTO: 12.2 FL (ref 9.2–12.9)
POCT GLUCOSE: 78 MG/DL (ref 70–110)
POCT GLUCOSE: 82 MG/DL (ref 70–110)
POCT GLUCOSE: 97 MG/DL (ref 70–110)
POCT GLUCOSE: 97 MG/DL (ref 70–110)
POTASSIUM SERPL-SCNC: 3.4 MMOL/L (ref 3.5–5.1)
POTASSIUM SERPL-SCNC: 3.7 MMOL/L (ref 3.5–5.1)
PROT UR QL STRIP: ABNORMAL
RBC # BLD AUTO: 3.36 M/UL (ref 4–5.4)
RBC #/AREA URNS HPF: 19 /HPF (ref 0–4)
SODIUM SERPL-SCNC: 147 MMOL/L (ref 136–145)
SODIUM SERPL-SCNC: 147 MMOL/L (ref 136–145)
SP GR UR STRIP: 1.01 (ref 1–1.03)
SQUAMOUS #/AREA URNS HPF: 3 /HPF
UNIDENT CRYS URNS QL MICRO: ABNORMAL
URN SPEC COLLECT METH UR: ABNORMAL
UROBILINOGEN UR STRIP-ACNC: NEGATIVE EU/DL
WBC # BLD AUTO: 8.4 K/UL (ref 3.9–12.7)
WBC #/AREA URNS HPF: >100 /HPF (ref 0–5)
WBC CLUMPS URNS QL MICRO: ABNORMAL

## 2023-03-18 PROCEDURE — 83735 ASSAY OF MAGNESIUM: CPT | Performed by: FAMILY MEDICINE

## 2023-03-18 PROCEDURE — 36415 COLL VENOUS BLD VENIPUNCTURE: CPT | Performed by: FAMILY MEDICINE

## 2023-03-18 PROCEDURE — 25000003 PHARM REV CODE 250: Performed by: FAMILY MEDICINE

## 2023-03-18 PROCEDURE — 80048 BASIC METABOLIC PNL TOTAL CA: CPT | Performed by: INTERNAL MEDICINE

## 2023-03-18 PROCEDURE — 87077 CULTURE AEROBIC IDENTIFY: CPT | Performed by: INTERNAL MEDICINE

## 2023-03-18 PROCEDURE — C1751 CATH, INF, PER/CENT/MIDLINE: HCPCS

## 2023-03-18 PROCEDURE — 87086 URINE CULTURE/COLONY COUNT: CPT | Performed by: INTERNAL MEDICINE

## 2023-03-18 PROCEDURE — 87186 SC STD MICRODIL/AGAR DIL: CPT | Performed by: INTERNAL MEDICINE

## 2023-03-18 PROCEDURE — 99232 PR SUBSEQUENT HOSPITAL CARE,LEVL II: ICD-10-PCS | Mod: ,,, | Performed by: INTERNAL MEDICINE

## 2023-03-18 PROCEDURE — 36415 COLL VENOUS BLD VENIPUNCTURE: CPT | Performed by: INTERNAL MEDICINE

## 2023-03-18 PROCEDURE — 36569 INSJ PICC 5 YR+ W/O IMAGING: CPT

## 2023-03-18 PROCEDURE — 83735 ASSAY OF MAGNESIUM: CPT | Mod: 91 | Performed by: INTERNAL MEDICINE

## 2023-03-18 PROCEDURE — 99232 SBSQ HOSP IP/OBS MODERATE 35: CPT | Mod: ,,, | Performed by: INTERNAL MEDICINE

## 2023-03-18 PROCEDURE — 81000 URINALYSIS NONAUTO W/SCOPE: CPT | Performed by: INTERNAL MEDICINE

## 2023-03-18 PROCEDURE — 87088 URINE BACTERIA CULTURE: CPT | Performed by: INTERNAL MEDICINE

## 2023-03-18 PROCEDURE — 11000001 HC ACUTE MED/SURG PRIVATE ROOM

## 2023-03-18 PROCEDURE — 80069 RENAL FUNCTION PANEL: CPT | Performed by: FAMILY MEDICINE

## 2023-03-18 PROCEDURE — 85025 COMPLETE CBC W/AUTO DIFF WBC: CPT | Performed by: FAMILY MEDICINE

## 2023-03-18 RX ORDER — FUROSEMIDE 10 MG/ML
80 INJECTION INTRAMUSCULAR; INTRAVENOUS ONCE
Status: DISCONTINUED | OUTPATIENT
Start: 2023-03-18 | End: 2023-03-20

## 2023-03-18 RX ORDER — MAGNESIUM SULFATE HEPTAHYDRATE 40 MG/ML
2 INJECTION, SOLUTION INTRAVENOUS ONCE
Status: DISCONTINUED | OUTPATIENT
Start: 2023-03-18 | End: 2023-03-19

## 2023-03-18 RX ADMIN — MUPIROCIN: 20 OINTMENT TOPICAL at 09:03

## 2023-03-18 RX ADMIN — MICONAZOLE NITRATE: 20 POWDER TOPICAL at 09:03

## 2023-03-18 RX ADMIN — POTASSIUM BICARBONATE 20 MEQ: 391 TABLET, EFFERVESCENT ORAL at 09:03

## 2023-03-18 RX ADMIN — FEBUXOSTAT 40 MG: 40 TABLET, FILM COATED ORAL at 09:03

## 2023-03-18 RX ADMIN — VANCOMYCIN HYDROCHLORIDE 125 MG: KIT at 05:03

## 2023-03-18 RX ADMIN — SODIUM BICARBONATE 650 MG: 650 TABLET ORAL at 09:03

## 2023-03-18 RX ADMIN — VANCOMYCIN HYDROCHLORIDE 125 MG: KIT at 01:03

## 2023-03-18 RX ADMIN — SODIUM BICARBONATE 650 MG: 650 TABLET ORAL at 05:03

## 2023-03-18 NOTE — SUBJECTIVE & OBJECTIVE
Interval History: See hospital course for today      Review of Systems   Unable to perform ROS: Mental status change   Objective:     Vital Signs (Most Recent):  Temp: 98 °F (36.7 °C) (03/18/23 1127)  Pulse: 88 (03/18/23 1127)  Resp: 18 (03/18/23 1127)  BP: 136/79 (03/18/23 1127)  SpO2: 98 % (03/18/23 1127) Vital Signs (24h Range):  Temp:  [96.2 °F (35.7 °C)-98.9 °F (37.2 °C)] 98 °F (36.7 °C)  Pulse:  [84-96] 88  Resp:  [16-18] 18  SpO2:  [91 %-98 %] 98 %  BP: (122-153)/(56-82) 136/79     Weight: (!) 224.1 kg (494 lb)  Body mass index is 84.79 kg/m².    Intake/Output Summary (Last 24 hours) at 3/18/2023 1214  Last data filed at 3/18/2023 0540  Gross per 24 hour   Intake 2190 ml   Output 300 ml   Net 1890 ml      Physical Exam  Vitals and nursing note reviewed.   Constitutional:       General: She is sleeping. She is not in acute distress.     Appearance: She is morbidly obese. She is ill-appearing. She is not toxic-appearing.   HENT:      Head: Normocephalic and atraumatic.   Cardiovascular:      Rate and Rhythm: Normal rate.   Pulmonary:      Effort: Pulmonary effort is normal. No respiratory distress.   Abdominal:      Palpations: Abdomen is soft.      Tenderness: There is no abdominal tenderness.      Comments: Abdominal incision   Musculoskeletal:      Right lower leg: Edema present.      Left lower leg: Edema present.   Skin:     General: Skin is warm.      Findings: Lesion present.   Neurological:      Mental Status: She is easily aroused.      Motor: Weakness present.   Psychiatric:         Mood and Affect: Mood is depressed.       Significant Labs: All pertinent labs within the past 24 hours have been reviewed.  CBC:   Recent Labs   Lab 03/17/23  0523 03/18/23  0506   WBC 9.23 8.40   HGB 6.9* 9.2*   HCT 22.7* 28.9*    233     CMP:   Recent Labs   Lab 03/16/23  1717 03/17/23  0523 03/18/23  0506   * 150* 147*   K 4.1  4.1 3.2* 3.4*   * 125* 112*   CO2 16* 15* 22*   * 102 98   BUN  68* 58* 74*   CREATININE 6.4* 5.1* 6.4*   CALCIUM 8.1* 6.2* 7.9*   ALBUMIN  --  1.1* 1.4*   ANIONGAP 12 10 13       Significant Imaging: I have reviewed all pertinent imaging results/findings within the past 24 hours.

## 2023-03-18 NOTE — ASSESSMENT & PLAN NOTE
Patient's FSGs are controlled on current medication regimen.  Last A1c reviewed-   Lab Results   Component Value Date    HGBA1C 6.4 (H) 01/25/2023     Most recent fingerstick glucose reviewed-   Recent Labs   Lab 03/17/23  1743 03/17/23 2048 03/18/23  0534   POCTGLUCOSE 95 123* 97     Current correctional scale  Low  Maintain anti-hyperglycemic dose as follows-   Antihyperglycemics (From admission, onward)    Start     Stop Route Frequency Ordered    03/15/23 1412  insulin aspart U-100 pen 0-5 Units         -- SubQ Every 6 hours PRN 03/15/23 1312        Hold Oral hypoglycemics while patient is in the hospital.  Full liquid diet per speech until mentation improves

## 2023-03-18 NOTE — PROCEDURES
"Chastity Hung is a 57 y.o. female patient.    Temp: 98 °F (36.7 °C) (03/18/23 1127)  Pulse: 85 (03/18/23 1310)  Resp: 18 (03/18/23 1127)  BP: 136/79 (03/18/23 1127)  SpO2: 98 % (03/18/23 1127)  Weight: (!) 224.1 kg (494 lb) (03/18/23 0016)  Height: 5' 4" (162.6 cm) (03/15/23 1602)    PICC  Date/Time: 3/18/2023 3:33 PM  Performed by: Shekhar Dorado RN  Supervising provider: Na Frances RN  Consent Done: Yes  Time out: Immediately prior to procedure a time out was called to verify the correct patient, procedure, equipment, support staff and site/side marked as required  Indications: med administration and vascular access  Anesthesia: local infiltration  Local anesthetic: lidocaine 1% without epinephrine  Anesthetic Total (mL): 3  Preparation: skin prepped with ChloraPrep  Skin prep agent dried: skin prep agent completely dried prior to procedure  Sterile barriers: all five maximum sterile barriers used - cap, mask, sterile gown, sterile gloves, and large sterile sheet  Hand hygiene: hand hygiene performed prior to central venous catheter insertion  Location details: right basilic  Catheter type: double lumen  Catheter size: 4 Fr  Catheter Length: 45cm    Ultrasound guidance: yes  Vessel Caliber: medium and patent, compressibility normal  Vascular Doppler: not done  Needle advanced into vessel with real time Ultrasound guidance.  Guidewire confirmed in vessel.  Sterile sheath used.  no esophageal manometryNumber of attempts: 1  Post-procedure: blood return through all ports, chlorhexidine patch and sterile dressing applied  Estimated blood loss (mL): 0    Assessment: placement verified by x-ray  Complications: none        Name Shekhar Dorado RN  3/18/2023    "

## 2023-03-18 NOTE — ASSESSMENT & PLAN NOTE
Responded after blood tranfusion  No overt signs or symptoms of bleeding  Multifactorial: nutritional, toby?, dilutional  Iron studies negative for toby  Stool occult negative   Mcv>80  Folate and b12 pending

## 2023-03-18 NOTE — PLAN OF CARE
Patient remains free of falls and injuries during shift. No signs of pain or discomfort noted. IV sodium bicarb infusing as ordered. Blood glucose monitoring. Singh in place. Was able to swallow night meds with redirecting. More alert and verbalized wanting water. Able to drink a cup without complications.Telemonitoring in place. Call bell within reach. Chart check complete. Plan of care ongoing.

## 2023-03-18 NOTE — PROGRESS NOTES
[facility] Medicine  Progress Note    Patient Name: Chastity Hung  MRN: [id number]  Patient Class: IP- Inpatient   Admission Date: 3/15/2023  Length of Stay: 3 days  Attending Physician: Waldo Garcia MD  Primary Care Provider: Alexandra Dawkins MD        Subjective:     Principal Problem:Acute renal failure        HPI:  Patient is a 57 y.o. aa female with a PMHx of DM2, HLD, HTN, morbid obesity, and reactive airway disease who presents to the Emergency Department for slurred speech since this morning. Unable to obtain hx due to confusion, hx obtained via chart review. Patient was last seen normal last night. She was started on xanax a couple of days ago. No other issues reported. In the ED, no focal weakness was noted however patient was confused. Unable to obtain CT scan due to body habitus. Labs significant for BUN/Cr: 69/6.4, HCO3: 14, M.0, Uric acid: 12.2. Patient was bolused 2 L NS. HM consulted and patient admitted for acute renal failure.         Overview/Hospital Course:  3/16 admitted for acute renal failure, respiratory and metabolic acidosis. Started on bicarb gtt. Hyperkalemia noted. Patient/family noted diarrhea at skilled nursing facility. Son endorses steady decline in health 2/2 depression.  3/17 significant drop in hb/hct. Likely dilutional in setting of dehydration. Occult stool negative. Blood transfusion pending. Nephrology following for acute renal failure and hypernatremia. Mentation improving  3/18 mentation waxes and wanes. Labs either stable or improving. Picc line pending      No new subjective & objective note has been filed under this hospital service since the last note was generated.      Assessment/Plan:      * Acute renal failure  Creatinine 6.4   Baseline 1.0 (2 months ago)  fena 1.12%  Nephrology following  Strict I/o    Hypokalemia  Initially hyperkalemia requiring kayexalate, albuterol and calcium gluconate  Now hypokalemia in setting of hypomagnesemia    replete      Anemia  Responded after blood tranfusion  No overt signs or symptoms of bleeding  Multifactorial: nutritional, toby?, dilutional  Iron studies negative for toby  Stool occult negative   Mcv>80  Folate and b12 pending    Diarrhea  Previously treated with antibiotic(s) for uti during previous hospitalization  Uncertain whether patient received antibiotic(s) during skilled nursing facility stay  Treat empirically for cdiff  Contact precautions  Stool studies pending    Metabolic acidosis with respiratory acidosis  Bicarb gtt and po as tolerated  Unable to tolerate po  Mentation waxes and wanes  Speech consulted full liquid diet   Will advance when more alert    Hypernatremia       Improving        Nephrology following and adjusting fluids    Hyperuricemia  Will cont febuxostat       Morbid obesity with BMI of 70 and over, adult  Body mass index is 84.79 kg/m². Morbid obesity complicates all aspects of disease management from diagnostic modalities to treatment. Weight loss encouraged and health benefits explained to patient.     Has been bedbound for this year    Diabetes mellitus type 2, controlled  Patient's FSGs are controlled on current medication regimen.  Last A1c reviewed-   Lab Results   Component Value Date    HGBA1C 6.4 (H) 01/25/2023     Most recent fingerstick glucose reviewed-   Recent Labs   Lab 03/17/23  1743 03/17/23  2048 03/18/23  0534   POCTGLUCOSE 95 123* 97     Current correctional scale  Low  Maintain anti-hyperglycemic dose as follows-   Antihyperglycemics (From admission, onward)      Start     Stop Route Frequency Ordered    03/15/23 1412  insulin aspart U-100 pen 0-5 Units         -- SubQ Every 6 hours PRN 03/15/23 1312          Hold Oral hypoglycemics while patient is in the hospital.  Full liquid diet per speech until mentation improves    Essential hypertension  Normotensive to hypo  Hydralazine prn  Hold losartan      VTE Risk Mitigation (From admission, onward)      None             Discharge Planning   ADOLFO:      Code Status: Full Code   Is the patient medically ready for discharge?:     Reason for patient still in hospital (select all that apply): Patient trending condition, Laboratory test, Treatment and Consult recommendations  Discharge Plan A: Home with family                  Waldo Garcia MD  Department of Hospital Medicine   [facility]

## 2023-03-18 NOTE — PROGRESS NOTES
[facility]  Nephrology  Progress Note    Patient Name: Chastity Hung  MRN: [id number]  Admission Date: 3/15/2023  Hospital Length of Stay: 3 days  Attending Provider: Waldo Garcia MD   Primary Care Physician: Alexandra Dawkins MD  Principal Problem:Acute renal failure      Subjective:     Interval History:   - more responsive today, at my exam intermittent  - may need PICC  - two sons at bedside, updated    3/18  - no nausea/queasiness  - alert, responsive with son at bedside     Review of patient's allergies indicates:   Allergen Reactions    Nsaids (non-steroidal anti-inflammatory drug) Other (See Comments)     Gastric ulcer perforation      Current Facility-Administered Medications   Medication Frequency    0.9%  NaCl infusion (for blood administration) Q24H PRN    0.9%  NaCl infusion PRN    acetaminophen tablet 650 mg Q6H PRN    calcium gluconate 1 g in NS IVPB (premixed) Q10 Min PRN    dextrose 10% bolus 125 mL 125 mL PRN    dextrose 10% bolus 250 mL 250 mL PRN    febuxostat tablet 40 mg Daily    furosemide injection 80 mg Once    glucagon (human recombinant) injection 1 mg PRN    hydrALAZINE injection 10 mg Q6H PRN    insulin aspart U-100 pen 0-5 Units Q6H PRN    magnesium sulfate 2g in water 50mL IVPB (premix) Once    miconazole NITRATE 2 % top powder BID    mupirocin 2 % ointment BID    ondansetron disintegrating tablet 4 mg Q6H PRN    potassium bicarbonate disintegrating tablet 20 mEq BID    sodium bicarbonate 100 mEq in dextrose 5 % (D5W) 1,100 mL infusion Continuous    sodium bicarbonate tablet 650 mg TID    vancomycin 125 mg/5 mL oral solution 125 mg Q6H       Objective:     Vital Signs (Most Recent):  Temp: 98 °F (36.7 °C) (03/18/23 1127)  Pulse: 88 (03/18/23 1127)  Resp: 18 (03/18/23 1127)  BP: 136/79 (03/18/23 1127)  SpO2: 98 % (03/18/23 1127)   Vital Signs (24h Range):  Temp:  [96.2 °F (35.7 °C)-98.9 °F (37.2 °C)] 98 °F (36.7 °C)  Pulse:  [84-96] 88  Resp:  [16-18] 18  SpO2:  [91 %-98 %]  98 %  BP: (122-153)/(56-82) 136/79     Weight: (!) 224.1 kg (494 lb) (03/18/23 0016)  Body mass index is 84.79 kg/m².  Body surface area is 3.18 meters squared.    I/O last 3 completed shifts:  In: 4787.5 [P.O.:240; I.V.:4547.5]  Out: 300 [Urine:300]    Physical Exam  Vitals and nursing note reviewed.   Constitutional:       General: She is awake. She is not in acute distress.     Appearance: She is obese. She is not ill-appearing.   HENT:      Head: Atraumatic.   Eyes:      General: No scleral icterus.  Cardiovascular:      Rate and Rhythm: Normal rate.   Neurological:      Mental Status: She is alert and oriented to person, place, and time.   Psychiatric:         Mood and Affect: Mood normal.         Behavior: Behavior is cooperative.       Significant Labs: reviewed         Assessment/Plan:     Active Diagnoses:    Diagnosis Date Noted POA    PRINCIPAL PROBLEM:  Acute renal failure [N17.9] 03/15/2023 Yes    Anemia [D64.9] 03/17/2023 Yes    Hypokalemia [E87.6] 03/17/2023 No    Metabolic acidosis with respiratory acidosis [E87.4] 03/16/2023 Yes    Diarrhea [R19.7] 03/16/2023 Yes    Hyperuricemia [E79.0] 08/03/2017 Yes    Morbid obesity with BMI of 70 and over, adult [E66.01, Z68.45] 01/11/2016 Not Applicable    Diabetes mellitus type 2, controlled [E11.9]  Yes    Essential hypertension [I10]  Yes      Problems Resolved During this Admission:    Diagnosis Date Noted Date Resolved POA    Hyperkalemia [E87.5] 03/16/2023 03/18/2023 No     MIGUEL with baseline creatinine around 1mg/dL  - monitor daily   - no indications for dialysis     Metabolic Acidosis  - due to MIGUEL and diarrhea  - continue po bicarb     Hypernatremia, improving   - secondary to little po intake (had decreased LOC, unable to answer thirst drive) plus bicarb gtts  - as she awakens she will increase po fluids per self drive    Hypokalemia  - may need IV repletion, monitor po intake     OK for PICC if needed    Hayden Harris MD  Nephrology     no

## 2023-03-18 NOTE — ASSESSMENT & PLAN NOTE
Bicarb gtt and po as tolerated  Unable to tolerate po  Mentation waxes and wanes  Speech consulted full liquid diet   Will advance when more alert

## 2023-03-18 NOTE — ASSESSMENT & PLAN NOTE
Body mass index is 84.79 kg/m². Morbid obesity complicates all aspects of disease management from diagnostic modalities to treatment. Weight loss encouraged and health benefits explained to patient.     Has been bedbound for this year

## 2023-03-19 PROBLEM — E87.4 METABOLIC ACIDOSIS WITH RESPIRATORY ACIDOSIS: Status: RESOLVED | Noted: 2023-03-16 | Resolved: 2023-03-19

## 2023-03-19 PROBLEM — E87.0 HYPERNATREMIA: Status: ACTIVE | Noted: 2023-03-19

## 2023-03-19 LAB
ANION GAP SERPL CALC-SCNC: 11 MMOL/L (ref 8–16)
BUN SERPL-MCNC: 75 MG/DL (ref 6–20)
CALCIUM SERPL-MCNC: 7.7 MG/DL (ref 8.7–10.5)
CHLORIDE SERPL-SCNC: 109 MMOL/L (ref 95–110)
CO2 SERPL-SCNC: 25 MMOL/L (ref 23–29)
CREAT SERPL-MCNC: 6.5 MG/DL (ref 0.5–1.4)
EST. GFR  (NO RACE VARIABLE): 7 ML/MIN/1.73 M^2
FOLATE SERPL-MCNC: 3.9 NG/ML (ref 4–24)
GLUCOSE SERPL-MCNC: 79 MG/DL (ref 70–110)
MAGNESIUM SERPL-MCNC: 1.5 MG/DL (ref 1.6–2.6)
POCT GLUCOSE: 61 MG/DL (ref 70–110)
POCT GLUCOSE: 71 MG/DL (ref 70–110)
POCT GLUCOSE: 72 MG/DL (ref 70–110)
POCT GLUCOSE: 81 MG/DL (ref 70–110)
POTASSIUM SERPL-SCNC: 3.7 MMOL/L (ref 3.5–5.1)
SODIUM SERPL-SCNC: 145 MMOL/L (ref 136–145)
VIT B12 SERPL-MCNC: 986 PG/ML (ref 210–950)

## 2023-03-19 PROCEDURE — 92526 ORAL FUNCTION THERAPY: CPT

## 2023-03-19 PROCEDURE — 99232 PR SUBSEQUENT HOSPITAL CARE,LEVL II: ICD-10-PCS | Mod: ,,, | Performed by: INTERNAL MEDICINE

## 2023-03-19 PROCEDURE — 80048 BASIC METABOLIC PNL TOTAL CA: CPT | Performed by: INTERNAL MEDICINE

## 2023-03-19 PROCEDURE — 82607 VITAMIN B-12: CPT | Performed by: FAMILY MEDICINE

## 2023-03-19 PROCEDURE — 25000003 PHARM REV CODE 250: Performed by: FAMILY MEDICINE

## 2023-03-19 PROCEDURE — 99232 SBSQ HOSP IP/OBS MODERATE 35: CPT | Mod: ,,, | Performed by: INTERNAL MEDICINE

## 2023-03-19 PROCEDURE — 83735 ASSAY OF MAGNESIUM: CPT | Performed by: INTERNAL MEDICINE

## 2023-03-19 PROCEDURE — 82746 ASSAY OF FOLIC ACID SERUM: CPT | Performed by: FAMILY MEDICINE

## 2023-03-19 PROCEDURE — 11000001 HC ACUTE MED/SURG PRIVATE ROOM

## 2023-03-19 PROCEDURE — 63600175 PHARM REV CODE 636 W HCPCS: Performed by: INTERNAL MEDICINE

## 2023-03-19 RX ORDER — MAGNESIUM SULFATE HEPTAHYDRATE 40 MG/ML
2 INJECTION, SOLUTION INTRAVENOUS ONCE
Status: COMPLETED | OUTPATIENT
Start: 2023-03-19 | End: 2023-03-19

## 2023-03-19 RX ADMIN — MAGNESIUM SULFATE HEPTAHYDRATE 2 G: 40 INJECTION, SOLUTION INTRAVENOUS at 10:03

## 2023-03-19 RX ADMIN — FEBUXOSTAT 40 MG: 40 TABLET, FILM COATED ORAL at 10:03

## 2023-03-19 RX ADMIN — POTASSIUM BICARBONATE 20 MEQ: 391 TABLET, EFFERVESCENT ORAL at 10:03

## 2023-03-19 RX ADMIN — MICONAZOLE NITRATE: 20 POWDER TOPICAL at 10:03

## 2023-03-19 RX ADMIN — VANCOMYCIN HYDROCHLORIDE 125 MG: KIT at 05:03

## 2023-03-19 RX ADMIN — VANCOMYCIN HYDROCHLORIDE 125 MG: KIT at 12:03

## 2023-03-19 RX ADMIN — SODIUM BICARBONATE 650 MG: 650 TABLET ORAL at 10:03

## 2023-03-19 RX ADMIN — MUPIROCIN: 20 OINTMENT TOPICAL at 11:03

## 2023-03-19 RX ADMIN — VANCOMYCIN HYDROCHLORIDE 125 MG: KIT at 11:03

## 2023-03-19 RX ADMIN — MUPIROCIN: 20 OINTMENT TOPICAL at 10:03

## 2023-03-19 RX ADMIN — MICONAZOLE NITRATE: 20 POWDER TOPICAL at 11:03

## 2023-03-19 RX ADMIN — SODIUM CHLORIDE: 9 INJECTION, SOLUTION INTRAVENOUS at 10:03

## 2023-03-19 NOTE — PT/OT/SLP PROGRESS
Speech Language Pathology Treatment    Patient Name:  Chastity Hung   MRN:  [id number]  Admitting Diagnosis: Acute renal failure    Recommendations:                 General Recommendations:  Dysphagia therapy and Cognitive-linguistic evaluation  Diet recommendations:  IDDSI 6 (Soft and Bite-Sized diet); Thin Liquids   Aspiration Precautions: Standard aspiration precautions   General Precautions: Standard, aspiration  Communication strategies:  provide increased time to answer and go to room if call light pushed    Subjective     Pt alert and conversing with son and nurse this morning  Patient goals: pt reporting feeling better today     Pain/Comfort:   0/10    Respiratory Status:  see medical chart     Objective:     Has the patient been evaluated by SLP for swallowing?    yes  Keep patient NPO?   no  Current Respiratory Status:    see medical chart    Pt's swallowing was evaluated with water, applesauce and cracker.  Pt tolerated all consistencies with no coughing or changes in vocal quality.  Pt was noted with slightly slowed mastication of cracker but cleared without incidence.    Pt needed cues for orientation to time, place and situation.    Assessment:     Chastity Hung is a 57 y.o. female who is recommended to be upgraded to an IDDSI 6 diet (Soft and bite sized diet) and thin liquids with basic swallowing precautions.  Precautions were reviewed with patient and her son.  Pt recommended to continue to be assessed for upgrade to Level 7 (Regular consistency diet) and continue cognitive tx.     Goals:   Multidisciplinary Problems       SLP Goals          Problem: SLP    Goal Priority Disciplines Outcome   SLP Goal     SLP Ongoing, Progressing   Description: 1.  Pt will consume full liquid diet (IDDSI 0-thin liquids) without incident.  2.  Ongoing bedside swallowing evaluation with diet advancement as mentation improves.  3.  Pt will improve cognitive-communicative impairment to PLOF related to  orientation/memory recall and reasoning.                       Plan:     Patient to be seen:  2 x/week, 3 x/week   Plan of Care expires:  03/24/23  Plan of Care reviewed with:  patient   SLP Follow-Up:  Yes       Discharge recommendations:  nursing facility, skilled   Barriers to Discharge:  Safety Awareness      Time Tracking:     SLP Treatment Date:    03/19/23  Speech Start Time:   1025  Speech Stop Time:    1045    Speech Total Time (min):   20 min    Billable Minutes: 20 min    03/19/2023

## 2023-03-19 NOTE — PLAN OF CARE
CHINMAY saw patient this today.  She is more alert, conversing and following commands.  Pt tolerated different consistencies of foods and liquids. She was recommended to be upgraded to an IDDSI 6 diet (soft and bite-sized) and thin liquids with basic swallowing precautions.

## 2023-03-19 NOTE — ASSESSMENT & PLAN NOTE
Responded after blood tranfusion  No overt signs or symptoms of bleeding  Multifactorial: nutritional, toby?, dilutional  Iron studies negative for toby  Stool occult negative   Mcv>80  Folate pending  b12 elevated

## 2023-03-19 NOTE — PLAN OF CARE
Problem: Adult Inpatient Plan of Care  Goal: Plan of Care Review  Outcome: Ongoing, Progressing  Goal: Patient-Specific Goal (Individualized)  Outcome: Ongoing, Progressing  Goal: Absence of Hospital-Acquired Illness or Injury  Outcome: Ongoing, Progressing  Goal: Optimal Comfort and Wellbeing  Outcome: Ongoing, Progressing  Goal: Readiness for Transition of Care  Outcome: Ongoing, Progressing     Problem: Bariatric Environmental Safety  Goal: Safety Maintained with Care  Outcome: Ongoing, Progressing     Problem: Infection  Goal: Absence of Infection Signs and Symptoms  Outcome: Ongoing, Progressing     Problem: Diabetes Comorbidity  Goal: Blood Glucose Level Within Targeted Range  Outcome: Ongoing, Progressing     Problem: Fluid and Electrolyte Imbalance (Acute Kidney Injury/Impairment)  Goal: Fluid and Electrolyte Balance  Outcome: Ongoing, Progressing     Problem: Oral Intake Inadequate (Acute Kidney Injury/Impairment)  Goal: Optimal Nutrition Intake  Outcome: Ongoing, Progressing     Problem: Renal Function Impairment (Acute Kidney Injury/Impairment)  Goal: Effective Renal Function  Outcome: Ongoing, Progressing     Problem: Impaired Wound Healing  Goal: Optimal Wound Healing  Outcome: Ongoing, Progressing     Problem: Skin Injury Risk Increased  Goal: Skin Health and Integrity  Outcome: Ongoing, Progressing

## 2023-03-19 NOTE — PROGRESS NOTES
[facility] Medicine  Progress Note    Patient Name: Chastity Hung  MRN: [id number]  Patient Class: IP- Inpatient   Admission Date: 3/15/2023  Length of Stay: 4 days  Attending Physician: Waldo Garcia MD  Primary Care Provider: Alexandra Dawkins MD        Subjective:     Principal Problem:Acute renal failure        HPI:  Patient is a 57 y.o. aa female with a PMHx of DM2, HLD, HTN, morbid obesity, and reactive airway disease who presents to the Emergency Department for slurred speech since this morning. Unable to obtain hx due to confusion, hx obtained via chart review. Patient was last seen normal last night. She was started on xanax a couple of days ago. No other issues reported. In the ED, no focal weakness was noted however patient was confused. Unable to obtain CT scan due to body habitus. Labs significant for BUN/Cr: 69/6.4, HCO3: 14, M.0, Uric acid: 12.2. Patient was bolused 2 L NS. HM consulted and patient admitted for acute renal failure.           Overview/Hospital Course:  3/16 admitted for acute renal failure, respiratory and metabolic acidosis. Started on bicarb gtt. Hyperkalemia noted. Patient/family noted diarrhea at skilled nursing facility. Son endorses steady decline in health 2/2 depression.  3/17 significant drop in hb/hct. Likely dilutional in setting of dehydration. Occult stool negative. Blood transfusion pending. Nephrology following for acute renal failure and hypernatremia. Mentation improving  3/18 mentation waxes and wanes. Labs either stable or improving. Picc line pending  3/19 mentation improved. Diet advanced per speech recommendations. Tolerated picc line placement.      Interval History: See hospital course for today      Review of Systems   Constitutional:  Positive for activity change, appetite change and fatigue. Negative for fever.   HENT:  Negative for trouble swallowing.    Respiratory:  Negative for shortness of breath.    Gastrointestinal:  Negative  for abdominal pain, nausea and vomiting.   Musculoskeletal:  Positive for gait problem.   Neurological:  Positive for weakness.   Psychiatric/Behavioral:  Positive for decreased concentration. Negative for agitation, behavioral problems and confusion.    Objective:     Vital Signs (Most Recent):  Temp: 98.2 °F (36.8 °C) (03/19/23 1114)  Pulse: 91 (03/19/23 1114)  Resp: 17 (03/19/23 1114)  BP: (!) 141/70 (03/19/23 1114)  SpO2: (!) 94 % (03/19/23 1114)   Vital Signs (24h Range):  Temp:  [97.7 °F (36.5 °C)-98.4 °F (36.9 °C)] 98.2 °F (36.8 °C)  Pulse:  [85-95] 91  Resp:  [17-18] 17  SpO2:  [94 %-98 %] 94 %  BP: (130-147)/(60-79) 141/70     Weight: (!) 224.1 kg (494 lb)  Body mass index is 84.79 kg/m².    Intake/Output Summary (Last 24 hours) at 3/19/2023 1121  Last data filed at 3/18/2023 1652  Gross per 24 hour   Intake --   Output 250 ml   Net -250 ml      Physical Exam  Vitals and nursing note reviewed. Exam conducted with a chaperone present (speech, family).   Constitutional:       General: She is not in acute distress.     Appearance: She is morbidly obese. She is ill-appearing. She is not toxic-appearing.   HENT:      Head: Normocephalic and atraumatic.   Cardiovascular:      Rate and Rhythm: Normal rate.   Pulmonary:      Effort: Pulmonary effort is normal. No respiratory distress.   Abdominal:      Palpations: Abdomen is soft.      Tenderness: There is no abdominal tenderness.   Genitourinary:     Comments: Singh  Musculoskeletal:      Right lower leg: Edema present.      Left lower leg: Edema present.   Skin:     General: Skin is warm.      Findings: Lesion present.   Neurological:      Mental Status: She is alert. Mental status is at baseline.      Motor: Weakness present.   Psychiatric:         Speech: Speech normal.         Behavior: Behavior is cooperative.         Cognition and Memory: She exhibits impaired recent memory.       Significant Labs: All pertinent labs within the past 24 hours have been  reviewed.  CBC:   Recent Labs   Lab 03/18/23  0506   WBC 8.40   HGB 9.2*   HCT 28.9*        CMP:   Recent Labs   Lab 03/18/23  0506 03/18/23  1423   * 147*   K 3.4* 3.7   * 108   CO2 22* 26   GLU 98 88   BUN 74* 76*   CREATININE 6.4* 6.7*   CALCIUM 7.9* 7.9*   ALBUMIN 1.4*  --    ANIONGAP 13 13     Urine Studies:   Recent Labs   Lab 03/18/23  1655   COLORU Yellow   APPEARANCEUA Cloudy*   PHUR 6.0   SPECGRAV 1.010   PROTEINUA 1+*   GLUCUA Negative   KETONESU Negative   BILIRUBINUA Negative   OCCULTUA 1+*   NITRITE Negative   UROBILINOGEN Negative   LEUKOCYTESUR 3+*   RBCUA 19*   WBCUA >100*   BACTERIA Many*   SQUAMEPITHEL 3   HYALINECASTS 0       Significant Imaging: I have reviewed all pertinent imaging results/findings within the past 24 hours.  CXR: I have reviewed all pertinent results/findings within the past 24 hours and my personal findings are:  increased interstitial markings      Assessment/Plan:      * Acute renal failure  Creatinine 6.4   Baseline 1.0 (2 months ago)  fena 1.12%  Nephrology following  Strict I/o  uop 10ml/hr    Hypernatremia  Discontinued fluids  Diet progressed     Hypokalemia  Initially hyperkalemia requiring kayexalate, albuterol and calcium gluconate  Now hypokalemia in setting of hypomagnesemia   replete      Anemia  Responded after blood tranfusion  No overt signs or symptoms of bleeding  Multifactorial: nutritional, toby?, dilutional  Iron studies negative for toby  Stool occult negative   Mcv>80  Folate pending  b12 elevated      Diarrhea  Previously treated with antibiotic(s) for uti during previous hospitalization  Uncertain whether patient received antibiotic(s) during skilled nursing facility stay  Treat empirically for cdiff  Contact precautions  Stool studies pending    Hyperuricemia  Will cont febuxostat       Morbid obesity with BMI of 70 and over, adult  Body mass index is 84.79 kg/m². Morbid obesity complicates all aspects of disease management from  diagnostic modalities to treatment. Weight loss encouraged and health benefits explained to patient.     Has been bedbound for this year    Diabetes mellitus type 2, controlled  Patient's FSGs are controlled on current medication regimen.  Last A1c reviewed-   Lab Results   Component Value Date    HGBA1C 6.4 (H) 01/25/2023     Most recent fingerstick glucose reviewed-   Recent Labs   Lab 03/18/23  1124 03/18/23  1711 03/18/23 2020 03/19/23  0553   POCTGLUCOSE 97 82 78 61*     Current correctional scale  Low  Maintain anti-hyperglycemic dose as follows-   Antihyperglycemics (From admission, onward)    Start     Stop Route Frequency Ordered    03/15/23 1412  insulin aspart U-100 pen 0-5 Units         -- SubQ Every 6 hours PRN 03/15/23 1312        Hold Oral hypoglycemics while patient is in the hospital.  Progressed to soft diet per speech recommendations     Essential hypertension  Normotensive  Hydralazine prn  Hold losartan      VTE Risk Mitigation (From admission, onward)    None          Discharge Planning   ADOLFO:      Code Status: Full Code   Is the patient medically ready for discharge?:     Reason for patient still in hospital (select all that apply): Patient trending condition, Laboratory test, Treatment and Consult recommendations  Discharge Plan A: Home with family                  Waldo Garcia MD  Department of Hospital Medicine   [facility] Med Surg

## 2023-03-19 NOTE — PROGRESS NOTES
[facility]  Nephrology  Progress Note    Patient Name: Chastity Hung  MRN: [id number]  Admission Date: 3/15/2023  Hospital Length of Stay: 4 days  Attending Provider: Waldo Garcia MD   Primary Care Physician: Alexandra Dawkins MD  Principal Problem:Acute renal failure      Subjective:     Interval History:   - more responsive today, at my exam intermittent  - may need PICC  - two sons at bedside, updated    3/18  - no nausea/queasiness  - alert, responsive with son at bedside     3/19  - feels well, no nausea  - Singh full with yellow urine    Review of patient's allergies indicates:   Allergen Reactions    Nsaids (non-steroidal anti-inflammatory drug) Other (See Comments)     Gastric ulcer perforation      Current Facility-Administered Medications   Medication Frequency    0.9%  NaCl infusion (for blood administration) Q24H PRN    0.9%  NaCl infusion PRN    acetaminophen tablet 650 mg Q6H PRN    calcium gluconate 1 g in NS IVPB (premixed) Q10 Min PRN    dextrose 10% bolus 125 mL 125 mL PRN    dextrose 10% bolus 250 mL 250 mL PRN    febuxostat tablet 40 mg Daily    furosemide injection 80 mg Once    glucagon (human recombinant) injection 1 mg PRN    hydrALAZINE injection 10 mg Q6H PRN    insulin aspart U-100 pen 0-5 Units Q6H PRN    miconazole NITRATE 2 % top powder BID    mupirocin 2 % ointment BID    ondansetron disintegrating tablet 4 mg Q6H PRN    potassium bicarbonate disintegrating tablet 20 mEq BID    sodium bicarbonate tablet 650 mg TID    vancomycin 125 mg/5 mL oral solution 125 mg Q6H       Objective:     Vital Signs (Most Recent):  Temp: 98.2 °F (36.8 °C) (03/19/23 1114)  Pulse: 92 (03/19/23 1123)  Resp: 17 (03/19/23 1114)  BP: (!) 141/70 (03/19/23 1114)  SpO2: (!) 94 % (03/19/23 1114)   Vital Signs (24h Range):  Temp:  [97.7 °F (36.5 °C)-98.4 °F (36.9 °C)] 98.2 °F (36.8 °C)  Pulse:  [85-95] 92  Resp:  [17-18] 17  SpO2:  [94 %-97 %] 94 %  BP: (130-147)/(60-78) 141/70     Weight: (!) 224.1 kg  (494 lb) (03/18/23 0016)  Body mass index is 84.79 kg/m².  Body surface area is 3.18 meters squared.    I/O last 3 completed shifts:  In: 2190 [P.O.:240; I.V.:1950]  Out: 550 [Urine:550]    Physical Exam  Vitals and nursing note reviewed.   Constitutional:       General: She is awake. She is not in acute distress.     Appearance: She is obese. She is not ill-appearing.   HENT:      Head: Atraumatic.   Eyes:      General: No scleral icterus.  Cardiovascular:      Rate and Rhythm: Normal rate.   Neurological:      Mental Status: She is alert and oriented to person, place, and time.   Psychiatric:         Mood and Affect: Mood normal.         Behavior: Behavior is cooperative.       Significant Labs: reviewed         Assessment/Plan:     Active Diagnoses:    Diagnosis Date Noted POA    PRINCIPAL PROBLEM:  Acute renal failure [N17.9] 03/15/2023 Yes    Hypernatremia [E87.0] 03/19/2023 Yes    Anemia [D64.9] 03/17/2023 Yes    Hypokalemia [E87.6] 03/17/2023 No    Diarrhea [R19.7] 03/16/2023 Yes    Hyperuricemia [E79.0] 08/03/2017 Yes    Morbid obesity with BMI of 70 and over, adult [E66.01, Z68.45] 01/11/2016 Not Applicable    Diabetes mellitus type 2, controlled [E11.9]  Yes    Essential hypertension [I10]  Yes      Problems Resolved During this Admission:    Diagnosis Date Noted Date Resolved POA    Hyperkalemia [E87.5] 03/16/2023 03/18/2023 No    Metabolic acidosis with respiratory acidosis [E87.4] 03/16/2023 03/19/2023 Yes     MIGUEL with baseline creatinine around 1mg/dL  - monitor daily   - no indications for dialysis     Metabolic Acidosis  - resolved, stop po bicarb     Hypernatremia, improving   - secondary to little po intake (had decreased LOC, unable to answer thirst drive) plus bicarb gtts  - will self correct    Hypokalemia  - continue po repletion   - check today  - mag not replaced yesterday as ordered, done today         Hayden Harris MD  Nephrology

## 2023-03-19 NOTE — ASSESSMENT & PLAN NOTE
Patient's FSGs are controlled on current medication regimen.  Last A1c reviewed-   Lab Results   Component Value Date    HGBA1C 6.4 (H) 01/25/2023     Most recent fingerstick glucose reviewed-   Recent Labs   Lab 03/18/23  1124 03/18/23  1711 03/18/23 2020 03/19/23  0553   POCTGLUCOSE 97 82 78 61*     Current correctional scale  Low  Maintain anti-hyperglycemic dose as follows-   Antihyperglycemics (From admission, onward)    Start     Stop Route Frequency Ordered    03/15/23 1412  insulin aspart U-100 pen 0-5 Units         -- SubQ Every 6 hours PRN 03/15/23 1312        Hold Oral hypoglycemics while patient is in the hospital.  Progressed to soft diet per speech recommendations

## 2023-03-19 NOTE — SUBJECTIVE & OBJECTIVE
Interval History: See hospital course for today      Review of Systems   Constitutional:  Positive for activity change, appetite change and fatigue. Negative for fever.   HENT:  Negative for trouble swallowing.    Respiratory:  Negative for shortness of breath.    Gastrointestinal:  Negative for abdominal pain, nausea and vomiting.   Musculoskeletal:  Positive for gait problem.   Neurological:  Positive for weakness.   Psychiatric/Behavioral:  Positive for decreased concentration. Negative for agitation, behavioral problems and confusion.    Objective:     Vital Signs (Most Recent):  Temp: 98.2 °F (36.8 °C) (03/19/23 1114)  Pulse: 91 (03/19/23 1114)  Resp: 17 (03/19/23 1114)  BP: (!) 141/70 (03/19/23 1114)  SpO2: (!) 94 % (03/19/23 1114)   Vital Signs (24h Range):  Temp:  [97.7 °F (36.5 °C)-98.4 °F (36.9 °C)] 98.2 °F (36.8 °C)  Pulse:  [85-95] 91  Resp:  [17-18] 17  SpO2:  [94 %-98 %] 94 %  BP: (130-147)/(60-79) 141/70     Weight: (!) 224.1 kg (494 lb)  Body mass index is 84.79 kg/m².    Intake/Output Summary (Last 24 hours) at 3/19/2023 1121  Last data filed at 3/18/2023 1652  Gross per 24 hour   Intake --   Output 250 ml   Net -250 ml      Physical Exam  Vitals and nursing note reviewed. Exam conducted with a chaperone present (speech, family).   Constitutional:       General: She is not in acute distress.     Appearance: She is morbidly obese. She is ill-appearing. She is not toxic-appearing.   HENT:      Head: Normocephalic and atraumatic.   Cardiovascular:      Rate and Rhythm: Normal rate.   Pulmonary:      Effort: Pulmonary effort is normal. No respiratory distress.   Abdominal:      Palpations: Abdomen is soft.      Tenderness: There is no abdominal tenderness.   Genitourinary:     Comments: Samantha  Musculoskeletal:      Right lower leg: Edema present.      Left lower leg: Edema present.   Skin:     General: Skin is warm.      Findings: Lesion present.   Neurological:      Mental Status: She is alert. Mental  status is at baseline.      Motor: Weakness present.   Psychiatric:         Speech: Speech normal.         Behavior: Behavior is cooperative.         Cognition and Memory: She exhibits impaired recent memory.       Significant Labs: All pertinent labs within the past 24 hours have been reviewed.  CBC:   Recent Labs   Lab 03/18/23  0506   WBC 8.40   HGB 9.2*   HCT 28.9*        CMP:   Recent Labs   Lab 03/18/23  0506 03/18/23  1423   * 147*   K 3.4* 3.7   * 108   CO2 22* 26   GLU 98 88   BUN 74* 76*   CREATININE 6.4* 6.7*   CALCIUM 7.9* 7.9*   ALBUMIN 1.4*  --    ANIONGAP 13 13     Urine Studies:   Recent Labs   Lab 03/18/23  1655   COLORU Yellow   APPEARANCEUA Cloudy*   PHUR 6.0   SPECGRAV 1.010   PROTEINUA 1+*   GLUCUA Negative   KETONESU Negative   BILIRUBINUA Negative   OCCULTUA 1+*   NITRITE Negative   UROBILINOGEN Negative   LEUKOCYTESUR 3+*   RBCUA 19*   WBCUA >100*   BACTERIA Many*   SQUAMEPITHEL 3   HYALINECASTS 0       Significant Imaging: I have reviewed all pertinent imaging results/findings within the past 24 hours.  CXR: I have reviewed all pertinent results/findings within the past 24 hours and my personal findings are:  increased interstitial markings

## 2023-03-19 NOTE — ASSESSMENT & PLAN NOTE
Creatinine 6.4   Baseline 1.0 (2 months ago)  fena 1.12%  Nephrology following  Strict I/o  uop 10ml/hr

## 2023-03-19 NOTE — PLAN OF CARE
Discussed poc with pt, pt verbalized understanding    Purposeful rounding every 2hours    VS wnl  Cardiac monitoring in use, pt is NSR, tele monitor # [id number]  Blood glucose monitoring   Fall precautions in place, remains injury free      IVFs  Accurate I&Os  Abx given as prescribed  Bed locked at lowest position  Call light within reach    Chart check complete  Will cont with POC

## 2023-03-20 LAB
ALBUMIN SERPL BCP-MCNC: 1.5 G/DL (ref 3.5–5.2)
ANION GAP SERPL CALC-SCNC: 14 MMOL/L (ref 8–16)
BASOPHILS # BLD AUTO: 0.02 K/UL (ref 0–0.2)
BASOPHILS NFR BLD: 0.3 % (ref 0–1.9)
BUN SERPL-MCNC: 72 MG/DL (ref 6–20)
CALCIUM SERPL-MCNC: 7.8 MG/DL (ref 8.7–10.5)
CHLORIDE SERPL-SCNC: 109 MMOL/L (ref 95–110)
CO2 SERPL-SCNC: 22 MMOL/L (ref 23–29)
CREAT SERPL-MCNC: 6.2 MG/DL (ref 0.5–1.4)
DIFFERENTIAL METHOD: ABNORMAL
E COLI SXT1 STL QL IA: NEGATIVE
E COLI SXT2 STL QL IA: NEGATIVE
EOSINOPHIL # BLD AUTO: 0.6 K/UL (ref 0–0.5)
EOSINOPHIL NFR BLD: 8.9 % (ref 0–8)
ERYTHROCYTE [DISTWIDTH] IN BLOOD BY AUTOMATED COUNT: 16.4 % (ref 11.5–14.5)
EST. GFR  (NO RACE VARIABLE): 7 ML/MIN/1.73 M^2
GLUCOSE SERPL-MCNC: 67 MG/DL (ref 70–110)
HCT VFR BLD AUTO: 30.7 % (ref 37–48.5)
HGB BLD-MCNC: 9.6 G/DL (ref 12–16)
IMM GRANULOCYTES # BLD AUTO: 0.02 K/UL (ref 0–0.04)
IMM GRANULOCYTES NFR BLD AUTO: 0.3 % (ref 0–0.5)
LYMPHOCYTES # BLD AUTO: 2.1 K/UL (ref 1–4.8)
LYMPHOCYTES NFR BLD: 29.5 % (ref 18–48)
MCH RBC QN AUTO: 27.5 PG (ref 27–31)
MCHC RBC AUTO-ENTMCNC: 31.3 G/DL (ref 32–36)
MCV RBC AUTO: 88 FL (ref 82–98)
MONOCYTES # BLD AUTO: 0.8 K/UL (ref 0.3–1)
MONOCYTES NFR BLD: 11.5 % (ref 4–15)
NEUTROPHILS # BLD AUTO: 3.5 K/UL (ref 1.8–7.7)
NEUTROPHILS NFR BLD: 49.5 % (ref 38–73)
NRBC BLD-RTO: 0 /100 WBC
PHOSPHATE SERPL-MCNC: 4.1 MG/DL (ref 2.7–4.5)
PLATELET # BLD AUTO: 241 K/UL (ref 150–450)
PMV BLD AUTO: 11.5 FL (ref 9.2–12.9)
POCT GLUCOSE: 117 MG/DL (ref 70–110)
POCT GLUCOSE: 121 MG/DL (ref 70–110)
POCT GLUCOSE: 83 MG/DL (ref 70–110)
POCT GLUCOSE: 84 MG/DL (ref 70–110)
POTASSIUM SERPL-SCNC: 3.7 MMOL/L (ref 3.5–5.1)
RBC # BLD AUTO: 3.49 M/UL (ref 4–5.4)
SODIUM SERPL-SCNC: 145 MMOL/L (ref 136–145)
WBC # BLD AUTO: 6.98 K/UL (ref 3.9–12.7)

## 2023-03-20 PROCEDURE — 99232 SBSQ HOSP IP/OBS MODERATE 35: CPT | Mod: ,,, | Performed by: INTERNAL MEDICINE

## 2023-03-20 PROCEDURE — 87425 ROTAVIRUS AG IA: CPT | Performed by: INTERNAL MEDICINE

## 2023-03-20 PROCEDURE — 82653 EL-1 FECAL QUANTITATIVE: CPT | Performed by: INTERNAL MEDICINE

## 2023-03-20 PROCEDURE — 99232 PR SUBSEQUENT HOSPITAL CARE,LEVL II: ICD-10-PCS | Mod: ,,, | Performed by: INTERNAL MEDICINE

## 2023-03-20 PROCEDURE — 92526 ORAL FUNCTION THERAPY: CPT

## 2023-03-20 PROCEDURE — 63600175 PHARM REV CODE 636 W HCPCS: Performed by: INTERNAL MEDICINE

## 2023-03-20 PROCEDURE — 25000003 PHARM REV CODE 250: Performed by: FAMILY MEDICINE

## 2023-03-20 PROCEDURE — 87045 FECES CULTURE AEROBIC BACT: CPT | Performed by: INTERNAL MEDICINE

## 2023-03-20 PROCEDURE — 92507 TX SP LANG VOICE COMM INDIV: CPT

## 2023-03-20 PROCEDURE — 83993 ASSAY FOR CALPROTECTIN FECAL: CPT | Performed by: INTERNAL MEDICINE

## 2023-03-20 PROCEDURE — 89055 LEUKOCYTE ASSESSMENT FECAL: CPT | Performed by: INTERNAL MEDICINE

## 2023-03-20 PROCEDURE — 82705 FATS/LIPIDS FECES QUAL: CPT | Performed by: INTERNAL MEDICINE

## 2023-03-20 PROCEDURE — 85025 COMPLETE CBC W/AUTO DIFF WBC: CPT | Performed by: INTERNAL MEDICINE

## 2023-03-20 PROCEDURE — 80069 RENAL FUNCTION PANEL: CPT | Performed by: INTERNAL MEDICINE

## 2023-03-20 PROCEDURE — 82272 OCCULT BLD FECES 1-3 TESTS: CPT | Performed by: INTERNAL MEDICINE

## 2023-03-20 PROCEDURE — 11000001 HC ACUTE MED/SURG PRIVATE ROOM

## 2023-03-20 PROCEDURE — 87046 STOOL CULTR AEROBIC BACT EA: CPT | Mod: 59 | Performed by: INTERNAL MEDICINE

## 2023-03-20 PROCEDURE — 87427 SHIGA-LIKE TOXIN AG IA: CPT | Mod: 59 | Performed by: INTERNAL MEDICINE

## 2023-03-20 PROCEDURE — 87209 SMEAR COMPLEX STAIN: CPT | Performed by: INTERNAL MEDICINE

## 2023-03-20 PROCEDURE — 87177 OVA AND PARASITES SMEARS: CPT | Performed by: INTERNAL MEDICINE

## 2023-03-20 PROCEDURE — 25000003 PHARM REV CODE 250: Performed by: INTERNAL MEDICINE

## 2023-03-20 PROCEDURE — 87329 GIARDIA AG IA: CPT | Performed by: INTERNAL MEDICINE

## 2023-03-20 PROCEDURE — 87338 HPYLORI STOOL AG IA: CPT | Performed by: INTERNAL MEDICINE

## 2023-03-20 RX ORDER — CHOLESTYRAMINE 4 G/9G
1 POWDER, FOR SUSPENSION ORAL 2 TIMES DAILY
Status: DISCONTINUED | OUTPATIENT
Start: 2023-03-20 | End: 2023-03-24 | Stop reason: HOSPADM

## 2023-03-20 RX ORDER — SERTRALINE HYDROCHLORIDE 50 MG/1
100 TABLET, FILM COATED ORAL DAILY
Status: DISCONTINUED | OUTPATIENT
Start: 2023-03-21 | End: 2023-03-24 | Stop reason: HOSPADM

## 2023-03-20 RX ORDER — ATORVASTATIN CALCIUM 10 MG/1
10 TABLET, FILM COATED ORAL DAILY
Status: DISCONTINUED | OUTPATIENT
Start: 2023-03-21 | End: 2023-03-24 | Stop reason: HOSPADM

## 2023-03-20 RX ORDER — MAGNESIUM SULFATE 1 G/100ML
1 INJECTION INTRAVENOUS ONCE
Status: COMPLETED | OUTPATIENT
Start: 2023-03-20 | End: 2023-03-20

## 2023-03-20 RX ORDER — PANTOPRAZOLE SODIUM 40 MG/1
40 TABLET, DELAYED RELEASE ORAL 2 TIMES DAILY
Status: DISCONTINUED | OUTPATIENT
Start: 2023-03-20 | End: 2023-03-24 | Stop reason: HOSPADM

## 2023-03-20 RX ORDER — FOLIC ACID 1 MG/1
1 TABLET ORAL DAILY
Status: DISCONTINUED | OUTPATIENT
Start: 2023-03-20 | End: 2023-03-24 | Stop reason: HOSPADM

## 2023-03-20 RX ADMIN — PANTOPRAZOLE SODIUM 40 MG: 40 TABLET, DELAYED RELEASE ORAL at 12:03

## 2023-03-20 RX ADMIN — POTASSIUM BICARBONATE 20 MEQ: 391 TABLET, EFFERVESCENT ORAL at 11:03

## 2023-03-20 RX ADMIN — CEFTRIAXONE 2 G: 2 INJECTION, POWDER, FOR SOLUTION INTRAMUSCULAR; INTRAVENOUS at 12:03

## 2023-03-20 RX ADMIN — VANCOMYCIN HYDROCHLORIDE 125 MG: KIT at 12:03

## 2023-03-20 RX ADMIN — CHOLESTYRAMINE 4 G: 4 POWDER, FOR SUSPENSION ORAL at 11:03

## 2023-03-20 RX ADMIN — FEBUXOSTAT 40 MG: 40 TABLET, FILM COATED ORAL at 09:03

## 2023-03-20 RX ADMIN — CHOLESTYRAMINE 4 G: 4 POWDER, FOR SUSPENSION ORAL at 12:03

## 2023-03-20 RX ADMIN — MICONAZOLE NITRATE: 20 POWDER TOPICAL at 09:03

## 2023-03-20 RX ADMIN — PANTOPRAZOLE SODIUM 40 MG: 40 TABLET, DELAYED RELEASE ORAL at 11:03

## 2023-03-20 RX ADMIN — FOLIC ACID 1 MG: 1 TABLET ORAL at 12:03

## 2023-03-20 RX ADMIN — POTASSIUM BICARBONATE 20 MEQ: 391 TABLET, EFFERVESCENT ORAL at 09:03

## 2023-03-20 RX ADMIN — MUPIROCIN: 20 OINTMENT TOPICAL at 09:03

## 2023-03-20 RX ADMIN — VANCOMYCIN HYDROCHLORIDE 125 MG: KIT at 06:03

## 2023-03-20 RX ADMIN — SODIUM CHLORIDE: 9 INJECTION, SOLUTION INTRAVENOUS at 12:03

## 2023-03-20 RX ADMIN — MAGNESIUM SULFATE 1 G: 1 INJECTION INTRAVENOUS at 02:03

## 2023-03-20 RX ADMIN — VANCOMYCIN HYDROCHLORIDE 125 MG: KIT at 05:03

## 2023-03-20 NOTE — ASSESSMENT & PLAN NOTE
Patient's FSGs are controlled on current medication regimen.  Last A1c reviewed-   Lab Results   Component Value Date    HGBA1C 6.4 (H) 01/25/2023     Most recent fingerstick glucose reviewed-   Recent Labs   Lab 03/19/23  1612 03/19/23 2020 03/20/23  0534   POCTGLUCOSE 71 72 121*     Current correctional scale  Low  Maintain anti-hyperglycemic dose as follows-   Antihyperglycemics (From admission, onward)    Start     Stop Route Frequency Ordered    03/15/23 1412  insulin aspart U-100 pen 0-5 Units         -- SubQ Every 6 hours PRN 03/15/23 1312        Hold Oral hypoglycemics while patient is in the hospital.  Progressed to soft diet per speech recommendations

## 2023-03-20 NOTE — PHYSICIAN QUERY
"PT Name: Chastity Hung  MR #: [id number]                                                                                                                                            withdrawn. DV                                                                                                                                                        enceph- documented on 3/20@11:41 by Miki Smallwood MD            DOCUMENTATION CLARIFICATION     CDS/: Norm Shah RN               Contact information:   [email or web address]     This form is a permanent document in the medical record.     Query Date: 2023    By submitting this query, we are merely seeking further clarification of documentation. Please utilize your independent clinical judgment when addressing the question(s) below.    The Medical Record contains the following:   Indicators   Supporting Clinical Findings Location in Medical Record   x AMS, Confusion,  LOC, etc.  disoriented ;  Pt appears over-medicated. Smile is symmetric; Thick tongue. No slurred speech    Mental Status: She is disoriented.     Comments: Follows commands    Mental Status: She is lethargic, disoriented and confused.    Nephrology following for acute renal failure and hypernatremia. Mentation improving;     mentation improved. Diet advanced per speech recommendations 3/15 ED provider note       3/15 H&P: Hosp. Med. (HM)    3/16 HM, PN    3/17 HM, PN      3/19 HM, PN     x Acute/Chronic Illness Problem:Acute renal failure;  PMHx of DM2, HLD, HTN, morbid obesity, and reactive airway disease  3/15 H&P:       Radiology Findings     x Electrolyte Imbalance 3/15   Na: 146;   CO2: 14;  Cr: 6.4;  GFR: 7;  phos: 5.6; magnesium: 1.0    3/19:  Na: 145;   CO2: 25;  Cr: 6.5;   GFR: 7;    ma.5  3/20 phos: 4.1   Labs     "  "      Medication      Treatment              Other       The noted clinical guidelines are only system guidelines and do not replace the " providers clinical judgment.    The National Ralls of Neurologic Disorders and Stroke (NINDS) of the NIH describes encephalopathy as any diffuse disease of the brain that alters brain function or structure.    Provider, please specify the      disoriented condition      associated with above clinical findings.  [   ] Metabolic Encephalopathy - Due to electrolyte imbalance, metabolic derangements, or infectious processes, includes Septic Encephalopathy, Uremic Encephalopathy     [   ] Toxic Encephalopathy - Due to drugs, chemicals, or other toxic substances     [   ] Encephalopathy, unspecified        [   ]  Clinically Undetermined           Please document in your progress notes daily for the duration of treatment until resolved, and include in your discharge summary.    References:  JEANE Rodriguez RN, CCDS. (2018, June 9). Notes from the Instructor: Encephalopathy tips. Retrieved October 22, 2020, from [email or web address]-tips    ICD-10-CM/PCS [employer] Integrated Codebook (Version V.20.8.10.0) [Computer software]. (2020). Retrieved October 21, 2020.        Form No. 67738

## 2023-03-20 NOTE — PROGRESS NOTES
[facility] Medicine  Progress Note    Patient Name: Chastity Hung  MRN: [id number]  Patient Class: IP- Inpatient   Admission Date: 3/15/2023  Length of Stay: 5 days  Attending Physician: Miki Smallwood, *  Primary Care Provider: Alexandra Dawkins MD        Subjective:     Principal Problem:Acute renal failure        HPI:  Patient is a 57 y.o. aa female with a PMHx of DM2, HLD, HTN, morbid obesity, and reactive airway disease who presents to the Emergency Department for slurred speech since this morning. Unable to obtain hx due to confusion, hx obtained via chart review. Patient was last seen normal last night. She was started on xanax a couple of days ago. No other issues reported. In the ED, no focal weakness was noted however patient was confused. Unable to obtain CT scan due to body habitus. Labs significant for BUN/Cr: 69/6.4, HCO3: 14, M.0, Uric acid: 12.2. Patient was bolused 2 L NS. HM consulted and patient admitted for acute renal failure.           Overview/Hospital Course:  3/16 admitted for acute renal failure, respiratory and metabolic acidosis. Started on bicarb gtt. Hyperkalemia noted. Patient/family noted diarrhea at skilled nursing facility. Son endorses steady decline in health 2/2 depression.  3/17 significant drop in hb/hct. Likely dilutional in setting of dehydration. Occult stool negative. Blood transfusion pending. Nephrology following for acute renal failure and hypernatremia. Mentation improving  3/18 mentation waxes and wanes. Labs either stable or improving. Picc line pending  3/19 mentation improved. Diet advanced per speech recommendations. Tolerated picc line placement.  3/20 She is aao x 3 in nad . Admitted with a Dx of metabolic encephalopathy  , acute diarrhea  and MIGUEL .  Nephrology consulted .  Pt most likely develop ATN due to  IVVD . The diarrhea has improved . She was started on po vanc  by previous MD for possible C.diff . The diarrhea has improved  .The urine cx is (+) for GNR .       Interval History:     Review of Systems   Constitutional:  Positive for activity change, appetite change and fatigue. Negative for fever.   HENT:  Negative for trouble swallowing.    Eyes: Negative.    Respiratory:  Negative for shortness of breath.    Gastrointestinal:  Negative for abdominal pain, nausea and vomiting.   Endocrine: Negative.    Genitourinary: Negative.    Musculoskeletal:  Positive for gait problem.   Allergic/Immunologic: Negative.    Neurological:  Positive for weakness.   Psychiatric/Behavioral:  Negative for agitation, behavioral problems, confusion and decreased concentration.    Objective:     Vital Signs (Most Recent):  Temp: 97.3 °F (36.3 °C) (03/20/23 0738)  Pulse: 91 (03/20/23 0900)  Resp: 16 (03/20/23 0738)  BP: (!) 142/71 (03/20/23 0738)  SpO2: 95 % (03/20/23 0738)   Vital Signs (24h Range):  Temp:  [97.3 °F (36.3 °C)-98.4 °F (36.9 °C)] 97.3 °F (36.3 °C)  Pulse:  [87-93] 91  Resp:  [16-18] 16  SpO2:  [95 %-98 %] 95 %  BP: (139-143)/(63-83) 142/71     Weight: (!) 224.1 kg (494 lb)  Body mass index is 84.79 kg/m².  No intake or output data in the 24 hours ending 03/20/23 1126   Physical Exam  Vitals and nursing note reviewed. Exam conducted with a chaperone present (speech, family).   Constitutional:       General: She is not in acute distress.     Appearance: She is morbidly obese. She is ill-appearing. She is not toxic-appearing.   HENT:      Head: Normocephalic and atraumatic.      Nose: Nose normal.   Eyes:      Pupils: Pupils are equal, round, and reactive to light.   Cardiovascular:      Rate and Rhythm: Normal rate.   Pulmonary:      Effort: Pulmonary effort is normal. No respiratory distress.   Abdominal:      Palpations: Abdomen is soft.      Tenderness: There is no abdominal tenderness.   Genitourinary:     Comments: Singh  Musculoskeletal:      Cervical back: Normal range of motion.      Right lower leg: Edema present.      Left lower leg:  Edema present.   Skin:     General: Skin is warm.      Findings: Lesion present.   Neurological:      Mental Status: She is alert. Mental status is at baseline.      Motor: Weakness present.   Psychiatric:         Speech: Speech normal.         Behavior: Behavior is cooperative.         Cognition and Memory: She exhibits impaired recent memory.       Significant Labs: All pertinent labs within the past 24 hours have been reviewed.      Significant Imaging: I have reviewed all pertinent imaging results/findings within the past 24 hours.        Assessment/Plan:      * Acute renal failure  2/2 to ATN  UOP improving   Nephrology on board     Hypernatremia  Discontinued fluids  Diet progressed   Improving     Hypokalemia  Initially hyperkalemia requiring kayexalate, albuterol and calcium gluconate  Now hypokalemia in setting of hypomagnesemia   replete      Anemia  Responded after blood tranfusion  No overt signs or symptoms of bleeding  Multifactorial: nutritional, toby?, dilutional  Iron studies negative for toby  Stool occult negative   Mcv>80  Folate low   b12 elevated    Resume PPI BID   H/O Perforated PUD s/p surgery       Diarrhea  Previously treated with antibiotic(s) for uti during previous hospitalization  Uncertain whether patient received antibiotic(s) during skilled nursing facility stay  Treat empirically for cdiff  Stool studies pending    Acute cystitis  Rocephin 1 gr qd x 3 days     Hyperuricemia  Will cont febuxostat       Morbid obesity with BMI of 70 and over, adult  Body mass index is 84.79 kg/m². Morbid obesity complicates all aspects of disease management from diagnostic modalities to treatment. Weight loss encouraged and health benefits explained to patient.     Has been bedbound for this year    Diabetes mellitus type 2, controlled  Patient's FSGs are controlled on current medication regimen.  Last A1c reviewed-   Lab Results   Component Value Date    HGBA1C 6.4 (H) 01/25/2023     Most recent  fingerstick glucose reviewed-   Recent Labs   Lab 03/19/23  1612 03/19/23 2020 03/20/23  0534   POCTGLUCOSE 71 72 121*     Current correctional scale  Low  Maintain anti-hyperglycemic dose as follows-   Antihyperglycemics (From admission, onward)    Start     Stop Route Frequency Ordered    03/15/23 1412  insulin aspart U-100 pen 0-5 Units         -- SubQ Every 6 hours PRN 03/15/23 1312        Hold Oral hypoglycemics while patient is in the hospital.  Progressed to soft diet per speech recommendations     Essential hypertension  Normotensive  Hydralazine prn  Hold losartan      VTE Risk Mitigation (From admission, onward)    None          Discharge Planning   ADOLFO:      Code Status: Full Code   Is the patient medically ready for discharge?:     Reason for patient still in hospital (select all that apply): Treatment  Discharge Plan A: Home with family                  Miki Smallwood MD  Department of Hospital Medicine   [facility] - Med Surg

## 2023-03-20 NOTE — PLAN OF CARE
Nutrition Recommendations 3/20:  1. Recommend pt diet be modified to Diabetic, Cardiac (IDDSI Level 7) diet when medically appropritate   2. Recommend Suplena BID to assist with nutritional gaps   3. Recommend Abdulkadir BID for wound healing   4. Recommend daily weights  5. Collaboration of care with medical providers     Goals:   1. Pt will consume >60% EEN by RD follow up   2. Pt will consume Abdulkadir BID prior to RD follow up    Annie Aldrich, Registration Eligible, Provisional LDN

## 2023-03-20 NOTE — ASSESSMENT & PLAN NOTE
Previously treated with antibiotic(s) for uti during previous hospitalization  Uncertain whether patient received antibiotic(s) during skilled nursing facility stay  Treat empirically for cdiff  Stool studies pending

## 2023-03-20 NOTE — PHYSICIAN QUERY
PT Name: Chastity Hung  MR #: [id number]     DOCUMENTATION CLARIFICATION     CDS/: Norm Shah RN, CCDS         Contact information:   [email or web address]     This form is a permanent document in the medical record.     Query Date: March 20, 2023    By submitting this query, we are merely seeking further clarification of documentation.  Please utilize your independent clinical judgment when addressing the question(s) below.    The Medical Record contains the following:   Indicators   Supporting Clinical Findings Location in Medical Record    Non-blanchable erythema/redness      Ulcer/Injury/Skin Breakdown      Deep Tissue Injury     x Wound care consult L- post thigh stage 3 Pressure Injury (PI) :  moist red and yellow subcutaneous tissue to wound bed, irregular edges    Bilateral  ischium - stage 3  PI:   moist red wound bed and healing well.           3/16 wound care        3/16 wound care    x Acute/Chronic Illness admitted for acute renal failure. PMHx of DM2, HLD, HTN, morbid obesity, and reactive airway disease    3/15 H&P: HM   x Medication/Treatment Cleansed all with bath wipes and patted dry. Zinc moisture barrier applied to all affected skin. Heel boots in use. Will follow.       x Other Has been bedbound for this year- per   son  3/17 Hosp med.  ()         The clinical guidelines noted are only a system guideline. It does not replace the providers clinical judgment.    Per the National Pressure Injury Advisory Panel:   A pressure injury is localized damage to the skin and underlying soft tissue usually over a bony prominence or related to a medical or other device. The injury can present as intact skin or an open ulcer and may be painful. The injury occurs as a result of intense and/or prolonged pressure or pressure in combination with shear. The tolerance of soft tissue for pressure and shear may also be affected by microclimate, nutrition, perfusion, co-morbidities and  condition of the soft tissue.       Stage 1 Pressure Injury:  Intact skin with a localized area of non-blanchable erythema, which may appear differently in darkly pigmented skin. Color changes do not include purple or maroon discoloration; these may indicate deep tissue pressure injury.    Stage 2 Pressure Injury:  Partial-thickness loss of skin with exposed dermis. The wound bed is viable, pink or red, moist, and may also present as an intact or ruptured serum-filled blister.    Stage 3 Pressure Injury:  Full-thickness loss of skin, in which adipose (fat) is visible in the ulcer and granulation tissue and epibole (rolled wound edges) are often present. Slough and/or eschar may be visible. Undermining and tunneling may occur.    Stage 4 Pressure Injury:  Full-thickness skin and tissue loss with exposed or directly palpable fascia, muscle, tendon, ligament, cartilage or bone in the ulcer. Slough and/or eschar may be visible. Epibole (rolled edges), undermining and/or tunneling often occur.    Unstageable Pressure Injury:  Full-thickness skin and tissue loss in which the extent of tissue damage within the ulcer cannot be confirmed because it is obscured by slough or eschar. If slough or eschar is removed, a Stage 3 or Stage 4 pressure injury will be revealed.    Deep Tissue Pressure Injury:  Intact or non-intact skin with localized area of persistent non-blanchable deep red, maroon, purple discoloration or epidermal separation revealing a dark wound bed or blood filled blister. This injury results from intense and/or prolonged pressure and shear forces at the bone-muscle interface. The wound may evolve rapidly to reveal the actual extent of tissue injury, or may resolve without tissue loss. If necrotic tissue, subcutaneous tissue, granulation tissue, fascia, muscle or other underlying structures are visible, this indicates a full thickness pressure injury (Unstageable, Stage 3 or Stage 4). Do not use DTPI to describe  vascular, traumatic, neuropathic, or dermatologic conditions.   Medical Device Related Pressure Injury: This describes an etiology. Medical device related pressure injuries result from the use of devices designed and applied for diagnostic or therapeutic purposes. The resultant pressure injury generally conforms to the pattern or shape of the device. The injury should be staged using the staging system.    Mucosal Membrane Pressure Injury: Mucosal membrane pressure injury is found on mucous membranes with a history of a medical device in use at the location of the injury. Due to the anatomy of the tissue these ulcers cannot be staged.       Provider, please provide the integumentary diagnosis related to the documentation of :Left posterior thigh      [  x ] Pressure Injury/Decubitus Ulcer, Stage 3   [   ] Other Integumentary Diagnosis (please specify):______________   [  ] Clinically Undetermined       Provider, please provide the integumentary diagnosis related to the documentation of : bilateral Ischium :   [  x ] Pressure Injury/Decubitus Ulcer, Stage 3   [   ] Other Integumentary Diagnosis (please specify):______________   [  ] Clinically Undetermined           Please document in your progress notes daily for the duration of treatment until resolved and include in your discharge summary.    Reference:    LELAND Delacruz., SHIMA Cornell., Goldberg, M., RYAN Puentes., LELAND Curtis, & MAN Landon. (2016). Revised National Pressure Ulcer Advisory Panel Pressure Injury Staging System: Revised Pressure Injury Staging System. J Wound Ostomy Continence Nurs, 43(6), 585-597. doi:10.1097/won.6551439367200696    Form No.03245

## 2023-03-20 NOTE — ASSESSMENT & PLAN NOTE
Responded after blood tranfusion  No overt signs or symptoms of bleeding  Multifactorial: nutritional, toby?, dilutional  Iron studies negative for toby  Stool occult negative   Mcv>80  Folate low   b12 elevated    Resume PPI BID   H/O Perforated PUD s/p surgery

## 2023-03-20 NOTE — PLAN OF CARE
Discussed poc with pt, pt verbalized understanding    Purposeful rounding every 2hours    VS wnl  Cardiac monitoring in use, pt is NSR, tele monitor # [id number]  Blood glucose monitoring   Fall precautions in place, remains injury free        IVFs  Accurate I&Os  Abx given as prescribed  Bed locked at lowest position  Call light within reach    Chart check complete  Will cont with POC

## 2023-03-20 NOTE — PT/OT/SLP PROGRESS
Speech Language Pathology Treatment    Patient Name:  Chastity Hung   MRN:  [id number]  Admitting Diagnosis: Acute renal failure    Recommendations:                 General Recommendations:  Cognitive-linguistic therapy  Diet recommendations:  Regular Diet - IDDSI Level 7, Liquid Diet Level: Thin liquids - IDDSI Level 0   Aspiration Precautions: Assistance with meals, Feed only when awake/alert, Frequent oral care, HOB to 90 degrees, Remain upright 30 minutes post meal, Small bites/sips, and Standard aspiration precautions   General Precautions: Standard, aspiration  Communication strategies:  Cognitive impairment    Subjective     Pt seen bedside for ST.  No c/o pain.  No family present.  Patient goals: to advance diet    Pain/Comfort:  Pain Rating 1: 0/10  Pain Rating Post-Intervention 1: 0/10  Pain Rating 2: 0/10  Pain Rating Post-Intervention 2: 0/10    Respiratory Status: Room air    Objective:     Has the patient been evaluated by SLP for swallowing?   Yes  Keep patient NPO? No   Current Respiratory Status: room air    Pt able to engage in functional conversation; however, cognition significantly impaired related to recent memory recall and problem solving/reasoning.  Pt unable to recall events from post-op over a month ago and current events.  Pt oriented to basic person (noted unable to recall names/amount of grandchildren she has) and place but not time or situation/event.    Assessment:     Chastity Hung is a 57 y.o. female with an SLP diagnosis of Cognitive-Linguistic Impairment in the setting of acute renal failure, metabolic/respiratory acidosis and anemia with reported recent sx, requiring prolonged hospitalization, SNF admission and debility/functional decline.  She presents with improved KAZ, communication and is recommended for diet advancement to IDDSI 7 (regular solids) with IDDSI 0 (thin liquids), following aspiration precautions.  Pt's cognition related to orientation/memory recall and  functional problem solving/safety awareness remains impaired.  Continued SLP services recommended post-acute.    Goals:   Multidisciplinary Problems       SLP Goals          Problem: SLP    Goal Priority Disciplines Outcome   SLP Goal     SLP Ongoing, Progressing   Description: 1.  Pt will consume full liquid diet (IDDSI 0-thin liquids) without incident.-MET and advanced to IDDSI 7/0.  2.  Ongoing bedside swallowing evaluation with diet advancement as mentation improves.--MET and advanced to IDDSI 7/0.  3.  Pt will improve cognitive-communicative impairment to PLOF related to orientation/memory recall and reasoning.                       Plan:     Patient to be seen:  2 x/week, 3 x/week   Plan of Care expires:  03/24/23  Plan of Care reviewed with:  patient   SLP Follow-Up:  Yes       Discharge recommendations:  home with home health, nursing facility, skilled (Pt prevoiusly at SNF.)   Barriers to Discharge:  None    Time Tracking:     SLP Treatment Date:   03/20/23  Speech Start Time:  1000  Speech Stop Time:  1030     Speech Total Time (min):  30 min    Billable Minutes: Speech Therapy Individual 15 minutes and Treatment Swallowing Dysfunction 15 minutes    03/20/2023

## 2023-03-20 NOTE — PROGRESS NOTES
[facility]  Nephrology  Progress Note    Patient Name: Chastity Hung  MRN: [id number]  Admission Date: 3/15/2023  Hospital Length of Stay: 5 days  Attending Provider: Miki Smallwood, *   Primary Care Physician: Alexandra Dawkins MD  Principal Problem:Acute renal failure      Subjective:     Interval History:   - more responsive today, at my exam intermittent  - may need PICC  - two sons at bedside, updated    3/18  - no nausea/queasiness  - alert, responsive with son at bedside     3/19  - feels well, no nausea  - Singh full with yellow urine    3/20  - feels fine  - UTI    Review of patient's allergies indicates:   Allergen Reactions    Nsaids (non-steroidal anti-inflammatory drug) Other (See Comments)     Gastric ulcer perforation      Current Facility-Administered Medications   Medication Frequency    0.9%  NaCl infusion (for blood administration) Q24H PRN    0.9%  NaCl infusion PRN    acetaminophen tablet 650 mg Q6H PRN    [START ON 3/21/2023] atorvastatin tablet 10 mg Daily    calcium gluconate 1 g in NS IVPB (premixed) Q10 Min PRN    cefTRIAXone (ROCEPHIN) 2 g in dextrose 5 % in water (D5W) 5 % 50 mL IVPB (MB+) Q24H    cholestyramine 4 gram packet 4 g BID    dextrose 10% bolus 125 mL 125 mL PRN    dextrose 10% bolus 250 mL 250 mL PRN    febuxostat tablet 40 mg Daily    folic acid tablet 1 mg Daily    glucagon (human recombinant) injection 1 mg PRN    hydrALAZINE injection 10 mg Q6H PRN    insulin aspart U-100 pen 0-5 Units Q6H PRN    magnesium sulfate in dextrose IVPB (premix) 1 g Once    miconazole NITRATE 2 % top powder BID    mupirocin 2 % ointment BID    ondansetron disintegrating tablet 4 mg Q6H PRN    pantoprazole EC tablet 40 mg BID    potassium bicarbonate disintegrating tablet 20 mEq BID    [START ON 3/21/2023] sertraline tablet 100 mg Daily    vancomycin 125 mg/5 mL oral solution 125 mg Q6H       Objective:     Vital Signs (Most Recent):  Temp: 98.6 °F (37 °C) (03/20/23  1159)  Pulse: 91 (03/20/23 1159)  Resp: 18 (03/20/23 1159)  BP: (!) 188/80 (03/20/23 1159)  SpO2: 97 % (03/20/23 1159)   Vital Signs (24h Range):  Temp:  [97.3 °F (36.3 °C)-98.6 °F (37 °C)] 98.6 °F (37 °C)  Pulse:  [87-93] 91  Resp:  [16-18] 18  SpO2:  [95 %-98 %] 97 %  BP: (139-188)/(63-83) 188/80     Weight: (!) 224.1 kg (494 lb) (03/18/23 0016)  Body mass index is 84.79 kg/m².  Body surface area is 3.18 meters squared.    No intake/output data recorded.    Physical Exam  Vitals and nursing note reviewed.   Constitutional:       General: She is awake. She is not in acute distress.     Appearance: She is obese. She is not ill-appearing.   HENT:      Head: Atraumatic.   Eyes:      General: No scleral icterus.  Cardiovascular:      Rate and Rhythm: Normal rate.   Neurological:      Mental Status: She is alert and oriented to person, place, and time.   Psychiatric:         Mood and Affect: Mood normal.         Behavior: Behavior is cooperative.       Significant Labs: reviewed         Assessment/Plan:     Active Diagnoses:    Diagnosis Date Noted POA    PRINCIPAL PROBLEM:  Acute renal failure [N17.9] 03/15/2023 Yes    Hypernatremia [E87.0] 03/19/2023 Yes    Anemia [D64.9] 03/17/2023 Yes    Hypokalemia [E87.6] 03/17/2023 No    Diarrhea [R19.7] 03/16/2023 Yes    Acute cystitis [N30.00] 01/31/2023 Yes    Hyperuricemia [E79.0] 08/03/2017 Yes    Morbid obesity with BMI of 70 and over, adult [E66.01, Z68.45] 01/11/2016 Not Applicable    Diabetes mellitus type 2, controlled [E11.9]  Yes    Essential hypertension [I10]  Yes      Problems Resolved During this Admission:    Diagnosis Date Noted Date Resolved POA    Hyperkalemia [E87.5] 03/16/2023 03/18/2023 No    Metabolic acidosis with respiratory acidosis [E87.4] 03/16/2023 03/19/2023 Yes     MIGUEL due to ATN (prolonged diarrhea and hypotension) with baseline creatinine around 1mg/dL  - increased UOP with slight improvement in creatinine   - no indications for dialysis   -  check labs in am     Metabolic Acidosis  - improved, off all bicarb  - monitor     Hypernatremia  - mostly resolved   - will continue to self correct     Hypokalemia  - continue po repletion   - mag replaced yesterday and today     UTI  - on ABX      Hayden Harris MD  Nephrology

## 2023-03-20 NOTE — SUBJECTIVE & OBJECTIVE
Interval History:     Review of Systems   Constitutional:  Positive for activity change, appetite change and fatigue. Negative for fever.   HENT:  Negative for trouble swallowing.    Eyes: Negative.    Respiratory:  Negative for shortness of breath.    Gastrointestinal:  Negative for abdominal pain, nausea and vomiting.   Endocrine: Negative.    Genitourinary: Negative.    Musculoskeletal:  Positive for gait problem.   Allergic/Immunologic: Negative.    Neurological:  Positive for weakness.   Psychiatric/Behavioral:  Negative for agitation, behavioral problems, confusion and decreased concentration.    Objective:     Vital Signs (Most Recent):  Temp: 97.3 °F (36.3 °C) (03/20/23 0738)  Pulse: 91 (03/20/23 0900)  Resp: 16 (03/20/23 0738)  BP: (!) 142/71 (03/20/23 0738)  SpO2: 95 % (03/20/23 0738)   Vital Signs (24h Range):  Temp:  [97.3 °F (36.3 °C)-98.4 °F (36.9 °C)] 97.3 °F (36.3 °C)  Pulse:  [87-93] 91  Resp:  [16-18] 16  SpO2:  [95 %-98 %] 95 %  BP: (139-143)/(63-83) 142/71     Weight: (!) 224.1 kg (494 lb)  Body mass index is 84.79 kg/m².  No intake or output data in the 24 hours ending 03/20/23 1126   Physical Exam  Vitals and nursing note reviewed. Exam conducted with a chaperone present (speech, family).   Constitutional:       General: She is not in acute distress.     Appearance: She is morbidly obese. She is ill-appearing. She is not toxic-appearing.   HENT:      Head: Normocephalic and atraumatic.      Nose: Nose normal.   Eyes:      Pupils: Pupils are equal, round, and reactive to light.   Cardiovascular:      Rate and Rhythm: Normal rate.   Pulmonary:      Effort: Pulmonary effort is normal. No respiratory distress.   Abdominal:      Palpations: Abdomen is soft.      Tenderness: There is no abdominal tenderness.   Genitourinary:     Comments: Samantha  Musculoskeletal:      Cervical back: Normal range of motion.      Right lower leg: Edema present.      Left lower leg: Edema present.   Skin:     General:  Skin is warm.      Findings: Lesion present.   Neurological:      Mental Status: She is alert. Mental status is at baseline.      Motor: Weakness present.   Psychiatric:         Speech: Speech normal.         Behavior: Behavior is cooperative.         Cognition and Memory: She exhibits impaired recent memory.       Significant Labs: All pertinent labs within the past 24 hours have been reviewed.      Significant Imaging: I have reviewed all pertinent imaging results/findings within the past 24 hours.

## 2023-03-21 LAB
ALBUMIN SERPL BCP-MCNC: 1.5 G/DL (ref 3.5–5.2)
ANION GAP SERPL CALC-SCNC: 12 MMOL/L (ref 8–16)
BACTERIA UR CULT: ABNORMAL
BUN SERPL-MCNC: 69 MG/DL (ref 6–20)
CALCIUM SERPL-MCNC: 8.1 MG/DL (ref 8.7–10.5)
CHLORIDE SERPL-SCNC: 107 MMOL/L (ref 95–110)
CO2 SERPL-SCNC: 24 MMOL/L (ref 23–29)
CREAT SERPL-MCNC: 5.9 MG/DL (ref 0.5–1.4)
EST. GFR  (NO RACE VARIABLE): 8 ML/MIN/1.73 M^2
GLUCOSE SERPL-MCNC: 81 MG/DL (ref 70–110)
OB PNL STL: NEGATIVE
PHOSPHATE SERPL-MCNC: 3.7 MG/DL (ref 2.7–4.5)
POCT GLUCOSE: 121 MG/DL (ref 70–110)
POCT GLUCOSE: 79 MG/DL (ref 70–110)
POCT GLUCOSE: 94 MG/DL (ref 70–110)
POCT GLUCOSE: 95 MG/DL (ref 70–110)
POTASSIUM SERPL-SCNC: 3.4 MMOL/L (ref 3.5–5.1)
SODIUM SERPL-SCNC: 143 MMOL/L (ref 136–145)
WBC #/AREA STL HPF: NORMAL /[HPF]

## 2023-03-21 PROCEDURE — 25000003 PHARM REV CODE 250: Performed by: INTERNAL MEDICINE

## 2023-03-21 PROCEDURE — 25000003 PHARM REV CODE 250: Performed by: FAMILY MEDICINE

## 2023-03-21 PROCEDURE — 99232 SBSQ HOSP IP/OBS MODERATE 35: CPT | Mod: ,,, | Performed by: INTERNAL MEDICINE

## 2023-03-21 PROCEDURE — 92507 TX SP LANG VOICE COMM INDIV: CPT

## 2023-03-21 PROCEDURE — 92526 ORAL FUNCTION THERAPY: CPT

## 2023-03-21 PROCEDURE — 63700000 PHARM REV CODE 250 ALT 637 W/O HCPCS: Performed by: FAMILY MEDICINE

## 2023-03-21 PROCEDURE — 11000001 HC ACUTE MED/SURG PRIVATE ROOM

## 2023-03-21 PROCEDURE — 80069 RENAL FUNCTION PANEL: CPT | Performed by: INTERNAL MEDICINE

## 2023-03-21 PROCEDURE — 63600175 PHARM REV CODE 636 W HCPCS: Performed by: FAMILY MEDICINE

## 2023-03-21 PROCEDURE — 99232 PR SUBSEQUENT HOSPITAL CARE,LEVL II: ICD-10-PCS | Mod: ,,, | Performed by: INTERNAL MEDICINE

## 2023-03-21 PROCEDURE — 63600175 PHARM REV CODE 636 W HCPCS: Performed by: INTERNAL MEDICINE

## 2023-03-21 RX ORDER — POTASSIUM CHLORIDE 20 MEQ/15ML
20 SOLUTION ORAL 2 TIMES DAILY
Status: DISCONTINUED | OUTPATIENT
Start: 2023-03-21 | End: 2023-03-24 | Stop reason: HOSPADM

## 2023-03-21 RX ORDER — SODIUM CHLORIDE 9 MG/ML
INJECTION, SOLUTION INTRAVENOUS CONTINUOUS
Status: DISCONTINUED | OUTPATIENT
Start: 2023-03-21 | End: 2023-03-24

## 2023-03-21 RX ADMIN — MICONAZOLE NITRATE: 20 POWDER TOPICAL at 09:03

## 2023-03-21 RX ADMIN — FEBUXOSTAT 40 MG: 40 TABLET, FILM COATED ORAL at 09:03

## 2023-03-21 RX ADMIN — VANCOMYCIN HYDROCHLORIDE 125 MG: KIT at 05:03

## 2023-03-21 RX ADMIN — VANCOMYCIN HYDROCHLORIDE 125 MG: KIT at 12:03

## 2023-03-21 RX ADMIN — MICONAZOLE NITRATE: 20 POWDER TOPICAL at 12:03

## 2023-03-21 RX ADMIN — MUPIROCIN: 20 OINTMENT TOPICAL at 12:03

## 2023-03-21 RX ADMIN — SODIUM CHLORIDE: 9 INJECTION, SOLUTION INTRAVENOUS at 09:03

## 2023-03-21 RX ADMIN — POTASSIUM CHLORIDE 20 MEQ: 20 SOLUTION ORAL at 09:03

## 2023-03-21 RX ADMIN — ATORVASTATIN CALCIUM 10 MG: 10 TABLET, FILM COATED ORAL at 09:03

## 2023-03-21 RX ADMIN — PANTOPRAZOLE SODIUM 40 MG: 40 TABLET, DELAYED RELEASE ORAL at 09:03

## 2023-03-21 RX ADMIN — SERTRALINE HYDROCHLORIDE 100 MG: 50 TABLET ORAL at 09:03

## 2023-03-21 RX ADMIN — FOLIC ACID 1 MG: 1 TABLET ORAL at 09:03

## 2023-03-21 RX ADMIN — CEFTRIAXONE 2 G: 2 INJECTION, POWDER, FOR SOLUTION INTRAMUSCULAR; INTRAVENOUS at 01:03

## 2023-03-21 RX ADMIN — HYDRALAZINE HYDROCHLORIDE 10 MG: 20 INJECTION, SOLUTION INTRAMUSCULAR; INTRAVENOUS at 12:03

## 2023-03-21 RX ADMIN — VANCOMYCIN HYDROCHLORIDE 125 MG: KIT at 01:03

## 2023-03-21 RX ADMIN — CHOLESTYRAMINE 4 G: 4 POWDER, FOR SUSPENSION ORAL at 09:03

## 2023-03-21 NOTE — PLAN OF CARE
[facility]  Discharge Reassessment    Primary Care Provider: Alexandra Dawkins MD    Expected Discharge Date:     Reassessment (most recent)       Discharge Reassessment - 03/21/23 1020          Discharge Reassessment    Assessment Type Discharge Planning Reassessment     Did the patient's condition or plan change since previous assessment? No     Discharge Plan discussed with: Patient;Adult children     Name(s) and Number(s) Ra     Communicated ADOLFO with patient/caregiver Yes     Discharge Plan A Home Health;Home with family     DME Needed Upon Discharge  hospital bed     Discharge Barriers Identified Bariatric     Why the patient remains in the hospital Requires continued medical care                     Anticipated DC Dispo: home with Ra castelan  Prior Level of Function: limited with mobility, dependent  Progress Towards Plan: monitor kidney function, anticipate d/c 1-2 days per rounds report.  Comments:  CM met with patient at bedside and also called son Esperanza) over phone per request. CM informed patient per [facility] patient is into SNF copay days. Discussed options for d/c from the hospital. Patient states the SNF was working on a home plan and ordering DME, but then patient came to hospital. Patient prefers to pursue home plan and son is in agreement. Son requesting hospital bed as patient has not ambulated since prior to January 2023 and mobility was very limited prior to January. Confirmed son would be home with patient and no stairs to enter home.    MD updated via secure chat.

## 2023-03-21 NOTE — PLAN OF CARE
CM spoke with [facility] regarding d/c planning. Per facility patient was at SNF side and working on discharging home as she is into SNF copay days. CM to f/u with patient/family regarding d/c planning.

## 2023-03-21 NOTE — PLAN OF CARE
[facility] reviewing hospital bed order at this time and  referrals sent.    Update 1405: patient qualifies for bed and [facility] to coordinate delivery to home. Anticipate 1-2 days for delivery.

## 2023-03-21 NOTE — ASSESSMENT & PLAN NOTE
Body mass index is 84.8 kg/m². Morbid obesity complicates all aspects of disease management from diagnostic modalities to treatment. Weight loss encouraged and health benefits explained to patient.     Has been bedbound for this year

## 2023-03-21 NOTE — PROGRESS NOTES
[facility] Medicine  Progress Note    Patient Name: Chastity Hung  MRN: [id number]  Patient Class: IP- Inpatient   Admission Date: 3/15/2023  Length of Stay: 6 days  Attending Physician: Miki Smallwood, *  Primary Care Provider: Alexandra Dawkins MD        Subjective:     Principal Problem:Acute renal failure        HPI:  Patient is a 57 y.o. aa female with a PMHx of DM2, HLD, HTN, morbid obesity, and reactive airway disease who presents to the Emergency Department for slurred speech since this morning. Unable to obtain hx due to confusion, hx obtained via chart review. Patient was last seen normal last night. She was started on xanax a couple of days ago. No other issues reported. In the ED, no focal weakness was noted however patient was confused. Unable to obtain CT scan due to body habitus. Labs significant for BUN/Cr: 69/6.4, HCO3: 14, M.0, Uric acid: 12.2. Patient was bolused 2 L NS. HM consulted and patient admitted for acute renal failure.           Overview/Hospital Course:  3/16 admitted for acute renal failure, respiratory and metabolic acidosis. Started on bicarb gtt. Hyperkalemia noted. Patient/family noted diarrhea at skilled nursing facility. Son endorses steady decline in health 2/2 depression.  3/17 significant drop in hb/hct. Likely dilutional in setting of dehydration. Occult stool negative. Blood transfusion pending. Nephrology following for acute renal failure and hypernatremia. Mentation improving  3/18 mentation waxes and wanes. Labs either stable or improving. Picc line pending  3/19 mentation improved. Diet advanced per speech recommendations. Tolerated picc line placement.  3/20 She is aao x 3 in nad . Admitted with a Dx of metabolic encephalopathy  , acute diarrhea  and MIGUEL .  Nephrology consulted .  Pt most likely develop ATN due to  IVVD . The diarrhea has improved . She was started on po vanc  by previous MD for possible C.diff . The diarrhea has improved  .The urine cx is (+) for GNR .   3/21 NAEON . She is more awake . The diarrhea has improved .  Kidney function slowly improving . CM consulted for placement . She has a good UOP .       Interval History:     Review of Systems   Constitutional:  Positive for activity change, appetite change and fatigue. Negative for fever.   HENT:  Negative for trouble swallowing.    Eyes: Negative.    Respiratory:  Negative for shortness of breath.    Gastrointestinal:  Negative for abdominal pain, nausea and vomiting.   Endocrine: Negative.    Genitourinary: Negative.    Musculoskeletal:  Positive for gait problem.   Allergic/Immunologic: Negative.    Neurological:  Positive for weakness.   Psychiatric/Behavioral:  Negative for agitation, behavioral problems, confusion and decreased concentration.    Objective:     Vital Signs (Most Recent):  Temp: 97.9 °F (36.6 °C) (03/21/23 0745)  Pulse: 91 (03/21/23 0745)  Resp: 16 (03/21/23 0745)  BP: 138/71 (03/21/23 0745)  SpO2: 97 % (03/21/23 0745) Vital Signs (24h Range):  Temp:  [97.6 °F (36.4 °C)-98.6 °F (37 °C)] 97.9 °F (36.6 °C)  Pulse:  [87-92] 91  Resp:  [16-18] 16  SpO2:  [91 %-99 %] 97 %  BP: (136-199)/(65-80) 138/71     Weight: (!) 224.1 kg (494 lb 0.8 oz)  Body mass index is 84.8 kg/m².    Intake/Output Summary (Last 24 hours) at 3/21/2023 0945  Last data filed at 3/21/2023 0805  Gross per 24 hour   Intake 240 ml   Output 275 ml   Net -35 ml      Physical Exam  Vitals and nursing note reviewed. Exam conducted with a chaperone present (speech, family).   Constitutional:       General: She is not in acute distress.     Appearance: She is morbidly obese. She is ill-appearing. She is not toxic-appearing.   HENT:      Head: Normocephalic and atraumatic.      Nose: Nose normal.   Eyes:      Pupils: Pupils are equal, round, and reactive to light.   Cardiovascular:      Rate and Rhythm: Normal rate.   Pulmonary:      Effort: Pulmonary effort is normal. No respiratory distress.    Abdominal:      Palpations: Abdomen is soft.      Tenderness: There is no abdominal tenderness.   Genitourinary:     Comments: Singh  Musculoskeletal:      Cervical back: Normal range of motion.      Right lower leg: Edema present.      Left lower leg: Edema present.   Skin:     General: Skin is warm.      Findings: Lesion present.   Neurological:      Mental Status: She is alert. Mental status is at baseline.      Motor: Weakness present.   Psychiatric:         Speech: Speech normal.         Behavior: Behavior is cooperative.         Cognition and Memory: She exhibits impaired recent memory.       Significant Labs: All pertinent labs within the past 24 hours have been reviewed.      Significant Imaging: I have reviewed all pertinent imaging results/findings within the past 24 hours.        Assessment/Plan:      * Acute renal failure  2/2 to ATN  UOP improving   Nephrology on board     Hypernatremia  Discontinued fluids  Diet progressed   Improving     Hypokalemia  Initially hyperkalemia requiring kayexalate, albuterol and calcium gluconate  Now hypokalemia in setting of hypomagnesemia   replete      Anemia  Responded after blood tranfusion  No overt signs or symptoms of bleeding  Multifactorial: nutritional, toby?, dilutional  Iron studies negative for toby  Stool occult negative   Mcv>80  Folate low   b12 elevated    Resume PPI BID   H/O Perforated PUD s/p surgery       Diarrhea  Previously treated with antibiotic(s) for uti during previous hospitalization  Uncertain whether patient received antibiotic(s) during skilled nursing facility stay  Treat empirically for cdiff  Stool studies pending    Acute cystitis  Rocephin 1 gr qd x 3 days     Hyperuricemia  Will cont febuxostat       Morbid obesity with BMI of 70 and over, adult  Body mass index is 84.8 kg/m². Morbid obesity complicates all aspects of disease management from diagnostic modalities to treatment. Weight loss encouraged and health benefits explained to  patient.     Has been bedbound for this year    Diabetes mellitus type 2, controlled  Patient's FSGs are controlled on current medication regimen.  Last A1c reviewed-   Lab Results   Component Value Date    HGBA1C 6.4 (H) 01/25/2023     Most recent fingerstick glucose reviewed-   Recent Labs   Lab 03/20/23  1201 03/20/23  1728 03/20/23 2029 03/21/23  0605   POCTGLUCOSE 83 117* 84 79     Current correctional scale  Low  Maintain anti-hyperglycemic dose as follows-   Antihyperglycemics (From admission, onward)    Start     Stop Route Frequency Ordered    03/15/23 1412  insulin aspart U-100 pen 0-5 Units         -- SubQ Every 6 hours PRN 03/15/23 1312        Hold Oral hypoglycemics while patient is in the hospital.  Progressed to soft diet per speech recommendations     Essential hypertension  Normotensive  Hydralazine prn  Hold losartan      VTE Risk Mitigation (From admission, onward)    None          Discharge Planning   ADOLFO:      Code Status: Full Code   Is the patient medically ready for discharge?:     Reason for patient still in hospital (select all that apply): Treatment  Discharge Plan A: Home with family                  Miki Smallwood MD  Department of Hospital Medicine   [facility]

## 2023-03-21 NOTE — PT/OT/SLP PROGRESS
Speech Language Pathology Treatment    Patient Name:  Chastity Hung   MRN:  [id number]  Admitting Diagnosis: Acute renal failure    Recommendations:                 General Recommendations:  Cognitive-linguistic therapy  Diet recommendations:  Regular Diet - IDDSI Level 7, Liquid Diet Level: Thin liquids - IDDSI Level 0   Aspiration Precautions: Feed only when awake/alert, Frequent oral care, HOB to 90 degrees, Remain upright 30 minutes post meal, Small bites/sips, and Standard aspiration precautions   General Precautions: Standard, aspiration  Communication strategies:  provide increased time to answer    Subjective     Pt seen bedside for ST. Mentation continues to improve.  No family present.  No c/o pain.  Disposition pending.  Patient goals: to go home    Pain/Comfort:  Pain Rating 1: 0/10  Pain Rating Post-Intervention 1: 0/10  Pain Rating 2: 0/10  Pain Rating Post-Intervention 2: 0/10    Respiratory Status: Room air    Objective:     Has the patient been evaluated by SLP for swallowing?   Yes  Keep patient NPO? No   Current Respiratory Status: room air    Pt consuming IDDSI 7 (regular solids) with IDDSI 0 (thin liquids) without incident.    Pt oriented to person and place but not situation/event.  Pt able to engage in functional conversation; however, cognition significantly impaired related to recent memory recall and problem solving/reasoning/safety awareness.  Will need 24-hour assist at home.    Assessment:     Chastity Hung is a 57 y.o. female with an SLP diagnosis of Cognitive-Linguistic Impairment in the setting of acute renal failure, metabolic/respiratory acidosis and anemia with reported recent sx, requiring prolonged hospitalization, SNF admission and debility/functional decline.  She presents with improved KAZ, communication and is consuming IDDSI 7 (regular solids) with IDDSI 0 (thin liquids) without incident. Pt's cognition related to orientation/memory recall and functional problem  solving/safety awareness remains impaired.  Recommended to continue SLP services post-acute for cognitive intervention--pt will need assist by family at home on d/c.    Goals:   Multidisciplinary Problems       SLP Goals          Problem: SLP    Goal Priority Disciplines Outcome   SLP Goal     SLP Ongoing, Progressing   Description: 1.  Pt will consume full liquid diet (IDDSI 0-thin liquids) without incident.-MET and advanced to IDDSI 7/0.  2.  Ongoing bedside swallowing evaluation with diet advancement as mentation improves.--MET and advanced to IDDSI 7/0.  3.  Pt will improve cognitive-communicative impairment to PLOF related to orientation/memory recall and reasoning.                       Plan:     Patient to be seen:  2 x/week, 3 x/week   Plan of Care expires:  03/24/23  Plan of Care reviewed with:  patient   SLP Follow-Up:  Yes       Discharge recommendations:  home health speech therapy   Barriers to Discharge:  None    Time Tracking:     SLP Treatment Date:   03/21/23  Speech Start Time:  1130  Speech Stop Time:  1200     Speech Total Time (min):  30 min    Billable Minutes: Speech Therapy Individual 15 minutes and Treatment Swallowing Dysfunction 15 minutes    03/21/2023   f/u apptc with Dr Sherwood on August 15th @ 2:45

## 2023-03-21 NOTE — SUBJECTIVE & OBJECTIVE
Interval History:     Review of Systems   Constitutional:  Positive for activity change, appetite change and fatigue. Negative for fever.   HENT:  Negative for trouble swallowing.    Eyes: Negative.    Respiratory:  Negative for shortness of breath.    Gastrointestinal:  Negative for abdominal pain, nausea and vomiting.   Endocrine: Negative.    Genitourinary: Negative.    Musculoskeletal:  Positive for gait problem.   Allergic/Immunologic: Negative.    Neurological:  Positive for weakness.   Psychiatric/Behavioral:  Negative for agitation, behavioral problems, confusion and decreased concentration.    Objective:     Vital Signs (Most Recent):  Temp: 97.9 °F (36.6 °C) (03/21/23 0745)  Pulse: 91 (03/21/23 0745)  Resp: 16 (03/21/23 0745)  BP: 138/71 (03/21/23 0745)  SpO2: 97 % (03/21/23 0745) Vital Signs (24h Range):  Temp:  [97.6 °F (36.4 °C)-98.6 °F (37 °C)] 97.9 °F (36.6 °C)  Pulse:  [87-92] 91  Resp:  [16-18] 16  SpO2:  [91 %-99 %] 97 %  BP: (136-199)/(65-80) 138/71     Weight: (!) 224.1 kg (494 lb 0.8 oz)  Body mass index is 84.8 kg/m².    Intake/Output Summary (Last 24 hours) at 3/21/2023 0945  Last data filed at 3/21/2023 0805  Gross per 24 hour   Intake 240 ml   Output 275 ml   Net -35 ml      Physical Exam  Vitals and nursing note reviewed. Exam conducted with a chaperone present (speech, family).   Constitutional:       General: She is not in acute distress.     Appearance: She is morbidly obese. She is ill-appearing. She is not toxic-appearing.   HENT:      Head: Normocephalic and atraumatic.      Nose: Nose normal.   Eyes:      Pupils: Pupils are equal, round, and reactive to light.   Cardiovascular:      Rate and Rhythm: Normal rate.   Pulmonary:      Effort: Pulmonary effort is normal. No respiratory distress.   Abdominal:      Palpations: Abdomen is soft.      Tenderness: There is no abdominal tenderness.   Genitourinary:     Comments: Singh  Musculoskeletal:      Cervical back: Normal range of motion.       Right lower leg: Edema present.      Left lower leg: Edema present.   Skin:     General: Skin is warm.      Findings: Lesion present.   Neurological:      Mental Status: She is alert. Mental status is at baseline.      Motor: Weakness present.   Psychiatric:         Speech: Speech normal.         Behavior: Behavior is cooperative.         Cognition and Memory: She exhibits impaired recent memory.       Significant Labs: All pertinent labs within the past 24 hours have been reviewed.      Significant Imaging: I have reviewed all pertinent imaging results/findings within the past 24 hours.

## 2023-03-21 NOTE — PLAN OF CARE
Discussed poc with pt, pt verbalized understanding    Purposeful rounding every 2hours    VS wnl  Cardiac monitoring in use, pt is NSR, tele monitor # [id number]  Blood glucose monitoring   Fall precautions in place, remains injury free      IVFs  Accurate I&Os  Abx given as prescribed  Bed locked at lowest position  Call light within reach    Chart check complete  Will cont with POC

## 2023-03-21 NOTE — ASSESSMENT & PLAN NOTE
Patient's FSGs are controlled on current medication regimen.  Last A1c reviewed-   Lab Results   Component Value Date    HGBA1C 6.4 (H) 01/25/2023     Most recent fingerstick glucose reviewed-   Recent Labs   Lab 03/20/23  1201 03/20/23  1728 03/20/23 2029 03/21/23  0605   POCTGLUCOSE 83 117* 84 79     Current correctional scale  Low  Maintain anti-hyperglycemic dose as follows-   Antihyperglycemics (From admission, onward)    Start     Stop Route Frequency Ordered    03/15/23 1412  insulin aspart U-100 pen 0-5 Units         -- SubQ Every 6 hours PRN 03/15/23 1312        Hold Oral hypoglycemics while patient is in the hospital.  Progressed to soft diet per speech recommendations

## 2023-03-22 LAB
ANION GAP SERPL CALC-SCNC: 9 MMOL/L (ref 8–16)
BUN SERPL-MCNC: 61 MG/DL (ref 6–20)
CALCIUM SERPL-MCNC: 8.3 MG/DL (ref 8.7–10.5)
CHLORIDE SERPL-SCNC: 110 MMOL/L (ref 95–110)
CO2 SERPL-SCNC: 24 MMOL/L (ref 23–29)
CREAT SERPL-MCNC: 5.4 MG/DL (ref 0.5–1.4)
CRYPTOSP AG STL QL IA: NEGATIVE
E COLI SXT1 STL QL IA: NEGATIVE
E COLI SXT2 STL QL IA: NEGATIVE
EST. GFR  (NO RACE VARIABLE): 9 ML/MIN/1.73 M^2
G LAMBLIA AG STL QL IA: NEGATIVE
GLUCOSE SERPL-MCNC: 84 MG/DL (ref 70–110)
MAGNESIUM SERPL-MCNC: 1.3 MG/DL (ref 1.6–2.6)
POCT GLUCOSE: 114 MG/DL (ref 70–110)
POCT GLUCOSE: 82 MG/DL (ref 70–110)
POCT GLUCOSE: 90 MG/DL (ref 70–110)
POCT GLUCOSE: 96 MG/DL (ref 70–110)
POTASSIUM SERPL-SCNC: 3.5 MMOL/L (ref 3.5–5.1)
RV AG STL QL IA.RAPID: NEGATIVE
SODIUM SERPL-SCNC: 143 MMOL/L (ref 136–145)

## 2023-03-22 PROCEDURE — 25000003 PHARM REV CODE 250: Performed by: FAMILY MEDICINE

## 2023-03-22 PROCEDURE — 63700000 PHARM REV CODE 250 ALT 637 W/O HCPCS: Performed by: FAMILY MEDICINE

## 2023-03-22 PROCEDURE — 25000003 PHARM REV CODE 250: Performed by: INTERNAL MEDICINE

## 2023-03-22 PROCEDURE — 63600175 PHARM REV CODE 636 W HCPCS: Performed by: INTERNAL MEDICINE

## 2023-03-22 PROCEDURE — 80048 BASIC METABOLIC PNL TOTAL CA: CPT | Performed by: INTERNAL MEDICINE

## 2023-03-22 PROCEDURE — 11000001 HC ACUTE MED/SURG PRIVATE ROOM

## 2023-03-22 PROCEDURE — 83735 ASSAY OF MAGNESIUM: CPT | Performed by: INTERNAL MEDICINE

## 2023-03-22 RX ORDER — TALC
6 POWDER (GRAM) TOPICAL NIGHTLY PRN
Status: DISCONTINUED | OUTPATIENT
Start: 2023-03-22 | End: 2023-03-24 | Stop reason: HOSPADM

## 2023-03-22 RX ORDER — HEPARIN SODIUM 5000 [USP'U]/ML
7500 INJECTION, SOLUTION INTRAVENOUS; SUBCUTANEOUS EVERY 8 HOURS
Status: DISCONTINUED | OUTPATIENT
Start: 2023-03-22 | End: 2023-03-24 | Stop reason: HOSPADM

## 2023-03-22 RX ORDER — MAGNESIUM SULFATE HEPTAHYDRATE 40 MG/ML
2 INJECTION, SOLUTION INTRAVENOUS ONCE
Status: COMPLETED | OUTPATIENT
Start: 2023-03-22 | End: 2023-03-22

## 2023-03-22 RX ADMIN — POTASSIUM CHLORIDE 20 MEQ: 20 SOLUTION ORAL at 10:03

## 2023-03-22 RX ADMIN — CHOLESTYRAMINE 4 G: 4 POWDER, FOR SUSPENSION ORAL at 12:03

## 2023-03-22 RX ADMIN — SODIUM CHLORIDE: 9 INJECTION, SOLUTION INTRAVENOUS at 12:03

## 2023-03-22 RX ADMIN — ATORVASTATIN CALCIUM 10 MG: 10 TABLET, FILM COATED ORAL at 09:03

## 2023-03-22 RX ADMIN — CHOLESTYRAMINE 4 G: 4 POWDER, FOR SUSPENSION ORAL at 09:03

## 2023-03-22 RX ADMIN — CEFTRIAXONE 2 G: 2 INJECTION, POWDER, FOR SOLUTION INTRAMUSCULAR; INTRAVENOUS at 12:03

## 2023-03-22 RX ADMIN — MICONAZOLE NITRATE: 20 POWDER TOPICAL at 10:03

## 2023-03-22 RX ADMIN — FOLIC ACID 1 MG: 1 TABLET ORAL at 09:03

## 2023-03-22 RX ADMIN — POTASSIUM CHLORIDE 20 MEQ: 20 SOLUTION ORAL at 09:03

## 2023-03-22 RX ADMIN — HEPARIN SODIUM 7500 UNITS: 5000 INJECTION INTRAVENOUS; SUBCUTANEOUS at 10:03

## 2023-03-22 RX ADMIN — PANTOPRAZOLE SODIUM 40 MG: 40 TABLET, DELAYED RELEASE ORAL at 12:03

## 2023-03-22 RX ADMIN — VANCOMYCIN HYDROCHLORIDE 125 MG: KIT at 06:03

## 2023-03-22 RX ADMIN — PANTOPRAZOLE SODIUM 40 MG: 40 TABLET, DELAYED RELEASE ORAL at 10:03

## 2023-03-22 RX ADMIN — MICONAZOLE NITRATE: 20 POWDER TOPICAL at 12:03

## 2023-03-22 RX ADMIN — CHOLESTYRAMINE 4 G: 4 POWDER, FOR SUSPENSION ORAL at 10:03

## 2023-03-22 RX ADMIN — SERTRALINE HYDROCHLORIDE 100 MG: 50 TABLET ORAL at 09:03

## 2023-03-22 RX ADMIN — MAGNESIUM SULFATE HEPTAHYDRATE 2 G: 40 INJECTION, SOLUTION INTRAVENOUS at 10:03

## 2023-03-22 RX ADMIN — MICONAZOLE NITRATE: 20 POWDER TOPICAL at 09:03

## 2023-03-22 RX ADMIN — VANCOMYCIN HYDROCHLORIDE 125 MG: KIT at 12:03

## 2023-03-22 RX ADMIN — FEBUXOSTAT 40 MG: 40 TABLET, FILM COATED ORAL at 09:03

## 2023-03-22 RX ADMIN — POTASSIUM CHLORIDE 20 MEQ: 20 SOLUTION ORAL at 12:03

## 2023-03-22 NOTE — PLAN OF CARE
[facility]  Discharge Reassessment    Primary Care Provider: Alexandra Dawkins MD    Expected Discharge Date:     Reassessment (most recent)       Discharge Reassessment - 03/22/23 1119          Discharge Reassessment    Assessment Type Discharge Planning Reassessment     Did the patient's condition or plan change since previous assessment? Yes     Discharge Plan discussed with: Patient     Communicated ADOLFO with patient/caregiver Date not available/Unable to determine     Discharge Plan A New Nursing Home placement - long term care facility     Discharge Barriers Identified Bariatric     Why the patient remains in the hospital Placement issues;Social issues        Post-Acute Status    Post-Acute Authorization Placement     Post-Acute Placement Status Referrals Sent                     CM met with patient at bedside. Patient now wishes to pursue long term nursing home placement. CM to send mass referrals as patient's weight will be a barrier to acceptance, voicemail left with [facility] regarding patient.

## 2023-03-22 NOTE — PLAN OF CARE
CM met with patient at bedside to discuss home d/c plan with [facility]. Patient aware hospital bed to be delivered 3/24/23 to home. CM discussed safety concerns for home plan and high risk for re-admission as patient has not ambulated since January.     Discussed SNF, patient states SNF did not work with her like she thought they would and only got her to sit at side of bed. CM also discussed cost of copay per [facility], patient states she cannot afford daily SNF copay amount. CM also discussed long term nursing home placement, patient states she does not want to do that. CM will f/u with son later today, MD updated regarding conversation.

## 2023-03-22 NOTE — ASSESSMENT & PLAN NOTE
Patient's FSGs are controlled on current medication regimen.  Last A1c reviewed-   Lab Results   Component Value Date    HGBA1C 6.4 (H) 01/25/2023     Most recent fingerstick glucose reviewed-   Recent Labs   Lab 03/21/23  1638 03/21/23  2018 03/22/23  0532 03/22/23  1238   POCTGLUCOSE 121* 94 90 82     Current correctional scale  Low  Maintain anti-hyperglycemic dose as follows-   Antihyperglycemics (From admission, onward)    Start     Stop Route Frequency Ordered    03/15/23 1412  insulin aspart U-100 pen 0-5 Units         -- SubQ Every 6 hours PRN 03/15/23 1312        Hold Oral hypoglycemics while patient is in the hospital.  Progressed to soft diet per speech recommendations

## 2023-03-22 NOTE — SUBJECTIVE & OBJECTIVE
Interval History:     Review of Systems   Constitutional:  Positive for activity change, appetite change and fatigue. Negative for fever.   HENT:  Negative for trouble swallowing.    Eyes: Negative.    Respiratory:  Negative for shortness of breath.    Gastrointestinal:  Negative for abdominal pain, nausea and vomiting.   Endocrine: Negative.    Genitourinary: Negative.    Musculoskeletal:  Positive for gait problem.   Allergic/Immunologic: Negative.    Neurological:  Positive for weakness.   Psychiatric/Behavioral:  Negative for agitation, behavioral problems, confusion and decreased concentration.    Objective:     Vital Signs (Most Recent):  Temp: 97.9 °F (36.6 °C) (03/22/23 1239)  Pulse: 89 (03/22/23 1239)  Resp: 18 (03/22/23 1239)  BP: (!) 152/67 (03/22/23 1239)  SpO2: 96 % (03/22/23 1239)   Vital Signs (24h Range):  Temp:  [97.5 °F (36.4 °C)-98.2 °F (36.8 °C)] 97.9 °F (36.6 °C)  Pulse:  [85-90] 89  Resp:  [17-18] 18  SpO2:  [94 %-98 %] 96 %  BP: (143-196)/(63-84) 152/67     Weight: (!) 224.1 kg (494 lb 0.8 oz)  Body mass index is 84.8 kg/m².    Intake/Output Summary (Last 24 hours) at 3/22/2023 1420  Last data filed at 3/22/2023 0551  Gross per 24 hour   Intake --   Output 2100 ml   Net -2100 ml      Physical Exam  Vitals and nursing note reviewed. Exam conducted with a chaperone present (speech, family).   Constitutional:       General: She is not in acute distress.     Appearance: She is morbidly obese. She is ill-appearing. She is not toxic-appearing.   HENT:      Head: Normocephalic and atraumatic.      Nose: Nose normal.   Eyes:      Pupils: Pupils are equal, round, and reactive to light.   Cardiovascular:      Rate and Rhythm: Normal rate.   Pulmonary:      Effort: Pulmonary effort is normal. No respiratory distress.   Abdominal:      Palpations: Abdomen is soft.      Tenderness: There is no abdominal tenderness.   Genitourinary:     Comments: Singh  Musculoskeletal:      Cervical back: Normal range of  motion.      Right lower leg: Edema present.      Left lower leg: Edema present.   Skin:     General: Skin is warm.      Findings: Lesion present.   Neurological:      Mental Status: She is alert. Mental status is at baseline.      Motor: Weakness present.   Psychiatric:         Speech: Speech normal.         Behavior: Behavior is cooperative.         Cognition and Memory: She exhibits impaired recent memory.       Significant Labs: All pertinent labs within the past 24 hours have been reviewed.      Significant Imaging: I have reviewed all pertinent imaging results/findings within the past 24 hours.

## 2023-03-22 NOTE — PLAN OF CARE
Plan of care reviewed with patient with verbal understanding. Chart and orders reviewed.  Pt remains free from falls this shift, Safety precautions in place.   Pt currently resting comfortably in bed. Pain controlled with po and IV pain med (see mar for pain admin).Hourly rounding with bed in lowest position, side rails up, call light in reach. Will continue to monitor until end of shift.

## 2023-03-22 NOTE — PLAN OF CARE
Return call from [facility] received. Facility to discuss with  and speak with patient regarding jail placement. Per admissions staff discussed jail placement during SNF stay and patient declined at that time.

## 2023-03-22 NOTE — PROGRESS NOTES
[facility] Surg  Nephrology  Progress Note    Patient Name: Chastity Hung  MRN: [id number]  Admission Date: 3/15/2023  Hospital Length of Stay: 6 days  Attending Provider: Miki Smallwood, *   Primary Care Physician: Alexandra Dawkins MD  Principal Problem:Acute renal failure      Subjective:     Interval History:   - more responsive today, at my exam intermittent  - may need PICC  - two sons at bedside, updated    3/18  - no nausea/queasiness  - alert, responsive with son at bedside     3/19  - feels well, no nausea  - Singh full with yellow urine    3/20  - feels fine  - UTI    3/21  - feels OK  - all 3 sons at bedside    Review of patient's allergies indicates:   Allergen Reactions    Nsaids (non-steroidal anti-inflammatory drug) Other (See Comments)     Gastric ulcer perforation      Current Facility-Administered Medications   Medication Frequency    0.9%  NaCl infusion (for blood administration) Q24H PRN    0.9%  NaCl infusion PRN    0.9%  NaCl infusion Continuous    acetaminophen tablet 650 mg Q6H PRN    atorvastatin tablet 10 mg Daily    calcium gluconate 1 g in NS IVPB (premixed) Q10 Min PRN    cefTRIAXone (ROCEPHIN) 2 g in dextrose 5 % in water (D5W) 5 % 50 mL IVPB (MB+) Q24H    cholestyramine 4 gram packet 4 g BID    dextrose 10% bolus 125 mL 125 mL PRN    dextrose 10% bolus 250 mL 250 mL PRN    febuxostat tablet 40 mg Daily    folic acid tablet 1 mg Daily    glucagon (human recombinant) injection 1 mg PRN    hydrALAZINE injection 10 mg Q6H PRN    insulin aspart U-100 pen 0-5 Units Q6H PRN    miconazole NITRATE 2 % top powder BID    ondansetron disintegrating tablet 4 mg Q6H PRN    pantoprazole EC tablet 40 mg BID    potassium chloride 10% oral solution 20 mEq BID    sertraline tablet 100 mg Daily    vancomycin 125 mg/5 mL oral solution 125 mg Q6H       Objective:     Vital Signs (Most Recent):  Temp: 98.2 °F (36.8 °C) (03/21/23 1538)  Pulse: 90 (03/21/23 1735)  Resp: 17 (03/21/23 1538)  BP:  (!) 164/74 (03/21/23 1538)  SpO2: 96 % (03/21/23 1538)   Vital Signs (24h Range):  Temp:  [97.6 °F (36.4 °C)-98.2 °F (36.8 °C)] 98.2 °F (36.8 °C)  Pulse:  [87-92] 90  Resp:  [16-20] 17  SpO2:  [91 %-97 %] 96 %  BP: (138-181)/(66-81) 164/74     Weight: (!) 224.1 kg (494 lb 0.8 oz) (03/20/23 1609)  Body mass index is 84.8 kg/m².  Body surface area is 3.18 meters squared.    I/O last 3 completed shifts:  In: 720 [P.O.:720]  Out: 1475 [Urine:1475]    Physical Exam  Vitals and nursing note reviewed.   Constitutional:       General: She is awake. She is not in acute distress.     Appearance: She is obese. She is not ill-appearing.   HENT:      Head: Atraumatic.   Eyes:      General: No scleral icterus.  Cardiovascular:      Rate and Rhythm: Normal rate.   Neurological:      Mental Status: She is alert and oriented to person, place, and time.   Psychiatric:         Mood and Affect: Mood normal.         Behavior: Behavior is cooperative.       Significant Labs: reviewed         Assessment/Plan:     Active Diagnoses:    Diagnosis Date Noted POA    PRINCIPAL PROBLEM:  Acute renal failure [N17.9] 03/15/2023 Yes    Hypernatremia [E87.0] 03/19/2023 Yes    Anemia [D64.9] 03/17/2023 Yes    Hypokalemia [E87.6] 03/17/2023 No    Diarrhea [R19.7] 03/16/2023 Yes    Acute cystitis [N30.00] 01/31/2023 Yes    Hyperuricemia [E79.0] 08/03/2017 Yes    Morbid obesity with BMI of 70 and over, adult [E66.01, Z68.45] 01/11/2016 Not Applicable    Diabetes mellitus type 2, controlled [E11.9]  Yes    Essential hypertension [I10]  Yes      Problems Resolved During this Admission:    Diagnosis Date Noted Date Resolved POA    Hyperkalemia [E87.5] 03/16/2023 03/18/2023 No    Metabolic acidosis with respiratory acidosis [E87.4] 03/16/2023 03/19/2023 Yes     MIGUEL due to ATN (prolonged diarrhea and hypotension) with baseline creatinine around 1mg/dL  - stable UOP with further improvement in creatinine   - no indications for dialysis   - check labs in am      Metabolic Acidosis  - improved, off all bicarb  - monitor     Hypernatremia  - resolved     Hypokalemia  - continue po repletion   - mag replaced yesterday and today, check mag in am     UTI  - on ABX      Hayden Harris MD  Nephrology

## 2023-03-22 NOTE — PROGRESS NOTES
[facility] Medicine  Progress Note    Patient Name: Chastity Hung  MRN: [id number]  Patient Class: IP- Inpatient   Admission Date: 3/15/2023  Length of Stay: 7 days  Attending Physician: Miki Smallwood, *  Primary Care Provider: Alexandra Dawkins MD        Subjective:     Principal Problem:Acute renal failure        HPI:  Patient is a 57 y.o. aa female with a PMHx of DM2, HLD, HTN, morbid obesity, and reactive airway disease who presents to the Emergency Department for slurred speech since this morning. Unable to obtain hx due to confusion, hx obtained via chart review. Patient was last seen normal last night. She was started on xanax a couple of days ago. No other issues reported. In the ED, no focal weakness was noted however patient was confused. Unable to obtain CT scan due to body habitus. Labs significant for BUN/Cr: 69/6.4, HCO3: 14, M.0, Uric acid: 12.2. Patient was bolused 2 L NS. HM consulted and patient admitted for acute renal failure.           Overview/Hospital Course:  3/16 admitted for acute renal failure, respiratory and metabolic acidosis. Started on bicarb gtt. Hyperkalemia noted. Patient/family noted diarrhea at skilled nursing facility. Son endorses steady decline in health 2/2 depression.  3/17 significant drop in hb/hct. Likely dilutional in setting of dehydration. Occult stool negative. Blood transfusion pending. Nephrology following for acute renal failure and hypernatremia. Mentation improving  3/18 mentation waxes and wanes. Labs either stable or improving. Picc line pending  3/19 mentation improved. Diet advanced per speech recommendations. Tolerated picc line placement.  3/20 She is aao x 3 in nad . Admitted with a Dx of metabolic encephalopathy  , acute diarrhea  and MIGUEL .  Nephrology consulted .  Pt most likely develop ATN due to  IVVD . The diarrhea has improved . She was started on po vanc  by previous MD for possible C.diff . The diarrhea has improved  .The urine cx is (+) for GNR .   3/21 NAEON . She is more awake . The diarrhea has improved .  Kidney function slowly improving . CM consulted for placement . She has a good UOP .   3/22   The kidney function  is improving .  SW consulted for NH placement .  Urine cx (+) for klebsiella pneumoniae  sensitive to rocephin .      Interval History:     Review of Systems   Constitutional:  Positive for activity change, appetite change and fatigue. Negative for fever.   HENT:  Negative for trouble swallowing.    Eyes: Negative.    Respiratory:  Negative for shortness of breath.    Gastrointestinal:  Negative for abdominal pain, nausea and vomiting.   Endocrine: Negative.    Genitourinary: Negative.    Musculoskeletal:  Positive for gait problem.   Allergic/Immunologic: Negative.    Neurological:  Positive for weakness.   Psychiatric/Behavioral:  Negative for agitation, behavioral problems, confusion and decreased concentration.    Objective:     Vital Signs (Most Recent):  Temp: 97.9 °F (36.6 °C) (03/22/23 1239)  Pulse: 89 (03/22/23 1239)  Resp: 18 (03/22/23 1239)  BP: (!) 152/67 (03/22/23 1239)  SpO2: 96 % (03/22/23 1239)   Vital Signs (24h Range):  Temp:  [97.5 °F (36.4 °C)-98.2 °F (36.8 °C)] 97.9 °F (36.6 °C)  Pulse:  [85-90] 89  Resp:  [17-18] 18  SpO2:  [94 %-98 %] 96 %  BP: (143-196)/(63-84) 152/67     Weight: (!) 224.1 kg (494 lb 0.8 oz)  Body mass index is 84.8 kg/m².    Intake/Output Summary (Last 24 hours) at 3/22/2023 1420  Last data filed at 3/22/2023 0551  Gross per 24 hour   Intake --   Output 2100 ml   Net -2100 ml      Physical Exam  Vitals and nursing note reviewed. Exam conducted with a chaperone present (speech, family).   Constitutional:       General: She is not in acute distress.     Appearance: She is morbidly obese. She is ill-appearing. She is not toxic-appearing.   HENT:      Head: Normocephalic and atraumatic.      Nose: Nose normal.   Eyes:      Pupils: Pupils are equal, round, and reactive to  light.   Cardiovascular:      Rate and Rhythm: Normal rate.   Pulmonary:      Effort: Pulmonary effort is normal. No respiratory distress.   Abdominal:      Palpations: Abdomen is soft.      Tenderness: There is no abdominal tenderness.   Genitourinary:     Comments: Singh  Musculoskeletal:      Cervical back: Normal range of motion.      Right lower leg: Edema present.      Left lower leg: Edema present.   Skin:     General: Skin is warm.      Findings: Lesion present.   Neurological:      Mental Status: She is alert. Mental status is at baseline.      Motor: Weakness present.   Psychiatric:         Speech: Speech normal.         Behavior: Behavior is cooperative.         Cognition and Memory: She exhibits impaired recent memory.       Significant Labs: All pertinent labs within the past 24 hours have been reviewed.      Significant Imaging: I have reviewed all pertinent imaging results/findings within the past 24 hours.        Assessment/Plan:      * Acute renal failure  2/2 to ATN  UOP improving   Nephrology on board     Hypernatremia  Discontinued fluids  Diet progressed   Improving     Hypokalemia  Initially hyperkalemia requiring kayexalate, albuterol and calcium gluconate  Now hypokalemia in setting of hypomagnesemia   replete      Anemia  Responded after blood tranfusion  No overt signs or symptoms of bleeding  Multifactorial: nutritional, toby?, dilutional  Iron studies negative for toby  Stool occult negative   Mcv>80  Folate low   b12 elevated    Resume PPI BID   H/O Perforated PUD s/p surgery       Diarrhea  Previously treated with antibiotic(s) for uti during previous hospitalization  Uncertain whether patient received antibiotic(s) during skilled nursing facility stay  Treat empirically for cdiff  Stool studies pending    Acute cystitis  Rocephin 1 gr qd x 3 days     Hyperuricemia  Will cont febuxostat       Morbid obesity with BMI of 70 and over, adult  Body mass index is 84.8 kg/m². Morbid obesity  complicates all aspects of disease management from diagnostic modalities to treatment. Weight loss encouraged and health benefits explained to patient.     Has been bedbound for this year    Diabetes mellitus type 2, controlled  Patient's FSGs are controlled on current medication regimen.  Last A1c reviewed-   Lab Results   Component Value Date    HGBA1C 6.4 (H) 01/25/2023     Most recent fingerstick glucose reviewed-   Recent Labs   Lab 03/21/23  1638 03/21/23  2018 03/22/23  0532 03/22/23  1238   POCTGLUCOSE 121* 94 90 82     Current correctional scale  Low  Maintain anti-hyperglycemic dose as follows-   Antihyperglycemics (From admission, onward)    Start     Stop Route Frequency Ordered    03/15/23 1412  insulin aspart U-100 pen 0-5 Units         -- SubQ Every 6 hours PRN 03/15/23 1312        Hold Oral hypoglycemics while patient is in the hospital.  Progressed to soft diet per speech recommendations     Essential hypertension  Normotensive  Hydralazine prn  Hold losartan      VTE Risk Mitigation (From admission, onward)    None          Discharge Planning   ADOLFO:      Code Status: Full Code   Is the patient medically ready for discharge?:     Reason for patient still in hospital (select all that apply): Treatment  Discharge Plan A: New Nursing Home placement - longterm care facility                  Miki Smallwood MD  Department of Hospital Medicine   [facility] - Med Surg

## 2023-03-23 ENCOUNTER — PATIENT MESSAGE (OUTPATIENT)
Dept: SURGERY | Facility: HOSPITAL | Age: 58
End: 2023-03-23
Payer: COMMERCIAL

## 2023-03-23 LAB
ANION GAP SERPL CALC-SCNC: 7 MMOL/L (ref 8–16)
BACTERIA STL CULT: NORMAL
BUN SERPL-MCNC: 49 MG/DL (ref 6–20)
CALCIUM SERPL-MCNC: 8.2 MG/DL (ref 8.7–10.5)
CHLORIDE SERPL-SCNC: 115 MMOL/L (ref 95–110)
CO2 SERPL-SCNC: 21 MMOL/L (ref 23–29)
CREAT SERPL-MCNC: 4.6 MG/DL (ref 0.5–1.4)
EST. GFR  (NO RACE VARIABLE): 10 ML/MIN/1.73 M^2
GLUCOSE SERPL-MCNC: 76 MG/DL (ref 70–110)
MAGNESIUM SERPL-MCNC: 1.3 MG/DL (ref 1.6–2.6)
POCT GLUCOSE: 76 MG/DL (ref 70–110)
POCT GLUCOSE: 79 MG/DL (ref 70–110)
POCT GLUCOSE: 80 MG/DL (ref 70–110)
POTASSIUM SERPL-SCNC: 3.9 MMOL/L (ref 3.5–5.1)
SODIUM SERPL-SCNC: 143 MMOL/L (ref 136–145)

## 2023-03-23 PROCEDURE — 99232 PR SUBSEQUENT HOSPITAL CARE,LEVL II: ICD-10-PCS | Mod: ,,, | Performed by: INTERNAL MEDICINE

## 2023-03-23 PROCEDURE — 92507 TX SP LANG VOICE COMM INDIV: CPT

## 2023-03-23 PROCEDURE — 83735 ASSAY OF MAGNESIUM: CPT | Performed by: INTERNAL MEDICINE

## 2023-03-23 PROCEDURE — 25000003 PHARM REV CODE 250: Performed by: FAMILY MEDICINE

## 2023-03-23 PROCEDURE — 11000001 HC ACUTE MED/SURG PRIVATE ROOM

## 2023-03-23 PROCEDURE — C1751 CATH, INF, PER/CENT/MIDLINE: HCPCS

## 2023-03-23 PROCEDURE — 63600175 PHARM REV CODE 636 W HCPCS: Performed by: INTERNAL MEDICINE

## 2023-03-23 PROCEDURE — 63700000 PHARM REV CODE 250 ALT 637 W/O HCPCS: Performed by: FAMILY MEDICINE

## 2023-03-23 PROCEDURE — 99232 SBSQ HOSP IP/OBS MODERATE 35: CPT | Mod: ,,, | Performed by: INTERNAL MEDICINE

## 2023-03-23 PROCEDURE — 25000003 PHARM REV CODE 250: Performed by: INTERNAL MEDICINE

## 2023-03-23 PROCEDURE — 80048 BASIC METABOLIC PNL TOTAL CA: CPT | Performed by: INTERNAL MEDICINE

## 2023-03-23 PROCEDURE — 25000003 PHARM REV CODE 250: Performed by: NURSE PRACTITIONER

## 2023-03-23 RX ORDER — MAGNESIUM SULFATE HEPTAHYDRATE 40 MG/ML
2 INJECTION, SOLUTION INTRAVENOUS ONCE
Status: COMPLETED | OUTPATIENT
Start: 2023-03-23 | End: 2023-03-23

## 2023-03-23 RX ADMIN — FOLIC ACID 1 MG: 1 TABLET ORAL at 09:03

## 2023-03-23 RX ADMIN — VANCOMYCIN HYDROCHLORIDE 125 MG: KIT at 11:03

## 2023-03-23 RX ADMIN — FEBUXOSTAT 40 MG: 40 TABLET, FILM COATED ORAL at 09:03

## 2023-03-23 RX ADMIN — CHOLESTYRAMINE 4 G: 4 POWDER, FOR SUSPENSION ORAL at 10:03

## 2023-03-23 RX ADMIN — VANCOMYCIN HYDROCHLORIDE 125 MG: KIT at 06:03

## 2023-03-23 RX ADMIN — CHOLESTYRAMINE 4 G: 4 POWDER, FOR SUSPENSION ORAL at 09:03

## 2023-03-23 RX ADMIN — SERTRALINE HYDROCHLORIDE 100 MG: 50 TABLET ORAL at 09:03

## 2023-03-23 RX ADMIN — MICONAZOLE NITRATE: 20 POWDER TOPICAL at 09:03

## 2023-03-23 RX ADMIN — HEPARIN SODIUM 7500 UNITS: 5000 INJECTION INTRAVENOUS; SUBCUTANEOUS at 10:03

## 2023-03-23 RX ADMIN — ATORVASTATIN CALCIUM 10 MG: 10 TABLET, FILM COATED ORAL at 09:03

## 2023-03-23 RX ADMIN — MELATONIN TAB 3 MG 6 MG: 3 TAB at 01:03

## 2023-03-23 RX ADMIN — MELATONIN TAB 3 MG 6 MG: 3 TAB at 10:03

## 2023-03-23 RX ADMIN — POTASSIUM CHLORIDE 20 MEQ: 20 SOLUTION ORAL at 10:03

## 2023-03-23 RX ADMIN — POTASSIUM CHLORIDE 20 MEQ: 20 SOLUTION ORAL at 09:03

## 2023-03-23 RX ADMIN — MICONAZOLE NITRATE: 20 POWDER TOPICAL at 10:03

## 2023-03-23 RX ADMIN — PANTOPRAZOLE SODIUM 40 MG: 40 TABLET, DELAYED RELEASE ORAL at 09:03

## 2023-03-23 RX ADMIN — HEPARIN SODIUM 7500 UNITS: 5000 INJECTION INTRAVENOUS; SUBCUTANEOUS at 06:03

## 2023-03-23 RX ADMIN — PANTOPRAZOLE SODIUM 40 MG: 40 TABLET, DELAYED RELEASE ORAL at 10:03

## 2023-03-23 RX ADMIN — HEPARIN SODIUM 7500 UNITS: 5000 INJECTION INTRAVENOUS; SUBCUTANEOUS at 02:03

## 2023-03-23 RX ADMIN — MAGNESIUM SULFATE HEPTAHYDRATE 2 G: 40 INJECTION, SOLUTION INTRAVENOUS at 02:03

## 2023-03-23 RX ADMIN — VANCOMYCIN HYDROCHLORIDE 125 MG: KIT at 01:03

## 2023-03-23 NOTE — PROGRESS NOTES
[facility] - Med Surg  Adult Nutrition  Progress Note    SUMMARY       Recommendations    Recommendation/Intervention:   1. Recommend pt diet be modified to Diabetic, Cardiac (IDDSI Level 7) diet when medically appropritate   2. Recommend Suplena TID to assist with nutritional gaps   3. Recommend Abdulkadir BID for wound healing   4. Recommend Feeding assistance/Encourage PO and supplement intake   5. If diarrhea persists, recommend Banatrol TID to assist with C Diff/diarrhea  6. Recommend daily weights    Goals:   1. Pt will consume >60% EEN by RD follow up   2. Pt will consume Abdulkadir BID prior to RD follow up  3. Pt diarrhea will improve by RD follow up   Nutrition Goal Status: new/continues   Communication of RD Recs: other (comment)    Assessment and Plan    Nutrition Problem  Inadequate protein/energy intake    Related to (etiology):   Decreased ability to consume sufficient protein/energy     Signs and Symptoms (as evidenced by):   -Decreased appetite/PO intake: 0% to <50% PO intake of meals x 2 days   -Feeling full/early satiety when pt consumes food     Interventions(treatment strategy):  1. Recommend pt diet be modified to Diabetic, Cardiac (IDDSI Level 7) diet when medically appropritate   2. Recommend Suplena TID to assist with nutritional gaps   3. Recommend Abdulkadir BID for wound healing   4. Recommend Feeding assistance/Encourage PO and supplement intake   5. If diarrhea persists, recommend Banatrol TID to assist with C Diff/diarrhea  6. Recommend daily weights  7. Collaboration of care with medical providers     Nutrition Diagnosis Status:   New      Malnutrition Assessment     Skin (Micronutrient): dry                   Edema (Fluid Accumulation): 2-->mild             Reason for Assessment    Reason For Assessment: RD follow-up  Diagnosis: other (see comments) (Acute renal failure)  Relevant Medical History: DMT2, HTN, Morbid obesity w/ BMI over 70, Diarrhea, Hypernatremia, Hypokalemia, Anemia, Hyperuricemia,  "Acute cystitis  General Information Comments:   3/20: 57 y.o. Female admitted for Acute renal failure. Visited pt at bedside, pt working with SLP. Spoke to RN, stated pt refusing meals d/t preference. Spoke to RN tech, confirmed pt has refused last 5 meals, reported that speech stated plans to recommend advance to Regular diet. Per Hospital Medicine Physician note 3/20 "The diarrhea has improved; Diet progressed". Nephrology Physician note 3/20 "feels fine  - UTI; MIGUEL due to ATN -no indications for dialysis; Metabolic Acidosis  - improved, off all bicarb; Hypernatremia  - mostly resolved; - mag replaced yesterday and today; UTI  - on ABX". Reviewed note:% PO intake of meals: 25%;  LBM 3/19; Skin: Per wound note 3/16 "Right plantar heel DTPI healing well with minimal maroon discoloration and one area now open to full thickness at the center; Right lateral 5th toe DTPI...intact; Left plantar and posterior heel DTPI...maroon discoloration to edges, open to full thickness at the center; Intertrigo noted to lower abdominal/pannus fold, bilateral groin folds, and IAD to bilateral medial thighs and perineum all healing well since last admission; DTPIs noted to bilateral lower/lateral buttocks (R>L).   DTPI noted to right posterior thigh.   Stage 3 pressure injury noted to Left posterior thigh with moist red and yellow subcutaneous tissue to wound bed, irregular edges. Unstageable pressure injuries noted to Bilateral Ischium now stage 3 pressure injuries with moist red wound bed and healing well"; Hayden score; 9 (very high risk); Edema: 2+ mild bilateral foot/leg/arm, general. Labs, meds, weight reviewed. Labs 3/19: Calcium (L), Mg (L), BUN (H), Cr (H), GFR 7. Note atorvastatin 10 mg x daily. Weight charted 2/20 520 lbs, 3/18 494 lbs,-26 lb wt loss (5% wt change) ~1 month. RD will continue to monitor.  Nutrition Discharge Planning: Diabetic, Cardiac diet    Follow up  3/23: Visited pt at bedside. Pt reported her appetite " "as ok, confirmed <50% PO intake of meals, reported yesterday ate no breakfast, half of a burger for lunch and no dinner, pt stated not hungry, feels full/early satiety when she does eat, note pt taking SSRI, may be reducing appetite. Discussed protein/calorie benefits of Suplena, pt receptive to try. Pt reports not experiencing N/V/D, no abdominal pain/distention, stated normal weight around 500 lbs, last weighed in January 2023. Pt appeared well nourished, no NFPE warranted at this time. Per SLP note 3/21 "She presents with improved KAZ, communication and is consuming IDDSI 7 (regular solids) with IDDSI 0 (thin liquids) without incident. Pt's cognition related to orientation /memory recall and functional problem solving/safety awareness remains impaired". Per Hospital Medicine Physician note 3/23 " She is not in acute distress; There is no abdominal tenderness; She exhibits impaired recent memory; UOP improving; Hypernatremia improving; Now hypokalemia in setting of hypomagnesemia; Diarrhea-Treat empirically for cdiff". Per Nephrology Physician note 3/23 "Hypokalemia- continue po repletion - continue to replete Mag- from prolonged diarrhea". Reviewed chart: LBM 3/21 (x 2 days no BM); Skin: dry, incision abdomen dry/intact, wounds/excoriation continue; Hayden score increased to 10 (high risk); Edema continues 2+ mild bilateral foot/leg/arm, generalized. Labs, meds, weight reviewed. Calcium (L), Anion gap (L), Mg (L), Cl (H), BUN (H), Cr (H), eGFR 10. Note sertraline 100 mg x once daily, vancomycin 125 mg Q6H, potassium chloride 20 mEq BID, magnesium sulfate IVPB 2 g via IV once. Weight charted 3/18 494 lbs, 3/20 494 lbs, wt stable. RD will continue to monitor.         Nutrition Risk Screen    Nutrition Risk Screen: large or nonhealing wound, burn or pressure injury    Nutrition/Diet History    Spiritual, Cultural Beliefs, Shinto Practices, Values that Affect Care: no  Food Allergies: NKFA  Factors Affecting " "Nutritional Intake: impaired cognitive status/motor control, decreased appetite    Anthropometrics    Temp: 98.4 °F (36.9 °C)  Height: 5' 4" (162.6 cm)  Height (inches): 64 in  Weight Method: Bed Scale  Weight: (!) 224.1 kg (494 lb 0.8 oz)  Weight (lb): (!) 494.06 lb  Ideal Body Weight (IBW), Female: 120 lb  % Ideal Body Weight, Female (lb): 411.72 %  BMI (Calculated): 84.8  BMI Grade: greater than 40 - morbid obesity     Wt Readings from Last 15 Encounters:   03/20/23 (!) 224.1 kg (494 lb 0.8 oz)   02/20/23 (!) 235.9 kg (520 lb)   01/31/23 (!) 235.9 kg (520 lb 1 oz)   05/10/22 (!) 223.9 kg (493 lb 9.8 oz)   03/07/22 (!) 218.8 kg (482 lb 4.1 oz)   10/13/20 (!) 232.2 kg (511 lb 14.5 oz)   05/21/19 (!) 232.9 kg (513 lb 7.2 oz)   10/04/17 (!) 224.1 kg (494 lb 0.8 oz)   08/02/17 (!) 224.1 kg (494 lb 0.8 oz)   06/21/17 (!) 231.3 kg (510 lb)   06/09/17 (!) 231.6 kg (510 lb 9.4 oz)   05/19/17 (!) 225.5 kg (497 lb 2.2 oz)   05/24/16 (!) 214.4 kg (472 lb 10.7 oz)   01/11/16 (!) 195.8 kg (431 lb 10.6 oz)   01/05/16 (!) 198.4 kg (437 lb 6.3 oz)     Lab/Procedures/Meds    Pertinent Labs Reviewed: reviewed  Pertinent Labs Comments: Calcium (L), Mg (L), BUN (H), Cr (H), GFR 7  Pertinent Medications Reviewed: reviewed  BMP  Lab Results   Component Value Date     03/23/2023    K 3.9 03/23/2023     (H) 03/23/2023    CO2 21 (L) 03/23/2023    BUN 49 (H) 03/23/2023    CREATININE 4.6 (H) 03/23/2023    CALCIUM 8.2 (L) 03/23/2023    ANIONGAP 7 (L) 03/23/2023    EGFRNORACEVR 10 (A) 03/23/2023     Recent Labs   Lab 03/23/23  0543   POCTGLUCOSE 79         Scheduled Meds:   atorvastatin  10 mg Oral Daily    cholestyramine  1 packet Oral BID    febuxostat  40 mg Oral Daily    folic acid  1 mg Oral Daily    heparin (porcine)  7,500 Units Subcutaneous Q8H    miconazole NITRATE 2 %   Topical (Top) BID    pantoprazole  40 mg Oral BID    potassium chloride 10%  20 mEq Oral BID    sertraline  100 mg Oral Daily    vancomycin  125 mg Oral " Q6H     Continuous Infusions:   sodium chloride 0.9% Stopped (03/23/23 0512)     PRN Meds:.sodium chloride, sodium chloride 0.9%, acetaminophen, [COMPLETED] calcium gluconate IVPB **AND** calcium gluconate IVPB, dextrose 10%, dextrose 10%, glucagon (human recombinant), hydrALAZINE, insulin aspart U-100, melatonin, ondansetron    Physical Findings/Assessment         Estimated/Assessed Needs    Weight Used For Calorie Calculations: 97 kg (213 lb 13.5 oz) (Adj BW)  Energy Calorie Requirements (kcal): 8097-8679 (20-30 kcal/kg Adj BW (MIGUEL, Morbid obese, 411% IBW)  Energy Need Method: Kcal/kg  Protein Requirements: 77-97 (0.8-1.0 g/kg Adj BW (MIGUEL, DM w/ wounds, Morbid obese BMI 84.80)  Weight Used For Protein Calculations: 97 kg (213 lb 13.5 oz) (Adj BW)  Fluid Requirements (mL): 500 mL + total output or Per MD/NP (MIGUEL)  Estimated Fluid Requirement Method: other (see comments)  RDA Method (mL): 1940  CHO Requirement: 242-364 (5439-4031 kcal/8)      Nutrition Prescription Ordered    Current Diet Order: Regular (IDDSI Level 7) diet    Evaluation of Received Nutrient/Fluid Intake  I/O: (Net since admit)  3/20: +5236.3 mL  3/23: +3181.3 mL    Energy Calories Required: not meeting needs  Protein Required: not meeting needs  Fluid Required: exceeds needs  Tolerance: tolerating  % Intake of Estimated Energy Needs: 0% to < 50%  % Meal Intake: 0% to < 50%    Nutrition Risk    Level of Risk/Frequency of Follow-up: high (F/u x 2 weekly)     Monitor and Evaluation    Food and Nutrient Intake: energy intake, food and beverage intake  Food and Nutrient Adminstration: diet order  Knowledge/Beliefs/Attitudes: food and nutrition knowledge/skill, beliefs and attitudes  Anthropometric Measurements: weight, weight change, body mass index  Biochemical Data, Medical Tests and Procedures: electrolyte and renal panel, gastrointestinal profile, glucose/endocrine profile, inflammatory profile, lipid profile     Nutrition Follow-Up    RD  Follow-up?: Yes  Annie Aldrich, Registration Eligible, Provisional LDN

## 2023-03-23 NOTE — PLAN OF CARE
CM met with patient this morning and confirmed she spoke with [facility] liaison yesterday. CM left message with liaison, awaiting return call.    CM spoke with [facility] staff. Admissions having meeting with administration this morning to discuss acceptance/decline for NH placement. Staff to contact CM with decision today.

## 2023-03-23 NOTE — PROGRESS NOTES
[facility] Medicine  Progress Note    Patient Name: Chastity Hung  MRN: [id number]  Patient Class: IP- Inpatient   Admission Date: 3/15/2023  Length of Stay: 8 days  Attending Physician: Miki Smallwood, *  Primary Care Provider: Alexandra Dawkins MD        Subjective:     Principal Problem:Acute renal failure        HPI:  Patient is a 57 y.o. aa female with a PMHx of DM2, HLD, HTN, morbid obesity, and reactive airway disease who presents to the Emergency Department for slurred speech since this morning. Unable to obtain hx due to confusion, hx obtained via chart review. Patient was last seen normal last night. She was started on xanax a couple of days ago. No other issues reported. In the ED, no focal weakness was noted however patient was confused. Unable to obtain CT scan due to body habitus. Labs significant for BUN/Cr: 69/6.4, HCO3: 14, M.0, Uric acid: 12.2. Patient was bolused 2 L NS. HM consulted and patient admitted for acute renal failure.           Overview/Hospital Course:  3/16 admitted for acute renal failure, respiratory and metabolic acidosis. Started on bicarb gtt. Hyperkalemia noted. Patient/family noted diarrhea at skilled nursing facility. Son endorses steady decline in health 2/2 depression.  3/17 significant drop in hb/hct. Likely dilutional in setting of dehydration. Occult stool negative. Blood transfusion pending. Nephrology following for acute renal failure and hypernatremia. Mentation improving  3/18 mentation waxes and wanes. Labs either stable or improving. Picc line pending  3/19 mentation improved. Diet advanced per speech recommendations. Tolerated picc line placement.  3/20 She is aao x 3 in nad . Admitted with a Dx of metabolic encephalopathy  , acute diarrhea  and MIGUEL .  Nephrology consulted .  Pt most likely develop ATN due to  IVVD . The diarrhea has improved . She was started on po vanc  by previous MD for possible C.diff . The diarrhea has improved  .The urine cx is (+) for GNR .   3/21 NAEON . She is more awake . The diarrhea has improved .  Kidney function slowly improving . CM consulted for placement . She has a good UOP .   3/22   The kidney function  is improving .  SW consulted for NH placement .  Urine cx (+) for klebsiella pneumoniae  sensitive to rocephin .  3/23 NAEON . CM consulted for NH placement . She ahs a good UOP . S/P IV rocpehin for UTI .      Interval History:     Review of Systems   Constitutional:  Positive for activity change, appetite change and fatigue. Negative for fever.   HENT:  Negative for trouble swallowing.    Eyes: Negative.    Respiratory:  Negative for shortness of breath.    Gastrointestinal:  Negative for abdominal pain, nausea and vomiting.   Endocrine: Negative.    Genitourinary: Negative.    Musculoskeletal:  Positive for gait problem.   Allergic/Immunologic: Negative.    Neurological:  Positive for weakness.   Psychiatric/Behavioral:  Negative for agitation, behavioral problems, confusion and decreased concentration.    Objective:     Vital Signs (Most Recent):  Temp: 98.4 °F (36.9 °C) (03/23/23 1121)  Pulse: 88 (03/23/23 1121)  Resp: 18 (03/23/23 1121)  BP: (!) 159/72 (03/23/23 1121)  SpO2: 96 % (03/23/23 1121)   Vital Signs (24h Range):  Temp:  [97.8 °F (36.6 °C)-99.9 °F (37.7 °C)] 98.4 °F (36.9 °C)  Pulse:  [88-96] 88  Resp:  [18] 18  SpO2:  [92 %-97 %] 96 %  BP: (125-159)/(59-77) 159/72     Weight: (!) 224.1 kg (494 lb 0.8 oz)  Body mass index is 84.8 kg/m².    Intake/Output Summary (Last 24 hours) at 3/23/2023 1146  Last data filed at 3/23/2023 0540  Gross per 24 hour   Intake 1100 ml   Output 1500 ml   Net -400 ml      Physical Exam  Vitals and nursing note reviewed. Exam conducted with a chaperone present (speech, family).   Constitutional:       General: She is not in acute distress.     Appearance: She is morbidly obese. She is ill-appearing. She is not toxic-appearing.   HENT:      Head: Normocephalic and  atraumatic.      Nose: Nose normal.   Eyes:      Pupils: Pupils are equal, round, and reactive to light.   Cardiovascular:      Rate and Rhythm: Normal rate.   Pulmonary:      Effort: Pulmonary effort is normal. No respiratory distress.   Abdominal:      Palpations: Abdomen is soft.      Tenderness: There is no abdominal tenderness.   Genitourinary:     Comments: Samantha  Musculoskeletal:      Cervical back: Normal range of motion.      Right lower leg: Edema present.      Left lower leg: Edema present.   Skin:     General: Skin is warm.      Findings: Lesion present.   Neurological:      Mental Status: She is alert. Mental status is at baseline.      Motor: Weakness present.   Psychiatric:         Speech: Speech normal.         Behavior: Behavior is cooperative.         Cognition and Memory: She exhibits impaired recent memory.       Significant Labs: All pertinent labs within the past 24 hours have been reviewed.      Significant Imaging: I have reviewed all pertinent imaging results/findings within the past 24 hours.        Assessment/Plan:      * Acute renal failure  2/2 to ATN  UOP improving   Nephrology on board     Hypernatremia  Discontinued fluids  Diet progressed   Improving     Hypokalemia  Initially hyperkalemia requiring kayexalate, albuterol and calcium gluconate  Now hypokalemia in setting of hypomagnesemia   replete      Anemia  Responded after blood tranfusion  No overt signs or symptoms of bleeding  Multifactorial: nutritional, toby?, dilutional  Iron studies negative for toby  Stool occult negative   Mcv>80  Folate low   b12 elevated    Resume PPI BID   H/O Perforated PUD s/p surgery       Diarrhea  Previously treated with antibiotic(s) for uti during previous hospitalization  Uncertain whether patient received antibiotic(s) during skilled nursing facility stay  Treat empirically for cdiff  Stool studies pending    Acute cystitis  Rocephin 1 gr qd x 3 days     Hyperuricemia  Will cont febuxostat        Morbid obesity with BMI of 70 and over, adult  Body mass index is 84.8 kg/m². Morbid obesity complicates all aspects of disease management from diagnostic modalities to treatment. Weight loss encouraged and health benefits explained to patient.     Has been bedbound for this year    Diabetes mellitus type 2, controlled  Patient's FSGs are controlled on current medication regimen.  Last A1c reviewed-   Lab Results   Component Value Date    HGBA1C 6.4 (H) 01/25/2023     Most recent fingerstick glucose reviewed-   Recent Labs   Lab 03/22/23  1238 03/22/23  1542 03/22/23  2019 03/23/23  0543   POCTGLUCOSE 82 96 114* 79     Current correctional scale  Low  Maintain anti-hyperglycemic dose as follows-   Antihyperglycemics (From admission, onward)    Start     Stop Route Frequency Ordered    03/15/23 1412  insulin aspart U-100 pen 0-5 Units         -- SubQ Every 6 hours PRN 03/15/23 1312        Hold Oral hypoglycemics while patient is in the hospital.  Progressed to soft diet per speech recommendations     Essential hypertension  Normotensive  Hydralazine prn  Hold losartan      VTE Risk Mitigation (From admission, onward)         Ordered     heparin (porcine) injection 7,500 Units  Every 8 hours         03/22/23 1449                Discharge Planning   ADOLFO:      Code Status: Full Code   Is the patient medically ready for discharge?:     Reason for patient still in hospital (select all that apply): Treatment  Discharge Plan A: New Nursing Home placement - long term care facility                  Miki Smallwood MD  Department of Hospital Medicine   [facility]

## 2023-03-23 NOTE — SUBJECTIVE & OBJECTIVE
Interval History:     Review of Systems   Constitutional:  Positive for activity change, appetite change and fatigue. Negative for fever.   HENT:  Negative for trouble swallowing.    Eyes: Negative.    Respiratory:  Negative for shortness of breath.    Gastrointestinal:  Negative for abdominal pain, nausea and vomiting.   Endocrine: Negative.    Genitourinary: Negative.    Musculoskeletal:  Positive for gait problem.   Allergic/Immunologic: Negative.    Neurological:  Positive for weakness.   Psychiatric/Behavioral:  Negative for agitation, behavioral problems, confusion and decreased concentration.    Objective:     Vital Signs (Most Recent):  Temp: 98.4 °F (36.9 °C) (03/23/23 1121)  Pulse: 88 (03/23/23 1121)  Resp: 18 (03/23/23 1121)  BP: (!) 159/72 (03/23/23 1121)  SpO2: 96 % (03/23/23 1121)   Vital Signs (24h Range):  Temp:  [97.8 °F (36.6 °C)-99.9 °F (37.7 °C)] 98.4 °F (36.9 °C)  Pulse:  [88-96] 88  Resp:  [18] 18  SpO2:  [92 %-97 %] 96 %  BP: (125-159)/(59-77) 159/72     Weight: (!) 224.1 kg (494 lb 0.8 oz)  Body mass index is 84.8 kg/m².    Intake/Output Summary (Last 24 hours) at 3/23/2023 1146  Last data filed at 3/23/2023 0540  Gross per 24 hour   Intake 1100 ml   Output 1500 ml   Net -400 ml      Physical Exam  Vitals and nursing note reviewed. Exam conducted with a chaperone present (speech, family).   Constitutional:       General: She is not in acute distress.     Appearance: She is morbidly obese. She is ill-appearing. She is not toxic-appearing.   HENT:      Head: Normocephalic and atraumatic.      Nose: Nose normal.   Eyes:      Pupils: Pupils are equal, round, and reactive to light.   Cardiovascular:      Rate and Rhythm: Normal rate.   Pulmonary:      Effort: Pulmonary effort is normal. No respiratory distress.   Abdominal:      Palpations: Abdomen is soft.      Tenderness: There is no abdominal tenderness.   Genitourinary:     Comments: Singh  Musculoskeletal:      Cervical back: Normal range of  motion.      Right lower leg: Edema present.      Left lower leg: Edema present.   Skin:     General: Skin is warm.      Findings: Lesion present.   Neurological:      Mental Status: She is alert. Mental status is at baseline.      Motor: Weakness present.   Psychiatric:         Speech: Speech normal.         Behavior: Behavior is cooperative.         Cognition and Memory: She exhibits impaired recent memory.       Significant Labs: All pertinent labs within the past 24 hours have been reviewed.      Significant Imaging: I have reviewed all pertinent imaging results/findings within the past 24 hours.

## 2023-03-23 NOTE — PLAN OF CARE
Referral hard fax[facility] to [facility]

## 2023-03-23 NOTE — PLAN OF CARE
CM spoke with  at [facility] who states facility is willing to accept patient as long term care resident. Administer requested CM confirm patient/family willing to apply for LTC Medicaid and submit financials as this was a barrier during SNF stay when correction placement was discussed.    CM spoke with patient at bedside, patient is agreeable to pursue correction placement and confirms she understands financial agreement/LTC Medicaid application.     CM also spoke with son, Ra, to inform [facility] willing to accept. Ra states he was under the impression nurses could be sent to the home with HH to care for patient. CM clarified HH staff would likely only come 1-2 times per week and HH care is episodic, not continuous. CM explained patient is requiring multiple staff members to turn/clean patient at this time. Son states he will f/u with his mother and brothers. CM to f/u with son tomorrow.

## 2023-03-23 NOTE — PT/OT/SLP PROGRESS
Speech Language Pathology Treatment    Patient Name:  Chastity Hung   MRN:  [id number]  Admitting Diagnosis: Acute renal failure    Recommendations:                 General Recommendations:  Follow-up not indicated  Diet recommendations:  Regular Diet - IDDSI Level 7, Liquid Diet Level: Thin liquids - IDDSI Level 0   Aspiration Precautions: Frequent oral care, HOB to 90 degrees, Remain upright 30 minutes post meal, Small bites/sips, and Standard aspiration precautions   General Precautions: Standard, aspiration  Communication strategies:  none    Subjective     Pt seen bedside for ST.  Awaiting disposition.  No c/o pain.  Patient goals: d/c planning    Pain/Comfort:  Pain Rating 1: 0/10  Pain Rating Post-Intervention 1: 0/10  Pain Rating 2: 0/10  Pain Rating Post-Intervention 2: 0/10    Respiratory Status: Room air    Objective:     Has the patient been evaluated by SLP for swallowing?   Yes  Keep patient NPO? No   Current Respiratory Status: room air    Pt consuming IDDSI 7 (regular solids) with IDDSI 0 (thin liquids) without incident; however, reported decreased appetite.  Dietitian following.    Pt able to engage in conversational speech tasks and meet communicative needs without difficulty.  Pt oriented x4.    Assessment:     Chastity Hung is a 57 y.o. female with an SLP diagnosis of Cognitive-Linguistic Impairment in the setting of acute renal failure and UTI.  She presents with improved mentation and communication with ability to meet functional needs.  She is recommended for continued IDDSI 7 (regular solids) with IDDSI 0 (thin liquids), following aspiration precautions.  No further acute SLP intervention indicated at this time.  MD to reconsult if needed.    Goals:   Multidisciplinary Problems       SLP Goals       Not on file              Multidisciplinary Problems (Resolved)          Problem: SLP    Goal Priority Disciplines Outcome   SLP Goal   (Resolved)     SLP Met   Description: 1.  Pt will  consume full liquid diet (IDDSI 0-thin liquids) without incident.-MET and advanced to IDDSI 7/0.  2.  Ongoing bedside swallowing evaluation with diet advancement as mentation improves.--MET and advanced to IDDSI 7/0.  3.  Pt will improve cognitive-communicative impairment to PLOF related to orientation/memory recall and reasoning.                       Plan:     Patient to be seen:  2 x/week, 3 x/week   Plan of Care expires:  03/24/23  Plan of Care reviewed with:  patient   SLP Follow-Up:  No       Discharge recommendations:   (No further acute SLP intervention indicated at this time.)   Barriers to Discharge:  None    Time Tracking:     SLP Treatment Date:   03/23/23  Speech Start Time:  1530  Speech Stop Time:  1545     Speech Total Time (min):  15 min    Billable Minutes: Speech Therapy Individual 15 minutes    03/23/2023

## 2023-03-23 NOTE — PROGRESS NOTES
[facility]  Nephrology  Progress Note    Patient Name: Chastity Hung  MRN: [id number]  Admission Date: 3/15/2023  Hospital Length of Stay: 8 days  Attending Provider: Miki Smallwood, *   Primary Care Physician: Alexandra Dawkins MD  Principal Problem:Acute renal failure      Subjective:     Interval History:   - more responsive today, at my exam intermittent  - may need PICC  - two sons at bedside, updated    3/18  - no nausea/queasiness  - alert, responsive with son at bedside     3/19  - feels well, no nausea  - Singh full with yellow urine    3/20  - feels fine  - UTI    3/21  - feels OK  - all 3 sons at bedside    3/22  - UOP maintaining    3/23  - feeling well  - disposition underway     Review of patient's allergies indicates:   Allergen Reactions    Nsaids (non-steroidal anti-inflammatory drug) Other (See Comments)     Gastric ulcer perforation      Current Facility-Administered Medications   Medication Frequency    0.9%  NaCl infusion (for blood administration) Q24H PRN    0.9%  NaCl infusion PRN    0.9%  NaCl infusion Continuous    acetaminophen tablet 650 mg Q6H PRN    atorvastatin tablet 10 mg Daily    calcium gluconate 1 g in NS IVPB (premixed) Q10 Min PRN    cholestyramine 4 gram packet 4 g BID    dextrose 10% bolus 125 mL 125 mL PRN    dextrose 10% bolus 250 mL 250 mL PRN    febuxostat tablet 40 mg Daily    folic acid tablet 1 mg Daily    glucagon (human recombinant) injection 1 mg PRN    heparin (porcine) injection 7,500 Units Q8H    hydrALAZINE injection 10 mg Q6H PRN    insulin aspart U-100 pen 0-5 Units Q6H PRN    magnesium sulfate 2g in water 50mL IVPB (premix) Once    melatonin tablet 6 mg Nightly PRN    miconazole NITRATE 2 % top powder BID    ondansetron disintegrating tablet 4 mg Q6H PRN    pantoprazole EC tablet 40 mg BID    potassium chloride 10% oral solution 20 mEq BID    sertraline tablet 100 mg Daily    vancomycin 125 mg/5 mL oral solution 125 mg Q6H       Objective:      Vital Signs (Most Recent):  Temp: 98.4 °F (36.9 °C) (03/23/23 1121)  Pulse: 88 (03/23/23 1300)  Resp: 18 (03/23/23 1121)  BP: (!) 159/72 (03/23/23 1121)  SpO2: 96 % (03/23/23 1121)   Vital Signs (24h Range):  Temp:  [97.8 °F (36.6 °C)-99.9 °F (37.7 °C)] 98.4 °F (36.9 °C)  Pulse:  [88-96] 88  Resp:  [18] 18  SpO2:  [92 %-97 %] 96 %  BP: (125-159)/(59-77) 159/72     Weight: (!) 224.1 kg (494 lb 0.8 oz) (03/20/23 1609)  Body mass index is 84.8 kg/m².  Body surface area is 3.18 meters squared.    I/O last 3 completed shifts:  In: 1100 [I.V.:1100]  Out: 2400 [Urine:2400]    Physical Exam  Vitals and nursing note reviewed.   Constitutional:       General: She is awake. She is not in acute distress.     Appearance: She is obese. She is not ill-appearing.   HENT:      Head: Atraumatic.   Eyes:      General: No scleral icterus.  Cardiovascular:      Rate and Rhythm: Normal rate.   Neurological:      Mental Status: She is alert and oriented to person, place, and time.   Psychiatric:         Mood and Affect: Mood normal.         Behavior: Behavior is cooperative.       Significant Labs: reviewed         Assessment/Plan:     Active Diagnoses:    Diagnosis Date Noted POA    PRINCIPAL PROBLEM:  Acute renal failure [N17.9] 03/15/2023 Yes    Hypernatremia [E87.0] 03/19/2023 Yes    Anemia [D64.9] 03/17/2023 Yes    Hypokalemia [E87.6] 03/17/2023 No    Diarrhea [R19.7] 03/16/2023 Yes    Acute cystitis [N30.00] 01/31/2023 Yes    Hyperuricemia [E79.0] 08/03/2017 Yes    Morbid obesity with BMI of 70 and over, adult [E66.01, Z68.45] 01/11/2016 Not Applicable    Diabetes mellitus type 2, controlled [E11.9]  Yes    Essential hypertension [I10]  Yes      Problems Resolved During this Admission:    Diagnosis Date Noted Date Resolved POA    Hyperkalemia [E87.5] 03/16/2023 03/18/2023 No    Metabolic acidosis with respiratory acidosis [E87.4] 03/16/2023 03/19/2023 Yes     MIGUEL due to ATN (prolonged diarrhea and hypotension) with baseline  creatinine around 1mg/dL  - stable UOP with further improvement in creatinine   - can d/c IVFs, developing hyperchloremia, or change to 1/2NS    Metabolic Acidosis  - improved, off all bicarb  - monitor     Hypernatremia  - resolved     Hypokalemia  - continue po repletion   - continue to replete Mag  - from prolonged diarrhea     UTI  - on ABX    Dr. Britton to assume nephrology care in        Hayden Harris MD  Nephrology

## 2023-03-23 NOTE — PLAN OF CARE
Patient remains free from injury this shift. Safety precautions maintained. No s/s of acute distress noted.  IVF infusing. Cardiac monitoring in place. Blood glucose monitoring continued this shift per orders. Wound care performed. Pt wants the door to be closed at all times. Chart and orders review complete. Patient education about care complete.       Problem: Adult Inpatient Plan of Care  Goal: Plan of Care Review  Outcome: Ongoing, Progressing  Goal: Patient-Specific Goal (Individualized)  Outcome: Ongoing, Progressing  Goal: Absence of Hospital-Acquired Illness or Injury  Outcome: Ongoing, Progressing  Goal: Optimal Comfort and Wellbeing  Outcome: Ongoing, Progressing  Goal: Readiness for Transition of Care  Outcome: Ongoing, Progressing     Problem: Bariatric Environmental Safety  Goal: Safety Maintained with Care  Outcome: Ongoing, Progressing     Problem: Infection  Goal: Absence of Infection Signs and Symptoms  Outcome: Ongoing, Progressing     Problem: Diabetes Comorbidity  Goal: Blood Glucose Level Within Targeted Range  Outcome: Ongoing, Progressing     Problem: Fluid and Electrolyte Imbalance (Acute Kidney Injury/Impairment)  Goal: Fluid and Electrolyte Balance  Outcome: Ongoing, Progressing

## 2023-03-23 NOTE — PLAN OF CARE
Message left with Nirmal Brown requesting update regarding decision. Awaiting return call.    [facility] admissions on lunch break.  [facility] have declined, 85 denials in [facility] at this time. No accepting facility.

## 2023-03-23 NOTE — ASSESSMENT & PLAN NOTE
Patient's FSGs are controlled on current medication regimen.  Last A1c reviewed-   Lab Results   Component Value Date    HGBA1C 6.4 (H) 01/25/2023     Most recent fingerstick glucose reviewed-   Recent Labs   Lab 03/22/23  1238 03/22/23  1542 03/22/23  2019 03/23/23  0543   POCTGLUCOSE 82 96 114* 79     Current correctional scale  Low  Maintain anti-hyperglycemic dose as follows-   Antihyperglycemics (From admission, onward)    Start     Stop Route Frequency Ordered    03/15/23 1412  insulin aspart U-100 pen 0-5 Units         -- SubQ Every 6 hours PRN 03/15/23 1312        Hold Oral hypoglycemics while patient is in the hospital.  Progressed to soft diet per speech recommendations

## 2023-03-24 VITALS
OXYGEN SATURATION: 96 % | WEIGHT: 293 LBS | SYSTOLIC BLOOD PRESSURE: 151 MMHG | HEART RATE: 93 BPM | BODY MASS INDEX: 50.02 KG/M2 | HEIGHT: 64 IN | RESPIRATION RATE: 18 BRPM | DIASTOLIC BLOOD PRESSURE: 67 MMHG | TEMPERATURE: 98 F

## 2023-03-24 DIAGNOSIS — U07.1 COVID-19 VIRUS DETECTED: ICD-10-CM

## 2023-03-24 LAB
ANION GAP SERPL CALC-SCNC: 11 MMOL/L (ref 8–16)
BACTERIA STL CULT: NORMAL
BASOPHILS # BLD AUTO: 0.03 K/UL (ref 0–0.2)
BASOPHILS NFR BLD: 0.4 % (ref 0–1.9)
BUN SERPL-MCNC: 45 MG/DL (ref 6–20)
CALCIUM SERPL-MCNC: 8.9 MG/DL (ref 8.7–10.5)
CHLORIDE SERPL-SCNC: 112 MMOL/L (ref 95–110)
CO2 SERPL-SCNC: 20 MMOL/L (ref 23–29)
CREAT SERPL-MCNC: 4.4 MG/DL (ref 0.5–1.4)
DIFFERENTIAL METHOD: ABNORMAL
ELASTASE 1, FECAL: >500 MCG/G
EOSINOPHIL # BLD AUTO: 0.5 K/UL (ref 0–0.5)
EOSINOPHIL NFR BLD: 7.6 % (ref 0–8)
ERYTHROCYTE [DISTWIDTH] IN BLOOD BY AUTOMATED COUNT: 16.4 % (ref 11.5–14.5)
EST. GFR  (NO RACE VARIABLE): 11 ML/MIN/1.73 M^2
FAT STL QL: NORMAL
GLUCOSE SERPL-MCNC: 70 MG/DL (ref 70–110)
HCT VFR BLD AUTO: 30.3 % (ref 37–48.5)
HGB BLD-MCNC: 9.1 G/DL (ref 12–16)
IMM GRANULOCYTES # BLD AUTO: 0.02 K/UL (ref 0–0.04)
IMM GRANULOCYTES NFR BLD AUTO: 0.3 % (ref 0–0.5)
LYMPHOCYTES # BLD AUTO: 2.1 K/UL (ref 1–4.8)
LYMPHOCYTES NFR BLD: 28.9 % (ref 18–48)
MAGNESIUM SERPL-MCNC: 1.7 MG/DL (ref 1.6–2.6)
MCH RBC QN AUTO: 26.9 PG (ref 27–31)
MCHC RBC AUTO-ENTMCNC: 30 G/DL (ref 32–36)
MCV RBC AUTO: 90 FL (ref 82–98)
MONOCYTES # BLD AUTO: 0.6 K/UL (ref 0.3–1)
MONOCYTES NFR BLD: 8.3 % (ref 4–15)
NEUTRAL FAT STL QL: NORMAL
NEUTROPHILS # BLD AUTO: 3.9 K/UL (ref 1.8–7.7)
NEUTROPHILS NFR BLD: 54.5 % (ref 38–73)
NRBC BLD-RTO: 0 /100 WBC
PHOSPHATE SERPL-MCNC: 4.3 MG/DL (ref 2.7–4.5)
PLATELET # BLD AUTO: 206 K/UL (ref 150–450)
PMV BLD AUTO: 11.8 FL (ref 9.2–12.9)
POCT GLUCOSE: 77 MG/DL (ref 70–110)
POCT GLUCOSE: 90 MG/DL (ref 70–110)
POTASSIUM SERPL-SCNC: 4.2 MMOL/L (ref 3.5–5.1)
RBC # BLD AUTO: 3.38 M/UL (ref 4–5.4)
SARS-COV-2 RDRP RESP QL NAA+PROBE: POSITIVE
SODIUM SERPL-SCNC: 143 MMOL/L (ref 136–145)
WBC # BLD AUTO: 7.1 K/UL (ref 3.9–12.7)

## 2023-03-24 PROCEDURE — 25000003 PHARM REV CODE 250: Performed by: FAMILY MEDICINE

## 2023-03-24 PROCEDURE — 85025 COMPLETE CBC W/AUTO DIFF WBC: CPT | Performed by: INTERNAL MEDICINE

## 2023-03-24 PROCEDURE — 99233 SBSQ HOSP IP/OBS HIGH 50: CPT | Mod: ,,, | Performed by: INTERNAL MEDICINE

## 2023-03-24 PROCEDURE — 99233 PR SUBSEQUENT HOSPITAL CARE,LEVL III: ICD-10-PCS | Mod: ,,, | Performed by: INTERNAL MEDICINE

## 2023-03-24 PROCEDURE — 63700000 PHARM REV CODE 250 ALT 637 W/O HCPCS: Performed by: FAMILY MEDICINE

## 2023-03-24 PROCEDURE — 80048 BASIC METABOLIC PNL TOTAL CA: CPT | Performed by: INTERNAL MEDICINE

## 2023-03-24 PROCEDURE — 84100 ASSAY OF PHOSPHORUS: CPT | Performed by: INTERNAL MEDICINE

## 2023-03-24 PROCEDURE — 63600175 PHARM REV CODE 636 W HCPCS: Performed by: INTERNAL MEDICINE

## 2023-03-24 PROCEDURE — U0002 COVID-19 LAB TEST NON-CDC: HCPCS | Performed by: INTERNAL MEDICINE

## 2023-03-24 PROCEDURE — 83735 ASSAY OF MAGNESIUM: CPT | Performed by: INTERNAL MEDICINE

## 2023-03-24 PROCEDURE — 25000003 PHARM REV CODE 250: Performed by: INTERNAL MEDICINE

## 2023-03-24 RX ORDER — FOLIC ACID 1 MG/1
1 TABLET ORAL DAILY
Qty: 90 TABLET | Refills: 3 | Status: SHIPPED | OUTPATIENT
Start: 2023-03-25 | End: 2024-03-24

## 2023-03-24 RX ORDER — CHOLESTYRAMINE 4 G/9G
1 POWDER, FOR SUSPENSION ORAL DAILY
Qty: 10 PACKET | Refills: 0 | Status: SHIPPED | OUTPATIENT
Start: 2023-03-24 | End: 2023-04-03

## 2023-03-24 RX ORDER — INSULIN ASPART 100 [IU]/ML
0-5 INJECTION, SOLUTION INTRAVENOUS; SUBCUTANEOUS EVERY 6 HOURS PRN
Qty: 3 ML | Refills: 0 | Status: SHIPPED | OUTPATIENT
Start: 2023-03-24 | End: 2024-03-23

## 2023-03-24 RX ORDER — AMLODIPINE BESYLATE 10 MG/1
10 TABLET ORAL DAILY
Qty: 30 TABLET | Refills: 1 | Status: SHIPPED | OUTPATIENT
Start: 2023-03-24 | End: 2024-03-23

## 2023-03-24 RX ORDER — SODIUM CHLORIDE 450 MG/100ML
INJECTION, SOLUTION INTRAVENOUS CONTINUOUS
Status: DISCONTINUED | OUTPATIENT
Start: 2023-03-24 | End: 2023-03-24 | Stop reason: HOSPADM

## 2023-03-24 RX ADMIN — FOLIC ACID 1 MG: 1 TABLET ORAL at 09:03

## 2023-03-24 RX ADMIN — POTASSIUM CHLORIDE 20 MEQ: 20 SOLUTION ORAL at 09:03

## 2023-03-24 RX ADMIN — SERTRALINE HYDROCHLORIDE 100 MG: 50 TABLET ORAL at 09:03

## 2023-03-24 RX ADMIN — FEBUXOSTAT 40 MG: 40 TABLET, FILM COATED ORAL at 09:03

## 2023-03-24 RX ADMIN — VANCOMYCIN HYDROCHLORIDE 125 MG: KIT at 12:03

## 2023-03-24 RX ADMIN — PANTOPRAZOLE SODIUM 40 MG: 40 TABLET, DELAYED RELEASE ORAL at 09:03

## 2023-03-24 RX ADMIN — ATORVASTATIN CALCIUM 10 MG: 10 TABLET, FILM COATED ORAL at 09:03

## 2023-03-24 RX ADMIN — ONDANSETRON 4 MG: 4 TABLET, ORALLY DISINTEGRATING ORAL at 10:03

## 2023-03-24 RX ADMIN — VANCOMYCIN HYDROCHLORIDE 125 MG: KIT at 05:03

## 2023-03-24 RX ADMIN — MICONAZOLE NITRATE: 20 POWDER TOPICAL at 09:03

## 2023-03-24 RX ADMIN — SODIUM CHLORIDE: 4.5 INJECTION, SOLUTION INTRAVENOUS at 10:03

## 2023-03-24 RX ADMIN — CHOLESTYRAMINE 4 G: 4 POWDER, FOR SUSPENSION ORAL at 09:03

## 2023-03-24 RX ADMIN — HEPARIN SODIUM 7500 UNITS: 5000 INJECTION INTRAVENOUS; SUBCUTANEOUS at 06:03

## 2023-03-24 RX ADMIN — HEPARIN SODIUM 7500 UNITS: 5000 INJECTION INTRAVENOUS; SUBCUTANEOUS at 02:03

## 2023-03-24 NOTE — PROGRESS NOTES
[facility] S[facility]  Nephrology  Progress Note    Patient Name: Chastity Hung  MRN: [id number]  Admission Date: 3/15/2023  Hospital Length of Stay: 9 days  Attending Provider: Miki Smallwood, *   Primary Care Physician: Alexandra Dawkins MD  Principal Problem:Acute renal failure      Subjective:     Interval History:   - more responsive today, at my exam intermittent  - may need PICC  - two sons at bedside, updated    3/18  - no nausea/queasiness  - alert, responsive with son at bedside     3/19  - feels well, no nausea  - Singh full with yellow urine    3/20  - feels fine  - UTI    3/21  - feels OK  - all 3 sons at bedside    3/22  - UOP maintaining    3/23  - feeling well  - disposition underway     *For 3/24/23: First visit with patient; seen and examined; was nauseated and vomited this am.     Review of patient's allergies indicates:   Allergen Reactions    Nsaids (non-steroidal anti-inflammatory drug) Other (See Comments)     Gastric ulcer perforation      Current Facility-Administered Medications   Medication Frequency    0.9%  NaCl infusion (for blood administration) Q24H PRN    0.9%  NaCl infusion PRN    0.9%  NaCl infusion Continuous    acetaminophen tablet 650 mg Q6H PRN    atorvastatin tablet 10 mg Daily    calcium gluconate 1 g in NS IVPB (premixed) Q10 Min PRN    cholestyramine 4 gram packet 4 g BID    dextrose 10% bolus 125 mL 125 mL PRN    dextrose 10% bolus 250 mL 250 mL PRN    febuxostat tablet 40 mg Daily    folic acid tablet 1 mg Daily    glucagon (human recombinant) injection 1 mg PRN    heparin (porcine) injection 7,500 Units Q8H    hydrALAZINE injection 10 mg Q6H PRN    insulin aspart U-100 pen 0-5 Units Q6H PRN    melatonin tablet 6 mg Nightly PRN    miconazole NITRATE 2 % top powder BID    ondansetron disintegrating tablet 4 mg Q6H PRN    pantoprazole EC tablet 40 mg BID    potassium chloride 10% oral solution 20 mEq BID    sertraline tablet 100 mg Daily    vancomycin 125 mg/5 mL  oral solution 125 mg Q6H       Objective:     Vital Signs (Most Recent):  Temp: 98.3 °F (36.8 °C) (03/24/23 1011)  Pulse: 95 (03/24/23 1011)  Resp: 18 (03/24/23 1011)  BP: (!) 145/74 (03/24/23 1011)  SpO2: (!) 94 % (03/24/23 1011)   Vital Signs (24h Range):  Temp:  [97.6 °F (36.4 °C)-98.4 °F (36.9 °C)] 98.3 °F (36.8 °C)  Pulse:  [85-95] 95  Resp:  [16-18] 18  SpO2:  [94 %-97 %] 94 %  BP: (143-159)/(63-75) 145/74     Weight: (!) 224.1 kg (494 lb 0.8 oz) (03/20/23 1609)  Body mass index is 84.8 kg/m².  Body surface area is 3.18 meters squared.    I/O last 3 completed shifts:  In: 1100 [I.V.:1100]  Out: 2400 [Urine:2400]    Physical Exam  Vitals and nursing note reviewed.   Constitutional:       General: She is awake. She is not in acute distress.     Appearance: She is obese. She is not ill-appearing.   HENT:      Head: Atraumatic.   Eyes:      General: No scleral icterus.  Cardiovascular:      Rate and Rhythm: Normal rate.   Neurological:      Mental Status: She is alert and oriented to person, place, and time.   Psychiatric:         Mood and Affect: Mood normal.         Behavior: Behavior is cooperative.       Significant Labs: reviewed         Assessment/Plan:     Active Diagnoses:    Diagnosis Date Noted POA    PRINCIPAL PROBLEM:  Acute renal failure [N17.9] 03/15/2023 Yes    Hypernatremia [E87.0] 03/19/2023 Yes    Anemia [D64.9] 03/17/2023 Yes    Hypokalemia [E87.6] 03/17/2023 No    Diarrhea [R19.7] 03/16/2023 Yes    Acute cystitis [N30.00] 01/31/2023 Yes    Hyperuricemia [E79.0] 08/03/2017 Yes    Morbid obesity with BMI of 70 and over, adult [E66.01, Z68.45] 01/11/2016 Not Applicable    Diabetes mellitus type 2, controlled [E11.9]  Yes    Essential hypertension [I10]  Yes      Problems Resolved During this Admission:    Diagnosis Date Noted Date Resolved POA    Hyperkalemia [E87.5] 03/16/2023 03/18/2023 No    Metabolic acidosis with respiratory acidosis [E87.4] 03/16/2023 03/19/2023 Yes     MIGUEL due to ATN  (prolonged diarrhea and hypotension) with baseline creatinine around 1mg/dL  - stable UOP with further improvement in creatinine   - can d/c IVFs, developing hyperchloremia, or change to 1/2NS    Metabolic Acidosis  - improved, off all bicarb  - monitor     Hypernatremia  - resolved     Hypokalemia  - continue po repletion   - continue to replete Mag  - from prolonged diarrhea     UTI  - on ABX    *For 3/24/23: No new labs, will order BMP now; d/w nursing; eGFR slowly improving past couple of days; meds and labs reviewed; see CPOE; no urgent HD/RRT need at present time.  Anti-emetics per nursing. Changing IVF to half normal; Phos and Mag to be re-checked.       Fabien Britton MD  Nephrology

## 2023-03-24 NOTE — PLAN OF CARE
Patient remains free from injury this shift. Safety precautions maintained. No s/s of acute distress noted. IVF infusing. Cardiac monitoring in place. Blood glucose monitoring continued this shift per orders. Chart and orders review complete. Patient education about care complete.       Problem: Adult Inpatient Plan of Care  Goal: Plan of Care Review  Outcome: Ongoing, Progressing  Goal: Patient-Specific Goal (Individualized)  Outcome: Ongoing, Progressing  Goal: Absence of Hospital-Acquired Illness or Injury  Outcome: Ongoing, Progressing  Goal: Optimal Comfort and Wellbeing  Outcome: Ongoing, Progressing  Goal: Readiness for Transition of Care  Outcome: Ongoing, Progressing     Problem: Bariatric Environmental Safety  Goal: Safety Maintained with Care  Outcome: Ongoing, Progressing     Problem: Infection  Goal: Absence of Infection Signs and Symptoms  Outcome: Ongoing, Progressing     Problem: Fluid and Electrolyte Imbalance (Acute Kidney Injury/Impairment)  Goal: Fluid and Electrolyte Balance  Outcome: Ongoing, Progressing     Problem: Diabetes Comorbidity  Goal: Blood Glucose Level Within Targeted Range  Outcome: Ongoing, Progressing

## 2023-03-24 NOTE — NURSING
Report given to Tammie of [facility]. Requested if Ok to leave waters in. MD notified and okay to leave waters in for wounds.

## 2023-03-24 NOTE — PLAN OF CARE
VSS. Fall precautions maintained.   Dressing intact and clean  No pain complaints.  IV fluids maintained.  Blood sugar monitored.   NSR on cardiac monitor.  Repositioned q2hrs and as needed. On sizewise bed  SCDs on.  Singh intact and draining.  Family at bedside.   All needs met. Will continue to monitor.

## 2023-03-24 NOTE — SUBJECTIVE & OBJECTIVE
Interval History:     Review of Systems   Constitutional:  Positive for activity change, appetite change and fatigue. Negative for fever.   HENT:  Negative for trouble swallowing.    Eyes: Negative.    Respiratory:  Negative for shortness of breath.    Gastrointestinal:  Negative for abdominal pain, nausea and vomiting.   Endocrine: Negative.    Genitourinary: Negative.    Musculoskeletal:  Positive for gait problem.   Allergic/Immunologic: Negative.    Neurological:  Positive for weakness.   Psychiatric/Behavioral:  Negative for agitation, behavioral problems, confusion and decreased concentration.    Objective:     Vital Signs (Most Recent):  Temp: 98.3 °F (36.8 °C) (03/24/23 1011)  Pulse: 95 (03/24/23 1011)  Resp: 18 (03/24/23 1011)  BP: (!) 145/74 (03/24/23 1011)  SpO2: (!) 94 % (03/24/23 1011)   Vital Signs (24h Range):  Temp:  [97.6 °F (36.4 °C)-98.4 °F (36.9 °C)] 98.3 °F (36.8 °C)  Pulse:  [85-95] 95  Resp:  [16-18] 18  SpO2:  [94 %-97 %] 94 %  BP: (143-159)/(63-75) 145/74     Weight: (!) 224.1 kg (494 lb 0.8 oz)  Body mass index is 84.8 kg/m².    Intake/Output Summary (Last 24 hours) at 3/24/2023 1048  Last data filed at 3/24/2023 0827  Gross per 24 hour   Intake --   Output 2200 ml   Net -2200 ml      Physical Exam  Vitals and nursing note reviewed. Exam conducted with a chaperone present (speech, family).   Constitutional:       General: She is not in acute distress.     Appearance: She is morbidly obese. She is ill-appearing. She is not toxic-appearing.   HENT:      Head: Normocephalic and atraumatic.      Nose: Nose normal.   Eyes:      Pupils: Pupils are equal, round, and reactive to light.   Cardiovascular:      Rate and Rhythm: Normal rate.   Pulmonary:      Effort: Pulmonary effort is normal. No respiratory distress.   Abdominal:      Palpations: Abdomen is soft.      Tenderness: There is no abdominal tenderness.   Genitourinary:     Comments: Singh  Musculoskeletal:      Cervical back: Normal range  of motion.      Right lower leg: Edema present.      Left lower leg: Edema present.   Skin:     General: Skin is warm.      Findings: Lesion present.   Neurological:      Mental Status: She is alert. Mental status is at baseline.      Motor: Weakness present.   Psychiatric:         Speech: Speech normal.         Behavior: Behavior is cooperative.         Cognition and Memory: She exhibits impaired recent memory.       Significant Labs: All pertinent labs within the past 24 hours have been reviewed.      Significant Imaging: I have reviewed all pertinent imaging results/findings within the past 24 hours.

## 2023-03-24 NOTE — ASSESSMENT & PLAN NOTE
Patient's FSGs are controlled on current medication regimen.  Last A1c reviewed-   Lab Results   Component Value Date    HGBA1C 6.4 (H) 01/25/2023     Most recent fingerstick glucose reviewed-   Recent Labs   Lab 03/23/23  1627 03/23/23 2013 03/24/23  0540 03/24/23  1116   POCTGLUCOSE 80 76 90 77     Current correctional scale  Low  Maintain anti-hyperglycemic dose as follows-   Antihyperglycemics (From admission, onward)    Start     Stop Route Frequency Ordered    03/15/23 1412  insulin aspart U-100 pen 0-5 Units         -- SubQ Every 6 hours PRN 03/15/23 1312        Hold Oral hypoglycemics while patient is in the hospital.  Progressed to soft diet per speech recommendations

## 2023-03-24 NOTE — ASSESSMENT & PLAN NOTE
Previously treated with antibiotic(s) for uti during previous hospitalization  Uncertain whether patient received antibiotic(s) during skilled nursing facility stay  Stool studies  Negative

## 2023-03-24 NOTE — ASSESSMENT & PLAN NOTE
Patient's FSGs are controlled on current medication regimen.  Last A1c reviewed-   Lab Results   Component Value Date    HGBA1C 6.4 (H) 01/25/2023     Most recent fingerstick glucose reviewed-   Recent Labs   Lab 03/23/23  1627 03/23/23 2013 03/24/23  0540   POCTGLUCOSE 80 76 90     Current correctional scale  Low  Maintain anti-hyperglycemic dose as follows-   Antihyperglycemics (From admission, onward)    Start     Stop Route Frequency Ordered    03/15/23 1412  insulin aspart U-100 pen 0-5 Units         -- SubQ Every 6 hours PRN 03/15/23 1312        Hold Oral hypoglycemics while patient is in the hospital.  Progressed to soft diet per speech recommendations

## 2023-03-24 NOTE — DISCHARGE SUMMARY
[facility]– [place] Medicine  Discharge Summary      Patient Name: Chastity Hung  MRN: [id number]  JORGE: [id number]  Patient Class: IP- Inpatient  Admission Date: 3/15/2023  Hospital Length of Stay: 9 days  Discharge Date and Time:  2023 5:31 PM  Attending Physician: Miki Smallwood, *   Discharging Provider: Miki Smallwood MD  Primary Care Provider: Alexandra Dawkins MD    Primary Care Team: Networked reference to record PCT     HPI:   Patient is a 57 y.o. aa female with a PMHx of DM2, HLD, HTN, morbid obesity, and reactive airway disease who presents to the Emergency Department for slurred speech since this morning. Unable to obtain hx due to confusion, hx obtained via chart review. Patient was last seen normal last night. She was started on xanax a couple of days ago. No other issues reported. In the ED, no focal weakness was noted however patient was confused. Unable to obtain CT scan due to body habitus. Labs significant for BUN/Cr: 69/6.4, HCO3: 14, M.0, Uric acid: 12.2. Patient was bolused 2 L NS. HM consulted and patient admitted for acute renal failure.           * No surgery found *      Hospital Course:   3/16 admitted for acute renal failure, respiratory and metabolic acidosis. Started on bicarb gtt. Hyperkalemia noted. Patient/family noted diarrhea at skilled nursing facility. Son endorses steady decline in health 2/2 depression.  3/17 significant drop in hb/hct. Likely dilutional in setting of dehydration. Occult stool negative. Blood transfusion pending. Nephrology following for acute renal failure and hypernatremia. Mentation improving  3/18 mentation waxes and wanes. Labs either stable or improving. Picc line pending  3/19 mentation improved. Diet advanced per speech recommendations. Tolerated picc line placement.  3/20 She is aao x 3 in nad . Admitted with a Dx of metabolic encephalopathy  , acute diarrhea  and MIGUEL .  Nephrology consulted .  Pt most likely  develop ATN due to  IVVD . The diarrhea has improved . She was started on po vanc  by previous MD for possible C.diff . The diarrhea has improved .The urine cx is (+) for GNR .   3/21 NAEON . She is more awake . The diarrhea has improved .  Kidney function slowly improving . CM consulted for placement . She has a good UOP .   3/22   The kidney function  is improving .  SW consulted for NH placement .  Urine cx (+) for klebsiella pneumoniae  sensitive to rocephin .  3/23 NAEON . CM consulted for NH placement . She ahs a good UOP . S/P IV rocpehin for UTI .  3/24 Awaiting NH placement . NAEON .  No new complains .    Pt was seen and examiend at bedside . She was determined to be suitable for d/c .  She will be d/c to [facility].  The kidney function is improving with a good UOP . Case D/W Nephrology which agree to d/c home . She need to f/u with nephrology n 1 to 2 week . Repeat CMP in 1 to 2 week.  The diarrhea has resolved . She is tolerating po  intake .  Losartan and metformin d/c  due to MIGUEL . Started on Norvasc and Insulin sliding scale as need .  Consider to start ozempic at  the NH   Cont PPI BID due to recent Perforated PUD . Need to F/U with general surgery as scheduled before .           Goals of Care Treatment Preferences:  Code Status: Full Code      Consults:   Consults (From admission, onward)        Status Ordering Provider     [facility]  Once        Provider:  (Not yet assigned)    Acknowledged KARLEE MORALES     Inpatient consult to Midline team  Once        Provider:  (Not yet assigned)    Acknowledged KARLEE MORALES     IP consult to case management  Once        Provider:  (Not yet assigned)    Completed LE, ANTHONY     Inpatient consult to Registered Dietitian/Nutritionist  Once        Provider:  (Not yet assigned)    Completed LE, ANTHONY     Inpatient consult to Nephrology  Once        Provider:  (Not yet assigned)    Acknowledged AMY ABRAHAM           Cardiac/Vascular  Essential hypertension  Normotensive  Hydralazine prn  Hold losartan    Renal/  * Acute renal failure  2/2 to ATN  UOP improving   Nephrology on board     Hypernatremia  Discontinued fluids  Diet progressed   Improving     Acute cystitis  Rocephin 1 gr qd x 3 days     Oncology  Anemia  Responded after blood tranfusion  No overt signs or symptoms of bleeding  Multifactorial: nutritional, toby?, dilutional  Iron studies negative for toby  Stool occult negative   Mcv>80  Folate low   b12 elevated    Resume PPI BID   H/O Perforated PUD s/p surgery       Endocrine  Morbid obesity with BMI of 70 and over, adult  Body mass index is 84.8 kg/m². Morbid obesity complicates all aspects of disease management from diagnostic modalities to treatment. Weight loss encouraged and health benefits explained to patient.     Has been bedbound for this year    Diabetes mellitus type 2, controlled  Patient's FSGs are controlled on current medication regimen.  Last A1c reviewed-   Lab Results   Component Value Date    HGBA1C 6.4 (H) 01/25/2023     Most recent fingerstick glucose reviewed-   Recent Labs   Lab 03/23/23  1627 03/23/23 2013 03/24/23  0540 03/24/23  1116   POCTGLUCOSE 80 76 90 77     Current correctional scale  Low  Maintain anti-hyperglycemic dose as follows-   Antihyperglycemics (From admission, onward)    Start     Stop Route Frequency Ordered    03/15/23 1412  insulin aspart U-100 pen 0-5 Units         -- SubQ Every 6 hours PRN 03/15/23 1312        Hold Oral hypoglycemics while patient is in the hospital.  Progressed to soft diet per speech recommendations     GI  Diarrhea  Previously treated with antibiotic(s) for uti during previous hospitalization  Uncertain whether patient received antibiotic(s) during skilled nursing facility stay  Stool studies  Negative     Orthopedic  Hyperuricemia  Will cont febuxostat         Final Active Diagnoses:    Diagnosis Date Noted POA    PRINCIPAL PROBLEM:  Acute  "renal failure [N17.9] 03/15/2023 Yes    Hypernatremia [E87.0] 03/19/2023 Yes    Anemia [D64.9] 03/17/2023 Yes    Hypokalemia [E87.6] 03/17/2023 No    Diarrhea [R19.7] 03/16/2023 Yes    Acute cystitis [N30.00] 01/31/2023 Yes    Hyperuricemia [E79.0] 08/03/2017 Yes    Morbid obesity with BMI of 70 and over, adult [E66.01, Z68.45] 01/11/2016 Not Applicable    Diabetes mellitus type 2, controlled [E11.9]  Yes    Essential hypertension [I10]  Yes      Problems Resolved During this Admission:    Diagnosis Date Noted Date Resolved POA    Hyperkalemia [E87.5] 03/16/2023 03/18/2023 No    Metabolic acidosis with respiratory acidosis [E87.4] 03/16/2023 03/19/2023 Yes       Discharged Condition: stable    Disposition: Skilled Nursing Facility    Follow Up:   Follow-up Information     Alexandra Dawkins MD Follow up in 1 week(s).    Specialty: Family Medicine  Contact information:  [address]  [facility] [id number] [phone]             Hayden Harris MD Follow up in 2 week(s).    Specialty: Nephrology  Contact information:  [address] [place] [address] [phone]             [facility] Follow up.    Why: SNF  Contact information:  [address] [facility] [address] [phone]                       Patient Instructions:      HOSPITAL BED FOR HOME USE     Order Specific Question Answer Comments   Type: Heavy duty (350-500#)    Length of need (1-99 months): 99    Does patient have medical equipment at home? none    Height: 5' 4" (1.626 m)    Weight: 224.1 kg (494 lb 0.8 oz)    Please check all that apply: Patient requires frequent changes in body position and/or has an immediate need for a change in body position.    Please check all that apply: Patient requires positioning of the body in ways not feasible in an ordinary bed due to a medical condition which is expected to last at least one month.      Diet diabetic     Activity as tolerated       Significant Diagnostic " Studies: Labs:   BMP:   Recent Labs   Lab 03/23/23  1113 03/24/23  1040   GLU 76 70    143   K 3.9 4.2   * 112*   CO2 21* 20*   BUN 49* 45*   CREATININE 4.6* 4.4*   CALCIUM 8.2* 8.9   MG 1.3* 1.7   , CMP   Recent Labs   Lab 03/23/23  1113 03/24/23  1040    143   K 3.9 4.2   * 112*   CO2 21* 20*   GLU 76 70   BUN 49* 45*   CREATININE 4.6* 4.4*   CALCIUM 8.2* 8.9   ANIONGAP 7* 11    and CBC   Recent Labs   Lab 03/24/23  1040   WBC 7.10   HGB 9.1*   HCT 30.3*        Microbiology: Blood Culture No results found for: LABBLOO    Pending Diagnostic Studies:     Procedure Component Value Units Date/Time    Calprotectin, Stool [[id number]] Collected: 03/20/23 1534    Order Status: Sent Lab Status: In process Updated: 03/22/23 0331    Specimen: Stool     Pancreatic elastase, fecal [[id number]] Collected: 03/20/23 1534    Order Status: Sent Lab Status: In process Updated: 03/22/23 0033    Specimen: Stool     Stool Exam-Ova,Cysts,Parasites [[id number]] Collected: 03/20/23 1534    Order Status: Sent Lab Status: In process Updated: 03/22/23 0033    Specimen: Stool     Stool Exam-Ova,Cysts,Parasites [[id number]] Collected: 03/16/23 1501    Order Status: Sent Lab Status: In process Updated: 03/19/23 1403    Specimen: Stool          Medications:  Reconciled Home Medications:      Medication List      START taking these medications    amLODIPine 10 MG tablet  Commonly known as: NORVASC  Take 1 tablet (10 mg total) by mouth once daily.     cholestyramine 4 gram packet  Commonly known as: QUESTRAN  Take 1 packet (4 g total) by mouth once daily. for 10 days     folic acid 1 MG tablet  Commonly known as: FOLVITE  Take 1 tablet (1 mg total) by mouth once daily.  Start taking on: March 25, 2023     insulin aspart U-100 100 unit/mL (3 mL) Inpn pen  Commonly known as: NovoLOG  Inject 0-5 Units into the skin every 6 (six) hours as needed (Hyperglycemia). **LOW CORRECTION DOSE**  Blood Glucose  mg/dL     "              0600                      0000                               1200                               1800  151-200                0 unit                      0 unit  201-250                2 units                    1 unit  251-300                3 units                    1 unit  301-350                4 units                    2 units  >350                     5 units                    3 units  Administer subcutaneously if needed at times designated by monitoring schedule.   DO NOT HOLD correction dose insulin for patients who are NPO.  "HIGH ALERT MEDICATION" - Administer with meals or TF/TPN.        CONTINUE taking these medications    ALPRAZolam 0.5 MG tablet  Commonly known as: XANAX  Take 0.5 mg by mouth 2 (two) times daily as needed.     atorvastatin 10 MG tablet  Commonly known as: LIPITOR  TAKE 1 TABLET(10 MG) BY MOUTH EVERY DAY     blood sugar diagnostic Strp  1 strip by Misc.(Non-Drug; Combo Route) route 3 (three) times daily.     cyanocobalamin 100 MCG tablet  Commonly known as: VITAMIN B-12  Take 100 mcg by mouth once daily.     febuxostat 40 mg Tab  Commonly known as: ULORIC  TAKE 1 TABLET(40 MG) BY MOUTH EVERY DAY     ferrous gluconate 324 MG tablet  Commonly known as: FERGON  Take 324 mg by mouth daily with breakfast.     gabapentin 300 MG capsule  Commonly known as: NEURONTIN  TAKE 1 CAPSULE(300 MG) BY MOUTH THREE TIMES DAILY     lancets 28 gauge Misc  Commonly known as: FREESTYLE LANCETS  USE THREE TIMES DAILY     * miconazole NITRATE 2 % 2 % top powder  Commonly known as: MICOTIN  Apply topically 2 (two) times daily.     * miconazole nitrate 2% 2 % Oint  Commonly known as: MICOTIN  Apply topically 2 (two) times daily.     multivitamin with minerals tablet  Take 1 tablet by mouth once daily.     pantoprazole 40 MG tablet  Commonly known as: PROTONIX  Take 1 tablet (40 mg total) by mouth 2 (two) times daily for 30 days, THEN 1 tablet (40 mg total) once daily.  Start taking on: January " 31, 2023     sertraline 100 MG tablet  Commonly known as: ZOLOFT  TAKE 1 TABLET(100 MG) BY MOUTH EVERY DAY     sucralfate 1 gram tablet  Commonly known as: CARAFATE  Take 1 tablet (1 g total) by mouth 4 (four) times daily before meals and nightly.     vitamin D 1000 units Tab  Commonly known as: VITAMIN D3  Take 1,000 Units by mouth once daily.     zinc sulfate 50 mg zinc (220 mg) capsule  Commonly known as: ZINCATE  Take 220 mg by mouth 3 (three) times daily.         * This list has 2 medication(s) that are the same as other medications prescribed for you. Read the directions carefully, and ask your doctor or other care provider to review them with you.            STOP taking these medications    losartan 25 MG tablet  Commonly known as: COZAAR     metFORMIN 1000 MG tablet  Commonly known as: GLUCOPHAGE            Indwelling Lines/Drains at time of discharge:   Lines/Drains/Airways     Peripherally Inserted Central Catheter Line  Duration           PICC Double Lumen 03/18/23 1533 right basilic 6 days          Drain  Duration                Urethral Catheter 03/15/23 1253 Straight-tip 16 Fr. 9 days                Time spent on the discharge of patient: 30 minutes         Miki Smallwood MD  Department of Hospital Medicine  [facility]

## 2023-03-24 NOTE — PROGRESS NOTES
[facility] Medicine  Progress Note    Patient Name: Chastity Hung  MRN: [id number]  Patient Class: IP- Inpatient   Admission Date: 3/15/2023  Length of Stay: 9 days  Attending Physician: Miki Smallwood, *  Primary Care Provider: Alexandra Dawkins MD        Subjective:     Principal Problem:Acute renal failure        HPI:  Patient is a 57 y.o. aa female with a PMHx of DM2, HLD, HTN, morbid obesity, and reactive airway disease who presents to the Emergency Department for slurred speech since this morning. Unable to obtain hx due to confusion, hx obtained via chart review. Patient was last seen normal last night. She was started on xanax a couple of days ago. No other issues reported. In the ED, no focal weakness was noted however patient was confused. Unable to obtain CT scan due to body habitus. Labs significant for BUN/Cr: 69/6.4, HCO3: 14, M.0, Uric acid: 12.2. Patient was bolused 2 L NS. HM consulted and patient admitted for acute renal failure.           Overview/Hospital Course:  3/16 admitted for acute renal failure, respiratory and metabolic acidosis. Started on bicarb gtt. Hyperkalemia noted. Patient/family noted diarrhea at skilled nursing facility. Son endorses steady decline in health 2/2 depression.  3/17 significant drop in hb/hct. Likely dilutional in setting of dehydration. Occult stool negative. Blood transfusion pending. Nephrology following for acute renal failure and hypernatremia. Mentation improving  3/18 mentation waxes and wanes. Labs either stable or improving. Picc line pending  3/19 mentation improved. Diet advanced per speech recommendations. Tolerated picc line placement.  3/20 She is aao x 3 in nad . Admitted with a Dx of metabolic encephalopathy  , acute diarrhea  and MIGUEL .  Nephrology consulted .  Pt most likely develop ATN due to  IVVD . The diarrhea has improved . She was started on po vanc  by previous MD for possible C.diff . The diarrhea has improved  .The urine cx is (+) for GNR .   3/21 NAEON . She is more awake . The diarrhea has improved .  Kidney function slowly improving . CM consulted for placement . She has a good UOP .   3/22   The kidney function  is improving .  SW consulted for NH placement .  Urine cx (+) for klebsiella pneumoniae  sensitive to rocephin .  3/23 NAEON . CM consulted for NH placement . She ahs a good UOP . S/P IV rocpehin for UTI .  3/24 Awaiting NH placement . NAEON .  No new complains .      Interval History:     Review of Systems   Constitutional:  Positive for activity change, appetite change and fatigue. Negative for fever.   HENT:  Negative for trouble swallowing.    Eyes: Negative.    Respiratory:  Negative for shortness of breath.    Gastrointestinal:  Negative for abdominal pain, nausea and vomiting.   Endocrine: Negative.    Genitourinary: Negative.    Musculoskeletal:  Positive for gait problem.   Allergic/Immunologic: Negative.    Neurological:  Positive for weakness.   Psychiatric/Behavioral:  Negative for agitation, behavioral problems, confusion and decreased concentration.    Objective:     Vital Signs (Most Recent):  Temp: 98.3 °F (36.8 °C) (03/24/23 1011)  Pulse: 95 (03/24/23 1011)  Resp: 18 (03/24/23 1011)  BP: (!) 145/74 (03/24/23 1011)  SpO2: (!) 94 % (03/24/23 1011)   Vital Signs (24h Range):  Temp:  [97.6 °F (36.4 °C)-98.4 °F (36.9 °C)] 98.3 °F (36.8 °C)  Pulse:  [85-95] 95  Resp:  [16-18] 18  SpO2:  [94 %-97 %] 94 %  BP: (143-159)/(63-75) 145/74     Weight: (!) 224.1 kg (494 lb 0.8 oz)  Body mass index is 84.8 kg/m².    Intake/Output Summary (Last 24 hours) at 3/24/2023 1048  Last data filed at 3/24/2023 0827  Gross per 24 hour   Intake --   Output 2200 ml   Net -2200 ml      Physical Exam  Vitals and nursing note reviewed. Exam conducted with a chaperone present (speech, family).   Constitutional:       General: She is not in acute distress.     Appearance: She is morbidly obese. She is ill-appearing. She is  not toxic-appearing.   HENT:      Head: Normocephalic and atraumatic.      Nose: Nose normal.   Eyes:      Pupils: Pupils are equal, round, and reactive to light.   Cardiovascular:      Rate and Rhythm: Normal rate.   Pulmonary:      Effort: Pulmonary effort is normal. No respiratory distress.   Abdominal:      Palpations: Abdomen is soft.      Tenderness: There is no abdominal tenderness.   Genitourinary:     Comments: Singh  Musculoskeletal:      Cervical back: Normal range of motion.      Right lower leg: Edema present.      Left lower leg: Edema present.   Skin:     General: Skin is warm.      Findings: Lesion present.   Neurological:      Mental Status: She is alert. Mental status is at baseline.      Motor: Weakness present.   Psychiatric:         Speech: Speech normal.         Behavior: Behavior is cooperative.         Cognition and Memory: She exhibits impaired recent memory.       Significant Labs: All pertinent labs within the past 24 hours have been reviewed.      Significant Imaging: I have reviewed all pertinent imaging results/findings within the past 24 hours.        Assessment/Plan:      * Acute renal failure  2/2 to ATN  UOP improving   Nephrology on board     Hypernatremia  Discontinued fluids  Diet progressed   Improving     Hypokalemia  Initially hyperkalemia requiring kayexalate, albuterol and calcium gluconate  Now hypokalemia in setting of hypomagnesemia   replete      Anemia  Responded after blood tranfusion  No overt signs or symptoms of bleeding  Multifactorial: nutritional, toby?, dilutional  Iron studies negative for toby  Stool occult negative   Mcv>80  Folate low   b12 elevated    Resume PPI BID   H/O Perforated PUD s/p surgery       Diarrhea  Previously treated with antibiotic(s) for uti during previous hospitalization  Uncertain whether patient received antibiotic(s) during skilled nursing facility stay  Treat empirically for cdiff  Stool studies pending    Acute cystitis  Rocephin 1 gr  qd x 3 days     Hyperuricemia  Will cont febuxostat       Morbid obesity with BMI of 70 and over, adult  Body mass index is 84.8 kg/m². Morbid obesity complicates all aspects of disease management from diagnostic modalities to treatment. Weight loss encouraged and health benefits explained to patient.     Has been bedbound for this year    Diabetes mellitus type 2, controlled  Patient's FSGs are controlled on current medication regimen.  Last A1c reviewed-   Lab Results   Component Value Date    HGBA1C 6.4 (H) 01/25/2023     Most recent fingerstick glucose reviewed-   Recent Labs   Lab 03/23/23  1627 03/23/23 2013 03/24/23  0540   POCTGLUCOSE 80 76 90     Current correctional scale  Low  Maintain anti-hyperglycemic dose as follows-   Antihyperglycemics (From admission, onward)    Start     Stop Route Frequency Ordered    03/15/23 1412  insulin aspart U-100 pen 0-5 Units         -- SubQ Every 6 hours PRN 03/15/23 1312        Hold Oral hypoglycemics while patient is in the hospital.  Progressed to soft diet per speech recommendations     Essential hypertension  Normotensive  Hydralazine prn  Hold losartan      VTE Risk Mitigation (From admission, onward)         Ordered     heparin (porcine) injection 7,500 Units  Every 8 hours         03/22/23 1449                Discharge Planning   ADOLFO:      Code Status: Full Code   Is the patient medically ready for discharge?:     Reason for patient still in hospital (select all that apply): Treatment  Discharge Plan A: New Nursing Home placement - nursing home care facility                  Miki Smallwood MD  Department of Hospital Medicine   [facility]

## 2023-03-24 NOTE — DISCHARGE SUMMARY
[facility] Medicine  Discharge Summary      Patient Name: Chastity Hung  MRN: [id number]  JORGE: [id number]  Patient Class: IP- Inpatient  Admission Date: 3/15/2023  Hospital Length of Stay: 9 days  Discharge Date and Time:  2023 4:17 PM  Attending Physician: Miki Smallwood, *   Discharging Provider: Miki Smallwood MD  Primary Care Provider: Alexandra Dawkins MD    Primary Care Team: Networked reference to record PCT     HPI:   Patient is a 57 y.o. aa female with a PMHx of DM2, HLD, HTN, morbid obesity, and reactive airway disease who presents to the Emergency Department for slurred speech since this morning. Unable to obtain hx due to confusion, hx obtained via chart review. Patient was last seen normal last night. She was started on xanax a couple of days ago. No other issues reported. In the ED, no focal weakness was noted however patient was confused. Unable to obtain CT scan due to body habitus. Labs significant for BUN/Cr: 69/6.4, HCO3: 14, M.0, Uric acid: 12.2. Patient was bolused 2 L NS. HM consulted and patient admitted for acute renal failure.           * No surgery found *      Hospital Course:   3/16 admitted for acute renal failure, respiratory and metabolic acidosis. Started on bicarb gtt. Hyperkalemia noted. Patient/family noted diarrhea at skilled nursing facility. Son endorses steady decline in health 2/2 depression.  3/17 significant drop in hb/hct. Likely dilutional in setting of dehydration. Occult stool negative. Blood transfusion pending. Nephrology following for acute renal failure and hypernatremia. Mentation improving  3/18 mentation waxes and wanes. Labs either stable or improving. Picc line pending  3/19 mentation improved. Diet advanced per speech recommendations. Tolerated picc line placement.  3/20 She is aao x 3 in nad . Admitted with a Dx of metabolic encephalopathy  , acute diarrhea  and MIGUEL .  Nephrology consulted .  Pt most likely  develop ATN due to  IVVD . The diarrhea has improved . She was started on po vanc  by previous MD for possible C.diff . The diarrhea has improved .The urine cx is (+) for GNR .   3/21 NAEON . She is more awake . The diarrhea has improved .  Kidney function slowly improving . CM consulted for placement . She has a good UOP .   3/22   The kidney function  is improving .  SW consulted for NH placement .  Urine cx (+) for klebsiella pneumoniae  sensitive to rocephin .  3/23 NAEON . CM consulted for NH placement . She ahs a good UOP . S/P IV rocpehin for UTI .  3/24 Awaiting NH placement . NAEON .  No new complains .    Pt was seen and examiend at bedside . She was determined to be suitable for d/c .  She will be d/c to [facility].  The kidney function is improving with a good UOP . Case D/W Nephrology which agree to d/c home . She need to f/u with nephrology n 1 to 2 week . Repeat CMP in 1 to 2 week.  The diarrhea has resolved . She is tolerating po  intake .  Losartan and metformin d/c  due to MIGUEL . Started on Norvasc and Insulin sliding scale as need .  Consider to start ozempic at  the NH   Cont PPI BID due to recent Perforated PUD . Need to F/U with general surgery as scheduled before .           Goals of Care Treatment Preferences:  Code Status: Full Code      Consults:   Consults (From admission, onward)        Status Ordering Provider     [facility]  [facility]        Provider:  (Not yet assigned)    Acknowledged KARLEE MORALES     Inpatient consult to Midline team  Once        Provider:  (Not yet assigned)    Acknowledged KARLEE MORALES     IP consult to case management  Once        Provider:  (Not yet assigned)    Completed LE, ANTHONY     Inpatient consult to Registered Dietitian/Nutritionist  Once        Provider:  (Not yet assigned)    Completed LE, ANTHONY     Inpatient consult to Nephrology  Once        Provider:  (Not yet assigned)    Acknowledged AMY ABRAHAM           Cardiac/Vascular  Essential hypertension  Normotensive  Hydralazine prn  Hold losartan    Renal/  * Acute renal failure  2/2 to ATN  UOP improving   Nephrology on board     Hypernatremia  Discontinued fluids  Diet progressed   Improving     Acute cystitis  Rocephin 1 gr qd x 3 days     Oncology  Anemia  Responded after blood tranfusion  No overt signs or symptoms of bleeding  Multifactorial: nutritional, toby?, dilutional  Iron studies negative for toby  Stool occult negative   Mcv>80  Folate low   b12 elevated    Resume PPI BID   H/O Perforated PUD s/p surgery       Endocrine  Morbid obesity with BMI of 70 and over, adult  Body mass index is 84.8 kg/m². Morbid obesity complicates all aspects of disease management from diagnostic modalities to treatment. Weight loss encouraged and health benefits explained to patient.     Has been bedbound for this year    Diabetes mellitus type 2, controlled  Patient's FSGs are controlled on current medication regimen.  Last A1c reviewed-   Lab Results   Component Value Date    HGBA1C 6.4 (H) 01/25/2023     Most recent fingerstick glucose reviewed-   Recent Labs   Lab 03/23/23  1627 03/23/23 2013 03/24/23  0540 03/24/23  1116   POCTGLUCOSE 80 76 90 77     Current correctional scale  Low  Maintain anti-hyperglycemic dose as follows-   Antihyperglycemics (From admission, onward)    Start     Stop Route Frequency Ordered    03/15/23 1412  insulin aspart U-100 pen 0-5 Units         -- SubQ Every 6 hours PRN 03/15/23 1312        Hold Oral hypoglycemics while patient is in the hospital.  Progressed to soft diet per speech recommendations     GI  Diarrhea  Previously treated with antibiotic(s) for uti during previous hospitalization  Uncertain whether patient received antibiotic(s) during skilled nursing facility stay  Stool studies  Negative     Orthopedic  Hyperuricemia  Will cont febuxostat         Final Active Diagnoses:    Diagnosis Date Noted POA    PRINCIPAL PROBLEM:  Acute  "renal failure [N17.9] 03/15/2023 Yes    Hypernatremia [E87.0] 03/19/2023 Yes    Anemia [D64.9] 03/17/2023 Yes    Hypokalemia [E87.6] 03/17/2023 No    Diarrhea [R19.7] 03/16/2023 Yes    Acute cystitis [N30.00] 01/31/2023 Yes    Hyperuricemia [E79.0] 08/03/2017 Yes    Morbid obesity with BMI of 70 and over, adult [E66.01, Z68.45] 01/11/2016 Not Applicable    Diabetes mellitus type 2, controlled [E11.9]  Yes    Essential hypertension [I10]  Yes      Problems Resolved During this Admission:    Diagnosis Date Noted Date Resolved POA    Hyperkalemia [E87.5] 03/16/2023 03/18/2023 No    Metabolic acidosis with respiratory acidosis [E87.4] 03/16/2023 03/19/2023 Yes       Discharged Condition: stable    Disposition: Home or Self Care    Follow Up:   Follow-up Information     Alexandra Dawkins MD Follow up in 1 week(s).    Specialty: Family Medicine  Contact information:  [address] [id number] [phone]             Hayden Harris MD Follow up in 2 week(s).    Specialty: Nephrology  Contact information:  [address] [place] [address] [phone]                       Patient Instructions:      HOSPITAL BED FOR HOME USE     Order Specific Question Answer Comments   Type: Heavy duty (350-500#)    Length of need (1-99 months): 99    Does patient have medical equipment at home? none    Height: 5' 4" (1.626 m)    Weight: 224.1 kg (494 lb 0.8 oz)    Please check all that apply: Patient requires frequent changes in body position and/or has an immediate need for a change in body position.    Please check all that apply: Patient requires positioning of the body in ways not feasible in an ordinary bed due to a medical condition which is expected to last at least one month.      Diet diabetic     Activity as tolerated       Significant Diagnostic Studies: Labs:   BMP:   Recent Labs   Lab 03/23/23  1113 03/24/23  1040   GLU 76 70    143   K 3.9 4.2   * 112*   CO2 21* 20*   BUN 49* " 45*   CREATININE 4.6* 4.4*   CALCIUM 8.2* 8.9   MG 1.3* 1.7   , CMP   Recent Labs   Lab 03/23/23  1113 03/24/23  1040    143   K 3.9 4.2   * 112*   CO2 21* 20*   GLU 76 70   BUN 49* 45*   CREATININE 4.6* 4.4*   CALCIUM 8.2* 8.9   ANIONGAP 7* 11    and CBC   Recent Labs   Lab 03/24/23  1040   WBC 7.10   HGB 9.1*   HCT 30.3*        Microbiology: Blood Culture No results found for: LABBLOO    Pending Diagnostic Studies:     Procedure Component Value Units Date/Time    Calprotectin, Stool [[id number]] Collected: 03/20/23 1534    Order Status: Sent Lab Status: In process Updated: 03/22/23 0331    Specimen: Stool     Pancreatic elastase, fecal [[id number]] Collected: 03/20/23 1534    Order Status: Sent Lab Status: In process Updated: 03/22/23 0033    Specimen: Stool     Stool Exam-Ova,Cysts,Parasites [[id number]] Collected: 03/20/23 1534    Order Status: Sent Lab Status: In process Updated: 03/22/23 0033    Specimen: Stool     Stool Exam-Ova,Cysts,Parasites [[id number]] Collected: 03/16/23 1501    Order Status: Sent Lab Status: In process Updated: 03/19/23 1403    Specimen: Stool          Medications:  Reconciled Home Medications:      Medication List      START taking these medications    amLODIPine 10 MG tablet  Commonly known as: NORVASC  Take 1 tablet (10 mg total) by mouth once daily.     cholestyramine 4 gram packet  Commonly known as: QUESTRAN  Take 1 packet (4 g total) by mouth once daily. for 10 days     folic acid 1 MG tablet  Commonly known as: FOLVITE  Take 1 tablet (1 mg total) by mouth once daily.  Start taking on: March 25, 2023     insulin aspart U-100 100 unit/mL (3 mL) Inpn pen  Commonly known as: NovoLOG  Inject 0-5 Units into the skin every 6 (six) hours as needed (Hyperglycemia). **LOW CORRECTION DOSE**  Blood Glucose  mg/dL                  0600                      0000                               1200                               1800  151-200                0  "unit                      0 unit  201-250                2 units                    1 unit  251-300                3 units                    1 unit  301-350                4 units                    2 units  >350                     5 units                    3 units  Administer subcutaneously if needed at times designated by monitoring schedule.   DO NOT HOLD correction dose insulin for patients who are NPO.  "HIGH ALERT MEDICATION" - Administer with meals or TF/TPN.        CONTINUE taking these medications    ALPRAZolam 0.5 MG tablet  Commonly known as: XANAX  Take 0.5 mg by mouth 2 (two) times daily as needed.     atorvastatin 10 MG tablet  Commonly known as: LIPITOR  TAKE 1 TABLET(10 MG) BY MOUTH EVERY DAY     blood sugar diagnostic Strp  1 strip by Misc.(Non-Drug; Combo Route) route 3 (three) times daily.     cyanocobalamin 100 MCG tablet  Commonly known as: VITAMIN B-12  Take 100 mcg by mouth once daily.     febuxostat 40 mg Tab  Commonly known as: ULORIC  TAKE 1 TABLET(40 MG) BY MOUTH EVERY DAY     ferrous gluconate 324 MG tablet  Commonly known as: FERGON  Take 324 mg by mouth daily with breakfast.     gabapentin 300 MG capsule  Commonly known as: NEURONTIN  TAKE 1 CAPSULE(300 MG) BY MOUTH THREE TIMES DAILY     lancets 28 gauge Misc  Commonly known as: FREESTYLE LANCETS  USE THREE TIMES DAILY     * miconazole NITRATE 2 % 2 % top powder  Commonly known as: MICOTIN  Apply topically 2 (two) times daily.     * miconazole nitrate 2% 2 % Oint  Commonly known as: MICOTIN  Apply topically 2 (two) times daily.     multivitamin with minerals tablet  Take 1 tablet by mouth once daily.     pantoprazole 40 MG tablet  Commonly known as: PROTONIX  Take 1 tablet (40 mg total) by mouth 2 (two) times daily for 30 days, THEN 1 tablet (40 mg total) once daily.  Start taking on: January 31, 2023     sertraline 100 MG tablet  Commonly known as: ZOLOFT  TAKE 1 TABLET(100 MG) BY MOUTH EVERY DAY     sucralfate 1 gram " tablet  Commonly known as: CARAFATE  Take 1 tablet (1 g total) by mouth 4 (four) times daily before meals and nightly.     vitamin D 1000 units Tab  Commonly known as: VITAMIN D3  Take 1,000 Units by mouth once daily.     zinc sulfate 50 mg zinc (220 mg) capsule  Commonly known as: ZINCATE  Take 220 mg by mouth 3 (three) times daily.         * This list has 2 medication(s) that are the same as other medications prescribed for you. Read the directions carefully, and ask your doctor or other care provider to review them with you.            STOP taking these medications    losartan 25 MG tablet  Commonly known as: COZAAR     metFORMIN 1000 MG tablet  Commonly known as: GLUCOPHAGE            Indwelling Lines/Drains at time of discharge:   Lines/Drains/Airways     Peripherally Inserted Central Catheter Line  Duration           PICC Double Lumen 03/18/23 1533 right basilic 6 days          Drain  Duration                Urethral Catheter 03/15/23 1253 Straight-tip 16 Fr. 9 days                Time spent on the discharge of patient: 30 minutes         Miki Smallwood MD  Department of Hospital Medicine  [facility]

## 2023-03-27 LAB — CALPROTECTIN STL-MCNT: 129 MCG/G

## 2023-03-28 NOTE — PHYSICIAN QUERY
PT Name: Chastity Hung  MR #: [id number]     Documentation Clarification      CDS/: Norm Shah RN, CCDS         Contact information:   [email or web address]       This form is a permanent document in the medical record.     Query Date: March 28, 2023    By submitting this query, we are merely seeking further clarification of documentation. Please utilize your independent clinical judgment when addressing the question(s) below.    The Medical Record reflects the following:    Supporting Clinical Findings Location in Medical Record   Covid test: positive     A positive result is indicative of the presence of SARS-CoV-2 RNA;   clinical correlation with patient history and other diagnostic information is   necessary to determine patient infection status   3/24/2023  Infectious Disease    admitted for acute renal failure 2/2 ATN;   respiratory and metabolic acidosis; Started on bicarb gtt. Hyperkalemia noted. Patient/family noted diarrhea at skilled nursing facility. Son endorses steady decline in health 2/2 depression  Disposition: Skilled Nursing Facility   3/24 Discharge summary                                                                             Provider, please provide diagnosis or diagnoses associated with above clinical findings.    [  x ] Asymptomatic Covid test positive    [   ] Covid test is a false positive   [   ] Contact with Covid    [   ] Other (please specify): ____________   [  ] Clinically undetermined

## 2023-03-30 LAB
H PYLORI AG STL QL IA: NOT DETECTED
O+P STL MICRO: NORMAL
SPECIMEN SOURCE: NORMAL

## 2023-04-01 LAB — O+P STL MICRO: NORMAL

## 2023-04-18 ENCOUNTER — TELEPHONE (OUTPATIENT)
Dept: GASTROENTEROLOGY | Facility: CLINIC | Age: 58
End: 2023-04-18
Payer: COMMERCIAL

## 2023-04-18 ENCOUNTER — PATIENT MESSAGE (OUTPATIENT)
Dept: GASTROENTEROLOGY | Facility: CLINIC | Age: 58
End: 2023-04-18
Payer: COMMERCIAL

## 2023-04-18 NOTE — TELEPHONE ENCOUNTER
Called pt and left a msg. Per Dr Mckeon, pt needs a follow up appt with her or someone in GI.  Also sent a message on [facility] to see if pt can come in on 04/25/23.    Appt scheduled for 04/25/23 with Dr Mckeon, pending pt response.

## 2023-04-20 ENCOUNTER — PATIENT MESSAGE (OUTPATIENT)
Dept: GASTROENTEROLOGY | Facility: CLINIC | Age: 58
End: 2023-04-20
Payer: COMMERCIAL

## 2023-04-21 ENCOUNTER — TELEPHONE (OUTPATIENT)
Dept: GASTROENTEROLOGY | Facility: CLINIC | Age: 58
End: 2023-04-21
Payer: COMMERCIAL

## 2023-05-10 DIAGNOSIS — Z12.31 OTHER SCREENING MAMMOGRAM: ICD-10-CM

## 2023-06-19 PROBLEM — N17.9 ACUTE RENAL FAILURE: Status: RESOLVED | Noted: 2023-03-15 | Resolved: 2023-06-19

## 2025-06-25 NOTE — PROGRESS NOTES
[facility] ([facility]  Progress Note    Patient Name: Chastity Hung  MRN: [id number]  Patient Class: IP- Inpatient   Admission Date: 1/25/2023  Length of Stay: 1 days  Attending Physician: Henok Jacobs MD  Primary Care Provider: Alexandra Dawkins MD        Subjective:     Principal Problem:Incarcerated umbilical hernia        HPI:  The patient is a 56 yo female with Morbid Obesity - BMI 89, DM, HTN, Kidney stones who presented to ED with N/V and abdominal pain x 3 days. The patient reports abdominal pain at umbilical area is a sharp, 9/10 pain associated with N/V, Poor appetite, Fatigue, and Malaise. Pt also reports pain and wounds to lower abdomen and BLE legs making it difficult to ambulate. She has not ambulated for over 2 weeks. Pt reports noncompliance with home meds and self care.     In the ED, pt was found to have a non-reducible umbilical hernia with a draining wound at the umbilicus. Pt unable to have CT imaging due to body habitus. Afebrile, WBC 13, Serum Cr 1.6, mildly elevated LFTs/T bili  Abd xray showed nothing acute.     Dr. Collins with General surgery evaluated pt in ED and recommended urgent surgery for incarcerated umbilical hernia and ICU admission.       Overview/Hospital Course:  1/26: perforated gastric ulcer, surgically corrected by surgery. Will cont on rocephin and flagyl for intra-abdominal coverage and UTI. PT/OT on case.       Interval History: No acute events overnight. Stepped down from icu.     Review of Systems   Constitutional:  Negative for chills and fever.   Respiratory:  Negative for shortness of breath and wheezing.    Gastrointestinal:  Positive for abdominal pain. Negative for nausea and vomiting.   Genitourinary:  Negative for flank pain.   Skin:  Positive for wound.   Neurological:  Negative for headaches.   Psychiatric/Behavioral:  The patient is not nervous/anxious.    Objective:     Vital Signs (Most Recent):  Temp: 98.1 °F (36.7 °C)  (01/26/23 0700)  Pulse: 102 (01/26/23 0900)  Resp: (!) 22 (01/26/23 0900)  BP: (!) 126/58 (01/26/23 0900)  SpO2: 99 % (01/26/23 0900)   Vital Signs (24h Range):  Temp:  [97.2 °F (36.2 °C)-98.8 °F (37.1 °C)] 98.1 °F (36.7 °C)  Pulse:  [] 102  Resp:  [19-31] 22  SpO2:  [84 %-100 %] 99 %  BP: (103-180)/(41-72) 126/58     Weight: (!) 235.9 kg (520 lb)  Body mass index is 89.26 kg/m².    Intake/Output Summary (Last 24 hours) at 1/26/2023 1106  Last data filed at 1/26/2023 0905  Gross per 24 hour   Intake 3539.89 ml   Output 458 ml   Net 3081.89 ml      Physical Exam  Constitutional:       General: She is not in acute distress.     Appearance: She is well-developed. She is obese. She is not diaphoretic.   HENT:      Head: Normocephalic and atraumatic.   Eyes:      Pupils: Pupils are equal, round, and reactive to light.   Cardiovascular:      Rate and Rhythm: Normal rate and regular rhythm.      Heart sounds: Normal heart sounds. No murmur heard.    No friction rub. No gallop.   Pulmonary:      Effort: Pulmonary effort is normal. No respiratory distress.      Breath sounds: Normal breath sounds. No stridor. No wheezing or rales.   Abdominal:      General: Bowel sounds are normal. There is no distension.      Palpations: Abdomen is soft. There is no mass.      Tenderness: There is no abdominal tenderness. There is no guarding.   Skin:     General: Skin is warm.      Findings: No erythema.   Neurological:      Mental Status: She is alert and oriented to person, place, and time.       Significant Labs: All pertinent labs within the past 24 hours have been reviewed.    Significant Imaging: I have reviewed all pertinent imaging results/findings within the past 24 hours.      Assessment/Plan:      * Incarcerated umbilical hernia with partial ileum obstruction and perforated gastric ulcer  Cont NPO  Excision and over sewn 1/25/23  By surgery  Diet per surgery    MIGUEL (acute kidney injury)  Patient with acute kidney injury  likely due to IVVD/dehydration MIGUEL is currently worsening. Labs reviewed- Renal function/electrolytes with Estimated Creatinine Clearance: 76 mL/min (A) (based on SCr of 1.6 mg/dL (H)). according to latest data. Monitor urine output and serial BMP and adjust therapy as needed. Avoid nephrotoxins and renally dose meds for GFR listed above.     Gentle IV hydration   Avoid hypotension and nephrotoxic agents       Cutaneous candidiasis  Oral diflucan   Topical miconazole      Venous stasis dermatitis of both lower extremities  Consult wound care  Check Echo/BNP      Morbid obesity with BMI of 70 and over, adult  Body mass index is 89.26 kg/m². Morbid obesity complicates all aspects of disease management from diagnostic modalities to treatment. Weight loss encouraged and health benefits explained to patient.         Diabetes mellitus type 2, controlled  Patient's FSGs are uncontrolled due to hyperglycemia on current medication regimen.  Last A1c reviewed-   Lab Results   Component Value Date    HGBA1C 5.6 03/07/2022     Most recent fingerstick glucose reviewed- No results for input(s): POCTGLUCOSE in the last 24 hours.  Current correctional scale  Low  Maintain anti-hyperglycemic dose as follows-   Antihyperglycemics (From admission, onward)    Start     Stop Route Frequency Ordered    01/25/23 1515  insulin aspart U-100 pen 0-5 Units         -- SubQ Before meals & nightly PRN 01/25/23 1417        Hold Oral hypoglycemics while patient is in the hospital.    Essential hypertension  Hold Spironolactone and Losartan for now   Restart after surgery      Depression  Patient has persistent depression which is mild and is currently controlled. Will Continue anti-depressant medications. We will not consult psychiatry at this time. Patient does not display psychosis at this time. Continue to monitor closely and adjust plan of care as needed.    Restart Zoloft after surgery        Hyperlipidemia  Cont Statin after  surgery        VTE Risk Mitigation (From admission, onward)         Ordered     enoxaparin injection 40 mg  2 times daily         01/26/23 0753     IP VTE HIGH RISK PATIENT  Once         01/26/23 0753     Place sequential compression device  Until discontinued         01/25/23 1417                Discharge Planning   ADOLFO:      Code Status: Full Code   Is the patient medically ready for discharge?:     Reason for patient still in hospital (select all that apply): Patient trending condition               Critical care time spent on the evaluation and treatment of severe organ dysfunction, review of pertinent labs and imaging studies, discussions with consulting providers and discussions with patient/family: 37 minutes.      Henok Jacobs MD  Department of Hospital Medicine   [facility] ([facility])

## (undated) DEVICE — EVACUATOR WOUND BULB 100CC

## (undated) DEVICE — SUT SILK 3-0 SH 18IN BLACK

## (undated) DEVICE — RELOAD PROXIMATE CUT BLUE 75MM

## (undated) DEVICE — CART STAPLE RELD PROX 60X4.8MM

## (undated) DEVICE — COVER LIGHT HANDLE 80/CA

## (undated) DEVICE — MANIFOLD 4 PORT

## (undated) DEVICE — GAUZE SPONGE 4X4 12PLY

## (undated) DEVICE — GLOVE SURG BIOGEL LATEX SZ 7.5

## (undated) DEVICE — DRAIN SIL RND HUBLSS 19F TRCR

## (undated) DEVICE — DEVICE ENSEAL X1 LARGE JAW

## (undated) DEVICE — APPLICATOR CHLORAPREP ORN 26ML

## (undated) DEVICE — SUT SILK 2-0 SH 18IN BLACK

## (undated) DEVICE — SUT PROLENE 1 CTX 30IN BLUE

## (undated) DEVICE — STAPLER INT PROX TX 60X3.5MM

## (undated) DEVICE — Device

## (undated) DEVICE — SYR 10CC LUER LOCK

## (undated) DEVICE — SUT VICRYL 3-0 27 SH

## (undated) DEVICE — DRESSING N ADH OIL EMUL 3X8

## (undated) DEVICE — SUT 1 48IN PDS II VIO MONO

## (undated) DEVICE — NDL SAFETY 25G X 1.5 ECLIPSE

## (undated) DEVICE — CUTTER PROXIMATE BLUE 75MM

## (undated) DEVICE — DRAPE LAP T SHT W/ INSTR PAD

## (undated) DEVICE — ELECTRODE REM PLYHSV RETURN 9

## (undated) DEVICE — SOL 9P NACL IRR PIC IL

## (undated) DEVICE — PACK BASIC SETUP SC BR

## (undated) DEVICE — TOWEL OR DISP STRL BLUE 4/PK

## (undated) DEVICE — SUT VICRYL CTD 2-0 GI 27 SH

## (undated) DEVICE — GOWN POLY REINF BRTH SLV XL